# Patient Record
Sex: FEMALE | Race: WHITE | NOT HISPANIC OR LATINO | Employment: OTHER | URBAN - METROPOLITAN AREA
[De-identification: names, ages, dates, MRNs, and addresses within clinical notes are randomized per-mention and may not be internally consistent; named-entity substitution may affect disease eponyms.]

---

## 2018-01-17 ENCOUNTER — TRANSCRIBE ORDERS (OUTPATIENT)
Dept: ADMINISTRATIVE | Facility: HOSPITAL | Age: 78
End: 2018-01-17

## 2018-01-22 ENCOUNTER — APPOINTMENT (OUTPATIENT)
Dept: PREADMISSION TESTING | Facility: HOSPITAL | Age: 78
End: 2018-01-22

## 2018-01-22 LAB
ANION GAP SERPL CALCULATED.3IONS-SCNC: 2 MMOL/L (ref 3–11)
BUN BLD-MCNC: 19 MG/DL (ref 9–23)
BUN/CREAT SERPL: 19 (ref 7–25)
CALCIUM SPEC-SCNC: 9.6 MG/DL (ref 8.7–10.4)
CHLORIDE SERPL-SCNC: 109 MMOL/L (ref 99–109)
CO2 SERPL-SCNC: 29 MMOL/L (ref 20–31)
CREAT BLD-MCNC: 1 MG/DL (ref 0.6–1.3)
DEPRECATED RDW RBC AUTO: 47.1 FL (ref 37–54)
ERYTHROCYTE [DISTWIDTH] IN BLOOD BY AUTOMATED COUNT: 13.5 % (ref 11.3–14.5)
GFR SERPL CREATININE-BSD FRML MDRD: 54 ML/MIN/1.73
GLUCOSE BLD-MCNC: 84 MG/DL (ref 70–100)
HCT VFR BLD AUTO: 36.9 % (ref 34.5–44)
HGB BLD-MCNC: 12.1 G/DL (ref 11.5–15.5)
MCH RBC QN AUTO: 31.1 PG (ref 27–31)
MCHC RBC AUTO-ENTMCNC: 32.8 G/DL (ref 32–36)
MCV RBC AUTO: 94.9 FL (ref 80–99)
PLATELET # BLD AUTO: 277 10*3/MM3 (ref 150–450)
PMV BLD AUTO: 10.3 FL (ref 6–12)
POTASSIUM BLD-SCNC: 4.5 MMOL/L (ref 3.5–5.5)
RBC # BLD AUTO: 3.89 10*6/MM3 (ref 3.89–5.14)
SODIUM BLD-SCNC: 140 MMOL/L (ref 132–146)
WBC NRBC COR # BLD: 5.58 10*3/MM3 (ref 3.5–10.8)

## 2018-01-22 PROCEDURE — 93010 ELECTROCARDIOGRAM REPORT: CPT | Performed by: INTERNAL MEDICINE

## 2018-01-22 PROCEDURE — 80048 BASIC METABOLIC PNL TOTAL CA: CPT | Performed by: SURGERY

## 2018-01-22 PROCEDURE — 85027 COMPLETE CBC AUTOMATED: CPT | Performed by: SURGERY

## 2018-01-22 PROCEDURE — 93005 ELECTROCARDIOGRAM TRACING: CPT

## 2018-01-22 PROCEDURE — 36415 COLL VENOUS BLD VENIPUNCTURE: CPT

## 2018-01-26 ENCOUNTER — LAB REQUISITION (OUTPATIENT)
Dept: LAB | Facility: HOSPITAL | Age: 78
End: 2018-01-26

## 2018-01-26 DIAGNOSIS — C44.722 SQUAMOUS CELL CARCINOMA OF SKIN OF RIGHT LOWER LIMB, INCLUDING HIP: ICD-10-CM

## 2018-01-26 PROCEDURE — 88305 TISSUE EXAM BY PATHOLOGIST: CPT | Performed by: SURGERY

## 2018-01-29 LAB
CYTO UR: NORMAL
LAB AP CASE REPORT: NORMAL
LAB AP CLINICAL INFORMATION: NORMAL
LAB AP DIAGNOSIS COMMENT: NORMAL
Lab: NORMAL
PATH REPORT.FINAL DX SPEC: NORMAL
PATH REPORT.GROSS SPEC: NORMAL

## 2020-12-29 ENCOUNTER — LAB (OUTPATIENT)
Dept: LAB | Facility: HOSPITAL | Age: 80
End: 2020-12-29

## 2020-12-29 ENCOUNTER — OFFICE VISIT (OUTPATIENT)
Dept: ENDOCRINOLOGY | Facility: CLINIC | Age: 80
End: 2020-12-29

## 2020-12-29 VITALS
HEART RATE: 67 BPM | BODY MASS INDEX: 31.87 KG/M2 | WEIGHT: 168.8 LBS | DIASTOLIC BLOOD PRESSURE: 82 MMHG | OXYGEN SATURATION: 99 % | HEIGHT: 61 IN | TEMPERATURE: 97.3 F | SYSTOLIC BLOOD PRESSURE: 134 MMHG

## 2020-12-29 DIAGNOSIS — E04.2 MULTINODULAR GOITER: ICD-10-CM

## 2020-12-29 DIAGNOSIS — E04.2 MULTINODULAR GOITER: Primary | ICD-10-CM

## 2020-12-29 DIAGNOSIS — R94.6 ABNORMAL THYROID FUNCTION TEST: ICD-10-CM

## 2020-12-29 PROBLEM — E03.9 HYPOTHYROIDISM (ACQUIRED): Status: RESOLVED | Noted: 2020-12-29 | Resolved: 2020-12-29

## 2020-12-29 PROBLEM — E03.9 HYPOTHYROIDISM (ACQUIRED): Status: ACTIVE | Noted: 2020-12-29

## 2020-12-29 LAB
DEPRECATED RDW RBC AUTO: 44 FL (ref 37–54)
ERYTHROCYTE [DISTWIDTH] IN BLOOD BY AUTOMATED COUNT: 12.7 % (ref 12.3–15.4)
HCT VFR BLD AUTO: 39.3 % (ref 34–46.6)
HGB BLD-MCNC: 12.9 G/DL (ref 12–15.9)
MCH RBC QN AUTO: 31 PG (ref 26.6–33)
MCHC RBC AUTO-ENTMCNC: 32.8 G/DL (ref 31.5–35.7)
MCV RBC AUTO: 94.5 FL (ref 79–97)
PLATELET # BLD AUTO: 293 10*3/MM3 (ref 140–450)
PMV BLD AUTO: 11.1 FL (ref 6–12)
RBC # BLD AUTO: 4.16 10*6/MM3 (ref 3.77–5.28)
T3 SERPL-MCNC: 98.4 NG/DL (ref 80–200)
T4 FREE SERPL-MCNC: 0.99 NG/DL (ref 0.93–1.7)
TSH SERPL DL<=0.05 MIU/L-ACNC: 0.51 UIU/ML (ref 0.27–4.2)
WBC # BLD AUTO: 7.32 10*3/MM3 (ref 3.4–10.8)

## 2020-12-29 PROCEDURE — 84480 ASSAY TRIIODOTHYRONINE (T3): CPT

## 2020-12-29 PROCEDURE — 84439 ASSAY OF FREE THYROXINE: CPT

## 2020-12-29 PROCEDURE — 80053 COMPREHEN METABOLIC PANEL: CPT

## 2020-12-29 PROCEDURE — 99213 OFFICE O/P EST LOW 20 MIN: CPT | Performed by: INTERNAL MEDICINE

## 2020-12-29 PROCEDURE — 84443 ASSAY THYROID STIM HORMONE: CPT

## 2020-12-29 PROCEDURE — 85027 COMPLETE CBC AUTOMATED: CPT

## 2020-12-29 PROCEDURE — 84445 ASSAY OF TSI GLOBULIN: CPT

## 2020-12-29 RX ORDER — ASPIRIN 325 MG
325 TABLET ORAL DAILY
COMMUNITY

## 2020-12-29 RX ORDER — FOLIC ACID 1 MG/1
1 TABLET ORAL DAILY
COMMUNITY

## 2020-12-29 RX ORDER — MULTIPLE VITAMINS W/ MINERALS TAB 9MG-400MCG
1 TAB ORAL DAILY
COMMUNITY

## 2020-12-29 RX ORDER — EXEMESTANE 25 MG/1
25 TABLET ORAL DAILY
COMMUNITY
Start: 2020-12-03 | End: 2022-06-29

## 2020-12-29 RX ORDER — PROPAFENONE HYDROCHLORIDE 300 MG/1
1 TABLET, COATED ORAL 2 TIMES DAILY
COMMUNITY
Start: 2020-11-13

## 2020-12-29 RX ORDER — KETOCONAZOLE 20 MG/ML
SHAMPOO TOPICAL
COMMUNITY
Start: 2020-12-06 | End: 2022-06-29

## 2020-12-29 NOTE — PROGRESS NOTES
"     Office Note      Date: 2020  Patient Name: Laury Eugene  MRN: [id number]  : 1940    Chief Complaint   Patient presents with   • Follow-up   • Thyroid Problem       History of Present Illness:   Laury Eugene is a 80 y.o. female who presents for Follow-up and Thyroid Problem    She isn't taking any thyroid meds. She denies any excess iodine intake. She denies any recent steroids. She hasn't noted any change in the size of her neck. She denies any compressive sxs. She denies any sxs of hypo- or hyperthyroidism at this time, aside from fatigue and hair loss.  She is on MVI with low dose biotin.    Subjective      Review of Systems:   Review of Systems   Constitutional: Positive for fatigue.   Cardiovascular: Negative.    Gastrointestinal: Negative.    Endocrine: Negative.        The following portions of the patient's history were reviewed and updated as appropriate: allergies, current medications, past family history, past medical history, past social history, past surgical history and problem list.    Objective     Visit Vitals  /82   Pulse 67   Temp 97.3 °F (36.3 °C) (Infrared)   Ht 154.9 cm (61\")   Wt 76.6 kg (168 lb 12.8 oz)   SpO2 99%   BMI 31.89 kg/m²       Physical Exam:  Physical Exam  Constitutional:       Appearance: Normal appearance.   Neck:      Thyroid: No thyroid mass, thyromegaly or thyroid tenderness.   Lymphadenopathy:      Cervical: No cervical adenopathy.   Neurological:      Mental Status: She is alert.         Labs:    TSH  No results found for: TSHBASE     Free T4  No results found for: FREET4    T3  No results found for: [id number]      TPO  No results found for: THYROIDAB    TG AB  No results found for: THGAB    TG  No results found for: THYROGLB    CBC w/DIFF  Lab Results   Component Value Date    WBC 5.58 2018    RBC 3.89 2018    HGB 12.1 2018    HCT 36.9 2018    MCV 94.9 2018    MCH 31.1 (H) 2018    MCHC 32.8 2018    RDW " 13.5 01/22/2018    RDWSD 47.1 01/22/2018    MPV 10.3 01/22/2018     01/22/2018           Assessment / Plan      Assessment & Plan:  Problem List Items Addressed This Visit        Other    Multinodular goiter - Primary    Current Assessment & Plan     Plan for neck u/s in about 6 months.         Relevant Orders    Comprehensive Metabolic Panel    CBC (No Diff)    Abnormal thyroid function test    Current Assessment & Plan     TSH has been low.  I would like to check further labs.  Could she have Graves' disease or toxic nodule?  We discussed treatment with methimazole if needed.         Relevant Orders    TSH    T4, Free    T3    Thyroid Stimulating Immunoglobulin           Return in about 3 months (around 3/29/2021) for Recheck with TSH, free T4, CMP, CBC.    Roger Hodgson MD   12/29/2020

## 2020-12-29 NOTE — ASSESSMENT & PLAN NOTE
TSH has been low.  I would like to check further labs.  Could she have Graves' disease or toxic nodule?  We discussed treatment with methimazole if needed.

## 2020-12-30 LAB
ALBUMIN SERPL-MCNC: 3.9 G/DL (ref 3.5–5.2)
ALBUMIN/GLOB SERPL: 1.2 G/DL
ALP SERPL-CCNC: 104 U/L (ref 39–117)
ALT SERPL W P-5'-P-CCNC: 11 U/L (ref 1–33)
ANION GAP SERPL CALCULATED.3IONS-SCNC: 10.6 MMOL/L (ref 5–15)
AST SERPL-CCNC: 16 U/L (ref 1–32)
BILIRUB SERPL-MCNC: 0.2 MG/DL (ref 0–1.2)
BUN SERPL-MCNC: 20 MG/DL (ref 8–23)
BUN/CREAT SERPL: 20 (ref 7–25)
CALCIUM SPEC-SCNC: 9.3 MG/DL (ref 8.6–10.5)
CHLORIDE SERPL-SCNC: 103 MMOL/L (ref 98–107)
CO2 SERPL-SCNC: 25.4 MMOL/L (ref 22–29)
CREAT SERPL-MCNC: 1 MG/DL (ref 0.57–1)
GFR SERPL CREATININE-BSD FRML MDRD: 54 ML/MIN/1.73
GLOBULIN UR ELPH-MCNC: 3.2 GM/DL
GLUCOSE SERPL-MCNC: 75 MG/DL (ref 65–99)
POTASSIUM SERPL-SCNC: 4.8 MMOL/L (ref 3.5–5.2)
PROT SERPL-MCNC: 7.1 G/DL (ref 6–8.5)
SODIUM SERPL-SCNC: 139 MMOL/L (ref 136–145)

## 2021-01-02 LAB — TSI SER-ACNC: <0.1 IU/L (ref 0–0.55)

## 2021-04-05 ENCOUNTER — LAB (OUTPATIENT)
Dept: LAB | Facility: HOSPITAL | Age: 81
End: 2021-04-05

## 2021-04-05 ENCOUNTER — OFFICE VISIT (OUTPATIENT)
Dept: ENDOCRINOLOGY | Facility: CLINIC | Age: 81
End: 2021-04-05

## 2021-04-05 VITALS
TEMPERATURE: 98.6 F | DIASTOLIC BLOOD PRESSURE: 52 MMHG | BODY MASS INDEX: 31.34 KG/M2 | HEIGHT: 61 IN | HEART RATE: 74 BPM | OXYGEN SATURATION: 98 % | SYSTOLIC BLOOD PRESSURE: 122 MMHG | WEIGHT: 166 LBS

## 2021-04-05 DIAGNOSIS — R94.6 ABNORMAL THYROID FUNCTION TEST: ICD-10-CM

## 2021-04-05 DIAGNOSIS — E04.2 MULTINODULAR GOITER: ICD-10-CM

## 2021-04-05 DIAGNOSIS — E04.2 MULTINODULAR GOITER: Primary | ICD-10-CM

## 2021-04-05 LAB — TSH SERPL DL<=0.05 MIU/L-ACNC: 0.18 UIU/ML (ref 0.27–4.2)

## 2021-04-05 PROCEDURE — 99213 OFFICE O/P EST LOW 20 MIN: CPT | Performed by: INTERNAL MEDICINE

## 2021-04-05 PROCEDURE — 84443 ASSAY THYROID STIM HORMONE: CPT

## 2021-04-05 NOTE — ASSESSMENT & PLAN NOTE
She stopped the MVI with biotin about 5 days ago.  Check TSH and free T4 today.  Will send note about results.

## 2021-04-05 NOTE — PROGRESS NOTES
"     Office Note      Date: 2021  Patient Name: Laury Eugene  MRN: [id number]  : 1940    Chief Complaint   Patient presents with   • Thyroid Problem       History of Present Illness:   Laury Eugene is a 81 y.o. female who presents for Thyroid Problem    She isn't taking any thyroid meds. She denies any excess iodine intake. She denies any recent steroids. She hasn't noted any change in the size of her neck. She denies any compressive sxs. She denies any sxs of hypo- or hyperthyroidism at this time, aside from fatigue and hair loss.  She is on MVI with low dose biotin.    Subjective      Review of Systems:   Review of Systems   Constitutional: Positive for fatigue.   Cardiovascular: Negative.    Gastrointestinal: Negative.    Endocrine: Negative.        The following portions of the patient's history were reviewed and updated as appropriate: allergies, current medications, past family history, past medical history, past social history, past surgical history and problem list.    Objective     Visit Vitals  /52 (BP Location: Right arm, Patient Position: Sitting, Cuff Size: Adult)   Pulse 74   Temp 98.6 °F (37 °C) (Infrared)   Ht 154.9 cm (61\")   Wt 75.3 kg (166 lb)   SpO2 98%   BMI 31.37 kg/m²       Physical Exam:  Physical Exam  Constitutional:       Appearance: Normal appearance.   Neck:      Thyroid: No thyroid mass, thyromegaly or thyroid tenderness.   Lymphadenopathy:      Cervical: No cervical adenopathy.   Neurological:      Mental Status: She is alert.         Labs:    TSH  No results found for: TSHBASE     Free T4  Free T4   Date Value Ref Range Status   2020 0.99 0.93 - 1.70 ng/dL Final       T3  T3, Total   Date Value Ref Range Status   2020 98.4 80.0 - 200.0 ng/dl Final         TPO  No results found for: THYROIDAB    TG AB  No results found for: THGAB    TG  No results found for: THYROGLB    CBC w/DIFF  Lab Results   Component Value Date    WBC 7.32 2020    RBC " 4.16 12/29/2020    HGB 12.9 12/29/2020    HCT 39.3 12/29/2020    MCV 94.5 12/29/2020    MCH 31.0 12/29/2020    MCHC 32.8 12/29/2020    RDW 12.7 12/29/2020    RDWSD 44.0 12/29/2020    MPV 11.1 12/29/2020     12/29/2020           Assessment / Plan      Assessment & Plan:  Diagnoses and all orders for this visit:    1. Multinodular goiter (Primary)  Assessment & Plan:  Stable to palpation.  Plan for neck u/s next visit.    Orders:  -     TSH; Future    2. Abnormal thyroid function test  Assessment & Plan:  She stopped the MVI with biotin about 5 days ago.  Check TSH and free T4 today.  Will send note about results.          Return in about 3 months (around 7/5/2021) for Recheck with TSH, free T4, neck u/s.    Roger Hodgson MD   04/05/2021

## 2021-08-03 ENCOUNTER — LAB (OUTPATIENT)
Dept: LAB | Facility: HOSPITAL | Age: 81
End: 2021-08-03

## 2021-08-03 ENCOUNTER — OFFICE VISIT (OUTPATIENT)
Dept: ENDOCRINOLOGY | Facility: CLINIC | Age: 81
End: 2021-08-03

## 2021-08-03 VITALS
OXYGEN SATURATION: 98 % | SYSTOLIC BLOOD PRESSURE: 124 MMHG | BODY MASS INDEX: 31.15 KG/M2 | HEART RATE: 60 BPM | HEIGHT: 61 IN | WEIGHT: 165 LBS | DIASTOLIC BLOOD PRESSURE: 70 MMHG

## 2021-08-03 DIAGNOSIS — E04.2 MULTINODULAR GOITER: Primary | ICD-10-CM

## 2021-08-03 DIAGNOSIS — R94.6 ABNORMAL THYROID FUNCTION TEST: ICD-10-CM

## 2021-08-03 PROCEDURE — 99213 OFFICE O/P EST LOW 20 MIN: CPT | Performed by: INTERNAL MEDICINE

## 2021-08-03 PROCEDURE — 84439 ASSAY OF FREE THYROXINE: CPT

## 2021-08-03 PROCEDURE — 76536 US EXAM OF HEAD AND NECK: CPT | Performed by: INTERNAL MEDICINE

## 2021-08-03 PROCEDURE — 84443 ASSAY THYROID STIM HORMONE: CPT

## 2021-08-03 NOTE — ASSESSMENT & PLAN NOTE
A neck u/s was performed today.  This revealed multiple bilateral thyroid nodules.  The largest was on the right and was 1.9cm in size.  The largest on the left was 8mm in size.  These appear stable compared to u/s from 11/26/2019.  No abnormal lymph nodes were seen.    Plan for another u/s in 2 years.

## 2021-08-03 NOTE — PROGRESS NOTES
"     Office Note      Date: 2021  Patient Name: Laury Eugene  MRN: [id number]  : 1940    Chief Complaint   Patient presents with   • Thyroid Problem       History of Present Illness:   Laury Eugene is a 81 y.o. female who presents for Thyroid Problem    She isn't taking any thyroid meds. She denies any excess iodine intake. She denies any recent steroids. She hasn't noted any change in the size of her neck. She denies any compressive sxs. She denies any sxs of hypo- or hyperthyroidism at this time, aside from fatigue.  She is on MVI with low dose biotin.    Subjective      Review of Systems:   Review of Systems   Constitutional: Positive for fatigue.   Cardiovascular: Negative.    Gastrointestinal: Negative.    Endocrine: Negative.        The following portions of the patient's history were reviewed and updated as appropriate: allergies, current medications, past family history, past medical history, past social history, past surgical history and problem list.    Objective     Visit Vitals  /70 (BP Location: Right arm, Patient Position: Sitting, Cuff Size: Adult)   Pulse 60   Ht 154.9 cm (61\")   Wt 74.8 kg (165 lb)   SpO2 98%   BMI 31.18 kg/m²       Physical Exam:  Physical Exam  Constitutional:       Appearance: Normal appearance.   Neurological:      Mental Status: She is alert.         Labs:    TSH  No results found for: TSHBASE     Free T4  Free T4   Date Value Ref Range Status   2020 0.99 0.93 - 1.70 ng/dL Final       T3  T3, Total   Date Value Ref Range Status   2020 98.4 80.0 - 200.0 ng/dl Final         TPO  No results found for: THYROIDAB    TG AB  No results found for: THGAB    TG  No results found for: THYROGLB    CBC w/DIFF  Lab Results   Component Value Date    WBC 7.32 2020    RBC 4.16 2020    HGB 12.9 2020    HCT 39.3 2020    MCV 94.5 2020    MCH 31.0 2020    MCHC 32.8 2020    RDW 12.7 2020    RDWSD 44.0 2020 "    MPV 11.1 12/29/2020     12/29/2020           Assessment / Plan      Assessment & Plan:  Diagnoses and all orders for this visit:    1. Multinodular goiter (Primary)  Assessment & Plan:  A neck u/s was performed today.  This revealed multiple bilateral thyroid nodules.  The largest was on the right and was 1.9cm in size.  The largest on the left was 8mm in size.  These appear stable compared to u/s from 11/26/2019.  No abnormal lymph nodes were seen.    Plan for another u/s in 2 years.    Orders:  -     US Thyroid    2. Abnormal thyroid function test  Assessment & Plan:  TSH has been mildly low.  Check TFTs today.  Will send note about results.    Orders:  -     TSH; Future  -     T4, Free; Future      Return in about 6 months (around 2/3/2022) for Recheck with TSH, free T4.    Roger Hodgson MD   08/03/2021

## 2021-08-04 LAB
T4 FREE SERPL-MCNC: 1.12 NG/DL (ref 0.93–1.7)
TSH SERPL DL<=0.05 MIU/L-ACNC: 0.24 UIU/ML (ref 0.27–4.2)

## 2022-06-29 ENCOUNTER — OFFICE VISIT (OUTPATIENT)
Dept: ENDOCRINOLOGY | Facility: CLINIC | Age: 82
End: 2022-06-29

## 2022-06-29 ENCOUNTER — LAB (OUTPATIENT)
Dept: LAB | Facility: HOSPITAL | Age: 82
End: 2022-06-29

## 2022-06-29 VITALS
HEIGHT: 61 IN | BODY MASS INDEX: 30.96 KG/M2 | SYSTOLIC BLOOD PRESSURE: 126 MMHG | WEIGHT: 164 LBS | DIASTOLIC BLOOD PRESSURE: 70 MMHG | OXYGEN SATURATION: 98 % | HEART RATE: 72 BPM

## 2022-06-29 DIAGNOSIS — R94.6 ABNORMAL THYROID FUNCTION TEST: ICD-10-CM

## 2022-06-29 DIAGNOSIS — E04.2 MULTINODULAR GOITER: Primary | ICD-10-CM

## 2022-06-29 LAB
T4 FREE SERPL-MCNC: 1.12 NG/DL (ref 0.93–1.7)
TSH SERPL DL<=0.05 MIU/L-ACNC: 0.26 UIU/ML (ref 0.27–4.2)

## 2022-06-29 PROCEDURE — 99213 OFFICE O/P EST LOW 20 MIN: CPT | Performed by: INTERNAL MEDICINE

## 2022-06-29 PROCEDURE — 84443 ASSAY THYROID STIM HORMONE: CPT | Performed by: INTERNAL MEDICINE

## 2022-06-29 PROCEDURE — 84439 ASSAY OF FREE THYROXINE: CPT | Performed by: INTERNAL MEDICINE

## 2022-06-29 NOTE — PROGRESS NOTES
"     Office Note      Date: 2022  Patient Name: Laury Eugene  MRN: [id number]  : 1940    Chief Complaint   Patient presents with   • Goiter       History of Present Illness:   Laury Eugene is a 82 y.o. female who presents for Goiter    She isn't taking any thyroid meds. She denies any excess iodine intake. She denies any recent steroids. She hasn't noted any change in the size of her neck. She denies any compressive sxs. She denies any sxs of hypo- or hyperthyroidism at this time, aside from fatigue.  She is on MVI with low dose biotin.    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  She had cataract surgery since her last visit.      Subjective      Review of Systems:   Constitutional: Positive for fatigue.   Cardiovascular: Negative.    Gastrointestinal: Negative.    Endocrine: Negative.      The following portions of the patient's history were reviewed and updated as appropriate: allergies, current medications, past family history, past medical history, past social history, past surgical history and problem list.    Objective     Visit Vitals  /70 (BP Location: Left arm, Patient Position: Sitting, Cuff Size: Adult)   Pulse 72   Ht 154.9 cm (61\")   Wt 74.4 kg (164 lb)   SpO2 98%   BMI 30.99 kg/m²       Physical Exam:  Physical Exam  Constitutional:       Appearance: Normal appearance.   Neck:      Thyroid: No thyroid mass, thyromegaly or thyroid tenderness.   Lymphadenopathy:      Cervical: No cervical adenopathy.   Neurological:      Mental Status: She is alert.         Labs:    TSH  No results found for: TSHBASE     Free T4  Free T4   Date Value Ref Range Status   2021 1.12 0.93 - 1.70 ng/dL Final       T3  T3, Total   Date Value Ref Range Status   2020 98.4 80.0 - 200.0 ng/dl Final         TPO  No results found for: THYROIDAB    TG AB  No results found for: THGAB    TG  No results found for: THYROGLB    CBC w/DIFF  Lab Results   Component Value Date    WBC 7.32 " 12/29/2020    RBC 4.16 12/29/2020    HGB 12.9 12/29/2020    HCT 39.3 12/29/2020    MCV 94.5 12/29/2020    MCH 31.0 12/29/2020    MCHC 32.8 12/29/2020    RDW 12.7 12/29/2020    RDWSD 44.0 12/29/2020    MPV 11.1 12/29/2020     12/29/2020           Assessment / Plan      Assessment & Plan:  Diagnoses and all orders for this visit:    1. Multinodular goiter (Primary)  Assessment & Plan:  Plan for neck u/s in a year.      2. Abnormal thyroid function test  Assessment & Plan:  TSH has been low in the past.  Check TFTs today.    Orders:  -     TSH; Future  -     T4, Free; Future      Return in about 6 months (around 12/29/2022) for Recheck with TSH, free T4.    Roger Hodgson MD   06/29/2022

## 2022-12-30 ENCOUNTER — LAB (OUTPATIENT)
Dept: LAB | Facility: HOSPITAL | Age: 82
End: 2022-12-30
Payer: MEDICARE

## 2022-12-30 ENCOUNTER — OFFICE VISIT (OUTPATIENT)
Dept: ENDOCRINOLOGY | Facility: CLINIC | Age: 82
End: 2022-12-30

## 2022-12-30 VITALS
DIASTOLIC BLOOD PRESSURE: 68 MMHG | WEIGHT: 166 LBS | HEART RATE: 84 BPM | HEIGHT: 61 IN | SYSTOLIC BLOOD PRESSURE: 122 MMHG | BODY MASS INDEX: 31.34 KG/M2 | OXYGEN SATURATION: 98 %

## 2022-12-30 DIAGNOSIS — R94.6 ABNORMAL THYROID FUNCTION TEST: ICD-10-CM

## 2022-12-30 DIAGNOSIS — E04.2 MULTINODULAR GOITER: Primary | ICD-10-CM

## 2022-12-30 LAB
T4 FREE SERPL-MCNC: 1.13 NG/DL (ref 0.93–1.7)
TSH SERPL DL<=0.05 MIU/L-ACNC: 0.1 UIU/ML (ref 0.27–4.2)

## 2022-12-30 PROCEDURE — 84443 ASSAY THYROID STIM HORMONE: CPT

## 2022-12-30 PROCEDURE — 99213 OFFICE O/P EST LOW 20 MIN: CPT | Performed by: INTERNAL MEDICINE

## 2022-12-30 PROCEDURE — 84439 ASSAY OF FREE THYROXINE: CPT

## 2022-12-30 NOTE — PROGRESS NOTES
"     Office Note      Date: 2022  Patient Name: Laury Eugene  MRN: [id number]  : 1940    Chief Complaint   Patient presents with   • Goiter       History of Present Illness:   Laury Eugene is a 82 y.o. female who presents for Goiter    She isn't taking any thyroid meds. She denies any excess iodine intake. She denies any recent steroids. She hasn't noted any change in the size of her neck. She denies any compressive sxs. She denies any sxs of hypo- or hyperthyroidism at this time, aside from fatigue.  She is on MVI with low dose biotin.    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    Subjective      Review of Systems:   Review of Systems   Constitutional: Positive for fatigue.   Cardiovascular: Negative.    Gastrointestinal: Negative.    Endocrine: Negative.        The following portions of the patient's history were reviewed and updated as appropriate: allergies, current medications, past family history, past medical history, past social history, past surgical history and problem list.    Objective     Visit Vitals  /68   Pulse 84   Ht 154.9 cm (61\")   Wt 75.3 kg (166 lb)   SpO2 98%   BMI 31.37 kg/m²       Physical Exam:  Physical Exam  Constitutional:       Appearance: Normal appearance.   Neck:      Thyroid: No thyroid mass, thyromegaly or thyroid tenderness.   Lymphadenopathy:      Cervical: No cervical adenopathy.   Neurological:      Mental Status: She is alert.         Labs:    TSH  No results found for: TSHBASE     Free T4  Free T4   Date Value Ref Range Status   2022 1.12 0.93 - 1.70 ng/dL Final       T3  T3, Total   Date Value Ref Range Status   2020 98.4 80.0 - 200.0 ng/dl Final         TPO  No results found for: THYROIDAB    TG AB  No results found for: THGAB    TG  No results found for: THYROGLB    CBC w/DIFF  Lab Results   Component Value Date    WBC 7.32 2020    RBC 4.16 2020    HGB 12.9 2020    HCT 39.3 " 12/29/2020    MCV 94.5 12/29/2020    MCH 31.0 12/29/2020    MCHC 32.8 12/29/2020    RDW 12.7 12/29/2020    RDWSD 44.0 12/29/2020    MPV 11.1 12/29/2020     12/29/2020           Assessment / Plan      Assessment & Plan:  Diagnoses and all orders for this visit:    1. Multinodular goiter (Primary)  Assessment & Plan:  Plan for neck u/s in about 6 months.      2. Abnormal thyroid function test  Assessment & Plan:  TSH has been mildly low.  Check TFTs today.  Will send note about results.    Orders:  -     TSH; Future  -     T4, Free; Future    Current Outpatient Medications   Medication Instructions   • aspirin 325 mg, Oral, Daily   • folic acid (FOLVITE) 1 mg, Oral, Daily   • multivitamin with minerals tablet tablet 1 tablet, Oral, Daily   • propafenone (RYTHMOL) 300 MG tablet 1 tablet, 2 Times Daily   • vitamin B-12 (CYANOCOBALAMIN) 250 mcg, Oral, Daily      Return in about 6 months (around 6/30/2023) for Recheck with TSH, free T4, neck u/s.    Roger Hodgson MD   12/30/2022

## 2023-08-26 ENCOUNTER — APPOINTMENT (OUTPATIENT)
Dept: GENERAL RADIOLOGY | Facility: HOSPITAL | Age: 83
DRG: 061 | End: 2023-08-26
Payer: MEDICARE

## 2023-08-26 ENCOUNTER — APPOINTMENT (OUTPATIENT)
Dept: CT IMAGING | Facility: HOSPITAL | Age: 83
DRG: 061 | End: 2023-08-26
Payer: MEDICARE

## 2023-08-26 ENCOUNTER — HOSPITAL ENCOUNTER (INPATIENT)
Facility: HOSPITAL | Age: 83
LOS: 9 days | Discharge: NURSING, REHAB OR LONG-TERM CARE | DRG: 061 | End: 2023-09-04
Attending: EMERGENCY MEDICINE | Admitting: INTERNAL MEDICINE
Payer: MEDICARE

## 2023-08-26 ENCOUNTER — APPOINTMENT (OUTPATIENT)
Dept: CARDIOLOGY | Facility: HOSPITAL | Age: 83
DRG: 061 | End: 2023-08-26
Payer: MEDICARE

## 2023-08-26 ENCOUNTER — APPOINTMENT (OUTPATIENT)
Dept: MRI IMAGING | Facility: HOSPITAL | Age: 83
DRG: 061 | End: 2023-08-26
Payer: MEDICARE

## 2023-08-26 DIAGNOSIS — R13.10 DYSPHAGIA, UNSPECIFIED TYPE: ICD-10-CM

## 2023-08-26 DIAGNOSIS — R47.01 APHASIA: ICD-10-CM

## 2023-08-26 DIAGNOSIS — D62 ABLA (ACUTE BLOOD LOSS ANEMIA): ICD-10-CM

## 2023-08-26 DIAGNOSIS — I63.9 ACUTE CVA (CEREBROVASCULAR ACCIDENT): Primary | ICD-10-CM

## 2023-08-26 DIAGNOSIS — D64.9 ANEMIA, UNSPECIFIED TYPE: ICD-10-CM

## 2023-08-26 DIAGNOSIS — K92.2 ACUTE LOWER GASTROINTESTINAL BLEEDING: ICD-10-CM

## 2023-08-26 PROBLEM — I48.0 PAROXYSMAL ATRIAL FIBRILLATION: Status: ACTIVE | Noted: 2023-08-26

## 2023-08-26 PROBLEM — I10 ESSENTIAL HYPERTENSION: Status: ACTIVE | Noted: 2023-08-26

## 2023-08-26 PROBLEM — I63.512 ACUTE ISCHEMIC LEFT MCA STROKE: Status: ACTIVE | Noted: 2023-08-26

## 2023-08-26 PROBLEM — E78.5 HYPERLIPIDEMIA: Status: ACTIVE | Noted: 2023-08-26

## 2023-08-26 PROBLEM — E03.9 HYPOTHYROIDISM (ACQUIRED): Status: ACTIVE | Noted: 2023-08-26

## 2023-08-26 LAB
ABO GROUP BLD: NORMAL
ALBUMIN SERPL-MCNC: 2.8 G/DL (ref 3.5–5.2)
ALBUMIN/GLOB SERPL: 1.3 G/DL
ALP SERPL-CCNC: 85 U/L (ref 39–117)
ALT SERPL W P-5'-P-CCNC: 8 U/L (ref 1–33)
ALT SERPL W P-5'-P-CCNC: 9 U/L (ref 1–33)
ANION GAP SERPL CALCULATED.3IONS-SCNC: 6 MMOL/L (ref 5–15)
APTT PPP: 23 SECONDS (ref 22–39)
AST SERPL-CCNC: 14 U/L (ref 1–32)
AST SERPL-CCNC: 16 U/L (ref 1–32)
BASOPHILS # BLD AUTO: 0.03 10*3/MM3 (ref 0–0.2)
BASOPHILS # BLD AUTO: 0.04 10*3/MM3 (ref 0–0.2)
BASOPHILS NFR BLD AUTO: 0.4 % (ref 0–1.5)
BASOPHILS NFR BLD AUTO: 0.4 % (ref 0–1.5)
BH CV ECHO MEAS - AO ROOT DIAM: 2.7 CM
BH CV ECHO MEAS - EDV(CUBED): 48.4 ML
BH CV ECHO MEAS - EDV(MOD-SP4): 78.6 ML
BH CV ECHO MEAS - EF(MOD-SP4): 52.8 %
BH CV ECHO MEAS - ESV(CUBED): 20.6 ML
BH CV ECHO MEAS - ESV(MOD-SP4): 37.1 ML
BH CV ECHO MEAS - FS: 24.7 %
BH CV ECHO MEAS - IVS/LVPW: 1.06 CM
BH CV ECHO MEAS - IVSD: 0.98 CM
BH CV ECHO MEAS - LA DIMENSION: 3.6 CM
BH CV ECHO MEAS - LAT PEAK E' VEL: 14.9 CM/SEC
BH CV ECHO MEAS - LV DIASTOLIC VOL/BSA (35-75): 44.9 CM2
BH CV ECHO MEAS - LV MASS(C)D: 103.1 GRAMS
BH CV ECHO MEAS - LV MAX PG: 2.37 MMHG
BH CV ECHO MEAS - LV MEAN PG: 1 MMHG
BH CV ECHO MEAS - LV SYSTOLIC VOL/BSA (12-30): 21.2 CM2
BH CV ECHO MEAS - LV V1 MAX: 76.9 CM/SEC
BH CV ECHO MEAS - LV V1 VTI: 14.8 CM
BH CV ECHO MEAS - LVIDD: 3.6 CM
BH CV ECHO MEAS - LVIDS: 2.7 CM
BH CV ECHO MEAS - LVOT AREA: 2.13 CM2
BH CV ECHO MEAS - LVOT DIAM: 1.65 CM
BH CV ECHO MEAS - LVPWD: 0.93 CM
BH CV ECHO MEAS - MED PEAK E' VEL: 12 CM/SEC
BH CV ECHO MEAS - MV A MAX VEL: 44.3 CM/SEC
BH CV ECHO MEAS - MV E MAX VEL: 91.3 CM/SEC
BH CV ECHO MEAS - MV E/A: 2.06
BH CV ECHO MEAS - MV MAX PG: 8.1 MMHG
BH CV ECHO MEAS - MV MEAN PG: 2.7 MMHG
BH CV ECHO MEAS - MV V2 VTI: 18.1 CM
BH CV ECHO MEAS - MVA(VTI): 1.74 CM2
BH CV ECHO MEAS - PA ACC TIME: 0.12 SEC
BH CV ECHO MEAS - SI(MOD-SP4): 23.7 ML/M2
BH CV ECHO MEAS - SV(LVOT): 31.5 ML
BH CV ECHO MEAS - SV(MOD-SP4): 41.5 ML
BH CV ECHO MEAS - TAPSE (>1.6): 1.89 CM
BH CV ECHO MEASUREMENTS AVERAGE E/E' RATIO: 6.79
BH CV XLRA - RV BASE: 3.6 CM
BH CV XLRA - RV LENGTH: 5 CM
BH CV XLRA - RV MID: 2.19 CM
BH CV XLRA - TDI S': 21.3 CM/SEC
BILIRUB SERPL-MCNC: 0.2 MG/DL (ref 0–1.2)
BLD GP AB SCN SERPL QL: NEGATIVE
BUN BLDA-MCNC: 16 MG/DL (ref 8–26)
BUN SERPL-MCNC: 17 MG/DL (ref 8–23)
BUN/CREAT SERPL: 20.2 (ref 7–25)
CA-I BLDA-SCNC: 1.16 MMOL/L (ref 1.2–1.32)
CALCIUM SPEC-SCNC: 7.5 MG/DL (ref 8.6–10.5)
CHLORIDE BLDA-SCNC: 103 MMOL/L (ref 98–109)
CHLORIDE SERPL-SCNC: 107 MMOL/L (ref 98–107)
CHOLEST SERPL-MCNC: 129 MG/DL (ref 0–200)
CHOLEST SERPL-MCNC: 143 MG/DL (ref 0–200)
CO2 BLDA-SCNC: 25 MMOL/L (ref 24–29)
CO2 SERPL-SCNC: 25 MMOL/L (ref 22–29)
CREAT BLDA-MCNC: 1 MG/DL (ref 0.6–1.3)
CREAT SERPL-MCNC: 0.84 MG/DL (ref 0.57–1)
DEPRECATED RDW RBC AUTO: 49.1 FL (ref 37–54)
DEPRECATED RDW RBC AUTO: 50.2 FL (ref 37–54)
EGFRCR SERPLBLD CKD-EPI 2021: 56.7 ML/MIN/1.73
EGFRCR SERPLBLD CKD-EPI 2021: 69.9 ML/MIN/1.73
EOSINOPHIL # BLD AUTO: 0.15 10*3/MM3 (ref 0–0.4)
EOSINOPHIL # BLD AUTO: 0.38 10*3/MM3 (ref 0–0.4)
EOSINOPHIL NFR BLD AUTO: 1.8 % (ref 0.3–6.2)
EOSINOPHIL NFR BLD AUTO: 4.1 % (ref 0.3–6.2)
ERYTHROCYTE [DISTWIDTH] IN BLOOD BY AUTOMATED COUNT: 14.5 % (ref 12.3–15.4)
ERYTHROCYTE [DISTWIDTH] IN BLOOD BY AUTOMATED COUNT: 14.8 % (ref 12.3–15.4)
GLOBULIN UR ELPH-MCNC: 2.2 GM/DL
GLUCOSE BLDC GLUCOMTR-MCNC: 113 MG/DL (ref 70–130)
GLUCOSE BLDC GLUCOMTR-MCNC: 131 MG/DL (ref 70–130)
GLUCOSE BLDC GLUCOMTR-MCNC: 158 MG/DL (ref 70–130)
GLUCOSE BLDC GLUCOMTR-MCNC: 176 MG/DL (ref 70–130)
GLUCOSE BLDC GLUCOMTR-MCNC: 195 MG/DL (ref 70–130)
GLUCOSE SERPL-MCNC: 127 MG/DL (ref 65–99)
HCT VFR BLD AUTO: 26 % (ref 34–46.6)
HCT VFR BLD AUTO: 27 % (ref 34–46.6)
HCT VFR BLD AUTO: 27 % (ref 34–46.6)
HCT VFR BLD AUTO: 27.9 % (ref 34–46.6)
HCT VFR BLD AUTO: 28.3 % (ref 34–46.6)
HCT VFR BLDA CALC: 26 % (ref 38–51)
HDLC SERPL-MCNC: 41 MG/DL (ref 40–60)
HDLC SERPL-MCNC: 45 MG/DL (ref 40–60)
HGB BLD-MCNC: 8.6 G/DL (ref 12–15.9)
HGB BLD-MCNC: 9 G/DL (ref 12–15.9)
HGB BLD-MCNC: 9.1 G/DL (ref 12–15.9)
HGB BLD-MCNC: 9.4 G/DL (ref 12–15.9)
HGB BLD-MCNC: 9.5 G/DL (ref 12–15.9)
HGB BLDA-MCNC: 8.8 G/DL (ref 12–17)
HOLD SPECIMEN: NORMAL
IMM GRANULOCYTES # BLD AUTO: 0.06 10*3/MM3 (ref 0–0.05)
IMM GRANULOCYTES # BLD AUTO: 0.06 10*3/MM3 (ref 0–0.05)
IMM GRANULOCYTES NFR BLD AUTO: 0.7 % (ref 0–0.5)
IMM GRANULOCYTES NFR BLD AUTO: 0.7 % (ref 0–0.5)
INR PPP: 1 (ref 0.8–1.2)
LDLC SERPL CALC-MCNC: 73 MG/DL (ref 0–100)
LDLC SERPL CALC-MCNC: 80 MG/DL (ref 0–100)
LDLC/HDLC SERPL: 1.74 {RATIO}
LDLC/HDLC SERPL: 1.79 {RATIO}
LYMPHOCYTES # BLD AUTO: 1.31 10*3/MM3 (ref 0.7–3.1)
LYMPHOCYTES # BLD AUTO: 2.35 10*3/MM3 (ref 0.7–3.1)
LYMPHOCYTES NFR BLD AUTO: 16 % (ref 19.6–45.3)
LYMPHOCYTES NFR BLD AUTO: 25.6 % (ref 19.6–45.3)
MAGNESIUM SERPL-MCNC: 1.9 MG/DL (ref 1.6–2.4)
MCH RBC QN AUTO: 30.7 PG (ref 26.6–33)
MCH RBC QN AUTO: 30.8 PG (ref 26.6–33)
MCHC RBC AUTO-ENTMCNC: 33.1 G/DL (ref 31.5–35.7)
MCHC RBC AUTO-ENTMCNC: 33.6 G/DL (ref 31.5–35.7)
MCV RBC AUTO: 91.6 FL (ref 79–97)
MCV RBC AUTO: 93.2 FL (ref 79–97)
MONOCYTES # BLD AUTO: 0.67 10*3/MM3 (ref 0.1–0.9)
MONOCYTES # BLD AUTO: 1.08 10*3/MM3 (ref 0.1–0.9)
MONOCYTES NFR BLD AUTO: 11.8 % (ref 5–12)
MONOCYTES NFR BLD AUTO: 8.2 % (ref 5–12)
NEUTROPHILS NFR BLD AUTO: 5.27 10*3/MM3 (ref 1.7–7)
NEUTROPHILS NFR BLD AUTO: 5.99 10*3/MM3 (ref 1.7–7)
NEUTROPHILS NFR BLD AUTO: 57.4 % (ref 42.7–76)
NEUTROPHILS NFR BLD AUTO: 72.9 % (ref 42.7–76)
NRBC BLD AUTO-RTO: 0 /100 WBC (ref 0–0.2)
NRBC BLD AUTO-RTO: 0 /100 WBC (ref 0–0.2)
PHOSPHATE SERPL-MCNC: 2.6 MG/DL (ref 2.5–4.5)
PLATELET # BLD AUTO: 213 10*3/MM3 (ref 140–450)
PLATELET # BLD AUTO: 226 10*3/MM3 (ref 140–450)
PMV BLD AUTO: 10.4 FL (ref 6–12)
PMV BLD AUTO: 10.7 FL (ref 6–12)
POTASSIUM BLDA-SCNC: 3.7 MMOL/L (ref 3.5–4.9)
POTASSIUM SERPL-SCNC: 3.9 MMOL/L (ref 3.5–5.2)
PROT SERPL-MCNC: 5 G/DL (ref 6–8.5)
PROTHROMBIN TIME: 12.2 SECONDS (ref 12.8–15.2)
RBC # BLD AUTO: 2.79 10*6/MM3 (ref 3.77–5.28)
RBC # BLD AUTO: 3.09 10*6/MM3 (ref 3.77–5.28)
RH BLD: POSITIVE
SODIUM BLD-SCNC: 139 MMOL/L (ref 138–146)
SODIUM SERPL-SCNC: 138 MMOL/L (ref 136–145)
T&S EXPIRATION DATE: NORMAL
T4 FREE SERPL-MCNC: 1.3 NG/DL (ref 0.93–1.7)
TRIGL SERPL-MCNC: 74 MG/DL (ref 0–150)
TRIGL SERPL-MCNC: 98 MG/DL (ref 0–150)
TROPONIN T SERPL HS-MCNC: 18 NG/L
TSH SERPL DL<=0.05 MIU/L-ACNC: 0.2 UIU/ML (ref 0.27–4.2)
VLDLC SERPL-MCNC: 15 MG/DL (ref 5–40)
VLDLC SERPL-MCNC: 18 MG/DL (ref 5–40)
WBC NRBC COR # BLD: 8.21 10*3/MM3 (ref 3.4–10.8)
WBC NRBC COR # BLD: 9.18 10*3/MM3 (ref 3.4–10.8)
WHOLE BLOOD HOLD COAG: NORMAL
WHOLE BLOOD HOLD SPECIMEN: NORMAL

## 2023-08-26 PROCEDURE — 80047 BASIC METABLC PNL IONIZED CA: CPT

## 2023-08-26 PROCEDURE — 80061 LIPID PANEL: CPT | Performed by: NURSE PRACTITIONER

## 2023-08-26 PROCEDURE — 70551 MRI BRAIN STEM W/O DYE: CPT

## 2023-08-26 PROCEDURE — 74018 RADEX ABDOMEN 1 VIEW: CPT

## 2023-08-26 PROCEDURE — 0 MAGNESIUM SULFATE 4 GM/100ML SOLUTION: Performed by: INTERNAL MEDICINE

## 2023-08-26 PROCEDURE — 93306 TTE W/DOPPLER COMPLETE: CPT

## 2023-08-26 PROCEDURE — 71045 X-RAY EXAM CHEST 1 VIEW: CPT

## 2023-08-26 PROCEDURE — 80053 COMPREHEN METABOLIC PANEL: CPT | Performed by: EMERGENCY MEDICINE

## 2023-08-26 PROCEDURE — 93005 ELECTROCARDIOGRAM TRACING: CPT | Performed by: INTERNAL MEDICINE

## 2023-08-26 PROCEDURE — 94799 UNLISTED PULMONARY SVC/PX: CPT

## 2023-08-26 PROCEDURE — 99285 EMERGENCY DEPT VISIT HI MDM: CPT

## 2023-08-26 PROCEDURE — 86850 RBC ANTIBODY SCREEN: CPT | Performed by: INTERNAL MEDICINE

## 2023-08-26 PROCEDURE — 99222 1ST HOSP IP/OBS MODERATE 55: CPT | Performed by: INTERNAL MEDICINE

## 2023-08-26 PROCEDURE — 70498 CT ANGIOGRAPHY NECK: CPT

## 2023-08-26 PROCEDURE — 82948 REAGENT STRIP/BLOOD GLUCOSE: CPT

## 2023-08-26 PROCEDURE — 85025 COMPLETE CBC W/AUTO DIFF WBC: CPT | Performed by: INTERNAL MEDICINE

## 2023-08-26 PROCEDURE — 99291 CRITICAL CARE FIRST HOUR: CPT | Performed by: INTERNAL MEDICINE

## 2023-08-26 PROCEDURE — 84439 ASSAY OF FREE THYROXINE: CPT | Performed by: INTERNAL MEDICINE

## 2023-08-26 PROCEDURE — 74178 CT ABD&PLV WO CNTR FLWD CNTR: CPT

## 2023-08-26 PROCEDURE — 86901 BLOOD TYPING SEROLOGIC RH(D): CPT | Performed by: INTERNAL MEDICINE

## 2023-08-26 PROCEDURE — 93005 ELECTROCARDIOGRAM TRACING: CPT | Performed by: EMERGENCY MEDICINE

## 2023-08-26 PROCEDURE — 84100 ASSAY OF PHOSPHORUS: CPT | Performed by: INTERNAL MEDICINE

## 2023-08-26 PROCEDURE — 25510000001 IOPAMIDOL PER 1 ML: Performed by: EMERGENCY MEDICINE

## 2023-08-26 PROCEDURE — 93306 TTE W/DOPPLER COMPLETE: CPT | Performed by: INTERNAL MEDICINE

## 2023-08-26 PROCEDURE — 85014 HEMATOCRIT: CPT

## 2023-08-26 PROCEDURE — 85610 PROTHROMBIN TIME: CPT

## 2023-08-26 PROCEDURE — 83735 ASSAY OF MAGNESIUM: CPT | Performed by: INTERNAL MEDICINE

## 2023-08-26 PROCEDURE — 70496 CT ANGIOGRAPHY HEAD: CPT

## 2023-08-26 PROCEDURE — 25010000002 METHYLPREDNISOLONE PER 125 MG: Performed by: NURSE PRACTITIONER

## 2023-08-26 PROCEDURE — 86900 BLOOD TYPING SEROLOGIC ABO: CPT | Performed by: INTERNAL MEDICINE

## 2023-08-26 PROCEDURE — 84443 ASSAY THYROID STIM HORMONE: CPT | Performed by: INTERNAL MEDICINE

## 2023-08-26 PROCEDURE — 86923 COMPATIBILITY TEST ELECTRIC: CPT

## 2023-08-26 PROCEDURE — 70450 CT HEAD/BRAIN W/O DYE: CPT

## 2023-08-26 PROCEDURE — 94660 CPAP INITIATION&MGMT: CPT

## 2023-08-26 PROCEDURE — 99222 1ST HOSP IP/OBS MODERATE 55: CPT | Performed by: NURSE PRACTITIONER

## 2023-08-26 PROCEDURE — 0042T HC CT CEREBRAL PERFUSION W/WO CONTRAST: CPT

## 2023-08-26 PROCEDURE — 3E03317 INTRODUCTION OF OTHER THROMBOLYTIC INTO PERIPHERAL VEIN, PERCUTANEOUS APPROACH: ICD-10-PCS | Performed by: EMERGENCY MEDICINE

## 2023-08-26 PROCEDURE — 94640 AIRWAY INHALATION TREATMENT: CPT

## 2023-08-26 PROCEDURE — 85730 THROMBOPLASTIN TIME PARTIAL: CPT | Performed by: EMERGENCY MEDICINE

## 2023-08-26 PROCEDURE — 85025 COMPLETE CBC W/AUTO DIFF WBC: CPT | Performed by: EMERGENCY MEDICINE

## 2023-08-26 PROCEDURE — 93010 ELECTROCARDIOGRAM REPORT: CPT | Performed by: INTERNAL MEDICINE

## 2023-08-26 PROCEDURE — 93005 ELECTROCARDIOGRAM TRACING: CPT | Performed by: NURSE PRACTITIONER

## 2023-08-26 PROCEDURE — 85014 HEMATOCRIT: CPT | Performed by: INTERNAL MEDICINE

## 2023-08-26 PROCEDURE — 99223 1ST HOSP IP/OBS HIGH 75: CPT | Performed by: NURSE PRACTITIONER

## 2023-08-26 PROCEDURE — 25010000002 TENECTEPLASE PER 50 MG: Performed by: NURSE PRACTITIONER

## 2023-08-26 PROCEDURE — 25510000001 IOPAMIDOL PER 1 ML: Performed by: INTERNAL MEDICINE

## 2023-08-26 PROCEDURE — 25010000002 SULFUR HEXAFLUORIDE MICROSPH 60.7-25 MG RECONSTITUTED SUSPENSION: Performed by: NURSE PRACTITIONER

## 2023-08-26 PROCEDURE — 85018 HEMOGLOBIN: CPT | Performed by: INTERNAL MEDICINE

## 2023-08-26 PROCEDURE — 25010000002 DIPHENHYDRAMINE PER 50 MG: Performed by: NURSE PRACTITIONER

## 2023-08-26 PROCEDURE — 84484 ASSAY OF TROPONIN QUANT: CPT | Performed by: EMERGENCY MEDICINE

## 2023-08-26 PROCEDURE — 92610 EVALUATE SWALLOWING FUNCTION: CPT

## 2023-08-26 RX ORDER — NEBIVOLOL 2.5 MG/1
2.5 TABLET ORAL
Status: DISCONTINUED | OUTPATIENT
Start: 2023-08-26 | End: 2023-09-04 | Stop reason: HOSPADM

## 2023-08-26 RX ORDER — ATORVASTATIN CALCIUM 40 MG/1
80 TABLET, FILM COATED ORAL NIGHTLY
Status: DISCONTINUED | OUTPATIENT
Start: 2023-08-26 | End: 2023-09-04 | Stop reason: HOSPADM

## 2023-08-26 RX ORDER — METHYLPREDNISOLONE SODIUM SUCCINATE 125 MG/2ML
125 INJECTION, POWDER, LYOPHILIZED, FOR SOLUTION INTRAMUSCULAR; INTRAVENOUS ONCE
Status: COMPLETED | OUTPATIENT
Start: 2023-08-26 | End: 2023-08-26

## 2023-08-26 RX ORDER — ASPIRIN 300 MG/1
300 SUPPOSITORY RECTAL DAILY
Status: DISCONTINUED | OUTPATIENT
Start: 2023-08-27 | End: 2023-08-26

## 2023-08-26 RX ORDER — SODIUM CHLORIDE 0.9 % (FLUSH) 0.9 %
10 SYRINGE (ML) INJECTION EVERY 12 HOURS SCHEDULED
Status: DISCONTINUED | OUTPATIENT
Start: 2023-08-26 | End: 2023-08-26

## 2023-08-26 RX ORDER — ASPIRIN 81 MG/1
81 TABLET, CHEWABLE ORAL DAILY
Status: DISCONTINUED | OUTPATIENT
Start: 2023-08-26 | End: 2023-08-26

## 2023-08-26 RX ORDER — PEG-3350, SODIUM SULFATE, SODIUM CHLORIDE, POTASSIUM CHLORIDE, SODIUM ASCORBATE AND ASCORBIC ACID 7.5-2.691G
1000 KIT ORAL
Status: COMPLETED | OUTPATIENT
Start: 2023-08-27 | End: 2023-08-27

## 2023-08-26 RX ORDER — TRAMADOL HYDROCHLORIDE 50 MG/1
50 TABLET ORAL EVERY 6 HOURS PRN
COMMUNITY

## 2023-08-26 RX ORDER — DIPHENHYDRAMINE HYDROCHLORIDE 50 MG/ML
25 INJECTION INTRAMUSCULAR; INTRAVENOUS EVERY 6 HOURS
Status: DISCONTINUED | OUTPATIENT
Start: 2023-08-27 | End: 2023-08-27

## 2023-08-26 RX ORDER — ASPIRIN 300 MG/1
300 SUPPOSITORY RECTAL DAILY
Status: DISCONTINUED | OUTPATIENT
Start: 2023-08-26 | End: 2023-08-26 | Stop reason: ALTCHOICE

## 2023-08-26 RX ORDER — ASPIRIN 325 MG
325 TABLET, DELAYED RELEASE (ENTERIC COATED) ORAL DAILY
Status: DISCONTINUED | OUTPATIENT
Start: 2023-08-26 | End: 2023-08-26 | Stop reason: ALTCHOICE

## 2023-08-26 RX ORDER — SODIUM CHLORIDE 9 MG/ML
40 INJECTION, SOLUTION INTRAVENOUS AS NEEDED
Status: DISCONTINUED | OUTPATIENT
Start: 2023-08-26 | End: 2023-08-26

## 2023-08-26 RX ORDER — SODIUM CHLORIDE 0.9 % (FLUSH) 0.9 %
10 SYRINGE (ML) INJECTION AS NEEDED
Status: DISCONTINUED | OUTPATIENT
Start: 2023-08-26 | End: 2023-08-26

## 2023-08-26 RX ORDER — DIPHENHYDRAMINE HYDROCHLORIDE 50 MG/ML
50 INJECTION INTRAMUSCULAR; INTRAVENOUS ONCE
Status: COMPLETED | OUTPATIENT
Start: 2023-08-26 | End: 2023-08-26

## 2023-08-26 RX ORDER — SODIUM CHLORIDE 0.9 % (FLUSH) 0.9 %
10 SYRINGE (ML) INJECTION ONCE
Status: COMPLETED | OUTPATIENT
Start: 2023-08-26 | End: 2023-08-26

## 2023-08-26 RX ORDER — ASPIRIN 300 MG/1
300 SUPPOSITORY RECTAL DAILY
Status: DISCONTINUED | OUTPATIENT
Start: 2023-08-26 | End: 2023-08-26

## 2023-08-26 RX ORDER — EPINEPHRINE 1 MG/ML
0.3 INJECTION, SOLUTION, CONCENTRATE INTRAVENOUS ONCE AS NEEDED
Status: DISCONTINUED | OUTPATIENT
Start: 2023-08-26 | End: 2023-08-27

## 2023-08-26 RX ORDER — FAMOTIDINE 10 MG/ML
20 INJECTION, SOLUTION INTRAVENOUS EVERY 12 HOURS
Status: COMPLETED | OUTPATIENT
Start: 2023-08-26 | End: 2023-08-27

## 2023-08-26 RX ORDER — ASPIRIN 325 MG
325 TABLET ORAL DAILY
Status: DISCONTINUED | OUTPATIENT
Start: 2023-08-27 | End: 2023-08-26

## 2023-08-26 RX ORDER — PANTOPRAZOLE SODIUM 40 MG/1
40 TABLET, DELAYED RELEASE ORAL DAILY
COMMUNITY

## 2023-08-26 RX ORDER — PROPAFENONE HYDROCHLORIDE 325 MG/1
325 CAPSULE, EXTENDED RELEASE ORAL EVERY 12 HOURS SCHEDULED
Status: DISCONTINUED | OUTPATIENT
Start: 2023-08-26 | End: 2023-09-04 | Stop reason: HOSPADM

## 2023-08-26 RX ORDER — PEG-3350, SODIUM SULFATE, SODIUM CHLORIDE, POTASSIUM CHLORIDE, SODIUM ASCORBATE AND ASCORBIC ACID 7.5-2.691G
1000 KIT ORAL
Status: COMPLETED | OUTPATIENT
Start: 2023-08-26 | End: 2023-08-26

## 2023-08-26 RX ORDER — DIPHENHYDRAMINE HYDROCHLORIDE 50 MG/ML
25 INJECTION INTRAMUSCULAR; INTRAVENOUS EVERY 6 HOURS
Status: DISCONTINUED | OUTPATIENT
Start: 2023-08-26 | End: 2023-08-26

## 2023-08-26 RX ORDER — MAGNESIUM SULFATE HEPTAHYDRATE 40 MG/ML
4 INJECTION, SOLUTION INTRAVENOUS ONCE
Status: COMPLETED | OUTPATIENT
Start: 2023-08-26 | End: 2023-08-26

## 2023-08-26 RX ORDER — SODIUM CHLORIDE 0.9 % (FLUSH) 0.9 %
10 SYRINGE (ML) INJECTION
Status: COMPLETED | OUTPATIENT
Start: 2023-08-26 | End: 2023-08-26

## 2023-08-26 RX ORDER — FAMOTIDINE 10 MG/ML
20 INJECTION, SOLUTION INTRAVENOUS ONCE
Status: COMPLETED | OUTPATIENT
Start: 2023-08-26 | End: 2023-08-26

## 2023-08-26 RX ORDER — PANTOPRAZOLE SODIUM 40 MG/10ML
40 INJECTION, POWDER, LYOPHILIZED, FOR SOLUTION INTRAVENOUS EVERY 12 HOURS SCHEDULED
Status: DISCONTINUED | OUTPATIENT
Start: 2023-08-26 | End: 2023-08-26

## 2023-08-26 RX ORDER — IPRATROPIUM BROMIDE AND ALBUTEROL SULFATE 2.5; .5 MG/3ML; MG/3ML
3 SOLUTION RESPIRATORY (INHALATION) EVERY 6 HOURS PRN
Status: DISCONTINUED | OUTPATIENT
Start: 2023-08-26 | End: 2023-09-04 | Stop reason: HOSPADM

## 2023-08-26 RX ORDER — PANTOPRAZOLE SODIUM 40 MG/10ML
40 INJECTION, POWDER, LYOPHILIZED, FOR SOLUTION INTRAVENOUS EVERY 12 HOURS SCHEDULED
Status: DISCONTINUED | OUTPATIENT
Start: 2023-08-26 | End: 2023-08-28

## 2023-08-26 RX ADMIN — IPRATROPIUM BROMIDE AND ALBUTEROL SULFATE 3 ML: 2.5; .5 SOLUTION RESPIRATORY (INHALATION) at 23:06

## 2023-08-26 RX ADMIN — ATORVASTATIN CALCIUM 80 MG: 40 TABLET, FILM COATED ORAL at 20:17

## 2023-08-26 RX ADMIN — DIPHENHYDRAMINE HYDROCHLORIDE 50 MG: 50 INJECTION INTRAMUSCULAR; INTRAVENOUS at 04:41

## 2023-08-26 RX ADMIN — METHYLPREDNISOLONE SODIUM SUCCINATE 125 MG: 125 INJECTION, POWDER, FOR SOLUTION INTRAMUSCULAR; INTRAVENOUS at 04:41

## 2023-08-26 RX ADMIN — MAGNESIUM SULFATE HEPTAHYDRATE 4 G: 40 INJECTION, SOLUTION INTRAVENOUS at 17:25

## 2023-08-26 RX ADMIN — IOPAMIDOL 115 ML: 755 INJECTION, SOLUTION INTRAVENOUS at 00:42

## 2023-08-26 RX ADMIN — Medication 10 ML: at 02:03

## 2023-08-26 RX ADMIN — IOPAMIDOL 95 ML: 755 INJECTION, SOLUTION INTRAVENOUS at 09:31

## 2023-08-26 RX ADMIN — Medication 19 MG: at 02:04

## 2023-08-26 RX ADMIN — FAMOTIDINE 20 MG: 10 INJECTION INTRAVENOUS at 17:26

## 2023-08-26 RX ADMIN — SODIUM CHLORIDE 1000 ML: 9 INJECTION, SOLUTION INTRAVENOUS at 01:44

## 2023-08-26 RX ADMIN — Medication 10 ML: at 01:42

## 2023-08-26 RX ADMIN — DIPHENHYDRAMINE HYDROCHLORIDE 25 MG: 50 INJECTION INTRAMUSCULAR; INTRAVENOUS at 10:58

## 2023-08-26 RX ADMIN — NEBIVOLOL 2.5 MG: 2.5 TABLET ORAL at 15:37

## 2023-08-26 RX ADMIN — POLYETHYLENE GLYCOL 3350, SODIUM SULFATE, SODIUM CHLORIDE, POTASSIUM CHLORIDE, SODIUM ASCORBATE, AND ASCORBIC ACID 1000 ML: KIT at 20:18

## 2023-08-26 RX ADMIN — PROPAFENONE 325 MG: 325 CAPSULE, EXTENDED RELEASE ORAL at 15:38

## 2023-08-26 RX ADMIN — SULFUR HEXAFLUORIDE 3 ML: KIT at 10:46

## 2023-08-26 RX ADMIN — FAMOTIDINE 20 MG: 10 INJECTION INTRAVENOUS at 04:41

## 2023-08-26 RX ADMIN — PANTOPRAZOLE SODIUM 40 MG: 40 INJECTION, POWDER, LYOPHILIZED, FOR SOLUTION INTRAVENOUS at 20:18

## 2023-08-26 RX ADMIN — PROPAFENONE 325 MG: 325 CAPSULE, EXTENDED RELEASE ORAL at 20:17

## 2023-08-26 RX ADMIN — Medication 10 ML: at 02:04

## 2023-08-26 NOTE — CONSULTS
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    Chief Complaint:  APHASIA    Problem List:  Remote breast cancer in remission-data deficit  Paroxysmal atrial fibrillation on propafenone and aspirin therapy (UHD8CH1-MBOf score 8; 6.7% annual stroke risk)  Chronic hypertension-probably essential  Remote hypothyroidism/multinodular goiter  Remote tobacco use, resolved  Remote operations:  A.  Blepharoplasty  B.  Simple mastectomy with subsequent left breast reconstruction/implant-data deficit  C.  Cataract extraction, O.U.  D.  Hysterectomy  7.  Moderate normocytic normochromic anemia on admission with bright red blood per rectum/hematochezia post TNK  8.  Intermittent altered mental status examination with transient expressive aphasia/dysarthria with abnormal cerebral neuroimaging (acute ischemic stroke in the distal left MCA/M2 branch in the region of the anterior insula)/TNK administration, August 2023  9.  Recent GI bleeding with [facility] admission x 48 hours with EGD demonstrating erosive gastritis and colonoscopy demonstrating pandiverticulosis with no angiodysplasia or active GI bleeding but blood present in the distal transverse colon and distally with abdominal pelvic CT scan demonstrating \"small blush of high attenuation within the cecum.\"  (23-20 5 August 2023)  10.  Mild obesity (BMI 31.6)  11.  Periorbital angioedema post TNK treated with methylprednisolone, famotidine, and Benadryl, August 2023  12.  Obstructive sleep apnea treated with CPAP therapy    Allergies   Allergen Reactions    Lidocaine Other (See Comments)     seizure    Novocain [Procaine] Other (See Comments)     seizures     Medications Prior " to Admission   Medication Sig Dispense Refill Last Dose    aspirin 325 MG tablet Take 1 tablet by mouth Daily. (Patient not taking: Reported on 8/26/2023)   Not Taking    folic acid (FOLVITE) 1 MG tablet Take 800 mcg by mouth Daily.   Unknown    multivitamin with minerals tablet tablet Take 1 tablet by mouth Daily.   Unknown    pantoprazole (PROTONIX) 40 MG EC tablet Take 1 tablet by mouth Daily. Started at [facility]   Unknown    propafenone (RYTHMOL) 300 MG tablet 1 tablet 2 (Two) Times a Day.   Unknown    traMADol (ULTRAM) 50 MG tablet Take 1 tablet by mouth Every 6 (Six) Hours As Needed for Moderate Pain. Started at [facility]       vitamin B-12 (CYANOCOBALAMIN) 250 MCG tablet Take 4 tablets by mouth Daily.   Unknown     Scheduled Meds:[START ON 8/27/2023] aspirin, 325 mg, Oral, Daily   Or  [START ON 8/27/2023] aspirin, 300 mg, Rectal, Daily  atorvastatin, 80 mg, Oral, Nightly  diphenhydrAMINE, 25 mg, Intravenous, Q6H  famotidine, 20 mg, Intravenous, Q12H  sodium chloride, 500 mL, Intravenous, Once      Continuous Infusions:   PRN Meds:.  EPINEPHrine       History of Present Illness:      This elderly female was admitted after developing abrupt aphasia that waxed and waned with associated dysarthria after eating her evening meal yesterday.  She underwent evaluation and subsequent thrombolytic administration for waxing and waning symptoms that were quite severe.  She remotely has had the diagnosis of paroxysmal atrial fibrillation but current family members including a niece who is an RN as well as additional niece cannot provide additional history above and beyond the chart.  The patient did apparently have rapid atrial fibrillation while admitted to [facility] earlier this month in the emergency department; data deficit.  The initial diagnosis of atrial fibrillation is uncertain.  The patient is scheduled for cardiology consultation with  next month.  Patient currently is sleeping soundly and supine  "in bed and I did not awaken.  RN at bedside states that the aphasia is largely improving.  She has largely passed her dysphagia evaluation.  No apparent prior documented knowledge of myocardial infarction, CHF, TIA, claudication, or remote stroke.  No apparent angina pectoris or remote history of rheumatic heart disease.  Apparently the patient was not maintained on anticoagulation therapy because of cost concerns-data deficit.  The patient's  has significant Alzheimer's disease with marked cognitive dysfunction and her nieces help provide care in the absence of the patient having children.    Cardiac risk factors: advanced age (older than 55 for men, 65 for women), family history of premature cardiovascular disease, hypertension, obesity (BMI >= 30 kg/m2), sedentary lifestyle, and smoking/ tobacco exposure.    Social History     Socioeconomic History    Marital status:    Tobacco Use    Smoking status: Former     Types: Cigarettes     Quit date:      Years since quittin.6    Smokeless tobacco: Never   Vaping Use    Vaping Use: Never used   Substance and Sexual Activity    Alcohol use: Yes     Alcohol/week: 1.0 - 2.0 standard drink     Types: 1 - 2 Glasses of wine per week     Comment: social    Drug use: Never    Sexual activity: Never     Family History   Problem Relation Age of Onset    Hypothyroidism Sister     Stomach cancer Mother     Heart attack Father        Review of Systems  10 point review of systems was completed, positives outlined in the HPI, and otherwise all other systems are negative.      Objective:       Physical Exam  /76   Pulse 112   Temp 98.8 °F (37.1 °C) (Oral)   Resp 16   Ht 154.9 cm (61\")   Wt 75.9 kg (167 lb 5.3 oz)   SpO2 95%   BMI 31.62 kg/m²       23  0110 23  0400   Weight: 77.6 kg (171 lb 1.2 oz) 75.9 kg (167 lb 5.3 oz)     Body mass index is 31.62 kg/m².    Intake/Output Summary (Last 24 hours) at 2023 1152  Last data filed at " 8/26/2023 1133  Gross per 24 hour   Intake 1000 ml   Output 900 ml   Net 100 ml       General Appearance:  Sleeping soundly, no distress, appears stated age   Head:  Normocephalic, without obvious abnormality, atraumatic   Eyes:  PERRL, conjunctivae/corneas clear, EOM's intact, fundi benign, both eyes   Throat: Lips, mucosa, and tongue normal; teeth and gums normal   Neck: Supple, symmetrical, trachea midline, no adenopathy, thyroid: not enlarged, symmetric, no tenderness/mass/nodules, no carotid bruit or JVD   Lungs:   Clear to auscultation bilaterally, respirations unlabored   Heart:  Regular rate and rhythm, S1, S2 normal, grade 1/6 systolic murmur, no rub or gallop   Abdomen:   Soft, nontender, no masses, no organomegaly, bowel sounds audible x4   Extremities: No edema, normal range of motion   Pulses: 2+ and symmetric   Skin: Skin color, texture, turgor normal, no rashes or lesions   Neurologic: Deferred     Cardiographics:    EKG:    Normal sinus rhythm  Low voltage QRS  Nonspecific ST and T wave abnormality  Abnormal ECG  When compared with ECG of 26-AUG-2023 01:02, (Unconfirmed)  No significant change was found    ECHO: PENDING    Imaging:    Chest x-ray:    Findings:    There is no pneumothorax, pleural effusion or focal airspace consolidation. Heart size and pulmonary vasculature appear within normal limits. Regional bones appear intact.     IMPRESSION: No acute cardiopulmonary abnormality.    ABDOMINOPELVIC CT SCAN:    Findings:    Lower thorax: Bilateral dependent atelectasis. Trace right pleural effusion. Coronary artery calcifications seen. Trace pericardial fluid. No evidence of pulmonary embolus in the visualized pulmonary arteries.      Liver: No focal hepatic lesions seen.  Normal hepatic size. Normal density of the liver.      Gallbladder and bile ducts: Cholecystectomy clips. No intra- or extra- hepatic biliary ductal dilatation.     Spleen: Normal appearance of the spleen.     Pancreas: Normal  appearance of the pancreas. Main pancreatic duct is nondilated.     Adrenals: Normal appearance of the adrenal glands.     Kidneys: Scattered punctate hypodensities are too small to characterize. Symmetric enhancement and excretion of contrast. No nephrolithiasis.  Normal caliber of the ureters.      Bowel: Small hiatal hernia. Duodenal diverticulum seen with internal high density material is present on noncontrast images. Normal caliber of the bowel. No evidence of active arterial extravasation. Normal appearance of the appendix. Diverticulosis   without diverticulitis.     Pelvis: Normal appearance of the bladder. Hysterectomy. Ovaries are not visualized.     Peritoneum: No free air. No free fluid. No peritoneal nodularity.      Vessels: Normal aortic caliber.  Atherosclerotic calcification of the aorta.  Celiac artery, splenic artery, superior mesenteric artery, inferior mesenteric artery, renal arteries and iliac arteries appear patent. Iliac veins, inferior vena cava,   superior mesenteric vein, renal veins, portal vein and splenic vein are patent.      Lymph nodes: No enlarged or suspicious adenopathy.     Bones: No acute osseous abnormality. Degenerative changes of the spine.     Soft tissues: Left breast implant.     IMPRESSIONS:    No evidence of active arterial extravasation.     Trace right pleural effusion.     HEAD CT SCAN:    Findings:    There is patchy hypoattenuation and loss of gray-white differentiation within the posterior right insular region and right temporal lobe compatible with age-indeterminate, but likely chronic infarct. There is no acute intracranial hemorrhage. No   additional hypoattenuating lesions in the brain. There are intracranial vascular calcifications. There is no mass effect or midline shift. No abnormal extra-axial collections. The paranasal sinuses and mastoid air cells appear well aerated. The calvarium   is intact. There is a small focus of mucosal thickening within the  left sphenoid sinus and in the left nasal cavity. Superficial soft tissues are unremarkable. There is thinning of the orbital lenses bilaterally.     IMPRESSIONS:     1. Focal region of hypoattenuation in the right insula and right temporal lobe. This appears to represent chronic infarct, but is ultimately age indeterminate. If there is concern for acute infarct, consider MRI.  2. No acute intracranial hemorrhage    BRAIN MRI:    Findings:    There is a small focus of diffusion restriction in the anterior left insular cortex consistent with acute/subacute infarction. There is no additional diffusion restriction. There is a chronic infarct in the posterior right insular cortex and right   temporal lobe. There is mild patchy FLAIR hyperintense signal within the cerebral white matter. There is mild generalized parenchymal volume loss. There is no acute or chronic intracranial hemorrhage. There is no mass effect or midline shift. No abnormal   extra-axial collections. The major arterial flow voids appear intact. The calvarium appears normal signal. Superficial soft tissues are unremarkable. The cerebellum is normal. There are prominent perivascular spaces. There is thinning of the orbital   lenses bilaterally. There is mild left sphenoid and posterior left ethmoid sinus mucosal disease. The mastoid air cells appear well aerated.     IMPRESSIONS:     1. Small acute/subacute cortical infarct in the anterior left insular cortex.  2. Mild chronic small vessel ischemic change and chronic right temporal infarct.    NECK & HEAD CTA:    Findings:    Aorta: The aortic arch is unremarkable. There is conventional three-vessel arch anatomy. The right brachiocephalic and bilateral subclavian arteries appear patent.     Right carotid: The right CCA follows a normal course and appears normal caliber. Carotid bifurcation is normal. ECA and distal branches appear normal. Cervical ICA is normal. The intracranial ICA segments appear  widely patent. There is no definite   P-comm. There is a patent A-Comm. The A1 and M1 segments and distal NESSA and MCA branches appear patent.     Left carotid: The left CCA contains minimal amount stenosing plaque. The carotid bifurcation, ECA and distal branches and cervical ICA appear within normal limits. The intracranial ICA segments appear widely patent. There is a small patent P-comm. The A1   and M1 segments and distal NESSA branches appear normal. There is abrupt tapering of a distal left M2 branch within the anterior insular region. The remainder of the left MCA branches appear patent.     Posterior circulation: Vertebral arteries are codominant. Vertebral arteries follow a normal course and appear normal caliber throughout the neck. There is mild left V4 and right V4 calcific disease without stenosis. The basilar artery, superior   cerebellar and posterior cerebral arteries appear patent.     Nonvascular findings: Lung apices appear clear. Superior mediastinal structures are unremarkable. There is an 11 mm right thyroid lobe nodule. No evidence of a neck mass or adenopathy. There is thinning of the orbital lenses bilaterally. There is no   focal soft tissue inflammation or fluid collection. There are no acute osseous abnormalities or destructive bone lesions. There is moderate cervical spondylosis.     IMPRESSIONS:     1. There is abrupt tapering/occlusion of a distal left MCA/M2 branch in the region of the anterior insula. This is age-indeterminate, but corresponds to the region of perfusion abnormality on CT perfusion imaging.  2. No evidence of aneurysm or dissection.    CT CEREBRAL PERFUSION:                            Findings:    There is a region of perfusion abnormality in the left frontal lobe; left MCA distribution consisting of Tmax greater than 6 seconds with volume measuring 25 mL.     CBF<30% volume: 0 mL  Tmax>6sec volume: 25 mL mL  Mismatch volume: 25 mL mL  Mismatch ratio: Infinite      IMPRESSION:    Tmax perfusion abnormality in the left MCA distribution measuring approximately 25 mL suspicious for brain tissue at risk of ischemia.     Lab Review   Results from last 7 days   Lab Units 08/26/23  0446 08/26/23  0044   SODIUM mmol/L 138  --    POTASSIUM mmol/L 3.9  --    CHLORIDE mmol/L 107  --    CO2 mmol/L 25.0  --    BUN mg/dL 17  --    CREATININE mg/dL 0.84 1.00   GLUCOSE mg/dL 127*  --    CALCIUM mg/dL 7.5*  --      Results from last 7 days   Lab Units 08/26/23  1053 08/26/23  0446 08/26/23  0047   WBC 10*3/mm3  --  8.21 9.18   HEMOGLOBIN g/dL 9.4* 8.6* 9.5*   HEMATOCRIT % 27.9* 26.0* 28.3*   PLATELETS 10*3/mm3  --  213 226     Results from last 7 days   Lab Units 08/26/23 0446   CHOLESTEROL mg/dL 129   TRIGLYCERIDES mg/dL 74   HDL CHOL mg/dL 41   LDL CHOL mg/dL 73     Results from last 7 days   Lab Units 08/26/23 0047   HSTROP T ng/L 18*     NO URINALYSIS / proBNP / ESR.  TSH: 0.201  FT4: 1.30  ALBUMIN: 2.8  LFTs: WNL  INR: 1.00  MAGNESIUM: 1.9  NO DRIPS.     Assessment:      Unfortunate older female with history of paroxysmal atrial fibrillation and apparent recent recurrent episode 48-72 hours prior to current presentation with subsequent development of waxing and waning stroke which is unlikely for embolic stroke in the absence of additional findings of cerebral emboli on brain MRI scan.  I suspect she had ischemic stroke related to hypertension.  Lipid panel is currently acceptable albeit obtained following recent suboptimal nutrition while undergoing GI studies earlier in the week.  Await results of echocardiogram interms of occult mitral valve disease and to assess left atrial size and function.  She has a systolic flow murmur which I suspect may well represent mild aortic valvular sclerosis versus flow murmur in view of her significant anemia.  She needs treatment for obstructive sleep apnea; will restart propafenone and low-dose nebivolol.      Plan:     1.  Treat obstructive sleep  apnea  2.  Add nebivolol 2.5 mg daily and hold for systolic blood pressure less than 110 torr  3.  Restart propafenone  4.  No current full anticoagulation administration till cleared by neurology and without evidence of significant recurrent GI bleeding  5.  Defer MPS/LHC  6.  IV magnesium sulfate 4 g if recurrent atrial fibrillation develops as well as initiation of IV diltiazem to control ventricular response  7.  Review echocardiogram results when available    Discussed with 1 niece in room as well as patient's RN and additional RN niece via telephone.

## 2023-08-26 NOTE — CONSULTS
Stroke Consult Note    Patient Name: Laury Eugene   MRN: [id number]  Age: 83 y.o.  Sex: female  : 1940    Primary Care Physician: Leatha Melvin DO  Referring Physician:  Kathy ASKEW    TIME STROKE TEAM CALLED: 26 EST     TIME PATIENT SEEN: 26 EST    Handedness: Right  Race:     Chief Complaint/Reason for Consultation: Aphasia, altered mental status/aphasia, left facial droop    HPI:   This is Ms. Laury Eugene a 83-year-old white female, right-handed with significant health diagnosis for breast cancer in remission, remote A-fib on propafenone and aspirin, hypertension, hypothyroidism who presents to [facility] ED via EMS for strokelike symptoms left facial droop, confusion that lasted for 6 minutes at dinner.  Patient was reported last known well 2335 per niece Mindy Hall (POA).  Upon arrival to the unit patient was taken immediately to CT scanner CT head with no hemorrhage, age indeterminant right insula right temporal lobe represent old infarct, CTP with increased Tmax left MCA distribution was a mismatch volume 25 ml, CTA distal left MCA M2 branch occlusion in the anterior insula matching with a CTP study.     Initially NIH 0 patient upon arrival had no focal weakness, no aphasia no slurred speech alert and oriented and was able to tell me that her niece thought that she was having a stroke after a transient periods of unable to talk.  Patient reported that she was taking aspirin full dose 325 mg that was stopped prior to the colonoscopy.     After completing CT images patient again experienced a transient global aphasia and notable left facial droop, NIH score jumped to 8, discussed any TNK contraindication with patient as she reported that she was recently seen by [facility] for a GI bleed status post colonoscopy with possible small ruptured diverticulitis.  At that time I spoke to her niece Mindy Chávez over the phone discussing possibility giving TNK and risk and benefits.  Mindy  who is her POA reported that she did not feel comfortable giving the TNK at this time considering Laury recent GI bleed and old stroke. And she felt that dysarthria is non disabling as well as the facial droop and she wanted to proceed with medical management, and requested cardiology consult to rule out A-fib.  At that time reassessing patient NIH 3 with a slurred speech, left facial droop.  Patient was talking over the phone with her niece and her daughter who presents in the patient ED room discussing again the consideration of receiving TNK.  The ultimate decision by the family the niece JULES Garrison is to proceed with  TNK administration considering risk and benefits for the therapy,as Patient is within the window for administration.  Discussed plan with  neurology attending who recommended to give TNK.   Patient received TNK at 0202, blood pressure 156//78, heart rate , normal sinus rhythm.     Patient post TNK administration with some improvement in her speech.  She will be admitted to the ICU for close monitoring.    Last Known Normal Date/Time: 2335 EST     Review of Systems   Reason unable to perform ROS: Intermittent confusion, transient global aphasia.   Constitutional:  Positive for activity change.   HENT:  Positive for voice change.    Eyes:  Negative for visual disturbance.   Respiratory:  Negative for shortness of breath.    Cardiovascular:  Negative for chest pain and palpitations.   Musculoskeletal:  Negative for gait problem.   Neurological:  Positive for speech difficulty. Negative for weakness.   Psychiatric/Behavioral:  Positive for confusion.     Past Medical History:   Diagnosis Date    History of breast cancer     History of heart disease     Hypothyroidism     Multinodular goiter     Thyroid function test abnormal      Past Surgical History:   Procedure Laterality Date    BLEPHAROPLASTY      BREAST RECONSTRUCTION      CATARACT EXTRACTION Bilateral      HYSTERECTOMY      SIMPLE MASTECTOMY       Family History   Problem Relation Age of Onset    Hypothyroidism Sister     Stomach cancer Mother     Heart attack Father      Social History     Socioeconomic History    Marital status:    Tobacco Use    Smoking status: Former     Types: Cigarettes     Quit date: 1970     Years since quittin.6    Smokeless tobacco: Never   Vaping Use    Vaping Use: Never used   Substance and Sexual Activity    Alcohol use: Yes     Alcohol/week: 1.0 - 2.0 standard drink     Types: 1 - 2 Glasses of wine per week     Comment: social    Drug use: Never    Sexual activity: Never     Allergies   Allergen Reactions    Lidocaine Other (See Comments)     seizure    Novocain [Procaine] Other (See Comments)     seizures     Prior to Admission medications    Medication Sig Start Date End Date Taking? Authorizing Provider   aspirin 325 MG tablet Take 1 tablet by mouth Daily.    Angélica Stinson MD   folic acid (FOLVITE) 1 MG tablet Take 1 tablet by mouth Daily.    Angélica Stinson MD   multivitamin with minerals tablet tablet Take 1 tablet by mouth Daily.    Angélica Stinson MD   propafenone (RYTHMOL) 300 MG tablet 1 tablet 2 (Two) Times a Day. 20   Angélica Stinson MD   vitamin B-12 (CYANOCOBALAMIN) 250 MCG tablet Take 1 tablet by mouth Daily.    Angélica Stinson MD            Neurological Exam  Mental Status  Awake and alert. Oriented only to person and place. Moderate dysarthria present. Language: Transient global aphasia  Patient on and off with slurred speech, expressive aphasia. Attention and concentration are normal.    Physical Exam  Constitutional:       General: She is awake.   Neurological:      Mental Status: She is alert.      Cranial Nerves: Dysarthria present.       Acute Stroke Data    Thrombolytic Inclusion / Exclusion Criteria    Time: 00:43 EDT  Person Administering Scale: DIANNE George    Inclusion Criteria  [x]   18 years of age  or greater   []   Onset of symptoms < 4.5 hours before beginning treatment (stroke onset = time patient was last seen well or without symptoms).   []   Diagnosis of acute ischemic stroke causing measurable disabling deficit (Complete Hemianopia, Any Aphasia, Visual or Sensory Extinction, Any weakness limiting sustained effort against gravity)   []   Any remaining deficit considered potentially disabling in view of patient and practitioner   Exclusion criteria (Do not proceed with Alteplase if any are checked under exclusion criteria)  []   Onset unknown or GREATER than 4.5 hours   []   ICH on CT/MRI   []   CT demonstrates hypodensity representing acute or subacute infarct   []   Significant head trauma or prior stroke in the previous 3 months   []   Symptoms suggestive of subarachnoid hemorrhage   []   History of un-ruptured intracranial aneurysm GREATER than 10 mm   []   Recent intracranial or intraspinal surgery within the last 3 months   []   Arterial puncture at a non-compressible site in the previous 7 days   []   Active internal bleeding   []   Acute bleeding tendency   []   Platelet count LESS than 100,000 for known hematological diseases such as leukemia, thrombocytopenia or chronic cirrhosis   []   Current use of anticoagulant with INR GREATER than 1.7 or PT GREATER than 15 seconds, aPTT GREATER than 40 seconds   []   Heparin received within 48 hours, resulting in abnormally elevated aPTT GREATER than upper limit of normal   []   Current use of direct thrombin inhibitors or direct factor Xa inhibitors in the past 48 hours   []   Elevated blood pressure refractory to treatment (systolic GREATER than 185 mm/Hg or diastolic  GREATER than 110 mm/Hg   []   Suspected infective endocarditis and aortic arch dissection   []   Current use of therapeutic treatment dose of low-molecular-weight heparin (LMWH) within the previous 24 hours   []   Structural GI malignancy or bleed   Relative exclusion for all patients  []    Only minor non-disabling symptoms   []   Pregnancy   []   Seizure at onset with postictal residual neurological impairments   []   Major surgery or previous trauma within past 14 days   []   History of previous spontaneous ICH, intracranial neoplasm, or AV malformation   []   Postpartum (within previous 14 days)   []   Recent GI or urinary tract hemorrhage (within previous 21 days)   []   Recent acute MI (within previous 3 months)   []   History of un-ruptured intracranial aneurysm LESS than 10 mm   []   History of ruptured intracranial aneurysm   []   Blood glucose LESS than 50 mg/dL (2.7 mmol/L)   []   Dural puncture within the last 7 days   []   Known GREATER than 10 cerebral microbleeds   Additional exclusions for patients with symptoms onset between 3 and 4.5 hours.  [x]   Age > 80.   []   On any anticoagulants regardless of INR  >>> Warfarin (Coumadin), Heparin, Enoxaparin (Lovenox), fondaparinux (Arixtra), bivalirudin (Angiomax), Argatroban, dabigatran (Pradaxa), rivaroxaban (Xarelto), or apixaban (Eliquis)   []   Severe stroke (NIHSS > 25).   []   History of BOTH diabetes and previous ischemic stroke.   [x]   The risks and benefits have been discussed with the patient or family related to the administration of IV thrombolytic therapy for stroke symptoms.   [x]   I have discussed and reviewed the patient's case and imaging with the attending prior to IV thrombolytic therapy.   0202 Time IV thrombolytic administered       Hospital Meds:  Scheduled- aspirin, 81 mg, Oral, Daily   Or  aspirin, 300 mg, Rectal, Daily  atorvastatin, 80 mg, Oral, Nightly  sodium chloride, 10 mL, Intravenous, Q12H      Infusions-     PRNs-   sodium chloride    sodium chloride    Functional Status Prior to Current Stroke/[place] Score: 0    NIH Stroke Scale  Time: 00:43 EDT  Person Administering Scale: DIANNE George    Interval: baseline  1a. Level of Consciousness: 0-->Alert, keenly responsive  1b. LOC Questions:  0-->Answers both questions correctly  1c. LOC Commands: 0-->Performs both tasks correctly  2. Best Gaze: 0-->Normal  3. Visual: 0-->No visual loss  4. Facial Palsy: 0-->Normal symmetrical movements  5a. Motor Arm, Left: 0-->No drift, limb holds 90 (or 45) degrees for full 10 secs  5b. Motor Arm, Right: 0-->No drift, limb holds 90 (or 45) degrees for full 10 secs  6a. Motor Leg, Left: 0-->No drift, leg holds 30 degree position for full 5 secs  6b. Motor Leg, Right: 0-->No drift, leg holds 30 degree position for full 5 secs  7. Limb Ataxia: 0-->Absent  8. Sensory: 0-->Normal, no sensory loss  9. Best Language: 0-->No aphasia, normal  10. Dysarthria: 0-->Normal  11. Extinction and Inattention (formerly Neglect): 0-->No abnormality    Total (NIH Stroke Scale): 0    Interval: baseline  1a. Level of Consciousness: 0-->Alert, keenly responsive  1b. LOC Questions: 2-->Answers neither question correctly  1c. LOC Commands: 0-->Performs both tasks correctly  2. Best Gaze: 0-->Normal  3. Visual: 0-->No visual loss  4. Facial Palsy: 1-->Minor paralysis (flattened nasolabial fold, asymmetry on smiling)  5a. Motor Arm, Left: 0-->No drift, limb holds 90 (or 45) degrees for full 10 secs  5b. Motor Arm, Right: 0-->No drift, limb holds 90 (or 45) degrees for full 10 secs  6a. Motor Leg, Left: 0-->No drift, leg holds 30 degree position for full 5 secs  6b. Motor Leg, Right: 0-->No drift, leg holds 30 degree position for full 5 secs  7. Limb Ataxia: 0-->Absent  8. Sensory: 0-->Normal, no sensory loss  9. Best Language: 3-->Mute, global aphasia, no usable speech or auditory comprehension  10. Dysarthria: 2-->Severe dysarthria, patients speech is so slurred as to be unintelligible in the absence of or out of proportion to any dysphasia, or is mute/anarthric  11. Extinction and Inattention (formerly Neglect): 0-->No abnormality    Total (NIH Stroke Scale): 8    Interval: 24 hrs post onset of symptoms +/- 20 mins  1a. Level of  Consciousness: 0-->Alert, keenly responsive  1b. LOC Questions: 0-->Answers both questions correctly  1c. LOC Commands: 0-->Performs both tasks correctly  2. Best Gaze: 0-->Normal  3. Visual: 0-->No visual loss  4. Facial Palsy: 2-->Partial paralysis (total or near-total paralysis of lower face)  5a. Motor Arm, Left: 0-->No drift, limb holds 90 (or 45) degrees for full 10 secs  5b. Motor Arm, Right: 0-->No drift, limb holds 90 (or 45) degrees for full 10 secs  6a. Motor Leg, Left: 0-->No drift, leg holds 30 degree position for full 5 secs  6b. Motor Leg, Right: 0-->No drift, leg holds 30 degree position for full 5 secs  7. Limb Ataxia: 0-->Absent  8. Sensory: 0-->Normal, no sensory loss  9. Best Language: 0-->No aphasia, normal  10. Dysarthria: 1-->Mild-to-moderate dysarthria, patient slurs at least some words and, at worst, can be understood with some difficulty  11. Extinction and Inattention (formerly Neglect): 0-->No abnormality    Total (NIH Stroke Scale): 3    Results Reviewed:  I have personally reviewed current lab, radiology, and data and agree with results.    CT Angiogram Neck    Result Date: 8/26/2023  Impression: 1. There is abrupt tapering/occlusion of a distal left MCA/M2 branch in the region of the anterior insula. This is age-indeterminate, but corresponds to the region of perfusion abnormality on CT perfusion imaging. 2. No evidence of aneurysm or dissection. Electronically Signed: Jim Guillen MD  8/26/2023 1:03 AM EDT  Workstation ID: [id number]    XR Chest 1 View    Result Date: 8/26/2023  Impression: No acute cardiopulmonary abnormality. Electronically Signed: Jim Guillen MD  8/26/2023 1:15 AM EDT  Workstation ID: [id number]    CT Head Without Contrast Stroke Protocol    Result Date: 8/26/2023  Impression: 1. Focal region of hypoattenuation in the right insula and right temporal lobe. This appears to represent chronic infarct, but is ultimately age indeterminate. If there is concern for acute  infarct, consider MRI. 2. No acute intracranial hemorrhage Electronically Signed: Jim Guillen MD  8/26/2023 12:36 AM EDT  Workstation ID: [id number]    CT Angiogram Head w AI Analysis of LVO    Result Date: 8/26/2023  Impression: 1. There is abrupt tapering/occlusion of a distal left MCA/M2 branch in the region of the anterior insula. This is age-indeterminate, but corresponds to the region of perfusion abnormality on CT perfusion imaging. 2. No evidence of aneurysm or dissection. Electronically Signed: Jim Guillen MD  8/26/2023 1:03 AM EDT  Workstation ID: [id number]    CT CEREBRAL PERFUSION WITH & WITHOUT CONTRAST    Result Date: 8/26/2023  Impression: Tmax perfusion abnormality in the left MCA distribution measuring approximately 25 mL suspicious for brain tissue at risk of ischemia. Electronically Signed: Jim Guillen MD  8/26/2023 12:57 AM EDT  Workstation ID: [id number]         Assessment/Plan:        Antiplatelet PTA: asa 325   Anticoagulant PTA: NONE        Distal left MCA abrupt tapering\occlusion/M2 branch status post TNK administration  Chronic /age-indeterminate right MCA with possible stroke recrudescence.  Etiology: Cardioembolic possibly related to A-fib, as patient stopped aspirin.  ----TIA\ischemic stroke with IV thrombolytic order set in place  ----MRI ordered and pending  ----Echo ordered and pending w/o bubble study  ----CT head 24 hours post TNK ordered and pending  ----Lipid profile ordered and pending with LDL less than 70  ---Initiate high intensity statin Lipitor 80 mg at night and daily  ---Initiate aspirin 325 mg post 24 hours TNK administration per protocol  --- NIH\neuro check per protocol  ----Patient is admitted to the ICU for close monitoring  ----Systolic blood pressure less than 180 per protocol  ----Systolic blood pressure goal managed by intensivist  ----PT\OT\SLP evaluation  ----Case management for final discharge plan  ----Neurology stroke we will continue to  follow-up    Transient expressive aphasia/dysarthria  SLP evaluation    Remote A-fib on propafenone/aspirin  --- Patient currently normal sinus rhythm per EKG intermittently in sinus tach.  --- Echo ordered and  --- Consult cardiology, appreciate their recommendation    Essential hypertension  ----Systolic blood pressure less than 180 status post TNK administration  ----Managed by intensivist      Hyperlipidemia  --- Lipid profile ordered and pending  --- Initiate high intensity statin Lipitor 80 mg at night and daily      Anemia  --- H&H is stable  --- Received IV normal saline 1 L bolus  --- Trending lab      Obesity  --- BMI 32.32  --- Complicates all aspects of      I discussed plan of care with patient, family, ED physician,  neurology attending.  Neurology stroke we will continue to follow-up.  Thank you for letting us participate in patient care.            Ann Marie Witt, DIANNE  August 26, 2023  00:43 EDT

## 2023-08-26 NOTE — PROGRESS NOTES
"Critical Care Note     LOS: 0 days   Patient Care Team:  Leatha Melvin DO as PCP - General (Internal Medicine)  Roger Hodgson MD as Consulting Physician (Endocrinology)    Chief Complaint/Reason for visit:    Chief Complaint   Patient presents with    Stroke   Left middle cerebral artery stroke, status post TNK  Hypertension  Dyslipidemia  Recent GI bleed, recurrent  Paroxysmal atrial fibrillation    Subjective     Interval History:     Patient developed some angioedema after receiving TNK.  She was treated with Solu-Medrol, Benadryl and Pepcid.  MRI showed acute/subacute infarct in the left insula.  NIH stroke scale is an 8.  She remains in sinus rhythm.  She passed a bloody stool this morning with blood clots.  Hemoglobin is 8.6, decreased compared to 9.5 at midnight    Review of Systems:    All systems were reviewed and negative except as noted in subjective.    Medical history, surgical history, social history, family history reviewed    Objective     Intake/Output:    Intake/Output Summary (Last 24 hours) at 8/26/2023 09  Last data filed at 8/26/2023 0400  Gross per 24 hour   Intake 1000 ml   Output --   Net 1000 ml       Nutrition:  NPO Diet NPO Type: Strict NPO    Infusions:       Mechanical Ventilator Settings:                                                Telemetry: Sinus rhythm             Vital Signs  Blood pressure 157/85, pulse 93, temperature 98.8 °F (37.1 °C), temperature source Oral, resp. rate 18, height 154.9 cm (61\"), weight 75.9 kg (167 lb 5.3 oz), SpO2 96 %.    Physical Exam:  General Appearance:  Elderly woman in no respiratory distress   Head:  Atraumatic   Eyes:          No jaundice, conjunctiva pale   Ears:     Throat: Oral mucosa moist   Neck: Trachea midline, no palpable thyroid   Back:      Lungs:   Symmetric chest expansion without wheeze or rhonchi    Heart:  Regular rhythm, S1, S2 auscultated   Abdomen:   Bowel sounds present, soft, bloody stool   Rectal:   " Deferred   Extremities: No peripheral edema, some ecchymosis on her forearms   Pulses:    Skin: Warm and dry   Lymph nodes: No cervical adenopathy   Neurologic:     Interval:  (admission)  1a. Level of Consciousness: 2-->Not alert, requires repeated stimulation to attend, or is obtunded and requires strong or painful stimulation to make movements (not stereotyped)  1b. LOC Questions: 0-->Answers both questions correctly  1c. LOC Commands: 0-->Performs both tasks correctly  2. Best Gaze: 0-->Normal  3. Visual: 0-->No visual loss  4. Facial Palsy: 2-->Partial paralysis (total or near-total paralysis of lower face)  5a. Motor Arm, Left: 0-->No drift, limb holds 90 (or 45) degrees for full 10 secs  5b. Motor Arm, Right: 1-->Drift, limb holds 90 (or 45) degrees, but drifts down before full 10 secs, does not hit bed or other support  6a. Motor Leg, Left: 0-->No drift, leg holds 30 degree position for full 5 secs  6b. Motor Leg, Right: 0-->No drift, leg holds 30 degree position for full 5 secs  7. Limb Ataxia: 0-->Absent  8. Sensory: 1-->Mild-to-moderate sensory loss, patient feels pinprick is less sharp or is dull on the affected side, or there is a loss of superficial pain with pinprick, but patient is aware of being touched  9. Best Language: 1-->Mild-to-moderate aphasia, some obvious loss of fluency or facility of comprehension, without significant limitation on ideas expressed or form of expression. Reduction of speech and/or comprehension, however, makes conversation. . . (see row details)  10. Dysarthria: 1-->Mild-to-moderate dysarthria, patient slurs at least some words and, at worst, can be understood with some difficulty  11. Extinction and Inattention (formerly Neglect): 0-->No abnormality    Total (NIH Stroke Scale): 8   Results Review:     I reviewed the patient's new clinical results.   Results from last 7 days   Lab Units 08/26/23  0446 08/26/23  0047 08/26/23  0044   SODIUM mmol/L 138  --   --    POTASSIUM  mmol/L 3.9  --   --    CHLORIDE mmol/L 107  --   --    CO2 mmol/L 25.0  --   --    BUN mg/dL 17  --   --    CREATININE mg/dL 0.84  --  1.00   CALCIUM mg/dL 7.5*  --   --    BILIRUBIN mg/dL 0.2  --   --    ALK PHOS U/L 85  --   --    ALT (SGPT) U/L 8 9  --    AST (SGOT) U/L 14 16  --    GLUCOSE mg/dL 127*  --   --      Results from last 7 days   Lab Units 08/26/23  0446 08/26/23  0047 08/26/23  0044   WBC 10*3/mm3 8.21 9.18  --    HEMOGLOBIN g/dL 8.6* 9.5*  --    HEMOGLOBIN, POC g/dL  --   --  8.8*   HEMATOCRIT % 26.0* 28.3*  --    HEMATOCRIT POC %  --   --  26*   PLATELETS 10*3/mm3 213 226  --          No results found for: BLOODCX  No results found for: URINECX    I reviewed the patient's new imaging including images and reports.    Findings:  There is a small focus of diffusion restriction in the anterior left insular cortex consistent with acute/subacute infarction. There is no additional diffusion restriction. There is a chronic infarct in the posterior right insular cortex and right  temporal lobe. There is mild patchy FLAIR hyperintense signal within the cerebral white matter. There is mild generalized parenchymal volume loss. There is no acute or chronic intracranial hemorrhage. There is no mass effect or midline shift. No abnormal   extra-axial collections. The major arterial flow voids appear intact. The calvarium appears normal signal. Superficial soft tissues are unremarkable. The cerebellum is normal. There are prominent perivascular spaces. There is thinning of the orbital  lenses bilaterally. There is mild left sphenoid and posterior left ethmoid sinus mucosal disease. The mastoid air cells appear well aerated.   Impression:     Impression:    1. Small acute/subacute cortical infarct in the anterior left insular cortex.  2. Mild chronic small vessel ischemic change and chronic right temporal infarct.        Electronically Signed: Jim Guillen MD   8/26/2023 4:10 AM EDT     Findings:  There is a  region of perfusion abnormality in the left frontal lobe; left MCA distribution consisting of Tmax greater than 6 seconds with volume measuring 25 mL.    CBF<30% volume: 0 mL  Tmax>6sec volume: 25 mL mL  Mismatch volume: 25 mL mL  Mismatch ratio: Infinite     Impression:     Impression:  Tmax perfusion abnormality in the left MCA distribution measuring approximately 25 mL suspicious for brain tissue at risk of ischemia.        Electronically Signed: Jim Guillen MD   8/26/2023 12:57 AM EDT     Impression:     1. There is abrupt tapering/occlusion of a distal left MCA/M2 branch in the region of the anterior insula. This is age-indeterminate, but corresponds to the region of perfusion abnormality on CT perfusion imaging.   2. No evidence of aneurysm or dissection.       Electronically Signed: Jim Guillen MD    8/26/2023 1:03 AM EDT     FINDINGS:  LOWER CHEST:  The heart is normal in size. Lung bases are clear.    ABDOMEN/PELVIS:  Liver, gallbladder and bile ducts:  The liver enhances homogenously without suspicious focal hepatic lesion.  Cholelithiasis without evidence of cholecystitis. No significant biliary  ductal dilatation.    Adrenal glands:  The adrenal glands are morphologically unremarkable without suspicious  lesion.    Kidneys, ureters and urinary bladder:  No suspicious renal lesions. No hydronephrosis. Urinary bladder wall  thickening.    Spleen:  The spleen is normal in size.    Pancreas:  The pancreas is unremarkable.    Gastrointestinal system and mesentery:  Duodenal diverticulum. There is no evidence of bowel obstruction. The  appendix is visualized and unremarkable. No significant mesenteric  inflammation. Colonic diverticulosis without evidence of diverticulitis.  There is a small blush of high attenuation present within the cecum,  possibly representing an active colonic hemorrhage. (Axial images 49  through 52 of series 2 and coronal images 25 through 27 of series 601.)    Lymph nodes:  No  pathologically enlarged abdominal or pelvic lymph nodes are present.    Vessels:  The abdominal aorta is normal in caliber. The celiac trunk, superior  mesenteric artery, inferior mesenteric artery and their branch vessels  appear grossly patent. The superior mesenteric vein, splenic vein and  main portal veins are patent. The inferior vena cava and hepatic veins  are unremarkable.    Peritoneum:  No free intraperitoneal fluid or pneumoperitoneum.    Pelvic viscera:  Prior hysterectomy.    Body wall:  No acute findings. No significant body wall hernias.    Bones:  No acute fracture.   Impression:     Possible active GI hemorrhage involving the cecum. Colonoscopy is  recommended.    Urinary bladder wall thickening. Correlate with urinalysis.            Images personally reviewed, interpreted and dictated by RIMA Hargrove M.D.         All medications reviewed.   [START ON 8/27/2023] aspirin, 325 mg, Oral, Daily   Or  [START ON 8/27/2023] aspirin, 300 mg, Rectal, Daily  atorvastatin, 80 mg, Oral, Nightly  diphenhydrAMINE, 25 mg, Intravenous, Q6H  famotidine, 20 mg, Intravenous, Q12H  sodium chloride, 500 mL, Intravenous, Once          Assessment & Plan       CVA (cerebral vascular accident)    Hypothyroidism (acquired)    Essential hypertension    Hyperlipemia    Paroxysmal atrial fibrillation    Acute ischemic left MCA stroke    83-year-old woman with a history of paroxysmal atrial fibrillation, hypertension, hypothyroidism, breast cancer, recent GI bleed presenting with left facial droop, dysarthria, confusion.  Imaging revealed left middle cerebral artery M2 branch occlusion.  NIH was not 8.  She had a recent colonoscopy with possible ruptured diverticuli.  However with her new onset stroke and M2 occlusion decision was made to proceed with TNK.  She subsequently developed some angioedema and received Benadryl, Pepcid, Solu-Medrol with resolution.  Fortunately she did pass a bloody stool this morning.  Hemoglobin  is 8.6 compared to 9.5.  She is hemodynamically stable.      PLAN:    Monitor H&H every 6 hours and transfuse as needed  Continue Benadryl  Aspirin, statin  Monitor NIH stroke scale  CT scan in 24-hour  Systolic blood pressure less than 185      VTE Prophylaxis:SCDS    Stress Ulcer Prophylaxis: Charo Morales MD  08/26/23  09:13 EDT      Time: Critical care 25 min  I personally provided care to this critically ill patient as documented above.  Critical care time does not include time spent on separately billed procedures.  None of my critical care time was concurrent with other critical care providers.

## 2023-08-26 NOTE — CONSULTS
[facility]    Referring Provider: Provider, No Known    PCP: Leatha Melvin DO    Reason for Consultation: Lower GI bleed, s/p thrombolytics for stroke, recent GI bleed    Chief complaint: Bloody stool    History of present illness:    Laury Eugene is a 83 y.o. female who is admitted with strokelike symptoms was found to have MCA/M2 occlusion requiring tPA.  GI consult received as patient had large hematochezia stool while en route to scanner overnight. Of note, patient recently admitted at [facility] for GIB; underwent a bidirectional endoscopy on 08/24/2023- EGD with erosive gastritis and nonspecific erosions and colonoscopy concerning for pan-diverticulosis with findings concerning for diverticular GI bleed.  Patient presented overnight with aphasia and weakness; imaging concerning for MCA/M2 occlusion and received thrombolytic.  On exam, patient with marked lower abdominal pain and tenderness and recurrent bloody stool; no N/V, SOB, CP, or F/C.  Denies use of NSAIDs.  Received thrombolytics as noted.  Hemoglobin at time of admission 8.8; currently 9.4. Past medical, surgical, social, and family histories are reviewed for accuracy.  No documented alleviating or exacerbating factors.  Does not endorse pain at time of exam.    Allergies:  Lidocaine and Novocain [procaine]    Scheduled Meds:  [START ON 8/27/2023] aspirin, 325 mg, Oral, Daily   Or  [START ON 8/27/2023] aspirin, 300 mg, Rectal, Daily  atorvastatin, 80 mg, Oral, Nightly  diphenhydrAMINE, 25 mg, Intravenous, Q6H  famotidine, 20 mg, Intravenous, Q12H  sodium chloride, 500 mL, Intravenous, Once       Infusions:     PRN Meds:    EPINEPHrine    Home Meds:  Medications Prior to Admission   Medication Sig Dispense Refill Last Dose    aspirin 325 MG tablet Take 1 tablet by mouth Daily. (Patient not taking: Reported on 8/26/2023)   Not Taking    folic acid (FOLVITE) 1 MG tablet Take 800 mcg by mouth Daily.   Unknown     multivitamin with minerals tablet tablet Take 1 tablet by mouth Daily.   Unknown    pantoprazole (PROTONIX) 40 MG EC tablet Take 1 tablet by mouth Daily. Started at [facility]   Unknown    propafenone (RYTHMOL) 300 MG tablet 1 tablet 2 (Two) Times a Day.   Unknown    traMADol (ULTRAM) 50 MG tablet Take 1 tablet by mouth Every 6 (Six) Hours As Needed for Moderate Pain. Started at [place]       vitamin B-12 (CYANOCOBALAMIN) 250 MCG tablet Take 4 tablets by mouth Daily.   Unknown       ROS: Review of Systems   Constitutional:  Positive for activity change, appetite change and fatigue. Negative for chills, diaphoresis, fever and unexpected weight change.   HENT:  Negative for sore throat, trouble swallowing and voice change.    Respiratory:  Negative for apnea, cough, choking, chest tightness, shortness of breath, wheezing and stridor.    Cardiovascular:  Negative for chest pain, palpitations and leg swelling.   Gastrointestinal:  Positive for abdominal distention, abdominal pain, blood in stool and diarrhea. Negative for anal bleeding, constipation, nausea, rectal pain and vomiting.   Endocrine: Negative.    Genitourinary: Negative.    Musculoskeletal: Negative.    Skin:  Positive for pallor.   Allergic/Immunologic: Negative.    Neurological:  Positive for speech difficulty and weakness.   Hematological: Negative.    Psychiatric/Behavioral: Negative.       PAST MED HX:  Past Medical History:   Diagnosis Date    History of breast cancer     History of heart disease     Hypothyroidism     Multinodular goiter     Thyroid function test abnormal        PAST SURG HX:  Past Surgical History:   Procedure Laterality Date    BLEPHAROPLASTY      BREAST RECONSTRUCTION      CATARACT EXTRACTION Bilateral     HYSTERECTOMY      SIMPLE MASTECTOMY         FAM HX:  Family History   Problem Relation Age of Onset    Hypothyroidism Sister     Stomach cancer Mother     Heart attack Father        SOC HX:  Social History     Socioeconomic  "History    Marital status:    Tobacco Use    Smoking status: Former     Types: Cigarettes     Quit date: 1970     Years since quittin.6    Smokeless tobacco: Never   Vaping Use    Vaping Use: Never used   Substance and Sexual Activity    Alcohol use: Yes     Alcohol/week: 1.0 - 2.0 standard drink     Types: 1 - 2 Glasses of wine per week     Comment: social    Drug use: Never    Sexual activity: Never       PHYSICAL EXAM  /85 (BP Location: Right arm, Patient Position: Lying)   Pulse 93   Temp 98.8 °F (37.1 °C) (Oral)   Resp 18   Ht 154.9 cm (61\")   Wt 75.9 kg (167 lb 5.3 oz)   SpO2 96%   BMI 31.62 kg/m²   Wt Readings from Last 3 Encounters:   23 75.9 kg (167 lb 5.3 oz)   23 75.3 kg (166 lb)   22 75.3 kg (166 lb)   ,body mass index is 31.62 kg/m².  Physical Exam  Eyes:      General: No scleral icterus.     Extraocular Movements: Extraocular movements intact.      Conjunctiva/sclera: Conjunctivae normal.      Pupils: Pupils are equal, round, and reactive to light.   Cardiovascular:      Rate and Rhythm: Tachycardia present.      Pulses: Normal pulses.      Heart sounds: Normal heart sounds.   Pulmonary:      Effort: Pulmonary effort is normal. No respiratory distress.      Breath sounds: Normal breath sounds.   Abdominal:      General: Abdomen is flat. Bowel sounds are normal. There is distension.      Palpations: Abdomen is soft. There is no mass.      Tenderness: There is abdominal tenderness. There is guarding. There is no rebound.      Hernia: No hernia is present.   Skin:     General: Skin is warm and dry.      Capillary Refill: Capillary refill takes less than 2 seconds.      Coloration: Skin is pale. Skin is not jaundiced.   Neurological:      General: No focal deficit present.      Mental Status: She is oriented to person, place, and time. Mental status is at baseline.   Psychiatric:         Mood and Affect: Mood normal.         Behavior: Behavior normal.         " Thought Content: Thought content normal.         Judgment: Judgment normal.       Results Review:   I reviewed the patient's new clinical results.  I reviewed the patient's new imaging results and agree with the interpretation.  I reviewed the patient's other test results and agree with the interpretation  I personally viewed and interpreted the patient's EKG/Telemetry data    Lab Results   Component Value Date    WBC 8.21 08/26/2023    HGB 8.6 (L) 08/26/2023    HGB 9.5 (L) 08/26/2023    HGB 8.8 (L) 08/26/2023    HCT 26.0 (L) 08/26/2023    MCV 93.2 08/26/2023     08/26/2023       No results found for: INR    Lab Results   Component Value Date    GLUCOSE 127 (H) 08/26/2023    BUN 17 08/26/2023    CREATININE 0.84 08/26/2023    EGFRIFNONA 54 (L) 12/29/2020    BCR 20.2 08/26/2023     08/26/2023    K 3.9 08/26/2023    CO2 25.0 08/26/2023    CALCIUM 7.5 (L) 08/26/2023    ALBUMIN 2.8 (L) 08/26/2023    ALKPHOS 85 08/26/2023    BILITOT 0.2 08/26/2023    ALT 8 08/26/2023    AST 14 08/26/2023       CT Head Without Contrast    Result Date: 8/13/2023  HEAD CT     8/13/2023 4:15 PM HISTORY: Acute headache. COMPARISON: September 2020 TECHNIQUE: Multiple axial CT images were performed from the foramen magnum to the vertex. This study was performed with techniques to keep radiation doses as low as reasonably achievable, (ALARA). Individualized dose reduction techniques using automated exposure control or adjustment of mA and/or kV according to the patient size were employed. FINDINGS: The ventricles are enlarged. There is diffuse atrophy. There is no evidence of hemorrhage. No masses are identified. No extra-axial fluid is seen. The sinuses are normal. Right parietal encephalomalacia is consistent with a remote infarct.    No acute intracranial process. Images reviewed, interpreted, and dictated by GIANCARLO Collier MD    CT Angiogram Neck    Result Date: 8/26/2023  CT ANGIOGRAM NECK CT ANGIOGRAM HEAD W AI ANALYSIS  OF LVO Date of Exam: 8/26/2023 12:34 AM EDT Indication: stroke. Comparison: CT head 8/26/2023. Technique: CTA of the head and neck was performed before and after the uneventful intravenous administration of 115 mL Isovue-370. Reconstructed coronal and sagittal images were also obtained. In addition, a 3-D volume rendered image was created for interpretation. Automated exposure control and iterative reconstruction methods were used. Findings: Aorta: The aortic arch is unremarkable. There is conventional three-vessel arch anatomy. The right brachiocephalic and bilateral subclavian arteries appear patent. Right carotid: The right CCA follows a normal course and appears normal caliber. Carotid bifurcation is normal. ECA and distal branches appear normal. Cervical ICA is normal. The intracranial ICA segments appear widely patent. There is no definite P-comm. There is a patent A-Comm. The A1 and M1 segments and distal NESSA and MCA branches appear patent. Left carotid: The left CCA contains minimal amount stenosing plaque. The carotid bifurcation, ECA and distal branches and cervical ICA appear within normal limits. The intracranial ICA segments appear widely patent. There is a small patent P-comm. The A1  and M1 segments and distal NESSA branches appear normal. There is abrupt tapering of a distal left M2 branch within the anterior insular region. The remainder of the left MCA branches appear patent. Posterior circulation: Vertebral arteries are codominant. Vertebral arteries follow a normal course and appear normal caliber throughout the neck. There is mild left V4 and right V4 calcific disease without stenosis. The basilar artery, superior cerebellar and posterior cerebral arteries appear patent. Nonvascular findings: Lung apices appear clear. Superior mediastinal structures are unremarkable. There is an 11 mm right thyroid lobe nodule. No evidence of a neck mass or adenopathy. There is thinning of the orbital lenses  bilaterally. There is no focal soft tissue inflammation or fluid collection. There are no acute osseous abnormalities or destructive bone lesions. There is moderate cervical spondylosis.     Impression: 1. There is abrupt tapering/occlusion of a distal left MCA/M2 branch in the region of the anterior insula. This is age-indeterminate, but corresponds to the region of perfusion abnormality on CT perfusion imaging. 2. No evidence of aneurysm or dissection. Electronically Signed: Jim Guillen MD  8/26/2023 1:03 AM EDT  Workstation ID: [id number]    MRI Brain Without Contrast    Result Date: 8/26/2023  MRI BRAIN WO CONTRAST Date of Exam: 8/26/2023 3:03 AM EDT Indication: Stroke, follow up.  Comparison: CT head, perfusion and angiography 8/26/2023. Technique:  Routine multiplanar/multisequence sequence images of the brain were obtained without contrast administration. Findings: There is a small focus of diffusion restriction in the anterior left insular cortex consistent with acute/subacute infarction. There is no additional diffusion restriction. There is a chronic infarct in the posterior right insular cortex and right temporal lobe. There is mild patchy FLAIR hyperintense signal within the cerebral white matter. There is mild generalized parenchymal volume loss. There is no acute or chronic intracranial hemorrhage. There is no mass effect or midline shift. No abnormal  extra-axial collections. The major arterial flow voids appear intact. The calvarium appears normal signal. Superficial soft tissues are unremarkable. The cerebellum is normal. There are prominent perivascular spaces. There is thinning of the orbital lenses bilaterally. There is mild left sphenoid and posterior left ethmoid sinus mucosal disease. The mastoid air cells appear well aerated.     Impression: 1. Small acute/subacute cortical infarct in the anterior left insular cortex. 2. Mild chronic small vessel ischemic change and chronic right temporal  infarct. Electronically Signed: Jim Guillen MD  8/26/2023 4:10 AM EDT  Workstation ID: [id number]    CT Abdomen Pelvis With Contrast    Result Date: 8/23/2023  CT SCAN OF THE ABDOMEN AND PELVIS WITH CONTRAST    8/23/2023 8:24 PM HISTORY: GI bleed PROCEDURE: Axial CT images were obtained from the lung bases to the pubic symphysis following IV contrast administration. Coronal and sagittal reformatted images were generated from the axial data set and provided for interpretation. This study was performed with techniques to keep radiation doses as low as reasonably achievable, (ALARA). Individualized dose reduction techniques using automated exposure control or adjustment of mA and/or kV according to the patient size were employed. COMPARISON: 11/2/2018. FINDINGS: LOWER CHEST: The heart is normal in size. Lung bases are clear. ABDOMEN/PELVIS: Liver, gallbladder and bile ducts: The liver enhances homogenously without suspicious focal hepatic lesion. Cholelithiasis without evidence of cholecystitis. No significant biliary ductal dilatation. Adrenal glands: The adrenal glands are morphologically unremarkable without suspicious lesion. Kidneys, ureters and urinary bladder: No suspicious renal lesions. No hydronephrosis. Urinary bladder wall thickening. Spleen: The spleen is normal in size. Pancreas: The pancreas is unremarkable. Gastrointestinal system and mesentery: Duodenal diverticulum. There is no evidence of bowel obstruction. The appendix is visualized and unremarkable. No significant mesenteric inflammation. Colonic diverticulosis without evidence of diverticulitis. There is a small blush of high attenuation present within the cecum, possibly representing an active colonic hemorrhage. (Axial images 49 through 52 of series 2 and coronal images 25 through 27 of series 601.) Lymph nodes: No pathologically enlarged abdominal or pelvic lymph nodes are present. Vessels: The abdominal aorta is normal in caliber. The celiac  trunk, superior mesenteric artery, inferior mesenteric artery and their branch vessels appear grossly patent. The superior mesenteric vein, splenic vein and main portal veins are patent. The inferior vena cava and hepatic veins are unremarkable. Peritoneum: No free intraperitoneal fluid or pneumoperitoneum. Pelvic viscera: Prior hysterectomy. Body wall: No acute findings. No significant body wall hernias. Bones: No acute fracture.    Possible active GI hemorrhage involving the cecum. Colonoscopy is recommended. Urinary bladder wall thickening. Correlate with urinalysis. Images personally reviewed, interpreted and dictated by RIMA Hargrove M.D.    XR Chest 1 View    Result Date: 8/26/2023  XR CHEST 1 VW Date of Exam: 8/26/2023 12:53 AM EDT Indication: Acute Stroke Protocol (onset < 12 hrs) Comparison: None available. Findings: There is no pneumothorax, pleural effusion or focal airspace consolidation. Heart size and pulmonary vasculature appear within normal limits. Regional bones appear intact.     Impression: No acute cardiopulmonary abnormality. Electronically Signed: Jim Guillen MD  8/26/2023 1:15 AM EDT  Workstation ID: [id number]    CT Head Without Contrast Stroke Protocol    Result Date: 8/26/2023  CT HEAD WO CONTRAST STROKE PROTOCOL Date of Exam: 8/26/2023 12:27 AM EDT Indication: Stroke. Comparison: 4/23/2009. Technique: Axial CT images were obtained of the head without contrast administration.  Reconstructed coronal images were also obtained. Automated exposure control and iterative construction methods were used. Scan Time: 0026 hours Results discussed with stroke team at 0036 hours. Findings: There is patchy hypoattenuation and loss of gray-white differentiation within the posterior right insular region and right temporal lobe compatible with age-indeterminate, but likely chronic infarct. There is no acute intracranial hemorrhage. No additional hypoattenuating lesions in the brain. There are  intracranial vascular calcifications. There is no mass effect or midline shift. No abnormal extra-axial collections. The paranasal sinuses and mastoid air cells appear well aerated. The calvarium  is intact. There is a small focus of mucosal thickening within the left sphenoid sinus and in the left nasal cavity. Superficial soft tissues are unremarkable. There is thinning of the orbital lenses bilaterally.     Impression: 1. Focal region of hypoattenuation in the right insula and right temporal lobe. This appears to represent chronic infarct, but is ultimately age indeterminate. If there is concern for acute infarct, consider MRI. 2. No acute intracranial hemorrhage Electronically Signed: Jim Guillen MD  8/26/2023 12:36 AM EDT  Workstation ID: [id number]    XR Abdomen KUB    Result Date: 8/26/2023  XR ABDOMEN KUB Date of Exam: 8/26/2023 1:11 AM EDT Indication: mri clearance Comparison: None available. Findings: Surgical clips overlie the left axilla and left breast. There are multiple small surgical clips in the abdomen. There is no evidence of a metallic foreign body. There is contrast media in the urinary bladder. The visualized lung parenchyma is clear. No bowel distention, pneumatosis intestinalis or evidence of pneumoperitoneum.     Impression: No contraindication to MRI. Electronically Signed: Jim Guillen MD  8/26/2023 1:39 AM EDT  Workstation ID: [id number]    CT Angiogram Head w AI Analysis of LVO    Result Date: 8/26/2023  CT ANGIOGRAM NECK CT ANGIOGRAM HEAD W AI ANALYSIS OF LVO Date of Exam: 8/26/2023 12:34 AM EDT Indication: stroke. Comparison: CT head 8/26/2023. Technique: CTA of the head and neck was performed before and after the uneventful intravenous administration of 115 mL Isovue-370. Reconstructed coronal and sagittal images were also obtained. In addition, a 3-D volume rendered image was created for interpretation. Automated exposure control and iterative reconstruction methods were used.  Findings: Aorta: The aortic arch is unremarkable. There is conventional three-vessel arch anatomy. The right brachiocephalic and bilateral subclavian arteries appear patent. Right carotid: The right CCA follows a normal course and appears normal caliber. Carotid bifurcation is normal. ECA and distal branches appear normal. Cervical ICA is normal. The intracranial ICA segments appear widely patent. There is no definite P-comm. There is a patent A-Comm. The A1 and M1 segments and distal NESSA and MCA branches appear patent. Left carotid: The left CCA contains minimal amount stenosing plaque. The carotid bifurcation, ECA and distal branches and cervical ICA appear within normal limits. The intracranial ICA segments appear widely patent. There is a small patent P-comm. The A1  and M1 segments and distal NESSA branches appear normal. There is abrupt tapering of a distal left M2 branch within the anterior insular region. The remainder of the left MCA branches appear patent. Posterior circulation: Vertebral arteries are codominant. Vertebral arteries follow a normal course and appear normal caliber throughout the neck. There is mild left V4 and right V4 calcific disease without stenosis. The basilar artery, superior cerebellar and posterior cerebral arteries appear patent. Nonvascular findings: Lung apices appear clear. Superior mediastinal structures are unremarkable. There is an 11 mm right thyroid lobe nodule. No evidence of a neck mass or adenopathy. There is thinning of the orbital lenses bilaterally. There is no focal soft tissue inflammation or fluid collection. There are no acute osseous abnormalities or destructive bone lesions. There is moderate cervical spondylosis.     Impression: 1. There is abrupt tapering/occlusion of a distal left MCA/M2 branch in the region of the anterior insula. This is age-indeterminate, but corresponds to the region of perfusion abnormality on CT perfusion imaging. 2. No evidence of  aneurysm or dissection. Electronically Signed: Jim Guillen MD  8/26/2023 1:03 AM EDT  Workstation ID: [id number]    XR spine cervical 2 or 3 views    Result Date: 8/13/2023  CERVICAL SPINE SERIES HISTORY: Fall, acute neck pain. FINDINGS:  A two view exam demonstrates no acute fracture or dislocation. The joint spaces appear normal. No soft tissue abnormality is seen.    No acute fracture. THORACIC SPINE SERIES HISTORY: Fall, acute back pain. FINDINGS:  A two view exam was obtained. There is mild compression of 2 contiguous lower thoracic vertebral bodies and a single upper thoracic vertebral body which are age indeterminate. No other fracture is identified. The joint spaces appear normal. No soft tissue abnormality is seen. IMPRESSION: Age-indeterminate mild compression fractures. Images reviewed, interpreted, and dictated by GIANCARLO Collier MD    XR spine thoracic 2 views    Result Date: 8/13/2023  CERVICAL SPINE SERIES HISTORY: Fall, acute neck pain. FINDINGS:  A two view exam demonstrates no acute fracture or dislocation. The joint spaces appear normal. No soft tissue abnormality is seen.    No acute fracture. THORACIC SPINE SERIES HISTORY: Fall, acute back pain. FINDINGS:  A two view exam was obtained. There is mild compression of 2 contiguous lower thoracic vertebral bodies and a single upper thoracic vertebral body which are age indeterminate. No other fracture is identified. The joint spaces appear normal. No soft tissue abnormality is seen. IMPRESSION: Age-indeterminate mild compression fractures. Images reviewed, interpreted, and dictated by GIANCARLO Collier MD    CT CEREBRAL PERFUSION WITH & WITHOUT CONTRAST    Result Date: 8/26/2023  CT CEREBRAL PERFUSION W WO CONTRAST Date of Exam: 8/26/2023 12:34 AM EDT Indication: stroke.  Comparison: CT head 8/26/2023. Technique: Axial CT images of the brain were obtained prior to and after the administration of 115 mL Isovue-370. Core blood volume, core  blood flow, mean transit time, and Tmax images were obtained utilizing the Rapid software protocol. A limited CT angiogram of the head was also performed to measure the blood vessel density. The radiation dose reduction device was turned on for each scan per the ALARA (As Low as Reasonably Achievable) protocol. Findings: There is a region of perfusion abnormality in the left frontal lobe; left MCA distribution consisting of Tmax greater than 6 seconds with volume measuring 25 mL. CBF<30% volume: 0 mL Tmax>6sec volume: 25 mL mL Mismatch volume: 25 mL mL Mismatch ratio: Infinite     Impression: Tmax perfusion abnormality in the left MCA distribution measuring approximately 25 mL suspicious for brain tissue at risk of ischemia. Electronically Signed: Jim Guillen MD  8/26/2023 12:57 AM EDT  Workstation ID: [id number]      No results found for: COVID19    ASSESSMENTS/PLANS  1.  Acute abdominal pain with guarding  2.  Acute gastrointestinal bleeding, suspected lower source  3.  Acute blood loss anemia  4.  CVA, s/p TNK  5.  Angioedema secondary to above.  6.  Recent GI bleed, suspected diverticular, s/p bidirectional endoscopy on 8/24/2023    Laury Eugene is a 83 y.o. female presented to hospital with acute strokelike symptoms was found to have an MCA/M2 occlusion and received TNK protocol.  GI consult received for acute onset of lower gastrointestinal bleeding.  Patient was recently admitted outside facility, [facility], with similar presentation and underwent a bidirectional endoscopy earlier this week per Dr. Laura Pandya-EGD with erosive gastritis and nondescript erosions-colonoscopy concerning for pandiverticulosis.  Concerning this morning is acute lower abdominal pain with associated guarding and gaseous distention of the left and right upper quadrants.  Plan to obtain stat CT abdomen and pelvis with and without contrast given patient's recent colonoscopy.  If imaging unremarkable, will plan repeat  bidirectional endoscopy tomorrow for definitive evaluation and control of bleeding.    >>> N.p.o.  >>> Stat CT abdomen and pelvis with IV contrast x1  >>> IV PPI twice daily  >>> Continue to trend H&H; transfuse for hemoglobins less than 7 or symptomatic anemia per protocol.  >>> If imaging is unremarkable, will plan for bidirectional endoscopy in AM.    Addendum:  CT reviewed without acute findings. No contrast extravisation,.   Okay for SLP evaluation  If cleared from their perspective, okay for liquids today  NPO at MN  Obtain consent for esophagogastroduodenoscopy with and colonoscopy  MoviPrep: Give 8 ounces every 15 minutes ×2 L starting at 4 PM.  Repeat dose at 5 AM tomorrow.  Must finish both doses in 2 hours.  Stool should look like lemonade when finished.  Please call ENDO at [phone] at 7:30am if not clear.    I discussed the patient's findings and my recommendations with patient, family, and nursing staff    DIANNE Kern  08/26/23  09:29 EDT

## 2023-08-26 NOTE — H&P
INTENSIVIST   PROGRESS NOTE        SUBJECTIVE     Laury 83 y.o. female is followed for: Stroke       CVA (cerebral vascular accident)    Hypothyroidism (acquired)    Essential hypertension    Hyperlipemia    Paroxysmal atrial fibrillation    As an Intensivist, we provide an integrated approach to the ICU patient and family, medical management of comorbid conditions, including but not limited to electrolytes, glycemic control, organ dysfunction, lead interdisciplinary rounds and coordinate the care with all other services, including those from other specialists.     Interval History:  00:26 She was brought to the ED with new onset of aphasia. By the time she arrived to the ED she was able to talk.    At 00:45 after her CT Scan, she was aphasic again. This resolved again. At 01:35 she had some dysarthria but able to carry a conversation. After several conversation with the family and the Stroke APRN and the ED Attending, eventually family wanted to proceed with thrombolytics. (Administered at 02:04.    03:10 She went for her MRI. She had BRBPR. And another episode while getting the MRI. I saw her in the MRI area, she was aphasic again.    [place] [facility] [place] 2 [place] for GIB. She had an EGD. She was discharged on 08/25/23    Temp  Min: 98.8 °F (37.1 °C)  Max: 98.8 °F (37.1 °C)       History     Last Reviewed by Giancarlo Carreon MD on 8/26/2023 at  2:11 AM    Sections Reviewed    Medical, Family, Surgical, Tobacco, Alcohol, Drug Use, Sexual Activity,   Social Documentation    Problem list reviewed by Giancarlo Carreon MD on 8/26/2023 at  2:11 AM  Medicines reviewed by Giancarlo Carreon MD on 8/26/2023 at  2:11 AM  Allergies reviewed by Giancarlo Carreon MD on 8/26/2023 at  2:11 AM       The patient's relevant past medical, surgical and social history were reviewed and updated in Epic as appropriate.     PMH: She  has a past medical history of History of breast cancer, History of heart disease,  Hypothyroidism, Multinodular goiter, and Thyroid function test abnormal.   PSH: She  has a past surgical history that includes Blepharoplasty; Hysterectomy; Breast reconstruction; Simple mastectomy; and Cataract extraction (Bilateral).      Medications:  No current facility-administered medications on file prior to encounter.     Current Outpatient Medications on File Prior to Encounter   Medication Sig    aspirin 325 MG tablet Take 1 tablet by mouth Daily.    folic acid (FOLVITE) 1 MG tablet Take 1 tablet by mouth Daily.    multivitamin with minerals tablet tablet Take 1 tablet by mouth Daily.    propafenone (RYTHMOL) 300 MG tablet 1 tablet 2 (Two) Times a Day.    vitamin B-12 (CYANOCOBALAMIN) 250 MCG tablet Take 1 tablet by mouth Daily.       Allergies: She is allergic to lidocaine and novocain [procaine].   FH: Her family history includes Heart attack in her father; Hypothyroidism in her sister; Stomach cancer in her mother.   SH: She  reports that she quit smoking about 53 years ago. Her smoking use included cigarettes. She has never used smokeless tobacco. She reports current alcohol use of about 1.0 - 2.0 standard drink per week. She reports that she does not use drugs.     ROS:  Unable to obtain due to her neuro changes.       OBJECTIVE     Vitals:  Temp: 98.8 °F (37.1 °C) (23 0026) Temp  Min: 98.8 °F (37.1 °C)  Max: 98.8 °F (37.1 °C)   Temp core:      BP: 156/69 (23 0130) BP  Min: 148/90  Max: 160/78   MAP (non-invasive) Noninvasive MAP (mmHg): 78 (23 0130) Noninvasive MAP (mmHg)  Av.5  Min: 78  Max: 127   Pulse: 98 (23 0130) Pulse  Min: 96  Max: 98   Resp: 14 (23 0026) Resp  Min: 14  Max: 14   SpO2: 96 % (23 0130) SpO2  Min: 96 %  Max: 98 %   Device: room air (23 0026)    Flow Rate:   No data recorded     Physical Examination  Telemetry:  Rhythm: sinus tachycardia (23 0159)         Constitutional:  No acute distress.   Cardiovascular: RRR.     Respiratory: Normal breath sounds  (+) Rhonchi   Abdominal:  Soft with no tenderness.   Extremities: No Edema   Neurological:   Awake.  Best Eye Response: 4-->(E4) spontaneous (08/26/23 0143)  Best Motor Response: 6-->(M6) obeys commands (08/26/23 0143)  Best Verbal Response: 5-->(V5) oriented (08/26/23 0143)  Kd Coma Scale Score: 15 (08/26/23 0143)     NIH Stroke Scale  Interval: 24 hrs post onset of symptoms +/- 20 mins (08/26/23 0129)  1a. Level of Consciousness: 0-->Alert, keenly responsive (08/26/23 0129)  1b. LOC Questions: 0-->Answers both questions correctly (08/26/23 0129)  1c. LOC Commands: 0-->Performs both tasks correctly (08/26/23 0129)  2. Best Gaze: 0-->Normal (08/26/23 0129)  3. Visual: 0-->No visual loss (08/26/23 0129)  4. Facial Palsy: 2-->Partial paralysis (total or near-total paralysis of lower face) (08/26/23 0129)  5a. Motor Arm, Left: 0-->No drift, limb holds 90 (or 45) degrees for full 10 secs (08/26/23 0129)  5b. Motor Arm, Right: 0-->No drift, limb holds 90 (or 45) degrees for full 10 secs (08/26/23 0129)  6a. Motor Leg, Left: 0-->No drift, leg holds 30 degree position for full 5 secs (08/26/23 0129)  6b. Motor Leg, Right: 0-->No drift, leg holds 30 degree position for full 5 secs (08/26/23 0129)  7. Limb Ataxia: 0-->Absent (08/26/23 0129)  8. Sensory: 0-->Normal, no sensory loss (08/26/23 0129)  9. Best Language: 0-->No aphasia, normal (08/26/23 0129)  10. Dysarthria: 1-->Mild-to-moderate dysarthria, patient slurs at least some words and, at worst, can be understood with some difficulty (08/26/23 0129)  11. Extinction and Inattention (formerly Neglect): 0-->No abnormality (08/26/23 0129)  Total (NIH Stroke Scale): 3 (08/26/23 0129)    Results Reviewed:  Laboratory  Microbiology  Radiology  Pathology    Hematology:  Results from last 7 days   Lab Units 08/26/23 0047 08/26/23  0044   WBC 10*3/mm3 9.18  --    HEMOGLOBIN g/dL 9.5*  --    HEMOGLOBIN, POC g/dL  --  8.8*   MCV fL 91.6   --    PLATELETS 10*3/mm3 226  --      Results from last 7 days   Lab Units 08/26/23  0047   IMM GRAN % % 0.7*   NEUTROS ABS 10*3/mm3 5.27   LYMPHS ABS 10*3/mm3 2.35   MONOS ABS 10*3/mm3 1.08*   EOS ABS 10*3/mm3 0.38   BASOS ABS 10*3/mm3 0.04     Chemistry:  Estimated Creatinine Clearance: 41.6 mL/min (by C-G formula based on SCr of 1 mg/dL).    Results from last 7 days   Lab Units 08/26/23  0044   CREATININE mg/dL 1.00     Hepatic Panel:  Results from last 7 days   Lab Units 08/26/23  0047   AST (SGOT) U/L 16   ALT (SGPT) U/L 9     Coagulation Labs:  Results from last 7 days   Lab Units 08/26/23  0047   APTT seconds 23.0     Cardiac Labs:  Results from last 7 days   Lab Units 08/26/23  0047   HSTROP T ng/L 18*     Images:  CT Angiogram Neck    Result Date: 8/26/2023  Impression: 1. There is abrupt tapering/occlusion of a distal left MCA/M2 branch in the region of the anterior insula. This is age-indeterminate, but corresponds to the region of perfusion abnormality on CT perfusion imaging. 2. No evidence of aneurysm or dissection. Electronically Signed: Jim Guillen MD  8/26/2023 1:03 AM EDT  Workstation ID: [id number]    XR Chest 1 View    Result Date: 8/26/2023  Impression: No acute cardiopulmonary abnormality. Electronically Signed: Jim Guillen MD  8/26/2023 1:15 AM EDT  Workstation ID: [id number]    CT Head Without Contrast Stroke Protocol    Result Date: 8/26/2023  Impression: 1. Focal region of hypoattenuation in the right insula and right temporal lobe. This appears to represent chronic infarct, but is ultimately age indeterminate. If there is concern for acute infarct, consider MRI. 2. No acute intracranial hemorrhage Electronically Signed: Jim Guillen MD  8/26/2023 12:36 AM EDT  Workstation ID: [id number]    XR Abdomen KUB    Result Date: 8/26/2023  Impression: No contraindication to MRI. Electronically Signed: Jim Guillen MD  8/26/2023 1:39 AM EDT  Workstation ID: [id number]    CT Angiogram Head w AI  Analysis of LVO    Result Date: 8/26/2023  Impression: 1. There is abrupt tapering/occlusion of a distal left MCA/M2 branch in the region of the anterior insula. This is age-indeterminate, but corresponds to the region of perfusion abnormality on CT perfusion imaging. 2. No evidence of aneurysm or dissection. Electronically Signed: Jim Guillen MD  8/26/2023 1:03 AM EDT  Workstation ID: [id number]    CT CEREBRAL PERFUSION WITH & WITHOUT CONTRAST    Result Date: 8/26/2023  Impression: Tmax perfusion abnormality in the left MCA distribution measuring approximately 25 mL suspicious for brain tissue at risk of ischemia. Electronically Signed: Jim Guillen MD  8/26/2023 12:57 AM EDT  Workstation ID: [id number]     Echo:      Results: Reviewed.    I reviewed the patient's new laboratory and imaging results.  I independently reviewed the patient's new images.    Medications: Reviewed.    Assessment   A/P     Hospital:  LOS: 0 days   ICU: Patient does not have an ICU stay during this admission.     Active Hospital Problems    Diagnosis  POA    **CVA (cerebral vascular accident) [I63.9]  Yes    Hypothyroidism (acquired) [E03.9]  Yes    Essential hypertension [I10]  Yes    Hyperlipemia [E78.5]  Yes    Paroxysmal atrial fibrillation [I48.0]  Yes     Laury is a 83 y.o. female admitted on 8/26/2023 with CVA (cerebral vascular accident) [I63.9]    Assessment/Management/Treatment Plan:    AIS distal L MCA/M2 branch in the region of the anterior insula  S/p TNK  Cardiovascular  HTN  Dyslipidemia   P A Fib ? Treatment with Propafenone  GI/Hepatology  GIB (Admitted to [facility] 08/23/23-08/25/23)  EGD 08/24/23: Erosive gastritis. Duodenal diverticulum near ampulla.  Colonoscopy 08/24/23: Pandiverticulosis. No angiodysplasia. No active bleeding. Blood was present in the distal transverse colon and distally.  CT A/P 08/23/23: Small blush of high attenuation within the cecum.  Anemia ABL    Lab Results   Component Value Date    HGB 9.5 (L)  08/26/2023    HGB 8.8 (L) 08/26/2023    HGB 12.9 12/29/2020    HGB 12.1 01/22/2018     Hb: 08/24/23: 6.9 (SJE)  Endocrine  Body mass index is 32.32 kg/m². Obese Class I: 30-34.9kg/m2  Hypothyroidism per history but no replacement as per home medication list.    No results found for: HGBA1C    Results from last 7 days   Lab Units 08/26/23  0044   GLUCOSE mg/dL 113       Diet: NPO Diet NPO Type: Strict NPO  No active supplement orders      Advance Directives: There are no questions and answers to display.        DVT prophylaxis:  Mechanical DVT prophylaxis orders are present.       In brief:    Acute Ischemic Stroke  Monitor NIHSS  Monitor for arrhythmias  Parenteral antihypertensives prn  Target Post Thrombolytics BP < 180/105 mmHg  No ASA during first 24h post thrombolytics  MRI  24-h Head CT post-thrombolytics  High-intensity statin  FLP  HbA1c  ECHO with agitated saline study  VTE prophylaxis with SCDs  Keep SpO2 > 94%  Serial H/H  GI consult  PPI  Discussed with Dr. Mahesh Chavez (Emergency Medicine)    Disposition: Admit to ICU    Plan of care and goals reviewed during interdisciplinary rounds.  I discussed the patient's findings and my recommendations with nursing staff and ED Attending    Time: was greater than 40 minutes. This is non-concurrent time.   (This excludes time spent performing separately reportable procedures and services).    She has a high risk of imminent or life-threatening  deterioration, which requires the highest level of physician preparedness to intervene urgently. I devoted my full attention to the direct care of this patient for the amount of time indicated above.     Time spent with family or surrogate(s) is included only if the patient was incapable of providing the necessary information or participating in medical decision making.    She has the following organ/system impairments Stroke, Thrombolytic therapy and GIB.          [x] Primary Attending Intensive Care Medicine -  Nutrition Support   [] Consultant

## 2023-08-26 NOTE — PLAN OF CARE
Goal Outcome Evaluation:        Pt remains with facial droop, dysarthria/aphasia, RUE weakness. Pt's symptoms worsened this evening and stroke APRN notified. STAT CT obtained. Pt's symptoms improving. To note, pt's SBP was 100s around time of worsening symptoms, which was lower than it had been during shift. Pt on room air. Flipping between NS and afib. Cardiology saw & ordered meds given. Multiple bloody stools this shift. CT done this morning. EGD and colonoscopy planned for tomorrow. On arrival back from 2nd CT, pt's abd noted to be firm and distended to left. GI APRN notified and plan to come assess pt. Pt's niece/POA updated via telephone multiple times. Family at bedside also updated.              Problem: Adult Inpatient Plan of Care  Goal: Plan of Care Review  Outcome: Ongoing, Progressing  Goal: Patient-Specific Goal (Individualized)  Outcome: Ongoing, Progressing  Goal: Absence of Hospital-Acquired Illness or Injury  Outcome: Ongoing, Progressing  Intervention: Identify and Manage Fall Risk  Recent Flowsheet Documentation  Taken 8/26/2023 1000 by Iris Lott RN  Safety Promotion/Fall Prevention: safety round/check completed  Taken 8/26/2023 0800 by Iris Lott RN  Safety Promotion/Fall Prevention: safety round/check completed  Intervention: Prevent Skin Injury  Recent Flowsheet Documentation  Taken 8/26/2023 1000 by Iris Lott RN  Body Position:   turned   log-rolled   weight shifting  Skin Protection:   adhesive use limited   incontinence pads utilized   tubing/devices free from skin contact   skin-to-device areas padded  Taken 8/26/2023 0800 by Iris Lott RN  Body Position:   turned   log-rolled   weight shifting  Skin Protection:   adhesive use limited   incontinence pads utilized   tubing/devices free from skin contact   skin-to-device areas padded  Intervention: Prevent and Manage VTE (Venous Thromboembolism) Risk  Recent Flowsheet Documentation  Taken 8/26/2023 0800 by  Iris Lott RN  Activity Management: bedrest  VTE Prevention/Management:   bilateral   sequential compression devices on  Goal: Optimal Comfort and Wellbeing  Outcome: Ongoing, Progressing  Intervention: Provide Person-Centered Care  Recent Flowsheet Documentation  Taken 8/26/2023 1000 by Iris Lott RN  Trust Relationship/Rapport:   care explained   reassurance provided  Taken 8/26/2023 0800 by Iris Lott RN  Trust Relationship/Rapport:   care explained   emotional support provided   questions answered   reassurance provided  Goal: Readiness for Transition of Care  Outcome: Ongoing, Progressing     Problem: Fall Injury Risk  Goal: Absence of Fall and Fall-Related Injury  Outcome: Ongoing, Progressing  Intervention: Identify and Manage Contributors  Recent Flowsheet Documentation  Taken 8/26/2023 0800 by Iris Lott RN  Medication Review/Management: medications reviewed  Intervention: Promote Injury-Free Environment  Recent Flowsheet Documentation  Taken 8/26/2023 1000 by Iris Lott RN  Safety Promotion/Fall Prevention: safety round/check completed  Taken 8/26/2023 0800 by Iris Lott RN  Safety Promotion/Fall Prevention: safety round/check completed     Problem: Skin Injury Risk Increased  Goal: Skin Health and Integrity  Outcome: Ongoing, Progressing  Intervention: Optimize Skin Protection  Recent Flowsheet Documentation  Taken 8/26/2023 1000 by Iris Lott RN  Pressure Reduction Techniques:   weight shift assistance provided   pressure points protected   heels elevated off bed  Head of Bed (HOB) Positioning: HOB at 15 degrees  Pressure Reduction Devices:   specialty bed utilized   positioning supports utilized  Skin Protection:   adhesive use limited   incontinence pads utilized   tubing/devices free from skin contact   skin-to-device areas padded  Taken 8/26/2023 0800 by Iris Lott RN  Pressure Reduction Techniques:   weight shift assistance provided   pressure  points protected   heels elevated off bed  Head of Bed (HOB) Positioning: HOB at 30 degrees  Pressure Reduction Devices: specialty bed utilized  Skin Protection:   adhesive use limited   incontinence pads utilized   tubing/devices free from skin contact   skin-to-device areas padded     Problem: Adjustment to Illness (Stroke, Ischemic/Transient Ischemic Attack)  Goal: Optimal Coping  Outcome: Ongoing, Progressing     Problem: Bowel Elimination Impaired (Stroke, Ischemic/Transient Ischemic Attack)  Goal: Effective Bowel Elimination  Outcome: Ongoing, Progressing     Problem: Cerebral Tissue Perfusion (Stroke, Ischemic/Transient Ischemic Attack)  Goal: Optimal Cerebral Tissue Perfusion  Outcome: Ongoing, Progressing     Problem: Cognitive Impairment (Stroke, Ischemic/Transient Ischemic Attack)  Goal: Optimal Cognitive Function  Outcome: Ongoing, Progressing     Problem: Communication Impairment (Stroke, Ischemic/Transient Ischemic Attack)  Goal: Improved Communication Skills  Outcome: Ongoing, Progressing     Problem: Functional Ability Impaired (Stroke, Ischemic/Transient Ischemic Attack)  Goal: Optimal Functional Ability  Outcome: Ongoing, Progressing     Problem: Respiratory Compromise (Stroke, Ischemic/Transient Ischemic Attack)  Goal: Effective Oxygenation and Ventilation  Outcome: Ongoing, Progressing     Problem: Sensorimotor Impairment (Stroke, Ischemic/Transient Ischemic Attack)  Goal: Improved Sensorimotor Function  Outcome: Ongoing, Progressing     Problem: Swallowing Impairment (Stroke, Ischemic/Transient Ischemic Attack)  Goal: Optimal Eating and Swallowing without Aspiration  Outcome: Ongoing, Progressing     Problem: Urinary Elimination Impaired (Stroke, Ischemic/Transient Ischemic Attack)  Goal: Effective Urinary Elimination  Outcome: Ongoing, Progressing     Problem: Bleeding (Gastrointestinal Bleeding)  Goal: Hemostasis  Outcome: Ongoing, Progressing

## 2023-08-26 NOTE — PLAN OF CARE
Problem: Adult Inpatient Plan of Care  Goal: Plan of Care Review  Outcome: Ongoing, Progressing  Flowsheets (Taken 8/26/2023 1051)  Plan of Care Reviewed With:   patient   daughter   Goal Outcome Evaluation:  Plan of Care Reviewed With: patient, daughter         SLP evaluation completed. Will address dysphagia concerns with clinical reassessment as able pending GI concerns. Please see note for further details and recommendations.

## 2023-08-26 NOTE — THERAPY EVALUATION
Acute Care - Speech Language Pathology   Swallow Initial Evaluation [facility]  Clinical Swallow Evaluation       Patient Name: Laury Eugene  : 1940  MRN: [id number]  Today's Date: 2023               Admit Date: 2023    Visit Dx:     ICD-10-CM ICD-9-CM   1. Acute CVA (cerebrovascular accident)  I63.9 434.91   2. Anemia, unspecified type  D64.9 285.9   3. Dysphagia, unspecified type  R13.10 787.20     Patient Active Problem List   Diagnosis    Multinodular goiter    Abnormal thyroid function test    CVA (cerebral vascular accident)    Hypothyroidism (acquired)    Essential hypertension    Hyperlipemia    Paroxysmal atrial fibrillation    Acute ischemic left MCA stroke     Past Medical History:   Diagnosis Date    History of breast cancer     History of heart disease     Hypothyroidism     Multinodular goiter     Thyroid function test abnormal      Past Surgical History:   Procedure Laterality Date    BLEPHAROPLASTY      BREAST RECONSTRUCTION      CATARACT EXTRACTION Bilateral     HYSTERECTOMY      SIMPLE MASTECTOMY         SLP Recommendation and Plan  SLP Swallowing Diagnosis: R/O pharyngeal dysphagia, other (see comments) (unable to assess all consistencies) (23)  SLP Diet Recommendation: clear liquid diet (23)  Recommended Precautions and Strategies: upright posture during/after eating, general aspiration precautions (23)  SLP Rec. for Method of Medication Administration: meds whole, with thin liquids, as tolerated (23)     Monitor for Signs of Aspiration: yes, notify SLP if any concerns (23)  Recommended Diagnostics: reassess via clinical swallow evaluation (23)  Swallow Criteria for Skilled Therapeutic Interventions Met: demonstrates skilled criteria (23)           Predicted Duration Therapy Intervention (Days): until discharge (23)  Oral Care Recommendations: Oral Care BID/PRN (23)                                       Oral Care Recommendations: Oral Care BID/PRN (08/26/23 0915)    Plan of Care Reviewed With: patient, daughter      SWALLOW EVALUATION (last 72 hours)       SLP Adult Swallow Evaluation       Row Name 08/26/23 0915                   Rehab Evaluation    Document Type evaluation  -DV        Subjective Information no complaints  -DV        Patient Observations alert;cooperative  -DV        Patient/Family/Caregiver Comments/Observations dtrs present  -DV        Patient Effort excellent  -DV        Symptoms Noted During/After Treatment none  -DV           General Information    Patient Profile Reviewed yes  -DV        Pertinent History Of Current Problem 83yoF w/ L insular CVA. Also w/ GI concerns, undergoing EGD tomorrow per RN.  -DV        Current Method of Nutrition NPO  -DV        Precautions/Limitations, Vision WFL;for purposes of eval  -DV        Precautions/Limitations, Hearing WFL;for purposes of eval  -DV        Prior Level of Function-Communication unknown  -DV        Prior Level of Function-Swallowing no diet consistency restrictions  -DV        Plans/Goals Discussed with patient;family;agreed upon  -DV        Barriers to Rehab none identified  -DV        Patient's Goals for Discharge patient did not state  -DV        Family Goals for Discharge family did not state  -DV           Pain    Additional Documentation Pain Scale: Word Pre/Post-Treatment (Group)  -DV           Pain Scale: Word Pre/Post-Treatment    Pain: Word Scale, Pretreatment 2 - mild pain  -DV        Posttreatment Pain Rating 2 - mild pain  -DV           Oral Motor Structure and Function    Dentition Assessment natural, present and adequate  -DV        Secretion Management WNL/WFL  -DV        Mucosal Quality moist, healthy  -DV           Oral Musculature and Cranial Nerve Assessment    Oral Motor General Assessment unable to assess  -DV           General Eating/Swallowing Observations    Respiratory Support Currently  in Use room air  -DV        Eating/Swallowing Skills self-fed;needed assist  -DV        Positioning During Eating upright 90 degree;upright in bed  -DV        Utensils Used cup;straw  -DV        Consistencies Trialed thin liquids  -DV           Respiratory    Respiratory Status WFL  -DV           Clinical Swallow Eval    Oral Prep Phase WFL  -DV        Pharyngeal Phase no overt signs/symptoms of pharyngeal impairment  -DV        Clinical Swallow Evaluation Summary No s/sx aspiration with limited trials of thin liquids. Unable to complete further PO trials 2' pt had to be taken for stat CT. OK'd clear thin liquid diet with RN. Pt scheduled for EGD tomorrow - will check back to see if pt can do solid trials tomorrow.  -DV           SLP Evaluation Clinical Impression    SLP Swallowing Diagnosis R/O pharyngeal dysphagia;other (see comments)  unable to assess all consistencies  -DV        Functional Impact risk of aspiration/pneumonia  -DV        Swallow Criteria for Skilled Therapeutic Interventions Met demonstrates skilled criteria  -DV           Recommendations    Predicted Duration Therapy Intervention (Days) until discharge  -DV        SLP Diet Recommendation clear liquid diet  -DV        Recommended Diagnostics reassess via clinical swallow evaluation  -DV        Recommended Precautions and Strategies upright posture during/after eating;general aspiration precautions  -DV        Oral Care Recommendations Oral Care BID/PRN  -DV        SLP Rec. for Method of Medication Administration meds whole;with thin liquids;as tolerated  -DV        Monitor for Signs of Aspiration yes;notify SLP if any concerns  -DV                  User Key  (r) = Recorded By, (t) = Taken By, (c) = Cosigned By      Initials Name Effective Dates    Inga Obregon MS CCC-SLP 06/16/21 -                     EDUCATION  The patient has been educated in the following areas:   Dysphagia (Swallowing Impairment) Modified Diet Instruction.               Time Calculation:    Time Calculation- SLP       Row Name 08/26/23 1054             Time Calculation- SLP    SLP Start Time 0915  -DV      SLP Received On 08/26/23  -DV         Untimed Charges    SLP Eval/Re-eval  ST Eval Oral Pharyng Swallow - 98072  -DV      34017-IX Eval Oral Pharyng Swallow Minutes 41  -DV         Total Minutes    Untimed Charges Total Minutes 41  -DV       Total Minutes 41  -DV                User Key  (r) = Recorded By, (t) = Taken By, (c) = Cosigned By      Initials Name Provider Type    Inga Obregon MS CCC-SLP Speech and Language Pathologist                    Therapy Charges for Today       Code Description Service Date Service Provider Modifiers Qty    [id number]  ST EVAL ORAL PHARYNG SWALLOW 3 8/26/2023 Inga Gifford MS CCC-SLP GN 1                 Inga Gifford MS CCC-DOLORES  8/26/2023

## 2023-08-26 NOTE — SIGNIFICANT NOTE
RN report that pt have preorbital swelling bilaterally s/p TNK /MRI suspicious for angioedema, and double vision, suspect due to the swelling. Pt will receive steroids, benadryl and Pepcid , will be managed by intensivist.  Mri completed with acute subacute infract left insula area in DWI with correspondence in adc , no hemorrhage SWI noted.     Checked on pt , with improvement of symptoms.

## 2023-08-26 NOTE — ED PROVIDER NOTES
Subjective   History of Present Illness  This is a 83-year-old female with past medical history of hypothyroidism presented to the emergency department with some aphasia.  Patient was apparently at the dinner table when she stopped responding.  She was unable to talk.  Family members called 911 at this time for evaluation.  On presentation to the emergency department, the patient's symptoms have resolved.  She is talking to us fully without any difficulties.  The patient states that she just feels generally weak all over.  She denies any headache or change in vision.  No focal weakness.  No fevers or chills.  No chest pain or shortness of breath.  No abdominal pain or vomiting.    History provided by:  Patient and EMS personnel   used: No      Review of Systems   Constitutional:  Negative for chills and fever.   HENT:  Negative for congestion, ear pain and sore throat.    Eyes:  Negative for visual disturbance.   Respiratory:  Negative for shortness of breath.    Cardiovascular:  Negative for chest pain.   Gastrointestinal:  Negative for abdominal pain.   Genitourinary:  Negative for difficulty urinating.   Musculoskeletal:  Negative for arthralgias.   Skin:  Negative for rash.   Neurological:  Positive for speech difficulty and weakness. Negative for dizziness and numbness.   Psychiatric/Behavioral:  Negative for agitation.      Past Medical History:   Diagnosis Date    History of breast cancer     History of heart disease     Hypothyroidism     Multinodular goiter     Thyroid function test abnormal        Allergies   Allergen Reactions    Lidocaine Other (See Comments)     seizure    Novocain [Procaine] Other (See Comments)     seizures       Past Surgical History:   Procedure Laterality Date    BLEPHAROPLASTY      BREAST RECONSTRUCTION      CATARACT EXTRACTION Bilateral     HYSTERECTOMY      SIMPLE MASTECTOMY         Family History   Problem Relation Age of Onset    Hypothyroidism Sister      Stomach cancer Mother     Heart attack Father        Social History     Socioeconomic History    Marital status:    Tobacco Use    Smoking status: Former     Types: Cigarettes     Quit date: 1970     Years since quittin.6    Smokeless tobacco: Never   Vaping Use    Vaping Use: Never used   Substance and Sexual Activity    Alcohol use: Yes     Alcohol/week: 1.0 - 2.0 standard drink     Types: 1 - 2 Glasses of wine per week     Comment: social    Drug use: Never    Sexual activity: Never           Objective   Physical Exam  Vitals and nursing note reviewed.   Constitutional:       General: She is not in acute distress.     Appearance: She is not ill-appearing or toxic-appearing.   HENT:      Mouth/Throat:      Pharynx: No posterior oropharyngeal erythema.   Eyes:      Conjunctiva/sclera: Conjunctivae normal.      Pupils: Pupils are equal, round, and reactive to light.   Cardiovascular:      Rate and Rhythm: Normal rate and regular rhythm.   Pulmonary:      Effort: Pulmonary effort is normal. No respiratory distress.   Abdominal:      General: Abdomen is flat. There is no distension.      Palpations: There is no mass.      Tenderness: There is no abdominal tenderness. There is no guarding or rebound.   Musculoskeletal:         General: No deformity. Normal range of motion.   Skin:     General: Skin is warm.      Findings: No rash.   Neurological:      General: No focal deficit present.      Mental Status: She is alert and oriented to person, place, and time.      Motor: No weakness.       Procedures           ED Course  ED Course as of 23 0206   Sat Aug 26, 2023   0045 On reevaluation after CT scanning, the patient had another episode of aphasia.  Patient was unable to talk.  She was only able to communicate with pointing.  Seems like her symptoms are transient and returning.  Patient presents a complex case as she recently was admitted for anemia.  Her initial rapid hemoglobin in the emergency  department was 8.  Given her rapid resolution and return of symptoms, thrombolytics may be appropriate and the patient.  However we do need to have discussion with the family given her complex condition and recent bleeding.  We did call family numerous times to attend for discussion. [JK]   0109 After another reevaluation with the patient, her symptoms have completely resolved.  She is alert and appropriate and is answering all questions without difficulty.  Seems like her episodes of aphasia lasted for about 10 minutes or so.  Her NIH stroke scale is 0 at this time. [JK]   0135 Patient has some minimal slurred speech, however she is conversing appropriately.  She does not have any other focal weakness.  Family has arrived and we also had a discussion with the daughter.  We did discuss in length all of the benefits and risks of administration of thrombolytics at this time.  Stroke navigator was at bedside as well for this discussion.  We did answer all questions that the niece and daughter had regarding medication administration.  Family ultimately decided to not proceed with thrombolytic administration given the risks to the patient.  Patient agrees with this and would like to proceed with medical management. [JK]   0200 I was called to bedside as family had some more questions regarding medical management.  They are concerned that her slurred speech is not completely resolving.  They wish to proceed with thrombolytic administration at this time.  We did have a very thorough discussion with everyone at bedside including nursing staff, stroke team as well as the niece and daughter on the phone.  I explained to them in great depth the significant risks of administering thrombolytic medications.  They were made aware of the serious risks including life-threatening bleeding.  They would like to proceed with thrombolytic administration.  I also made sure the patient was aware and fully understand what was going on.  She  would like to proceed with thrombolytics as well.  Decision was made at this time to administer thrombolytics. [JK]   0203 Patient's repeat vitals, telemetry tracing, and pulse oximetry tracing were directly viewed and interpreted by myself.  Patient is hemodynamically stable, slightly elevated blood pressure [JK]   0203 BP: 150/80 [JK]   0203 Temp: 98.8 °F (37.1 °C) [JK]   0203 Temp src: Oral [JK]   0203 Heart Rate: 96 [JK]   0203 Resp: 14 [JK]   0203 SpO2: 97 % [JK]   0203 ALT [JK]   0203 AST [JK]   0203 aPTT [JK]   0203 Lipid Panel [JK]   0203 Single High Sensitivity Troponin T(!) [JK]   0203 CBC & Differential(!) [JK]   0203 POC CHEM 8(!)  Laboratory studies were reviewed and interpreted directly by myself.  Lipid panel was normal, coagulation studies normal, initial troponin was 18, CBC showed some anemia with a hemoglobin of 9.5, Chem-8 unremarkable [JK]   0204 XR Chest 1 View [JK]   0204 XR Abdomen KUB [JK]   0204 CT Angiogram Head w AI Analysis of LVO [JK]   0204 CT Angiogram Neck [JK]   0204 CT CEREBRAL PERFUSION WITH & WITHOUT CONTRAST [JK]   0204 CT Head Without Contrast Stroke Protocol  Imaging was directly visualized by myself, per my interpretations, chest x-ray unremarkable.  CT of the head was unremarkable.  CT angiogram of the head neck as well as CT perfusion showed some occlusion of the left MCA. [JK]   0204 We did proceed with thrombolytic administration per family and patient's wishes.  Patient will be admitted to the ICU in critical condition. [JK]   0204 Based on the patient's presentation, history and diffuse work-up in the emergency department, the patient is deemed appropriate for admission to the hospital for further evaluation and treatment.  This was discussed with the patient and family at bedside.  They are in agreement with the current medical management.    Admitting physician: Dr. Meng    Discussion was had with admitting physician regarding the laboratory and imaging findings.   We did discuss current therapeutics in the emergency department and progression of the patient.  Working diagnosis was conveyed to the admitting physician, as well as current status and prognosis for the patient.  They are in agreement with these findings and have accepted admission.    Shared decision making:   After full review of the patient's clinical presentation, review of any work-up including but not limited to laboratory studies and radiology obtained, I had a discussion with the patient.  Treatment options were discussed as well as the risks, benefits and consequences.  I discussed all findings with the patient and family members if available.  During the discussion, treatment goals were understood by all as well as any misconceptions which were addressed with the patient.  Ample time was given for any questions they may have had.  They are in agreement with the treatment plan as well as final disposition. [JK]      ED Course User Index  [JK] Mahesh Chavez MD                                           Medical Decision Making  This is a 83-year-old female with history of hypothyroidism presenting to the emergency department with some aphasia.  Patient appeared to have some rapid onset difficulties talking and unresponsiveness.  However upon arrival to the emergency department it appears that the patient's symptoms have resolved.  At this point her NIH stroke scale is 0.  However given the history of the symptoms patient was immediately transferred to CT scan code stroke was initiated.  Code stroke team was at bedside.  IV access established.  Work-up initiated.      Differential diagnosis: CVA, TIA, seizure, vasospasm, brain tumor, electrolyte abnormality, acute kidney injury, CAD      Amount and/or Complexity of Data Reviewed  External Data Reviewed: labs, radiology and notes.     Details: External laboratories, imaging as well as notes were reviewed personally by myself.  All relevant studies were  used to guide decision making.     Date of previous record: 8/23/2023    Source of note: Admission record    Summary: Patient was seen and admitted to [facility] secondary to symptomatic anemia and lower GI bleed.  The patient had a colonoscopy earlier today which did show some erosive gastritis as well as diverticulosis.  Patient had some anemia based on her laboratory studies which I did review previously.  Radiology and records were also reviewed.  Labs: ordered. Decision-making details documented in ED Course.  Radiology: ordered and independent interpretation performed. Decision-making details documented in ED Course.  ECG/medicine tests: ordered and independent interpretation performed. Decision-making details documented in ED Course.    Risk  Prescription drug management.  Decision regarding hospitalization.    Critical Care  Total time providing critical care: 43 minutes (Authorized and performed by: Mahesh Chavez MD  I personally spent a total of 43 minutes of critical care time with the patient.  Due to the high probability of clinically significant, life-threatening deterioration, the patient required my highest level of care to intervene emergently.  These interventions, including, but not limited to, establishing IV access, continuous pulse oximeter and telemetry monitoring, frequent monitoring and reevaluations, management the patient's airway and cardiovascular system, discussion with other consultants as needed, which bear directly on the management the patient.  This also includes obtaining history, examining the patient, frequent reevaluations and coordinating high level of care.  Failure to emergently initiate these interventions would carry a high probability of resulting in sudden, clinically significant or life threatening deterioration in the patient's condition.  This does not include time spent on separately reported billable procedures.)      Final diagnoses:   Acute CVA  (cerebrovascular accident)   Anemia, unspecified type       ED Disposition  ED Disposition       ED Disposition   Decision to Admit    Condition   --    Comment   Level of Care: Critical Care [6]   Admitting Physician: JOSAFAT KOHLI [[id number]]                 No follow-up provider specified.       Medication List      No changes were made to your prescriptions during this visit.            Mahesh Chavez MD  08/26/23 0206

## 2023-08-27 ENCOUNTER — ANESTHESIA (OUTPATIENT)
Dept: GASTROENTEROLOGY | Facility: HOSPITAL | Age: 83
DRG: 061 | End: 2023-08-27
Payer: MEDICARE

## 2023-08-27 ENCOUNTER — APPOINTMENT (OUTPATIENT)
Dept: CT IMAGING | Facility: HOSPITAL | Age: 83
DRG: 061 | End: 2023-08-27
Payer: MEDICARE

## 2023-08-27 ENCOUNTER — ANESTHESIA EVENT (OUTPATIENT)
Dept: GASTROENTEROLOGY | Facility: HOSPITAL | Age: 83
DRG: 061 | End: 2023-08-27
Payer: MEDICARE

## 2023-08-27 LAB
ANION GAP SERPL CALCULATED.3IONS-SCNC: 7 MMOL/L (ref 5–15)
BUN SERPL-MCNC: 12 MG/DL (ref 8–23)
BUN/CREAT SERPL: 14.5 (ref 7–25)
CALCIUM SPEC-SCNC: 7.8 MG/DL (ref 8.6–10.5)
CHLORIDE SERPL-SCNC: 106 MMOL/L (ref 98–107)
CO2 SERPL-SCNC: 28 MMOL/L (ref 22–29)
CREAT SERPL-MCNC: 0.83 MG/DL (ref 0.57–1)
DEPRECATED RDW RBC AUTO: 48.8 FL (ref 37–54)
EGFRCR SERPLBLD CKD-EPI 2021: 70.9 ML/MIN/1.73
ERYTHROCYTE [DISTWIDTH] IN BLOOD BY AUTOMATED COUNT: 14.4 % (ref 12.3–15.4)
GLUCOSE BLDC GLUCOMTR-MCNC: 117 MG/DL (ref 70–130)
GLUCOSE BLDC GLUCOMTR-MCNC: 147 MG/DL (ref 70–130)
GLUCOSE BLDC GLUCOMTR-MCNC: 157 MG/DL (ref 70–130)
GLUCOSE SERPL-MCNC: 104 MG/DL (ref 65–99)
HBA1C MFR BLD: 5.8 % (ref 4.8–5.6)
HCT VFR BLD AUTO: 23 % (ref 34–46.6)
HCT VFR BLD AUTO: 25.9 % (ref 34–46.6)
HGB BLD-MCNC: 7.6 G/DL (ref 12–15.9)
HGB BLD-MCNC: 8.5 G/DL (ref 12–15.9)
MCH RBC QN AUTO: 31 PG (ref 26.6–33)
MCHC RBC AUTO-ENTMCNC: 32.8 G/DL (ref 31.5–35.7)
MCV RBC AUTO: 94.5 FL (ref 79–97)
PLATELET # BLD AUTO: 242 10*3/MM3 (ref 140–450)
PMV BLD AUTO: 10.4 FL (ref 6–12)
POTASSIUM SERPL-SCNC: 3.3 MMOL/L (ref 3.5–5.2)
POTASSIUM SERPL-SCNC: 3.9 MMOL/L (ref 3.5–5.2)
QT INTERVAL: 352 MS
QTC INTERVAL: 430 MS
RBC # BLD AUTO: 2.74 10*6/MM3 (ref 3.77–5.28)
SODIUM SERPL-SCNC: 141 MMOL/L (ref 136–145)
WBC NRBC COR # BLD: 16.53 10*3/MM3 (ref 3.4–10.8)

## 2023-08-27 PROCEDURE — 85018 HEMOGLOBIN: CPT | Performed by: INTERNAL MEDICINE

## 2023-08-27 PROCEDURE — 85027 COMPLETE CBC AUTOMATED: CPT | Performed by: INTERNAL MEDICINE

## 2023-08-27 PROCEDURE — 83036 HEMOGLOBIN GLYCOSYLATED A1C: CPT | Performed by: INTERNAL MEDICINE

## 2023-08-27 PROCEDURE — 84132 ASSAY OF SERUM POTASSIUM: CPT | Performed by: INTERNAL MEDICINE

## 2023-08-27 PROCEDURE — 25010000002 PROPOFOL 10 MG/ML EMULSION: Performed by: NURSE ANESTHETIST, CERTIFIED REGISTERED

## 2023-08-27 PROCEDURE — 93005 ELECTROCARDIOGRAM TRACING: CPT | Performed by: INTERNAL MEDICINE

## 2023-08-27 PROCEDURE — 80048 BASIC METABOLIC PNL TOTAL CA: CPT | Performed by: INTERNAL MEDICINE

## 2023-08-27 PROCEDURE — 88305 TISSUE EXAM BY PATHOLOGIST: CPT | Performed by: INTERNAL MEDICINE

## 2023-08-27 PROCEDURE — 99232 SBSQ HOSP IP/OBS MODERATE 35: CPT | Performed by: INTERNAL MEDICINE

## 2023-08-27 PROCEDURE — 0 POTASSIUM CHLORIDE 10 MEQ/100ML SOLUTION: Performed by: NURSE PRACTITIONER

## 2023-08-27 PROCEDURE — 82948 REAGENT STRIP/BLOOD GLUCOSE: CPT

## 2023-08-27 PROCEDURE — 94799 UNLISTED PULMONARY SVC/PX: CPT

## 2023-08-27 PROCEDURE — 0W3P8ZZ CONTROL BLEEDING IN GASTROINTESTINAL TRACT, VIA NATURAL OR ARTIFICIAL OPENING ENDOSCOPIC: ICD-10-PCS | Performed by: INTERNAL MEDICINE

## 2023-08-27 PROCEDURE — 43255 EGD CONTROL BLEEDING ANY: CPT | Performed by: INTERNAL MEDICINE

## 2023-08-27 PROCEDURE — 0DB78ZX EXCISION OF STOMACH, PYLORUS, VIA NATURAL OR ARTIFICIAL OPENING ENDOSCOPIC, DIAGNOSTIC: ICD-10-PCS | Performed by: INTERNAL MEDICINE

## 2023-08-27 PROCEDURE — 94760 N-INVAS EAR/PLS OXIMETRY 1: CPT

## 2023-08-27 PROCEDURE — 93010 ELECTROCARDIOGRAM REPORT: CPT | Performed by: INTERNAL MEDICINE

## 2023-08-27 PROCEDURE — 45378 DIAGNOSTIC COLONOSCOPY: CPT | Performed by: INTERNAL MEDICINE

## 2023-08-27 PROCEDURE — 99291 CRITICAL CARE FIRST HOUR: CPT | Performed by: STUDENT IN AN ORGANIZED HEALTH CARE EDUCATION/TRAINING PROGRAM

## 2023-08-27 PROCEDURE — 70450 CT HEAD/BRAIN W/O DYE: CPT

## 2023-08-27 PROCEDURE — 0DJD8ZZ INSPECTION OF LOWER INTESTINAL TRACT, VIA NATURAL OR ARTIFICIAL OPENING ENDOSCOPIC: ICD-10-PCS | Performed by: INTERNAL MEDICINE

## 2023-08-27 PROCEDURE — 85014 HEMATOCRIT: CPT | Performed by: INTERNAL MEDICINE

## 2023-08-27 DEVICE — SYS CLIP GI OTSC 11/6T 165CM: Type: IMPLANTABLE DEVICE | Site: STOMACH | Status: FUNCTIONAL

## 2023-08-27 RX ORDER — ASPIRIN 81 MG/1
81 TABLET, CHEWABLE ORAL DAILY
Status: DISCONTINUED | OUTPATIENT
Start: 2023-08-27 | End: 2023-08-29

## 2023-08-27 RX ORDER — SODIUM CHLORIDE, SODIUM LACTATE, POTASSIUM CHLORIDE, CALCIUM CHLORIDE 600; 310; 30; 20 MG/100ML; MG/100ML; MG/100ML; MG/100ML
INJECTION, SOLUTION INTRAVENOUS CONTINUOUS PRN
Status: DISCONTINUED | OUTPATIENT
Start: 2023-08-27 | End: 2023-08-27 | Stop reason: SURG

## 2023-08-27 RX ORDER — ACETAMINOPHEN 325 MG/1
650 TABLET ORAL EVERY 6 HOURS PRN
Status: DISCONTINUED | OUTPATIENT
Start: 2023-08-27 | End: 2023-09-04 | Stop reason: HOSPADM

## 2023-08-27 RX ORDER — ONDANSETRON 2 MG/ML
4 INJECTION INTRAMUSCULAR; INTRAVENOUS EVERY 6 HOURS PRN
Status: DISCONTINUED | OUTPATIENT
Start: 2023-08-27 | End: 2023-09-04 | Stop reason: HOSPADM

## 2023-08-27 RX ORDER — POTASSIUM CHLORIDE 7.45 MG/ML
10 INJECTION INTRAVENOUS
Status: COMPLETED | OUTPATIENT
Start: 2023-08-27 | End: 2023-08-27

## 2023-08-27 RX ORDER — SODIUM CHLORIDE 9 MG/ML
75 INJECTION, SOLUTION INTRAVENOUS ONCE
Status: COMPLETED | OUTPATIENT
Start: 2023-08-27 | End: 2023-08-27

## 2023-08-27 RX ORDER — PHENYLEPHRINE HCL IN 0.9% NACL 1 MG/10 ML
SYRINGE (ML) INTRAVENOUS AS NEEDED
Status: DISCONTINUED | OUTPATIENT
Start: 2023-08-27 | End: 2023-08-27 | Stop reason: SURG

## 2023-08-27 RX ORDER — PROPOFOL 10 MG/ML
VIAL (ML) INTRAVENOUS AS NEEDED
Status: DISCONTINUED | OUTPATIENT
Start: 2023-08-27 | End: 2023-08-27 | Stop reason: SURG

## 2023-08-27 RX ORDER — SODIUM CHLORIDE, SODIUM LACTATE, POTASSIUM CHLORIDE, CALCIUM CHLORIDE 600; 310; 30; 20 MG/100ML; MG/100ML; MG/100ML; MG/100ML
30 INJECTION, SOLUTION INTRAVENOUS CONTINUOUS PRN
Status: DISCONTINUED | OUTPATIENT
Start: 2023-08-27 | End: 2023-08-28

## 2023-08-27 RX ADMIN — POTASSIUM CHLORIDE 10 MEQ: 7.46 INJECTION, SOLUTION INTRAVENOUS at 08:20

## 2023-08-27 RX ADMIN — SODIUM CHLORIDE, POTASSIUM CHLORIDE, SODIUM LACTATE AND CALCIUM CHLORIDE: 600; 310; 30; 20 INJECTION, SOLUTION INTRAVENOUS at 11:03

## 2023-08-27 RX ADMIN — POLYETHYLENE GLYCOL 3350, SODIUM SULFATE, SODIUM CHLORIDE, POTASSIUM CHLORIDE, SODIUM ASCORBATE, AND ASCORBIC ACID 1000 ML: KIT at 05:00

## 2023-08-27 RX ADMIN — SODIUM CHLORIDE 500 ML: 9 INJECTION, SOLUTION INTRAVENOUS at 17:53

## 2023-08-27 RX ADMIN — SODIUM CHLORIDE 500 ML: 9 INJECTION, SOLUTION INTRAVENOUS at 12:30

## 2023-08-27 RX ADMIN — FAMOTIDINE 20 MG: 10 INJECTION INTRAVENOUS at 05:54

## 2023-08-27 RX ADMIN — IPRATROPIUM BROMIDE AND ALBUTEROL SULFATE 3 ML: 2.5; .5 SOLUTION RESPIRATORY (INHALATION) at 17:07

## 2023-08-27 RX ADMIN — PROPOFOL 50 MG: 10 INJECTION, EMULSION INTRAVENOUS at 11:17

## 2023-08-27 RX ADMIN — PANTOPRAZOLE SODIUM 40 MG: 40 INJECTION, POWDER, LYOPHILIZED, FOR SOLUTION INTRAVENOUS at 10:17

## 2023-08-27 RX ADMIN — Medication 100 MCG: at 11:14

## 2023-08-27 RX ADMIN — PROPOFOL 50 MG: 10 INJECTION, EMULSION INTRAVENOUS at 11:26

## 2023-08-27 RX ADMIN — Medication 100 MCG: at 11:21

## 2023-08-27 RX ADMIN — PROPOFOL 50 MG: 10 INJECTION, EMULSION INTRAVENOUS at 11:20

## 2023-08-27 RX ADMIN — POTASSIUM CHLORIDE 10 MEQ: 7.46 INJECTION, SOLUTION INTRAVENOUS at 11:55

## 2023-08-27 RX ADMIN — POTASSIUM CHLORIDE 10 MEQ: 7.46 INJECTION, SOLUTION INTRAVENOUS at 10:17

## 2023-08-27 RX ADMIN — POTASSIUM CHLORIDE 10 MEQ: 7.46 INJECTION, SOLUTION INTRAVENOUS at 06:50

## 2023-08-27 RX ADMIN — ASPIRIN 81 MG: 81 TABLET, CHEWABLE ORAL at 16:36

## 2023-08-27 RX ADMIN — PANTOPRAZOLE SODIUM 40 MG: 40 INJECTION, POWDER, LYOPHILIZED, FOR SOLUTION INTRAVENOUS at 21:20

## 2023-08-27 RX ADMIN — PROPOFOL 50 MG: 10 INJECTION, EMULSION INTRAVENOUS at 11:10

## 2023-08-27 RX ADMIN — Medication 100 MCG: at 11:13

## 2023-08-27 RX ADMIN — PROPAFENONE 325 MG: 325 CAPSULE, EXTENDED RELEASE ORAL at 21:20

## 2023-08-27 RX ADMIN — SODIUM CHLORIDE 75 ML/HR: 9 INJECTION, SOLUTION INTRAVENOUS at 08:45

## 2023-08-27 RX ADMIN — PROPOFOL 50 MG: 10 INJECTION, EMULSION INTRAVENOUS at 11:07

## 2023-08-27 RX ADMIN — Medication 100 MCG: at 11:30

## 2023-08-27 NOTE — SIGNIFICANT NOTE
08/27/23 1040   SLP Deferred Reason   SLP Deferred Reason Patient unavailable for evaluation  (Pt out of room for procedure. SLP will f/u)

## 2023-08-27 NOTE — PROGRESS NOTES
Stroke Progress Note       Chief Complaint:  speech difficulty     Subjective    Subjective     Subjective:   Patient seemed exhausted after the overnight bowel prep for colonoscopy    Review of Systems   UTO    Objective      Temp:  [97.5 °F (36.4 °C)-100.4 °F (38 °C)] 97.8 °F (36.6 °C)  Heart Rate:  [] 89  Resp:  [16-18] 18  BP: ()/() 111/58          Patient is more drowsy today   She was going for endoscopy, exam is limited     Results Review:    I reviewed the patient's new clinical results.    Lab Results (last 24 hours)       Procedure Component Value Units Date/Time    POC Glucose Once [[id number]]  (Abnormal) Collected: 08/27/23 1154    Specimen: Blood Updated: 08/27/23 1158     Glucose 157 mg/dL     POC Glucose Once [[id number]]  (Normal) Collected: 08/27/23 0529    Specimen: Blood Updated: 08/27/23 0551     Glucose 117 mg/dL     Hemoglobin A1c [[id number]]  (Abnormal) Collected: 08/27/23 0331    Specimen: Blood Updated: 08/27/23 0539     Hemoglobin A1C 5.80 %     Narrative:      Hemoglobin A1C Ranges:    Increased Risk for Diabetes  5.7% to 6.4%  Diabetes                     >= 6.5%  Diabetic Goal                < 7.0%    Basic Metabolic Panel [[id number]]  (Abnormal) Collected: 08/27/23 0331    Specimen: Blood Updated: 08/27/23 0455     Glucose 104 mg/dL      BUN 12 mg/dL      Creatinine 0.83 mg/dL      Sodium 141 mmol/L      Potassium 3.3 mmol/L      Chloride 106 mmol/L      CO2 28.0 mmol/L      Calcium 7.8 mg/dL      BUN/Creatinine Ratio 14.5     Anion Gap 7.0 mmol/L      eGFR 70.9 mL/min/1.73     Narrative:      GFR Normal >60  Chronic Kidney Disease <60  Kidney Failure <15    The GFR formula is only valid for adults with stable renal function between ages 18 and 70.    CBC (No Diff) [[id number]]  (Abnormal) Collected: 08/27/23 0331    Specimen: Blood Updated: 08/27/23 0430     WBC 16.53 10*3/mm3      RBC 2.74 10*6/mm3      Hemoglobin 8.5 g/dL      Hematocrit 25.9 %      MCV 94.5 fL       MCH 31.0 pg      MCHC 32.8 g/dL      RDW 14.4 %      RDW-SD 48.8 fl      MPV 10.4 fL      Platelets 242 10*3/mm3     POC Glucose Once [[id number]]  (Abnormal) Collected: 08/26/23 2316    Specimen: Blood Updated: 08/26/23 2326     Glucose 158 mg/dL     Hemoglobin & Hematocrit, Blood [[id number]]  (Abnormal) Collected: 08/26/23 2158    Specimen: Blood Updated: 08/26/23 2207     Hemoglobin 9.1 g/dL      Hematocrit 27.0 %     POC Glucose Once [[id number]]  (Abnormal) Collected: 08/26/23 1726    Specimen: Blood Updated: 08/26/23 1742     Glucose 195 mg/dL     Hemoglobin & Hematocrit, Blood [[id number]]  (Abnormal) Collected: 08/26/23 1535    Specimen: Blood Updated: 08/26/23 1548     Hemoglobin 9.0 g/dL      Hematocrit 27.0 %           CT Abdomen Pelvis With & Without Contrast    Result Date: 8/26/2023  Impression: No evidence of active arterial extravasation. Trace right pleural effusion. Electronically Signed: Jose Gomez MD  8/26/2023 9:47 AM EDT  Workstation ID: [id number]    CT Head Without Contrast    Result Date: 8/27/2023  Impression: 1. Developing small area of hypoattenuation in the left insular cortex corresponding to known acute/subacute infarct. No acute intracranial hemorrhage. 2. Stable chronic right temporal lobe infarct and mild chronic small vessel ischemic change. Electronically Signed: Jim Guillen MD  8/27/2023 2:09 AM EDT  Workstation ID: [id number]    CT Head Without Contrast    Result Date: 8/26/2023  1.No acute intracranial hemorrhage is identified. 2.There is minimal hypodensity about the left insula corresponding to known acute infarct seen on MRI from today. 3.Findings compatible with chronic microvascular ischemic change. 4.Mild encephalomalacia within the right temporal lobe again noted. Electronically Signed: Geoff Marte MD  8/26/2023 6:10 PM EDT  Workstation ID: [id number]    CT Angiogram Neck    Result Date: 8/26/2023  Impression: 1. There is abrupt tapering/occlusion of a distal left  MCA/M2 branch in the region of the anterior insula. This is age-indeterminate, but corresponds to the region of perfusion abnormality on CT perfusion imaging. 2. No evidence of aneurysm or dissection. Electronically Signed: Jim Guillen MD  8/26/2023 1:03 AM EDT  Workstation ID: [id number]    MRI Brain Without Contrast    Result Date: 8/26/2023  Impression: 1. Small acute/subacute cortical infarct in the anterior left insular cortex. 2. Mild chronic small vessel ischemic change and chronic right temporal infarct. Electronically Signed: Jim Guillen MD  8/26/2023 4:10 AM EDT  Workstation ID: [id number]    XR Chest 1 View    Result Date: 8/26/2023  Impression: No acute cardiopulmonary abnormality. Electronically Signed: Jim Guillen MD  8/26/2023 1:15 AM EDT  Workstation ID: [id number]    CT Head Without Contrast Stroke Protocol    Result Date: 8/26/2023  Impression: 1. Focal region of hypoattenuation in the right insula and right temporal lobe. This appears to represent chronic infarct, but is ultimately age indeterminate. If there is concern for acute infarct, consider MRI. 2. No acute intracranial hemorrhage Electronically Signed: Jim Guillen MD  8/26/2023 12:36 AM EDT  Workstation ID: [id number]    XR Abdomen KUB    Result Date: 8/26/2023  Impression: No contraindication to MRI. Electronically Signed: Jim Guillen MD  8/26/2023 1:39 AM EDT  Workstation ID: [id number]    CT Angiogram Head w AI Analysis of LVO    Result Date: 8/26/2023  Impression: 1. There is abrupt tapering/occlusion of a distal left MCA/M2 branch in the region of the anterior insula. This is age-indeterminate, but corresponds to the region of perfusion abnormality on CT perfusion imaging. 2. No evidence of aneurysm or dissection. Electronically Signed: Jim Guillen MD  8/26/2023 1:03 AM EDT  Workstation ID: [id number]    CT CEREBRAL PERFUSION WITH & WITHOUT CONTRAST    Result Date: 8/26/2023  Impression: Tmax perfusion abnormality in the left  MCA distribution measuring approximately 25 mL suspicious for brain tissue at risk of ischemia. Electronically Signed: Jim Guillen MD  8/26/2023 12:57 AM EDT  Workstation ID: [id number]   Results for orders placed during the hospital encounter of 08/26/23    Adult Transthoracic Echo Complete W/ Cont if Necessary Per Protocol    Interpretation Summary    Left ventricular systolic function is normal. Left ventricular ejection fraction appears to be 51 - 55%.            Assessment/Plan       Other active problems          Essential hypertension    Hyperlipemia    Paroxysmal atrial fibrillation    Dysphagia    Acute lower gastrointestinal bleeding    ABLA (acute blood loss anemia)      Assessment/Plan:  Acute ischemic stroke, status post left distal M2 occlusion, status post TNK.  -Patient had a recent history of melena status post colonoscopy recently.  Risk of bleeding was explained to the family regarding administration of TNK.  Family was understanding, and willing to take the risk of the bleed, and the TNK was administered.  The delay in administration was due to patient having relative contraindications + delayed decision making from the family.     -Likely etiology of the stroke is cardioembolism in the setting of atrial fibrillation.  Patient was not taking anticoagulation secondary to the cost of Eliquis in the past.    -EGD was done 8/27-and showed the source of the bleed which was clipped. GI indicated low risk for bleeding if anticoagulants are indicated.   -will start aspirin today and consider anticoagulation tomorrow after discussing with the family   -okay for DVT prophylaxis with Lovenox.  - normal blood pressure goals.( 110-140)       critically ill, spent > 35 min reviewing the scans and discussing the plan with multidisciplinary team.       Stef Villarreal MD  08/27/23  14:47 EDT

## 2023-08-27 NOTE — NURSING NOTE
DIANNE Reveles Stroke navigator notified about systolic blood pressure in the 90's. Give 500 ml bolus of IV fluid. They are okay for systolic pressures above 100.

## 2023-08-27 NOTE — ANESTHESIA PREPROCEDURE EVALUATION
Anesthesia Evaluation                  Airway   Mallampati: I  TM distance: >3 FB  Neck ROM: full  No difficulty expected  Dental      Pulmonary    Cardiovascular     ECG reviewed    (+) hypertension, dysrhythmias Atrial Fib, hyperlipidemia      Neuro/Psych  (+) CVA  GI/Hepatic/Renal/Endo    (+) GI bleeding , thyroid problem     Musculoskeletal     Abdominal    Substance History      OB/GYN          Other                        Anesthesia Plan    ASA 4     general     intravenous induction     Anesthetic plan, risks, benefits, and alternatives have been provided, discussed and informed consent has been obtained with: patient.    Plan discussed with CRNA.    CODE STATUS:    Code Status (Patient has no pulse and is not breathing): CPR (Attempt to Resuscitate)  Medical Interventions (Patient has pulse or is breathing): Full Support

## 2023-08-27 NOTE — PLAN OF CARE
Goal Outcome Evaluation:    EGD completed with intervention.     NIHSS 12. Unable to speak. Waxes and wanes frequently Stroke team aware.     OKAY for systolic pressures greater than 100. Bolus's used today to maintain. See MAR.

## 2023-08-27 NOTE — NURSING NOTE
Stroke navigator DIANNE Cunningham notified about patient's lethargy. New order to give 500 ml bolus and continue to monitor.

## 2023-08-27 NOTE — PROGRESS NOTES
Laury Eugene  [id number]  1940   LOS: 1 day   Patient Care Team:  Leatha Melvin DO as PCP - General (Internal Medicine)  Roger Hodgson MD as Consulting Physician (Endocrinology)    Chief Complaint:  HTN / PAF / CVA / GIB    Subjective     Fluctuating aphasia and dysarthria of the night with labile blood pressure.  No atrial fibrillation or rapid tachycardia discernible.  She is undergoing prep and has had some intermittent darker blood but no current hematochezia or definitive melena and stools seem to be clearing up.  She did have nausea and emesis x1 while undergoing prep.  No apparent anginal type chest discomfort or increased tachypnea/dyspnea.  No current verbalization.    Objective     Vital Sign Min/Max for last 24 hours  Temp  Min: 97.5 °F (36.4 °C)  Max: 98.6 °F (37 °C)   BP  Min: 90/60  Max: 147/93   Pulse  Min: 70  Max: 125   Resp  Min: 16  Max: 18   SpO2  Min: 91 %  Max: 100 %               08/26/23  0110 08/26/23  0400   Weight: 77.6 kg (171 lb 1.2 oz) 75.9 kg (167 lb 5.3 oz)         Intake/Output Summary (Last 24 hours) at 8/27/2023 0745  Last data filed at 8/27/2023 0600  Gross per 24 hour   Intake 1448.1 ml   Output 2850 ml   Net -1401.9 ml       Physical Exam:     General Appearance:  Lethargic, no verbalization,, in no acute distress   Lungs:     Clear to auscultation,respirations regular, even and                unlabored    Heart:    Regular and normal rate, normal S1 and S2, grade 1/6 systolic murmur, no gallop, no rub, no click   Abdomen:  Extremities:   Soft, nontender, bowel sounds audible x4    No edema, normal range of motion   Pulses:   Pulses palpable and equal bilaterally      Results Review:   Results from last 7 days   Lab Units 08/27/23  0331 08/26/23  0446 08/26/23  0446 08/26/23  0044   SODIUM mmol/L 141  --  138  --    POTASSIUM mmol/L 3.3*  --  3.9  --    CHLORIDE mmol/L 106  --  107  --    CO2 mmol/L 28.0  --  25.0  --    BUN mg/dL 12  --  17  --     CREATININE mg/dL 0.83  --  0.84 1.00   GLUCOSE mg/dL 104*   < > 127*  --    CALCIUM mg/dL 7.8*  --  7.5*  --     < > = values in this interval not displayed.     Results from last 7 days   Lab Units 08/27/23  0331 08/26/23  2158 08/26/23  1535 08/26/23  1053 08/26/23  0446 08/26/23  0047   WBC 10*3/mm3 16.53*  --   --   --  8.21 9.18   HEMOGLOBIN g/dL 8.5* 9.1* 9.0*   < > 8.6* 9.5*   HEMATOCRIT % 25.9* 27.0* 27.0*   < > 26.0* 28.3*   PLATELETS 10*3/mm3 242  --   --   --  213 226    < > = values in this interval not displayed.     Results from last 7 days   Lab Units 08/26/23  0446   CHOLESTEROL mg/dL 129   TRIGLYCERIDES mg/dL 74   HDL CHOL mg/dL 41   LDL CHOL mg/dL 73     Results from last 7 days   Lab Units 08/27/23  0331   HEMOGLOBIN A1C % 5.80*     Results from last 7 days   Lab Units 08/26/23  0047   HSTROP T ng/L 18*     HEAD CT SCAN:    Findings:    There is no acute intracranial hemorrhage. No mass effect or midline shift. No abnormal extra-axial collections. There is a developing small cortical infarct in the left insular cortex. There is a stable small chronic infarct in the right temporal lobe. Stable patchy white matter hypoattenuation. There are intracranial vascular calcifications. There is thinning of the orbital lenses bilaterally. The calvarium appears intact. Paranasal sinuses and mastoid air cells appear well aerated.     IMPRESSIONS:     1. Developing small area of hypoattenuation in the left insular cortex corresponding to known acute/subacute infarct. No acute intracranial hemorrhage.  2. Stable chronic right temporal lobe infarct and mild chronic small vessel ischemic change.    EKG:    Normal sinus rhythm  Nonspecific ST and T wave abnormality  Abnormal ECG  When compared with ECG of 26-AUG-2023 13:38, (Unconfirmed)  No significant change was found    ECHO:     Left ventricular systolic function is normal. Left ventricular ejection fraction appears to be 51 - 55%.   No significant structural  valvular heart disease demonstrated.    Medication Review: REVIEWED; NO DRIPS.    Assessment & Plan     Labile blood pressure without recurrent atrial fibrillation with stable GFR and slight decrease in hemoglobin without definitive findings of marked GI bleeding currently.  N.p.o. and with prep will initiate IV fluids with normal saline 75 cc/hour x 1 L if all concur.  We will continue current cardiac medications and close observation and monitoring in ICU.  Prognosis guarded.    Discussed with patient and family member in room.      CVA (cerebral vascular accident)    Hypothyroidism (acquired)    Essential hypertension    Hyperlipemia    Paroxysmal atrial fibrillation    Acute ischemic left MCA stroke    Dysphagia    Acute lower gastrointestinal bleeding    ABLA (acute blood loss anemia)    08/27/23  07:45 EDT

## 2023-08-27 NOTE — ANESTHESIA POSTPROCEDURE EVALUATION
Patient: Laury Eugene    Procedure Summary       Date: 08/27/23 Room / Location:  [facility] /  [facility]    Anesthesia Start: 1103 Anesthesia Stop:     Procedures:       COLONOSCOPY      ESOPHAGOGASTRODUODENOSCOPY Diagnosis:       Dysphagia, unspecified type      Acute lower gastrointestinal bleeding      ABLA (acute blood loss anemia)      (Dysphagia, unspecified type [R13.10])      (Acute lower gastrointestinal bleeding [K92.2])      (ABLA (acute blood loss anemia) [D62])    Surgeons: Brunner, Mark I, MD Provider: Alex Neal MD    Anesthesia Type: general ASA Status: 4            Anesthesia Type: general    Vitals  No vitals data found for the desired time range.          Post Anesthesia Care and Evaluation    Patient location during evaluation: ICU  Patient participation: waiting for patient participation  Pain management: adequate    Airway patency: patent  Anesthetic complications: No anesthetic complications  PONV Status: none  Cardiovascular status: hemodynamically stable and acceptable  Respiratory status: nonlabored ventilation, acceptable and nasal cannula  Hydration status: acceptable

## 2023-08-27 NOTE — NURSING NOTE
Patient drank movi prep x 1 last night. This morning during drinking prep, patient vomited clear liquid. Endo nurse called and she spoke with NOAH Mora and then returned call to us. Sshe said it was okay to stop movi prep and instead put in order for tap water enema and said to call once stool is clear. First enema given at 0747.

## 2023-08-27 NOTE — BRIEF OP NOTE
COLONOSCOPY, ESOPHAGOGASTRODUODENOSCOPY  Progress Note    Laury Eugene  8/27/2023    Colonoscopy shows pandiverticulosis.  No blood seen.    EGD reveals several ulcerations in the gastric antrum.  The largest ulcer measures 8 mm and contains a visible vessel.  This is a presumed source of recent significant GI hemorrhage.  An Ovesco 11/6T clip was applied for hemostasis.  Biopsy taken for H. pylori.    >> Twice daily PPI for 1 month, then daily.  >> Await pathology.  >> Low risk for bleeding if anticoagulants are indicated.    Please call with questions or concerns.    Mark I. Brunner, MD     Date: 8/27/2023  Time: 11:40 EDT

## 2023-08-27 NOTE — PROGRESS NOTES
"Critical Care Note     LOS: 1 day   Patient Care Team:  Leatha Melvin DO as PCP - General (Internal Medicine)  Roger Hodgson MD as Consulting Physician (Endocrinology)    Chief Complaint/Reason for visit:    Chief Complaint   Patient presents with    Stroke   Left middle cerebral artery stroke, status post TNK  Angioedema secondary to TNKase  Hypertension  Dyslipidemia  Recent GI bleed, recurrent  Paroxysmal atrial fibrillation    Subjective     Interval History:     Patient underwent colonoscopy prep last night and is exhausted this morning.  She does remain in sinus rhythm.  Persistent right facial droop, dysarthria, right upper extremity weakness.    Review of Systems:    All systems were reviewed and negative except as noted in subjective.    Medical history, surgical history, social history, family history reviewed    Objective     Intake/Output:    Intake/Output Summary (Last 24 hours) at 8/27/2023 1114  Last data filed at 8/27/2023 1000  Gross per 24 hour   Intake 1923.1 ml   Output 2850 ml   Net -926.9 ml       Nutrition:  NPO Diet NPO Type: Strict NPO    Infusions:  lactated ringers, 30 mL/hr        Mechanical Ventilator Settings:                                                Telemetry: Sinus rhythm             Vital Signs  Blood pressure 104/56, pulse 89, temperature 98.2 °F (36.8 °C), temperature source Oral, resp. rate 18, height 154.9 cm (61\"), weight 77.4 kg (170 lb 10.2 oz), SpO2 99 %.    Physical Exam:  General Appearance:  Elderly woman in no respiratory distress, supine in bed with her eyes closed   Head:  Atraumatic   Eyes:          No jaundice, conjunctiva pale   Ears:     Throat: Oral mucosa moist   Neck: Trachea midline, no palpable thyroid   Back:      Lungs:   Symmetric chest expansion without wheeze or rhonchi    Heart:  Regular rhythm, S1, S2 auscultated   Abdomen:   Bowel sounds present, soft, nontender   Rectal:   Deferred   Extremities: No peripheral edema, some " ecchymosis on her forearms   Pulses:    Skin: Warm and dry   Lymph nodes: No cervical adenopathy   Neurologic:     Interval:  (Handoff)  1a. Level of Consciousness: 2-->Not alert, requires repeated stimulation to attend, or is obtunded and requires strong or painful stimulation to make movements (not stereotyped)  1b. LOC Questions: 2-->Answers neither question correctly  1c. LOC Commands: 0-->Performs both tasks correctly  2. Best Gaze: 0-->Normal  3. Visual: 0-->No visual loss  4. Facial Palsy: 2-->Partial paralysis (total or near-total paralysis of lower face)  5a. Motor Arm, Left: 0-->No drift, limb holds 90 (or 45) degrees for full 10 secs  5b. Motor Arm, Right: 1-->Drift, limb holds 90 (or 45) degrees, but drifts down before full 10 secs, does not hit bed or other support  6a. Motor Leg, Left: 0-->No drift, leg holds 30 degree position for full 5 secs  6b. Motor Leg, Right: 0-->No drift, leg holds 30 degree position for full 5 secs  7. Limb Ataxia: 0-->Absent  8. Sensory: 1-->Mild-to-moderate sensory loss, patient feels pinprick is less sharp or is dull on the affected side, or there is a loss of superficial pain with pinprick, but patient is aware of being touched  9. Best Language: 2-->Severe aphasia, all communication is through fragmentary expression, great need for inference, questioning, and guessing by the listener. Range of information that can be exchanged is limited, listener carries burden of. . . (see row details)  10. Dysarthria: 2-->Severe dysarthria, patients speech is so slurred as to be unintelligible in the absence of or out of proportion to any dysphasia, or is mute/anarthric  11. Extinction and Inattention (formerly Neglect): 0-->No abnormality    Total (NIH Stroke Scale): 12   Results Review:     I reviewed the patient's new clinical results.   Results from last 7 days   Lab Units 08/27/23  0331 08/26/23  0446 08/26/23  0047 08/26/23  0044   SODIUM mmol/L 141 138  --   --    POTASSIUM  mmol/L 3.3* 3.9  --   --    CHLORIDE mmol/L 106 107  --   --    CO2 mmol/L 28.0 25.0  --   --    BUN mg/dL 12 17  --   --    CREATININE mg/dL 0.83 0.84  --  1.00   CALCIUM mg/dL 7.8* 7.5*  --   --    BILIRUBIN mg/dL  --  0.2  --   --    ALK PHOS U/L  --  85  --   --    ALT (SGPT) U/L  --  8 9  --    AST (SGOT) U/L  --  14 16  --    GLUCOSE mg/dL 104* 127*  --   --      Results from last 7 days   Lab Units 08/27/23  0331 08/26/23  2158 08/26/23  1535 08/26/23  1053 08/26/23  0446 08/26/23  0047   WBC 10*3/mm3 16.53*  --   --   --  8.21 9.18   HEMOGLOBIN g/dL 8.5* 9.1* 9.0*   < > 8.6* 9.5*   HEMATOCRIT % 25.9* 27.0* 27.0*   < > 26.0* 28.3*   PLATELETS 10*3/mm3 242  --   --   --  213 226    < > = values in this interval not displayed.         No results found for: BLOODCX  No results found for: URINECX    I reviewed the patient's new imaging including images and reports.    Findings:  There is no acute intracranial hemorrhage. No mass effect or midline shift. No abnormal extra-axial collections. There is a developing small cortical infarct in the left insular cortex. There is a stable small chronic infarct in the right temporal lobe.   Stable patchy white matter hypoattenuation. There are intracranial vascular calcifications. There is thinning of the orbital lenses bilaterally. The calvarium appears intact. Paranasal sinuses and mastoid air cells appear well aerated.     IMPRESSION:  Impression:     1. Developing small area of hypoattenuation in the left insular cortex corresponding to known acute/subacute infarct. No acute intracranial hemorrhage.  2. Stable chronic right temporal lobe infarct and mild chronic small vessel ischemic change.           Electronically Signed: Jim Guillen MD    8/27/2023 2:09 AM EDT     Echocardiogram August 26, EF 51 to 55%, no valvular abnormalities or effusion          FINDINGS:  LOWER CHEST:  The heart is normal in size. Lung bases are clear.    ABDOMEN/PELVIS:  Liver, gallbladder  and bile ducts:  The liver enhances homogenously without suspicious focal hepatic lesion.  Cholelithiasis without evidence of cholecystitis. No significant biliary  ductal dilatation.    Adrenal glands:  The adrenal glands are morphologically unremarkable without suspicious  lesion.    Kidneys, ureters and urinary bladder:  No suspicious renal lesions. No hydronephrosis. Urinary bladder wall  thickening.    Spleen:  The spleen is normal in size.    Pancreas:  The pancreas is unremarkable.    Gastrointestinal system and mesentery:  Duodenal diverticulum. There is no evidence of bowel obstruction. The  appendix is visualized and unremarkable. No significant mesenteric  inflammation. Colonic diverticulosis without evidence of diverticulitis.  There is a small blush of high attenuation present within the cecum,  possibly representing an active colonic hemorrhage. (Axial images 49  through 52 of series 2 and coronal images 25 through 27 of series 601.)    Lymph nodes:  No pathologically enlarged abdominal or pelvic lymph nodes are present.    Vessels:  The abdominal aorta is normal in caliber. The celiac trunk, superior  mesenteric artery, inferior mesenteric artery and their branch vessels  appear grossly patent. The superior mesenteric vein, splenic vein and  main portal veins are patent. The inferior vena cava and hepatic veins  are unremarkable.    Peritoneum:  No free intraperitoneal fluid or pneumoperitoneum.    Pelvic viscera:  Prior hysterectomy.    Body wall:  No acute findings. No significant body wall hernias.    Bones:  No acute fracture.   Impression:     Possible active GI hemorrhage involving the cecum. Colonoscopy is  recommended.    Urinary bladder wall thickening. Correlate with urinalysis.            Images personally reviewed, interpreted and dictated by RIMA Hargrove M.D.         All medications reviewed.   atorvastatin, 80 mg, Oral, Nightly  diphenhydrAMINE, 25 mg, Intravenous, Q6H  nebivolol,  2.5 mg, Oral, Q24H  pantoprazole, 40 mg, Intravenous, Q12H  potassium chloride, 10 mEq, Intravenous, Q1H  propafenone SR, 325 mg, Oral, Q12H  sodium chloride, 500 mL, Intravenous, Once          Assessment & Plan       CVA (cerebral vascular accident)    Hypothyroidism (acquired)    Essential hypertension    Hyperlipemia    Paroxysmal atrial fibrillation    Acute ischemic left MCA stroke    Dysphagia    Acute lower gastrointestinal bleeding    ABLA (acute blood loss anemia)    83-year-old woman with a history of paroxysmal atrial fibrillation, hypertension, hypothyroidism, breast cancer, recent GI bleed presenting with left facial droop, dysarthria, confusion.  Imaging revealed left middle cerebral artery M2 branch occlusion.  NIH was  8.  She had a recent colonoscopy with possible ruptured diverticuli.  However with her new onset stroke and M2 occlusion decision was made to proceed with TNK.  She subsequently developed some angioedema and received Benadryl, Pepcid, Solu-Medrol with resolution.    Today her NIH stroke scale is a 12.  CT scan of the head shows stable evolving stroke.  Speech therapy cleared her for a liquid diet.  She has had no atrial fibrillation.  Echo revealed a normal EF.  Most recent hemoglobin is 8.5 and has ranged from 8.5-9.4    PLAN:    Monitor H&H every 6 hours and transfuse as needed  Colonoscopy today  Stop Benadryl  Aspirin, statin  Monitor NIH stroke scale  Monitor for atrial arrhythmias  Systolic blood pressure less than 185  PT, OT, speech therapy    VTE Prophylaxis:SCDS    Stress Ulcer Prophylaxis: Charo Morales MD  08/27/23  11:14 EDT      Time: Critical care 25 min  I personally provided care to this critically ill patient as documented above.  Critical care time does not include time spent on separately billed procedures.  None of my critical care time was concurrent with other critical care providers.

## 2023-08-28 ENCOUNTER — ANCILLARY PROCEDURE (OUTPATIENT)
Dept: SPEECH THERAPY | Facility: HOSPITAL | Age: 83
DRG: 061 | End: 2023-08-28
Payer: MEDICARE

## 2023-08-28 ENCOUNTER — APPOINTMENT (OUTPATIENT)
Dept: NEUROLOGY | Facility: HOSPITAL | Age: 83
DRG: 061 | End: 2023-08-28
Payer: MEDICARE

## 2023-08-28 LAB
ABO GROUP BLD: NORMAL
ANION GAP SERPL CALCULATED.3IONS-SCNC: 4 MMOL/L (ref 5–15)
BUN SERPL-MCNC: 14 MG/DL (ref 8–23)
BUN/CREAT SERPL: 18.9 (ref 7–25)
CALCIUM SPEC-SCNC: 6.9 MG/DL (ref 8.6–10.5)
CHLORIDE SERPL-SCNC: 108 MMOL/L (ref 98–107)
CO2 SERPL-SCNC: 27 MMOL/L (ref 22–29)
CREAT SERPL-MCNC: 0.74 MG/DL (ref 0.57–1)
DEPRECATED RDW RBC AUTO: 52 FL (ref 37–54)
EGFRCR SERPLBLD CKD-EPI 2021: 81.4 ML/MIN/1.73
ERYTHROCYTE [DISTWIDTH] IN BLOOD BY AUTOMATED COUNT: 14.8 % (ref 12.3–15.4)
GLUCOSE BLDC GLUCOMTR-MCNC: 104 MG/DL (ref 70–130)
GLUCOSE BLDC GLUCOMTR-MCNC: 109 MG/DL (ref 70–130)
GLUCOSE BLDC GLUCOMTR-MCNC: 110 MG/DL (ref 70–130)
GLUCOSE BLDC GLUCOMTR-MCNC: 129 MG/DL (ref 70–130)
GLUCOSE BLDC GLUCOMTR-MCNC: 97 MG/DL (ref 70–130)
GLUCOSE SERPL-MCNC: 89 MG/DL (ref 65–99)
HCT VFR BLD AUTO: 22.3 % (ref 34–46.6)
HCT VFR BLD AUTO: 22.5 % (ref 34–46.6)
HCT VFR BLD AUTO: 22.5 % (ref 34–46.6)
HCT VFR BLD AUTO: 22.8 % (ref 34–46.6)
HCT VFR BLD AUTO: 24.4 % (ref 34–46.6)
HGB BLD-MCNC: 7 G/DL (ref 12–15.9)
HGB BLD-MCNC: 7 G/DL (ref 12–15.9)
HGB BLD-MCNC: 7.2 G/DL (ref 12–15.9)
HGB BLD-MCNC: 7.6 G/DL (ref 12–15.9)
HGB BLD-MCNC: 8.1 G/DL (ref 12–15.9)
MAGNESIUM SERPL-MCNC: 2.1 MG/DL (ref 1.6–2.4)
MCH RBC QN AUTO: 30.8 PG (ref 26.6–33)
MCHC RBC AUTO-ENTMCNC: 31.1 G/DL (ref 31.5–35.7)
MCV RBC AUTO: 99.1 FL (ref 79–97)
PHOSPHATE SERPL-MCNC: 2.4 MG/DL (ref 2.5–4.5)
PLATELET # BLD AUTO: 222 10*3/MM3 (ref 140–450)
PMV BLD AUTO: 10.6 FL (ref 6–12)
POTASSIUM SERPL-SCNC: 3.4 MMOL/L (ref 3.5–5.2)
POTASSIUM SERPL-SCNC: 3.9 MMOL/L (ref 3.5–5.2)
QT INTERVAL: 326 MS
QT INTERVAL: 340 MS
QT INTERVAL: 388 MS
QTC INTERVAL: 444 MS
QTC INTERVAL: 451 MS
QTC INTERVAL: 485 MS
RBC # BLD AUTO: 2.27 10*6/MM3 (ref 3.77–5.28)
RH BLD: POSITIVE
SODIUM SERPL-SCNC: 139 MMOL/L (ref 136–145)
WBC NRBC COR # BLD: 13.86 10*3/MM3 (ref 3.4–10.8)

## 2023-08-28 PROCEDURE — 36430 TRANSFUSION BLD/BLD COMPNT: CPT

## 2023-08-28 PROCEDURE — C1894 INTRO/SHEATH, NON-LASER: HCPCS

## 2023-08-28 PROCEDURE — 86900 BLOOD TYPING SEROLOGIC ABO: CPT

## 2023-08-28 PROCEDURE — 85027 COMPLETE CBC AUTOMATED: CPT | Performed by: INTERNAL MEDICINE

## 2023-08-28 PROCEDURE — 92612 ENDOSCOPY SWALLOW (FEES) VID: CPT

## 2023-08-28 PROCEDURE — 0DJ08ZZ INSPECTION OF UPPER INTESTINAL TRACT, VIA NATURAL OR ARTIFICIAL OPENING ENDOSCOPIC: ICD-10-PCS | Performed by: INTERNAL MEDICINE

## 2023-08-28 PROCEDURE — 83735 ASSAY OF MAGNESIUM: CPT | Performed by: INTERNAL MEDICINE

## 2023-08-28 PROCEDURE — 94761 N-INVAS EAR/PLS OXIMETRY MLT: CPT

## 2023-08-28 PROCEDURE — 97166 OT EVAL MOD COMPLEX 45 MIN: CPT

## 2023-08-28 PROCEDURE — 95816 EEG AWAKE AND DROWSY: CPT | Performed by: PSYCHIATRY & NEUROLOGY

## 2023-08-28 PROCEDURE — 84132 ASSAY OF SERUM POTASSIUM: CPT | Performed by: INTERNAL MEDICINE

## 2023-08-28 PROCEDURE — 85018 HEMOGLOBIN: CPT | Performed by: INTERNAL MEDICINE

## 2023-08-28 PROCEDURE — 95816 EEG AWAKE AND DROWSY: CPT

## 2023-08-28 PROCEDURE — 85014 HEMATOCRIT: CPT | Performed by: INTERNAL MEDICINE

## 2023-08-28 PROCEDURE — 99232 SBSQ HOSP IP/OBS MODERATE 35: CPT | Performed by: INTERNAL MEDICINE

## 2023-08-28 PROCEDURE — 86901 BLOOD TYPING SEROLOGIC RH(D): CPT

## 2023-08-28 PROCEDURE — 92610 EVALUATE SWALLOWING FUNCTION: CPT

## 2023-08-28 PROCEDURE — 94799 UNLISTED PULMONARY SVC/PX: CPT

## 2023-08-28 PROCEDURE — 0 POTASSIUM CHLORIDE 10 MEQ/100ML SOLUTION: Performed by: INTERNAL MEDICINE

## 2023-08-28 PROCEDURE — 25010000002 ENOXAPARIN PER 10 MG

## 2023-08-28 PROCEDURE — 92523 SPEECH SOUND LANG COMPREHEN: CPT

## 2023-08-28 PROCEDURE — 99232 SBSQ HOSP IP/OBS MODERATE 35: CPT

## 2023-08-28 PROCEDURE — 97162 PT EVAL MOD COMPLEX 30 MIN: CPT

## 2023-08-28 PROCEDURE — 82948 REAGENT STRIP/BLOOD GLUCOSE: CPT

## 2023-08-28 PROCEDURE — 02HV33Z INSERTION OF INFUSION DEVICE INTO SUPERIOR VENA CAVA, PERCUTANEOUS APPROACH: ICD-10-PCS | Performed by: INTERNAL MEDICINE

## 2023-08-28 PROCEDURE — C1751 CATH, INF, PER/CENT/MIDLINE: HCPCS

## 2023-08-28 PROCEDURE — 84100 ASSAY OF PHOSPHORUS: CPT | Performed by: INTERNAL MEDICINE

## 2023-08-28 PROCEDURE — 80048 BASIC METABOLIC PNL TOTAL CA: CPT | Performed by: INTERNAL MEDICINE

## 2023-08-28 PROCEDURE — 94664 DEMO&/EVAL PT USE INHALER: CPT

## 2023-08-28 PROCEDURE — P9016 RBC LEUKOCYTES REDUCED: HCPCS

## 2023-08-28 RX ORDER — SODIUM CHLORIDE 9 MG/ML
75 INJECTION, SOLUTION INTRAVENOUS CONTINUOUS
Status: ACTIVE | OUTPATIENT
Start: 2023-08-28 | End: 2023-08-29

## 2023-08-28 RX ORDER — PANTOPRAZOLE SODIUM 40 MG/1
40 TABLET, DELAYED RELEASE ORAL
Status: DISCONTINUED | OUTPATIENT
Start: 2023-08-28 | End: 2023-09-04 | Stop reason: HOSPADM

## 2023-08-28 RX ORDER — SODIUM CHLORIDE 0.9 % (FLUSH) 0.9 %
20 SYRINGE (ML) INJECTION AS NEEDED
Status: DISCONTINUED | OUTPATIENT
Start: 2023-08-28 | End: 2023-09-04 | Stop reason: HOSPADM

## 2023-08-28 RX ORDER — POTASSIUM CHLORIDE 7.45 MG/ML
10 INJECTION INTRAVENOUS
Status: COMPLETED | OUTPATIENT
Start: 2023-08-28 | End: 2023-08-28

## 2023-08-28 RX ORDER — SODIUM CHLORIDE 0.9 % (FLUSH) 0.9 %
10 SYRINGE (ML) INJECTION EVERY 12 HOURS SCHEDULED
Status: DISCONTINUED | OUTPATIENT
Start: 2023-08-28 | End: 2023-09-04 | Stop reason: HOSPADM

## 2023-08-28 RX ORDER — ENOXAPARIN SODIUM 100 MG/ML
40 INJECTION SUBCUTANEOUS DAILY
Status: DISCONTINUED | OUTPATIENT
Start: 2023-08-28 | End: 2023-08-29

## 2023-08-28 RX ORDER — SODIUM CHLORIDE 0.9 % (FLUSH) 0.9 %
10 SYRINGE (ML) INJECTION AS NEEDED
Status: DISCONTINUED | OUTPATIENT
Start: 2023-08-28 | End: 2023-09-04 | Stop reason: HOSPADM

## 2023-08-28 RX ADMIN — Medication 10 ML: at 20:26

## 2023-08-28 RX ADMIN — ENOXAPARIN SODIUM 40 MG: 100 INJECTION SUBCUTANEOUS at 13:36

## 2023-08-28 RX ADMIN — PROPAFENONE 325 MG: 325 CAPSULE, EXTENDED RELEASE ORAL at 08:16

## 2023-08-28 RX ADMIN — ATORVASTATIN CALCIUM 80 MG: 40 TABLET, FILM COATED ORAL at 20:26

## 2023-08-28 RX ADMIN — NEBIVOLOL 2.5 MG: 2.5 TABLET ORAL at 08:16

## 2023-08-28 RX ADMIN — POTASSIUM CHLORIDE 10 MEQ: 7.46 INJECTION, SOLUTION INTRAVENOUS at 12:01

## 2023-08-28 RX ADMIN — POTASSIUM CHLORIDE 10 MEQ: 7.46 INJECTION, SOLUTION INTRAVENOUS at 08:16

## 2023-08-28 RX ADMIN — PANTOPRAZOLE SODIUM 40 MG: 40 INJECTION, POWDER, LYOPHILIZED, FOR SOLUTION INTRAVENOUS at 08:16

## 2023-08-28 RX ADMIN — PANTOPRAZOLE SODIUM 40 MG: 40 TABLET, DELAYED RELEASE ORAL at 16:49

## 2023-08-28 RX ADMIN — POTASSIUM CHLORIDE 10 MEQ: 7.46 INJECTION, SOLUTION INTRAVENOUS at 07:01

## 2023-08-28 RX ADMIN — IPRATROPIUM BROMIDE AND ALBUTEROL SULFATE 3 ML: 2.5; .5 SOLUTION RESPIRATORY (INHALATION) at 16:56

## 2023-08-28 RX ADMIN — SODIUM CHLORIDE 75 ML/HR: 9 INJECTION, SOLUTION INTRAVENOUS at 20:26

## 2023-08-28 RX ADMIN — PROPAFENONE 325 MG: 325 CAPSULE, EXTENDED RELEASE ORAL at 20:26

## 2023-08-28 RX ADMIN — ASPIRIN 81 MG: 81 TABLET, CHEWABLE ORAL at 08:16

## 2023-08-28 RX ADMIN — POTASSIUM CHLORIDE 10 MEQ: 7.46 INJECTION, SOLUTION INTRAVENOUS at 13:33

## 2023-08-28 NOTE — THERAPY EVALUATION
Acute Care - Speech Language Pathology Initial Evaluation  [facility]  Cognitive-Communication Evaluation   Clinical Swallow Re-Evaluation      Patient Name: Laury Eugene  : 1940  MRN: [id number]  Today's Date: 2023               Admit Date: 2023     Visit Dx:    ICD-10-CM ICD-9-CM   1. Acute CVA (cerebrovascular accident)  I63.9 434.91   2. Anemia, unspecified type  D64.9 285.9   3. Dysphagia, unspecified type  R13.10 787.20   4. Acute lower gastrointestinal bleeding  K92.2 578.9   5. ABLA (acute blood loss anemia)  D62 285.1     Patient Active Problem List   Diagnosis    Multinodular goiter    Abnormal thyroid function test    CVA (cerebral vascular accident)    Hypothyroidism (acquired)    Essential hypertension    Hyperlipemia    Paroxysmal atrial fibrillation    Acute ischemic left MCA stroke    Dysphagia    Acute lower gastrointestinal bleeding    ABLA (acute blood loss anemia)     Past Medical History:   Diagnosis Date    History of breast cancer     History of heart disease     Hypothyroidism     Multinodular goiter     Thyroid function test abnormal      Past Surgical History:   Procedure Laterality Date    BLEPHAROPLASTY      BREAST RECONSTRUCTION      CATARACT EXTRACTION Bilateral     COLONOSCOPY N/A 2023    Procedure: COLONOSCOPY;  Surgeon: Brunner, Mark I, MD;  Location: [facility];  Service: Gastroenterology;  Laterality: N/A;    ENDOSCOPY N/A 2023    Procedure: ESOPHAGOGASTRODUODENOSCOPY;  Surgeon: Brunner, Mark I, MD;  Location:  [facility];  Service: Gastroenterology;  Laterality: N/A;    HYSTERECTOMY      SIMPLE MASTECTOMY         SLP Recommendation and Plan  SLP Diagnosis: severe, aphasia (? possible apraxia) (23)           Swallow Criteria for Skilled Therapeutic Interventions Met: demonstrates skilled criteria (23)  SLC Criteria for Skilled Therapy Interventions Met: yes (23)  Anticipated Discharge Disposition (SLP):  inpatient rehabilitation facility, anticipate therapy at next level of care (08/28/23 0835)        Predicted Duration Therapy Intervention (Days): until discharge (08/28/23 0835)  Oral Care Recommendations: Oral Care BID/PRN (08/28/23 0835)                          Plan of Care Reviewed With: patient (08/28/23 0904)      SLP EVALUATION (last 72 hours)       SLP SLC Evaluation       Row Name 08/28/23 0835                   Communication Assessment/Intervention    Document Type evaluation  -MP        Subjective Information no complaints  -MP        Patient Observations alert;cooperative  -MP        Patient/Family/Caregiver Comments/Observations Family present  -MP        Patient Effort good  -MP           General Information    Patient Profile Reviewed yes  -MP        Pertinent History Of Current Problem Adm 8/26 w/ L insular CVA. s/p EGD 8/27 w/ ulcerations s/p clips. Pt was initially placed on CL diet, but RN reported has been coughing w/ PO.  -MP        Precautions/Limitations, Vision WFL;for purposes of eval  -MP        Precautions/Limitations, Hearing WFL;for purposes of eval  -MP        Prior Level of Function-Communication WFL  -MP        Plans/Goals Discussed with patient;family;agreed upon  -MP        Barriers to Rehab medically complex  -MP        Patient's Goals for Discharge patient could not state  -MP        Family Goals for Discharge family did not state  -MP           Pain    Additional Documentation Pain Scale: FACES Pre/Post-Treatment (Group)  -MP           Pain Scale: FACES Pre/Post-Treatment    Pain: FACES Scale, Pretreatment 0-->no hurt  -MP        Posttreatment Pain Rating 0-->no hurt  -MP           Comprehension Assessment/Intervention    Comprehension Assessment/Intervention Auditory Comprehension  -MP           Auditory Comprehension Assessment/Intervention    Auditory Comprehension (Communication) moderate impairment  -MP        Answers Questions (Communication) yes/no;personal;simple;severe  impairment  -MP        Able to Follow Commands (Communication) 1-step;moderate impairment  -MP           Expression Assessment/Intervention    Expression Assessment/Intervention verbal expression  -MP           Verbal Expression Assessment/Intervention    Verbal Expression severe impairment  -MP        Automatic Speech (Communication) response to greeting;severe impairment  -MP        Repetition sounds;severe impairment  -MP        Spontaneous/Functional Words severe impairment  -MP           Oral Motor Structure and Function    Oral Motor Structure and Function WFL  -MP        Dentition Assessment natural, present and adequate  -MP           Oral Musculature and Cranial Nerve Assessment    Oral Motor General Assessment oral labial or buccal impairment  -MP        Oral Labial or Buccal Impairment, Detail, Cranial Nerve VII (Facial): right labial droop;other (see comments)  dense  -MP           Motor Speech Assessment/Intervention    Motor Speech Function unable/difficult to assess;other (see comments)  no verbalizations  -MP           Cognitive Assessment Intervention- SLP    Cognitive Function (Cognition) unable/difficult to assess;other (see comments)  2' speech/language deficits  -MP           SLP Evaluation Clinical Impressions    SLP Diagnosis severe;aphasia  ? possible apraxia  -MP        Rehab Potential/Prognosis good  -MP        SLC Criteria for Skilled Therapy Interventions Met yes  -MP        Functional Impact difficulty communicating wants, needs;difficulty communicating in an emergency  -MP           Recommendations    Therapy Frequency (SLP SLC) 5 days per week  -MP        Predicted Duration Therapy Intervention (Days) until discharge  -MP        Anticipated Discharge Disposition (SLP) inpatient rehabilitation facility;anticipate therapy at next level of care  -MP                  User Key  (r) = Recorded By, (t) = Taken By, (c) = Cosigned By      Initials Name Effective Dates    SANTO Kong  MS Vince CCC-SLP 12/28/21 -                        EDUCATION  The patient has been educated in the following areas:     Cognitive Impairment Communication Impairment.           SLP GOALS       Row Name 08/28/23 0835             Patient will demonstrate functional communication skills for return to discharge environment     [place] with moderate cues  -MP      Time frame by discharge  -MP         Comprehend Questions Goal 1 (SLP)    Improve Ability to Comprehend Questions Goal 1 (SLP) simple yes/no questions;80%;with moderate cues (50-74%)  -MP      Time Frame (Comprehend Questions Goal 1, SLP) short term goal (STG)  -MP         Follow Directions Goal 2 (SLP)    Improve Ability to Follow Directions Goal 1 (SLP) 1 step direction with objects;1 step direction without objects;80%;with minimal cues (75-90%)  -MP      Time Frame (Follow Directions Goal 1, SLP) short term goal (STG)  -MP         Word Retrieval Skills Goal 1 (SLP)    Improve Word Retrieval Skills By Goal 1 (SLP) repeating sounds;repeating words;completing automatic speech task, counting;completing automatic speech task, alphabet;completing automatic speech task, days of the week;completing automatic speech task, months;completing automatic speech task, Pledge of Allegiance;completing automatic speech task, sing “Happy Birthday”;80%;with moderate cues (50-74%)  -MP      Time Frame (Word Retrieval Goal 1, SLP) short term goal (STG)  -MP         Motor Planning Goal 1 (SLP)    Improve Motor Planning to Reduce Apraxia by Goal 1 (SLP) imitating mouth postures;imitating vowels;following isolated oral commands;80%;with moderate cues (50-74%)  -MP      Time Frame (Motor Planning Goal 1, SLP) short term goal (STG)  -MP                User Key  (r) = Recorded By, (t) = Taken By, (c) = Cosigned By      Initials Name Provider Type    MP Vince Barnhart MS CCC-SLP Speech and Language Pathologist                            Time Calculation:      Time  Calculation- SLP       Row Name 23 0905             Time Calculation- SLP    SLP Start Time 0835  -MP      SLP Received On 23  -MP         Untimed Charges    63992-TJ Eval Speech and Production w/ Language Minutes 12  -MP      01134-TD Eval Oral Pharyng Swallow Minutes 25  -MP         Total Minutes    Untimed Charges Total Minutes 37  -MP       Total Minutes 37  -MP                User Key  (r) = Recorded By, (t) = Taken By, (c) = Cosigned By      Initials Name Provider Type    MP Vince Barnahrt MS CCC-SLP Speech and Language Pathologist                    Therapy Charges for Today       Code Description Service Date Service Provider Modifiers Qty    13840561435 HC ST EVAL ORAL PHARYNG SWALLOW 2 2023 iVnce Barnhart MS CCC-SLP GN 1    55230033334 HC ST EVAL SPEECH AND PROD W LANG  1 2023 Vince Barnhart MS CCC-SLP GN 1                       Vince Kong MS CCC-SLP  2023   and [facility] Care - Speech Language Pathology   Swallow Initial Evaluation [facility]     Patient Name: Laury Eugene  : 1940  MRN: [id number]  Today's Date: 2023               Admit Date: 2023    Visit Dx:     ICD-10-CM ICD-9-CM   1. Acute CVA (cerebrovascular accident)  I63.9 434.91   2. Anemia, unspecified type  D64.9 285.9   3. Dysphagia, unspecified type  R13.10 787.20   4. Acute lower gastrointestinal bleeding  K92.2 578.9   5. ABLA (acute blood loss anemia)  D62 285.1     Patient Active Problem List   Diagnosis    Multinodular goiter    Abnormal thyroid function test    CVA (cerebral vascular accident)    Hypothyroidism (acquired)    Essential hypertension    Hyperlipemia    Paroxysmal atrial fibrillation    Acute ischemic left MCA stroke    Dysphagia    Acute lower gastrointestinal bleeding    ABLA (acute blood loss anemia)     Past Medical History:   Diagnosis Date    History of breast cancer     History of heart disease     Hypothyroidism     Multinodular goiter      Thyroid function test abnormal      Past Surgical History:   Procedure Laterality Date    BLEPHAROPLASTY      BREAST RECONSTRUCTION      CATARACT EXTRACTION Bilateral     COLONOSCOPY N/A 8/27/2023    Procedure: COLONOSCOPY;  Surgeon: Brunner, Mark I, MD;  Location:  [facility];  Service: Gastroenterology;  Laterality: N/A;    ENDOSCOPY N/A 8/27/2023    Procedure: ESOPHAGOGASTRODUODENOSCOPY;  Surgeon: Brunner, Mark I, MD;  Location:  [facility];  Service: Gastroenterology;  Laterality: N/A;    HYSTERECTOMY      SIMPLE MASTECTOMY         SLP Recommendation and Plan  SLP Swallowing Diagnosis: suspected pharyngeal dysphagia (08/28/23 0835)  SLP Diet Recommendation: NPO (08/28/23 0835)  Recommended Precautions and Strategies: general aspiration precautions (08/28/23 0835)  SLP Rec. for Method of Medication Administration: meds whole, meds crushed, with puree, as tolerated (08/28/23 0835)     Monitor for Signs of Aspiration: notify SLP if any concerns (08/28/23 0835)  Recommended Diagnostics: FEES (08/28/23 0835)  Swallow Criteria for Skilled Therapeutic Interventions Met: demonstrates skilled criteria (08/28/23 0835)  Anticipated Discharge Disposition (SLP): inpatient rehabilitation facility, anticipate therapy at next level of care (08/28/23 0835)  Rehab Potential/Prognosis, Swallowing: good, to achieve stated therapy goals (08/28/23 0835)     Predicted Duration Therapy Intervention (Days): until discharge (08/28/23 0835)  Oral Care Recommendations: Oral Care BID/PRN (08/28/23 0835)                                      Oral Care Recommendations: Oral Care BID/PRN (08/28/23 0835)    Plan of Care Reviewed With: patient      SWALLOW EVALUATION (last 72 hours)       SLP Adult Swallow Evaluation       Row Name 08/28/23 0835 08/26/23 0915                Rehab Evaluation    Document Type -- evaluation  -DV       Subjective Information -- no complaints  -DV       Patient Observations -- alert;cooperative  -DV        Patient/Family/Caregiver Comments/Observations -- dtrs present  -DV       Patient Effort -- excellent  -DV       Symptoms Noted During/After Treatment -- none  -DV          General Information    Patient Profile Reviewed -- yes  -DV       Pertinent History Of Current Problem -- 83yoF w/ L insular CVA. Also w/ GI concerns, undergoing EGD tomorrow per RN.  -DV       Current Method of Nutrition NPO  -MP NPO  -DV       Precautions/Limitations, Vision -- WFL;for purposes of eval  -DV       Precautions/Limitations, Hearing -- WFL;for purposes of eval  -DV       Prior Level of Function-Communication WFL  -MP unknown  -DV       Prior Level of Function-Swallowing no diet consistency restrictions  -MP no diet consistency restrictions  -DV       Plans/Goals Discussed with -- patient;family;agreed upon  -DV       Barriers to Rehab -- none identified  -DV       Patient's Goals for Discharge patient did not state  -MP patient did not state  -DV       Family Goals for Discharge family did not state  -MP family did not state  -DV          Pain    Additional Documentation -- Pain Scale: Word Pre/Post-Treatment (Group)  -DV          Pain Scale: Word Pre/Post-Treatment    Pain: Word Scale, Pretreatment -- 2 - mild pain  -DV       Posttreatment Pain Rating -- 2 - mild pain  -DV          Oral Motor Structure and Function    Dentition Assessment -- natural, present and adequate  -DV       Secretion Management WNL/WFL  -MP WNL/WFL  -DV       Mucosal Quality moist, healthy  -MP moist, healthy  -DV          Oral Musculature and Cranial Nerve Assessment    Oral Motor General Assessment -- unable to assess  -DV          General Eating/Swallowing Observations    Respiratory Support Currently in Use room air  -MP room air  -DV       Eating/Swallowing Skills fed by SLP  -MP self-fed;needed assist  -DV       Positioning During Eating upright in bed  -MP upright 90 degree;upright in bed  -DV       Utensils Used spoon;cup  -MP cup;straw  -DV        Consistencies Trialed ice chips;thin liquids;pureed  -MP thin liquids  -DV          Respiratory    Respiratory Status -- WFL  -DV          Clinical Swallow Eval    Oral Prep Phase -- WFL  -DV       Pharyngeal Phase suspected pharyngeal impairment  -MP no overt signs/symptoms of pharyngeal impairment  -DV       Clinical Swallow Evaluation Summary Immediate wheeze w/ trials thin. No s/sxs w/ puree. Rec NPO x meds in pudding/puree and SLP will proceed w/ FEES to further assess.  -MP No s/sx aspiration with limited trials of thin liquids. Unable to complete further PO trials 2' pt had to be taken for stat CT. OK'd clear thin liquid diet with RN. Pt scheduled for EGD tomorrow - will check back to see if pt can do solid trials tomorrow.  -DV          Pharyngeal Phase Concerns    Pharyngeal Phase Concerns other (see comments)  wheeze  -MP --          SLP Evaluation Clinical Impression    SLP Swallowing Diagnosis suspected pharyngeal dysphagia  -MP R/O pharyngeal dysphagia;other (see comments)  unable to assess all consistencies  -DV       Functional Impact risk of aspiration/pneumonia  -MP risk of aspiration/pneumonia  -DV       Rehab Potential/Prognosis, Swallowing good, to achieve stated therapy goals  -MP --       Swallow Criteria for Skilled Therapeutic Interventions Met demonstrates skilled criteria  -MP demonstrates skilled criteria  -DV          Recommendations    Predicted Duration Therapy Intervention (Days) -- until discharge  -DV       SLP Diet Recommendation NPO  -MP clear liquid diet  -DV       Recommended Diagnostics FEES  -MP reassess via clinical swallow evaluation  -DV       Recommended Precautions and Strategies general aspiration precautions  -MP upright posture during/after eating;general aspiration precautions  -DV       Oral Care Recommendations Oral Care BID/PRN  -MP Oral Care BID/PRN  -DV       SLP Rec. for Method of Medication Administration meds whole;meds crushed;with puree;as tolerated  -MP  meds whole;with thin liquids;as tolerated  -DV       Monitor for Signs of Aspiration notify SLP if any concerns  -MP yes;notify SLP if any concerns  -DV                 User Key  (r) = Recorded By, (t) = Taken By, (c) = Cosigned By      Initials Name Effective Dates    MP Jens Vince Kong, MS CCC-SLP 12/28/21 -     DV Inga Gifford, MS CCC-SLP 06/16/21 -                     EDUCATION  The patient has been educated in the following areas:   Dysphagia (Swallowing Impairment).        SLP GOALS       Row Name 08/28/23 0835             Patient will demonstrate functional communication skills for return to discharge environment     [place] with moderate cues  -MP      Time frame by discharge  -MP         Comprehend Questions Goal 1 (SLP)    Improve Ability to Comprehend Questions Goal 1 (SLP) simple yes/no questions;80%;with moderate cues (50-74%)  -MP      Time Frame (Comprehend Questions Goal 1, SLP) short term goal (STG)  -MP         Follow Directions Goal 2 (SLP)    Improve Ability to Follow Directions Goal 1 (SLP) 1 step direction with objects;1 step direction without objects;80%;with minimal cues (75-90%)  -MP      Time Frame (Follow Directions Goal 1, SLP) short term goal (STG)  -MP         Word Retrieval Skills Goal 1 (SLP)    Improve Word Retrieval Skills By Goal 1 (SLP) repeating sounds;repeating words;completing automatic speech task, counting;completing automatic speech task, alphabet;completing automatic speech task, days of the week;completing automatic speech task, months;completing automatic speech task, Pledge of Allegiance;completing automatic speech task, sing “Happy Birthday”;80%;with moderate cues (50-74%)  -MP      Time Frame (Word Retrieval Goal 1, SLP) short term goal (STG)  -MP         Motor Planning Goal 1 (SLP)    Improve Motor Planning to Reduce Apraxia by Goal 1 (SLP) imitating mouth postures;imitating vowels;following isolated oral commands;80%;with moderate cues (50-74%)  -MP       Time Frame (Motor Planning Goal 1, SLP) short term goal (STG)  -MP                User Key  (r) = Recorded By, (t) = Taken By, (c) = Cosigned By      Initials Name Provider Type    Vince Adhikari MS CCC-SLP Speech and Language Pathologist                       Time Calculation:    Time Calculation- SLP       Row Name 08/28/23 0905             Time Calculation- SLP    SLP Start Time 0835  -MP      SLP Received On 08/28/23  -MP         Untimed Charges    64506-LJ Eval Speech and Production w/ Language Minutes 12  -MP      11398-OF Eval Oral Pharyng Swallow Minutes 25  -MP         Total Minutes    Untimed Charges Total Minutes 37  -MP       Total Minutes 37  -MP                User Key  (r) = Recorded By, (t) = Taken By, (c) = Cosigned By      Initials Name Provider Type    Vince Adhikari MS CCC-SLP Speech and Language Pathologist                    Therapy Charges for Today       Code Description Service Date Service Provider Modifiers Qty    23459121217 HC ST EVAL ORAL PHARYNG SWALLOW 2 8/28/2023 Vince Barnhart MS CCC-SLP GN 1    [id number] HC ST EVAL SPEECH AND PROD W LANG  1 8/28/2023 Vince Barnhart MS CCC-SLP GN 1                 MS JEREMI Dhillon  8/28/2023

## 2023-08-28 NOTE — PLAN OF CARE
Goal Outcome Evaluation:  Plan of Care Reviewed With: patient        Progress: no change  Outcome Evaluation: Patient presents w/ aphasia, impaired balance, R side weakness, inability to safely ambulate, and is below her functional baseline. Pt. required mod A x 2 for transfers and was unable to safely take steps. Skilled IPPT warranted to promote return to PLOF. Recommend IP rehab at D/C.      Anticipated Discharge Disposition (PT): inpatient rehabilitation facility

## 2023-08-28 NOTE — MBS/VFSS/FEES
Acute Care - Speech Language Pathology   Swallow Initial Evaluation [facility]  Fiberoptic Endoscopic Evaluation of Swallowing (FEES)      Patient Name: Laury Eugene  : 1940  MRN: [id number]  Today's Date: 2023               Admit Date: 2023    Visit Dx:     ICD-10-CM ICD-9-CM   1. Acute CVA (cerebrovascular accident)  I63.9 434.91   2. Anemia, unspecified type  D64.9 285.9   3. Dysphagia, unspecified type  R13.10 787.20   4. Acute lower gastrointestinal bleeding  K92.2 578.9   5. ABLA (acute blood loss anemia)  D62 285.1     Patient Active Problem List   Diagnosis    Multinodular goiter    Abnormal thyroid function test    CVA (cerebral vascular accident)    Hypothyroidism (acquired)    Essential hypertension    Hyperlipemia    Paroxysmal atrial fibrillation    Acute ischemic left MCA stroke    Dysphagia    Acute lower gastrointestinal bleeding    ABLA (acute blood loss anemia)     Past Medical History:   Diagnosis Date    History of breast cancer     History of heart disease     Hypothyroidism     Multinodular goiter     Thyroid function test abnormal      Past Surgical History:   Procedure Laterality Date    BLEPHAROPLASTY      BREAST RECONSTRUCTION      CATARACT EXTRACTION Bilateral     COLONOSCOPY N/A 2023    Procedure: COLONOSCOPY;  Surgeon: Brunner, Mark I, MD;  Location: [facility] ENDOSCOPY;  Service: Gastroenterology;  Laterality: N/A;    ENDOSCOPY N/A 2023    Procedure: ESOPHAGOGASTRODUODENOSCOPY;  Surgeon: Brunner, Mark I, MD;  Location:  [facility] ENDOSCOPY;  Service: Gastroenterology;  Laterality: N/A;    HYSTERECTOMY      SIMPLE MASTECTOMY         SLP Recommendation and Plan  SLP Swallowing Diagnosis: mild-moderate, oral dysphagia, pharyngeal dysphagia (23 1100)  SLP Diet Recommendation: soft to chew textures, ground, no mixed consistencies, nectar thick liquids (23 1100)  Recommended Precautions and Strategies: no straw, small bites of food and sips of liquid,  general aspiration precautions, upright posture during/after eating (08/28/23 1100)  SLP Rec. for Method of Medication Administration: meds whole, meds crushed, with puree, as tolerated (08/28/23 1100)     Monitor for Signs of Aspiration: notify SLP if any concerns (08/28/23 1100)  Recommended Diagnostics: FEES (08/28/23 0835)  Swallow Criteria for Skilled Therapeutic Interventions Met: demonstrates skilled criteria (08/28/23 1100)  Anticipated Discharge Disposition (SLP): inpatient rehabilitation facility, anticipate therapy at next level of care (08/28/23 1100)  Rehab Potential/Prognosis, Swallowing: good, to achieve stated therapy goals (08/28/23 1100)  Therapy Frequency (Swallow): 5 days per week (08/28/23 1100)  Predicted Duration Therapy Intervention (Days): until discharge (08/28/23 1100)  Oral Care Recommendations: Oral Care BID/PRN (08/28/23 1100)                                      Oral Care Recommendations: Oral Care BID/PRN (08/28/23 1100)    Plan of Care Reviewed With: patient      SWALLOW EVALUATION (last 72 hours)       SLP Adult Swallow Evaluation       Row Name 08/28/23 1100 08/28/23 0835 08/26/23 0915             Rehab Evaluation    Document Type evaluation  -MP -- evaluation  -DV      Subjective Information no complaints  -MP -- no complaints  -DV      Patient Observations alert;cooperative  -MP -- alert;cooperative  -DV      Patient/Family/Caregiver Comments/Observations Family present  -MP -- dtrs present  -DV      Patient Effort good  -MP -- excellent  -DV      Symptoms Noted During/After Treatment -- -- none  -DV         General Information    Patient Profile Reviewed yes  -MP -- yes  -DV      Pertinent History Of Current Problem See AM eval  -MP -- 83yoF w/ L insular CVA. Also w/ GI concerns, undergoing EGD tomorrow per RN.  -DV      Current Method of Nutrition NPO  -MP NPO  -MP NPO  -DV      Precautions/Limitations, Vision -- -- WFL;for purposes of eval  -DV      Precautions/Limitations,  Hearing -- -- WFL;for purposes of eval  -DV      Prior Level of Function-Communication -- WFL  -MP unknown  -DV      Prior Level of Function-Swallowing -- no diet consistency restrictions  - no diet consistency restrictions  -DV      Plans/Goals Discussed with -- -- patient;family;agreed upon  -DV      Barriers to Rehab -- -- none identified  -DV      Patient's Goals for Discharge -- patient did not state  -MP patient did not state  -DV      Family Goals for Discharge -- family did not state  -MP family did not state  -DV         Pain    Additional Documentation Pain Scale: FACES Pre/Post-Treatment (Group)  -MP -- Pain Scale: Word Pre/Post-Treatment (Group)  -DV         Pain Scale: Word Pre/Post-Treatment    Pain: Word Scale, Pretreatment -- -- 2 - mild pain  -DV      Posttreatment Pain Rating -- -- 2 - mild pain  -DV         Pain Scale: FACES Pre/Post-Treatment    Pain: FACES Scale, Pretreatment 0-->no hurt  -MP -- --      Posttreatment Pain Rating 0-->no hurt  -MP -- --         Oral Motor Structure and Function    Dentition Assessment -- -- natural, present and adequate  -DV      Secretion Management -- WNL/WFL  -MP WNL/WFL  -DV      Mucosal Quality -- moist, healthy  -MP moist, healthy  -DV         Oral Musculature and Cranial Nerve Assessment    Oral Motor General Assessment -- -- unable to assess  -DV         General Eating/Swallowing Observations    Respiratory Support Currently in Use -- room air  -MP room air  -DV      Eating/Swallowing Skills -- fed by SLP  -MP self-fed;needed assist  -DV      Positioning During Eating -- upright in bed  - upright 90 degree;upright in bed  -      Utensils Used -- spoon;cup  - cup;straw  -DV      Consistencies Trialed -- ice chips;thin liquids;pureed  - thin liquids  -DV         Respiratory    Respiratory Status -- -- WFL  -DV         Clinical Swallow Eval    Oral Prep Phase -- -- WFL  -DV      Pharyngeal Phase -- suspected pharyngeal impairment  - no overt  signs/symptoms of pharyngeal impairment  -DV      Clinical Swallow Evaluation Summary -- Immediate wheeze w/ trials thin. No s/sxs w/ puree. Rec NPO x meds in pudding/puree and SLP will proceed w/ FEES to further assess.  -MP No s/sx aspiration with limited trials of thin liquids. Unable to complete further PO trials 2' pt had to be taken for stat CT. OK'd clear thin liquid diet with RN. Pt scheduled for EGD tomorrow - will check back to see if pt can do solid trials tomorrow.  -DV         Pharyngeal Phase Concerns    Pharyngeal Phase Concerns -- other (see comments)  wheeze  -MP --         Fiberoptic Endoscopic Evaluation of Swallowing (FEES)    Risks/Benefits Reviewed risks/benefits explained;patient;family;agreed to eval  -MP -- --      Nasal Entry right:  -MP -- --      Scope serial number/identification [id number]  -MP -- --         Anatomy and Physiology    Anatomic Considerations no anatomic structural deviation  -MP -- --      Velopharyngeal WFL  -MP -- --      Base of Tongue symmetrical  -MP -- --      Epiglottis rests posteriorly  -MP -- --      Laryngeal Function Breathing symmetrical  -MP -- --      Laryngeal Function Phonation CNA  -MP -- --      Laryngeal Function to Breath Hold CNA  -MP -- --      Secretion Rating Scale (Geoff et al. 1996) 0- normal, no visible secretions  -MP -- --      Ice Chips DNA  -MP -- --      Spontaneous Swallow frequency adequate  -MP -- --      Sensory sensed scope  -MP -- --      Utensils Used Spoon;Cup;Straw  -MP -- --      Consistencies Trialed thin liquids;nectar-thick liquids;pudding/puree;regular textures  -MP -- --         FEES Interpretation    Oral Phase prolonged manipulation;with solids only;other (see comments)  u/a to pull liquid from straw  -MP -- --         Initiation of Pharyngeal Swallow    Initiation of Pharyngeal Swallow bolus in pyriform sinuses  -MP -- --      Pharyngeal Phase impaired pharyngeal phase of swallowing  -MP -- --      Penetration Before the  Swallow thin liquids;secondary to delayed swallow initiation or mistiming  -MP -- --      Aspiration During the Swallow thin liquids;secondary to delayed swallow initiation or mistiming;secondary to reduced vestibular closure  -MP -- --      Response to Aspiration No  -MP -- --      No spontaneous response to aspiration with could not produce cough response despite cue  -MP -- --      Residue other (see comments)  no significant pharyngeal residue  -MP -- --      FEES Summary Mild-moderate oropharyngeal dysphagia. Prolonged mastication w/ solids. U/a to pull liquids from straw. Penetration before/ aspiration during the swallow w/ thin liquid. Silent & u/a to elicit cough w/ cue. No penetration/aspiration w/ nectar-thick, pudding, or solid. No significant pharyngeal residue. Rec soft/ground diet, no mixed consistencies, nectar-thick liquids, no straws, meds in pudding/puree.  -MP -- --         SLP Evaluation Clinical Impression    SLP Swallowing Diagnosis mild-moderate;oral dysphagia;pharyngeal dysphagia  -MP suspected pharyngeal dysphagia  -MP R/O pharyngeal dysphagia;other (see comments)  unable to assess all consistencies  -DV      Functional Impact risk of aspiration/pneumonia  -MP risk of aspiration/pneumonia  -MP risk of aspiration/pneumonia  -DV      Rehab Potential/Prognosis, Swallowing good, to achieve stated therapy goals  -MP good, to achieve stated therapy goals  -MP --      Swallow Criteria for Skilled Therapeutic Interventions Met demonstrates skilled criteria  -MP demonstrates skilled criteria  -MP demonstrates skilled criteria  -DV         Recommendations    Therapy Frequency (Swallow) 5 days per week  -MP -- --      Predicted Duration Therapy Intervention (Days) until discharge  -MP -- until discharge  -DV      SLP Diet Recommendation soft to chew textures;ground;no mixed consistencies;nectar thick liquids  -MP NPO  -MP clear liquid diet  -DV      Recommended Diagnostics -- FEES  -MP reassess via  clinical swallow evaluation  -DV      Recommended Precautions and Strategies no straw;small bites of food and sips of liquid;general aspiration precautions;upright posture during/after eating  -MP general aspiration precautions  -MP upright posture during/after eating;general aspiration precautions  -DV      Oral Care Recommendations Oral Care BID/PRN  -MP Oral Care BID/PRN  -MP Oral Care BID/PRN  -DV      SLP Rec. for Method of Medication Administration meds whole;meds crushed;with puree;as tolerated  -MP meds whole;meds crushed;with puree;as tolerated  -MP meds whole;with thin liquids;as tolerated  -DV      Monitor for Signs of Aspiration notify SLP if any concerns  -MP notify SLP if any concerns  -MP yes;notify SLP if any concerns  -DV      Anticipated Discharge Disposition (SLP) inpatient rehabilitation facility;anticipate therapy at next level of care  -MP -- --                User Key  (r) = Recorded By, (t) = Taken By, (c) = Cosigned By      Initials Name Effective Dates    MP Vince Barnhart, MS CCC-SLP 12/28/21 -     Inga Obregon MS CCC-SLP 06/16/21 -                     EDUCATION  The patient has been educated in the following areas:   Dysphagia (Swallowing Impairment) Modified Diet Instruction.        SLP GOALS       Row Name 08/28/23 1100 08/28/23 0835          (LTG) Patient will demonstrate functional swallow for    Diet Texture (Demonstrate functional swallow) soft to chew (ground) textures  -MP --     Liquid viscosity (Demonstrate functional swallow) nectar/ mildly thick liquids  -MP --     [place] (Demonstrate functional swallow) with minimal cues (75-90% accuracy)  -MP --     Time Frame (Demonstrate functional swallow) by discharge  -MP --        (STG) Patient will tolerate trials of    Consistencies Trialed (Tolerate trials) soft to chew (ground) textures;nectar/ mildly thick liquids  -MP --     Desired Outcome (Tolerate trials) without signs/symptoms of aspiration;without signs of  distress;with adequate oral prep/transit/clearance  -MP --     [place] (Tolerate trials) with minimal cues (75-90% accuracy)  -MP --     Time Frame (Tolerate trials) by discharge  -MP --        (STG) Lingual Strengthening Goal 1 (SLP)    Activity (Lingual Strengthening Goal 1, SLP) increase tongue back strength  -MP --     Increase Tongue Back Strength swallow trials;lingual resistance exercises  -MP --     [place]/Accuracy (Lingual Strengthening Goal 1, SLP) with minimal cues (75-90% accuracy)  -MP --     Time Frame (Lingual Strengthening Goal 1, SLP) short term goal (STG)  -MP --        (STG) Pharyngeal Strengthening Exercise Goal 1 (SLP)    Activity (Pharyngeal Strengthening Goal 1, SLP) increase timing;increase closure at entrance to airway/closure of airway at glottis  -MP --     Increase Timing prepping - 3 second prep or suck swallow or 3-step swallow  -MP --     Increase Closure at Entrance to Airway/Closure of Airway at Glottis supraglottic swallow;hard effortful swallow  -MP --     [place]/Accuracy (Pharyngeal Strengthening Goal 1, SLP) with minimal cues (75-90% accuracy)  -MP --     Time Frame (Pharyngeal Strengthening Goal 1, SLP) short term goal (STG)  -MP --        Patient will demonstrate functional communication skills for return to discharge environment     [place] with moderate cues  -MP with moderate cues  -MP     Time frame by discharge  -MP by discharge  -MP     Progress/Outcomes goal ongoing  -MP --        Comprehend Questions Goal 1 (SLP)    Improve Ability to Comprehend Questions Goal 1 (SLP) simple yes/no questions;80%;with moderate cues (50-74%)  -MP simple yes/no questions;80%;with moderate cues (50-74%)  -MP     Time Frame (Comprehend Questions Goal 1, SLP) short term goal (STG)  -MP short term goal (STG)  -MP     Progress/Outcomes (Comprehend Questions Goal 1, SLP) goal ongoing  -MP --        Follow Directions Goal 2 (SLP)    Improve Ability to Follow Directions Goal  1 (SLP) 1 step direction with objects;1 step direction without objects;80%;with minimal cues (75-90%)  -MP 1 step direction with objects;1 step direction without objects;80%;with minimal cues (75-90%)  -MP     Time Frame (Follow Directions Goal 1, SLP) short term goal (STG)  -MP short term goal (STG)  -MP     Progress/Outcomes (Follow Directions Goal 1, SLP) goal ongoing  -MP --        Word Retrieval Skills Goal 1 (SLP)    Improve Word Retrieval Skills By Goal 1 (SLP) repeating sounds;repeating words;completing automatic speech task, counting;completing automatic speech task, alphabet;completing automatic speech task, days of the week;completing automatic speech task, months;completing automatic speech task, Pledge of Allegiance;completing automatic speech task, sing “Happy Birthday”;80%;with moderate cues (50-74%)  -MP repeating sounds;repeating words;completing automatic speech task, counting;completing automatic speech task, alphabet;completing automatic speech task, days of the week;completing automatic speech task, months;completing automatic speech task, Pledge of Allegiance;completing automatic speech task, sing “Happy Birthday”;80%;with moderate cues (50-74%)  -MP     Time Frame (Word Retrieval Goal 1, SLP) short term goal (STG)  -MP short term goal (STG)  -MP     Progress/Outcomes (Word Retrieval Goal 1, SLP) goal ongoing  -MP --        Motor Planning Goal 1 (SLP)    Improve Motor Planning to Reduce Apraxia by Goal 1 (SLP) imitating mouth postures;imitating vowels;following isolated oral commands;80%;with moderate cues (50-74%)  -MP imitating mouth postures;imitating vowels;following isolated oral commands;80%;with moderate cues (50-74%)  -MP     Time Frame (Motor Planning Goal 1, SLP) short term goal (STG)  -MP short term goal (STG)  -MP     Progress/Outcomes (Motor Planning Goal 1, SLP) goal ongoing  -MP --               User Key  (r) = Recorded By, (t) = Taken By, (c) = Cosigned By      Initials Name  Provider Type    Vince Adhikari MS CCC-SLP Speech and Language Pathologist                       Time Calculation:    Time Calculation- SLP       Row Name 08/28/23 1257 08/28/23 0905          Time Calculation- SLP    SLP Start Time 1100  -MP 0835  -MP     SLP Received On 08/28/23  -MP 08/28/23  -MP        Untimed Charges    14587-FV Eval Speech and Production w/ Language Minutes -- 12  -MP     22945-RD Eval Oral Pharyng Swallow Minutes -- 25  -MP     58109-XJ Fiberoptic Endo Eval Swallow Minutes 70  -MP --        Total Minutes    Untimed Charges Total Minutes 70  -MP 37  -MP      Total Minutes 70  -MP 37  -MP               User Key  (r) = Recorded By, (t) = Taken By, (c) = Cosigned By      Initials Name Provider Type    Vince Adhikari MS CCC-SLP Speech and Language Pathologist                    Therapy Charges for Today       Code Description Service Date Service Provider Modifiers Qty    32774012786 HC ST EVAL ORAL PHARYNG SWALLOW 2 8/28/2023 Vince Barnhart MS CCC-SLP GN 1    73831518885 HC ST EVAL SPEECH AND PROD W LANG  1 8/28/2023 Vince Barnhart MS CCC-SLP GN 1    09693377657 HC ST FIBEROPTIC ENDO EVAL SWALL 5 8/28/2023 Vince Barnhart MS CCC-SLP GN 1                 MS JEREMI Dhillon  8/28/2023

## 2023-08-28 NOTE — PROGRESS NOTES
Intensive Care Follow-up     Hospital:  LOS: 2 days   Ms. Laury Eugene, 83 y.o. female is followed for: Glycemic, Electrolyte, Respiratory, and Medical management         Subjective   Interval History:  Chart reviewed. VSS. Patient sleeping but arousable, lying in bed in no acute distress. Patient is able to follow commands, but is unable to speak.Patient has had two IVs infiltrate in right arm and is swollen and tender to touch. PICC consult placed for access. Patient neuro exam waxes and wanes frequently per nursing notes.     ROS unobtainable.     The patient's past medical, surgical and social history were reviewed and updated in Epic as appropriate.     Objective     Infusions:  lactated ringers, 30 mL/hr      Medications:  aspirin, 81 mg, Oral, Daily  atorvastatin, 80 mg, Oral, Nightly  nebivolol, 2.5 mg, Oral, Q24H  pantoprazole, 40 mg, Intravenous, Q12H  propafenone SR, 325 mg, Oral, Q12H  sodium chloride, 500 mL, Intravenous, Once      I reviewed the patient's medications.    Vital Sign Min/Max for last 24 hours  Temp  Min: 97.4 °F (36.3 °C)  Max: 99.6 °F (37.6 °C)   BP  Min: 95/46  Max: 124/42   Pulse  Min: 67  Max: 112   Resp  Min: 16  Max: 18   SpO2  Min: 93 %  Max: 100 %   Flow (L/min)  Min: 2  Max: 2       Input/Output for last 24 hour shift  08/27 0701 - 08/28 0700  In: 2555 [P.O.:100; I.V.:1555]  Out: 750 [Urine:750]      Physical Exam:  GENERAL: Patient lying in bed. Unable to speak No acute distress.   HEENT: Normocephalic and atraumatic. Trachea midline. PER. EOM WNL.   LUNGS: Chest rise of normal depth and symmetric. Lungs clear to auscultation bilaterally. No wheezes, rhonchi, or rales.   HEART: S1,S2 detected. Regular rate and rhythm. No rub, murmur, or gallop.   ABDOMEN: Soft, round, nondistended, and nontender. Bowel sounds present.   EXTREMITIES: No clubbing, edema, or cyanosis. Peripheral pulses present. Skin warm and dry.    NEURO/PSYCH: Alert and oriented. Follows commands. Moves all  extremities. Unable to speak.      Results from last 7 days   Lab Units 08/28/23  0546 08/28/23  0017 08/27/23  1615 08/27/23  0331 08/26/23  1053 08/26/23  0446   WBC 10*3/mm3 13.86*  --   --  16.53*  --  8.21   HEMOGLOBIN g/dL 7.0*  7.0* 7.2* 7.6* 8.5*   < > 8.6*   PLATELETS 10*3/mm3 222  --   --  242  --  213    < > = values in this interval not displayed.     Results from last 7 days   Lab Units 08/28/23  0546 08/27/23  2141 08/27/23  0331 08/26/23  0446 08/26/23  0044 08/25/23  0202   SODIUM mmol/L 139  --  141 138  --   --    POTASSIUM mmol/L 3.4* 3.9 3.3* 3.9   < >  --    CO2 mmol/L 27.0  --  28.0 25.0  --   --    BUN mg/dL 14  --  12 17  --   --    CREATININE mg/dL 0.74  --  0.83 0.84   < >  --    MAGNESIUM mg/dL 2.1  --   --  1.9  --  2.1   PHOSPHORUS mg/dL 2.4*  --   --  2.6  --   --    GLUCOSE mg/dL 89  --  104* 127*  --   --     < > = values in this interval not displayed.     Estimated Creatinine Clearance: 56.1 mL/min (by C-G formula based on SCr of 0.74 mg/dL).      I reviewed the patient's new clinical results.  I reviewed the patient's new imaging results/reports including actual images and agree with reports.     Imaging Results (Last 24 Hours)       Procedure Component Value Units Date/Time    SLP FEES - Fiberoptic Endo Eval Swallow [[id number]] Resulted: 08/28/23 1123     Updated: 08/28/23 1123    Narrative:      This procedure was auto-finalized with no dictation required.          Interval:  (Handoff)  1a. Level of Consciousness: 0-->Alert, keenly responsive  1b. LOC Questions: 2-->Answers neither question correctly  1c. LOC Commands: 0-->Performs both tasks correctly  2. Best Gaze: 0-->Normal  3. Visual: 0-->No visual loss  4. Facial Palsy: 2-->Partial paralysis (total or near-total paralysis of lower face)  5a. Motor Arm, Left: 0-->No drift, limb holds 90 (or 45) degrees for full 10 secs  5b. Motor Arm, Right: 0-->No drift, limb holds 90 (or 45) degrees for full 10 secs  6a. Motor Leg, Left:  0-->No drift, leg holds 30 degree position for full 5 secs  6b. Motor Leg, Right: 0-->No drift, leg holds 30 degree position for full 5 secs  7. Limb Ataxia: 0-->Absent  8. Sensory: 1-->Mild-to-moderate sensory loss, patient feels pinprick is less sharp or is dull on the affected side, or there is a loss of superficial pain with pinprick, but patient is aware of being touched  9. Best Language: 3-->Mute, global aphasia, no usable speech or auditory comprehension  10. Dysarthria: 2-->Severe dysarthria, patients speech is so slurred as to be unintelligible in the absence of or out of proportion to any dysphasia, or is mute/anarthric  11. Extinction and Inattention (formerly Neglect): 0-->No abnormality    Total (NIH Stroke Scale): 10      Assessment & Plan   Impression      CVA (cerebral vascular accident)    Hypothyroidism (acquired)    Essential hypertension    Hyperlipemia    Paroxysmal atrial fibrillation    Acute ischemic left MCA stroke    Dysphagia    Acute lower gastrointestinal bleeding    ABLA (acute blood loss anemia)       Plan      83-year-old woman with a history of paroxysmal atrial fibrillation, hypertension, hypothyroidism, breast cancer, recent GI bleed presenting with left facial droop, dysarthria, confusion.  Imaging revealed left middle cerebral artery M2 branch occlusion.  NIH was  8.  She had a recent colonoscopy with possible ruptured diverticuli.  However with her new onset stroke and M2 occlusion decision was made to proceed with TNK.  She subsequently developed some angioedema and received Benadryl, Pepcid, Solu-Medrol with resolution.     NIHSS today 10. CT scan of the head shows stable evolving stroke.  Speech therapy cleared her for a liquid diet 8/27; however, she was questionable for aspiration so she was made NPO with FEES scheduled today.  She underwent EGD and colonoscopy per Dr. Brunner 8/27 and was found to have several ulcerations in the gastric antrum, the largest of which was  8mm and was clipped. Biopsy was taken for H. Pylori. Pathology pending. GI noted patient to be low risk for bleeding if anticoagulation warranted, so she was initiated on ASA and started on Lovenox for DVT prophylaxis. She has had no atrial fibrillation.  Echo revealed a normal EF.  Most recent hemoglobin is 7.0. She will be transfused today.    Monitor H/H  Transfuse 1 u PRBCs today  SBP <110-140 per Neurology  PICC placement for IV access  Per Neurology, OK to start Lovenox for DVT prophylaxis  FEES today; if she does not pass will initiate maintenance IVF  Replace electrolytes per protocol  AM labs    DVT Prophylaxis: Lovenox  GI Prophylaxis: PPI  Dispo: Keep in ICU    Time spent: 36 minutes  Plan of care and goals reviewed with multidisciplinary/antibiotic stewardship team during rounds.   I discussed the patient's findings and my recommendations with patient, family, nursing staff, and primary care team     Mary Camarena, MSN, APRN, ACNPC-AG  Pulmonary and Critical Care Medicine  Electronically signed by DIANNE Swift, 08/28/23, 12:11 PM EDT.

## 2023-08-28 NOTE — CASE MANAGEMENT/SOCIAL WORK
Discharge Planning Assessment  [facility]     Patient Name: Laury Eugene  MRN: [id number]  Today's Date: 8/28/2023    Admit Date: 8/26/2023    Plan: IDP   Discharge Needs Assessment       Row Name 08/28/23 1413       Living Environment    People in Home spouse    Unique Family Situation Pt is caregiver for spouse, he has Alzheimer's disease    Current Living Arrangements home    Potentially Unsafe Housing Conditions none    Primary Care Provided by self    Provides Primary Care For no one    Family Caregiver if Needed other relative(s)    Family Caregiver Names Aditya/Caryn    Quality of Family Relationships helpful;involved;supportive       Transition Planning    Patient/Family Anticipates Transition to inpatient rehabilitation facility    Transportation Anticipated family or friend will provide                   Discharge Plan       Row Name 08/28/23 1414       Plan    Plan IDP    Patient/Family in Agreement with Plan yes    Plan Comments Pt has Expressive and Receptive Aphasia. I spoke with pt's aditya/Caryn, on the phone for IDP. Pt lives with spouse in [facility] with ADL's, still drives. She is husbands caregiver, he has Alzheimer's. They have a sitter come in to help T/Th and every other Friday. Aditya/Mindy/JULES comes in to help. She states she may need to move in with them. Pt wears a cpap at night-owns. PT/OT to see pt today. Pt receiving 1 unit PRBC. SLP doing FEES today. Pt's niece states she feels pt will need rehab before returning home. I explained the rehab referral process. She states they would want a referral to [facility] because  will be there for respite care. Mindy knows pt would not want [facility] or [facility]. I told Mindy CM would f/u after PT/OT recommendations are in and that we request 3 facilities to make referrals to, at a minimum. D/C plan is ongoing.  will continue to follow.    Final Discharge Disposition Code 30 - still a patient                   Continued Care and Services - Admitted Since 8/26/2023    Coordination has not been started for this encounter.       Expected Discharge Date and Time       Expected Discharge Date Expected Discharge Time    Sep 4, 2023            Demographic Summary       Row Name 08/28/23 1412       General Information    Admission Type inpatient    Reason for Consult discharge planning       Contact Information    Permission Granted to Share Info With                    Functional Status       Row Name 08/28/23 1413       Functional Status    Usual Activity Tolerance good       Functional Status, IADL    Medications independent    Meal Preparation independent    Housekeeping independent    Laundry independent    Shopping independent                   Psychosocial    No documentation.                  Abuse/Neglect    No documentation.                  Legal    No documentation.                  Substance Abuse    No documentation.                  Patient Forms    No documentation.                     Regina Hurtado RN

## 2023-08-28 NOTE — PLAN OF CARE
Goal Outcome Evaluation:  Plan of Care Reviewed With: patient            SLP evaluation completed. Will  plan for FEES today to further assess swallow function . Please see note for further details and recommendations.

## 2023-08-28 NOTE — PLAN OF CARE
Problem: Adult Inpatient Plan of Care  Goal: Plan of Care Review  Outcome: Ongoing, Progressing  Goal: Patient-Specific Goal (Individualized)  Outcome: Ongoing, Progressing  Goal: Absence of Hospital-Acquired Illness or Injury  Outcome: Ongoing, Progressing  Intervention: Identify and Manage Fall Risk  Recent Flowsheet Documentation  Taken 8/28/2023 0400 by Paola Solano, RN  Safety Promotion/Fall Prevention:   activity supervised   assistive device/personal items within reach   clutter free environment maintained   fall prevention program maintained   lighting adjusted   muscle strengthening facilitated   nonskid shoes/slippers when out of bed   room organization consistent   safety round/check completed  Taken 8/28/2023 0200 by Paola Solano, RN  Safety Promotion/Fall Prevention:   activity supervised   assistive device/personal items within reach   clutter free environment maintained   fall prevention program maintained   lighting adjusted   muscle strengthening facilitated   nonskid shoes/slippers when out of bed   room organization consistent   safety round/check completed  Taken 8/28/2023 0000 by Paola Solano, RN  Safety Promotion/Fall Prevention:   activity supervised   assistive device/personal items within reach   clutter free environment maintained   fall prevention program maintained   lighting adjusted   muscle strengthening facilitated   nonskid shoes/slippers when out of bed   room organization consistent   safety round/check completed  Taken 8/27/2023 2200 by Paola Solano, RN  Safety Promotion/Fall Prevention:   activity supervised   assistive device/personal items within reach   clutter free environment maintained   fall prevention program maintained   lighting adjusted   muscle strengthening facilitated   nonskid shoes/slippers when out of bed   safety round/check completed   room organization consistent  Taken 8/27/2023 2000 by Paola Solano, RN  Safety Promotion/Fall Prevention:    activity supervised   clutter free environment maintained   assistive device/personal items within reach   fall prevention program maintained   lighting adjusted   muscle strengthening facilitated   nonskid shoes/slippers when out of bed   room organization consistent   safety round/check completed  Intervention: Prevent Skin Injury  Recent Flowsheet Documentation  Taken 8/28/2023 0400 by Paola Solano RN  Body Position:   weight shifting   patient/family refused  Taken 8/28/2023 0200 by Paola Solano RN  Body Position:   weight shifting   neutral head position   neutral body alignment   patient/family refused  Taken 8/28/2023 0000 by Paola Solano RN  Body Position:   weight shifting   upper extremity elevated   neutral head position   neutral body alignment   legs elevated   patient/family refused  Taken 8/27/2023 2200 by Paola Solano RN  Body Position:   weight shifting   upper extremity elevated   neutral head position   neutral body alignment   legs elevated   turned  Taken 8/27/2023 2000 by Paola Solano RN  Body Position:   upper extremity elevated   weight shifting   neutral head position   neutral body alignment   legs elevated   patient/family refused  Intervention: Prevent and Manage VTE (Venous Thromboembolism) Risk  Recent Flowsheet Documentation  Taken 8/28/2023 0400 by Paola Solano RN  Activity Management: bedrest  VTE Prevention/Management: sequential compression devices off  Taken 8/28/2023 0200 by Paola Solano RN  Activity Management: bedrest  VTE Prevention/Management: sequential compression devices off  Range of Motion: active ROM (range of motion) encouraged  Taken 8/28/2023 0000 by Paola Solano RN  Activity Management: bedrest  VTE Prevention/Management: sequential compression devices off  Range of Motion: active ROM (range of motion) encouraged  Taken 8/27/2023 2200 by Paola Solano RN  Activity Management: bedrest  VTE Prevention/Management: sequential  compression devices off  Range of Motion:   active ROM (range of motion) encouraged   ROM (range of motion) performed  Taken 8/27/2023 2000 by Paola Solano, RN  Activity Management: bedrest  VTE Prevention/Management: sequential compression devices off  Range of Motion:   active ROM (range of motion) encouraged   ROM (range of motion) performed  Goal: Optimal Comfort and Wellbeing  Outcome: Ongoing, Progressing  Intervention: Provide Person-Centered Care  Recent Flowsheet Documentation  Taken 8/27/2023 2000 by Paola Solano, RN  Trust Relationship/Rapport:   care explained   choices provided   emotional support provided   empathic listening provided   questions encouraged   questions answered   reassurance provided   thoughts/feelings acknowledged  Goal: Readiness for Transition of Care  Outcome: Ongoing, Progressing   Goal Outcome Evaluation:

## 2023-08-28 NOTE — PLAN OF CARE
Goal Outcome Evaluation:  Plan of Care Reviewed With: patient           Outcome Evaluation: OT eval complete. Pt presents w/ R sided weakness (UE>LE), expressive/receptive aphasia, and R visual inattention limiting functional independence from baseline. Recommend cont skilled IPOT POC to promote return to PLOF. Recommend pt DC to IP rehab.      Anticipated Discharge Disposition (OT): inpatient rehabilitation facility

## 2023-08-28 NOTE — THERAPY TREATMENT NOTE
Patient Name: Laury Eugene  : 1940    MRN: [id number]                              Today's Date: 2023       Admit Date: 2023    Visit Dx:     ICD-10-CM ICD-9-CM   1. Acute CVA (cerebrovascular accident)  I63.9 434.91   2. Anemia, unspecified type  D64.9 285.9   3. Dysphagia, unspecified type  R13.10 787.20   4. Acute lower gastrointestinal bleeding  K92.2 578.9   5. ABLA (acute blood loss anemia)  D62 285.1     Patient Active Problem List   Diagnosis    Multinodular goiter    Abnormal thyroid function test    CVA (cerebral vascular accident)    Hypothyroidism (acquired)    Essential hypertension    Hyperlipemia    Paroxysmal atrial fibrillation    Acute ischemic left MCA stroke    Dysphagia    Acute lower gastrointestinal bleeding    ABLA (acute blood loss anemia)     Past Medical History:   Diagnosis Date    History of breast cancer     History of heart disease     Hypothyroidism     Multinodular goiter     Thyroid function test abnormal      Past Surgical History:   Procedure Laterality Date    BLEPHAROPLASTY      BREAST RECONSTRUCTION      CATARACT EXTRACTION Bilateral     COLONOSCOPY N/A 2023    Procedure: COLONOSCOPY;  Surgeon: Brunner, Mark I, MD;  Location:  [facility];  Service: Gastroenterology;  Laterality: N/A;    ENDOSCOPY N/A 2023    Procedure: ESOPHAGOGASTRODUODENOSCOPY;  Surgeon: Brunner, Mark I, MD;  Location:  [facility];  Service: Gastroenterology;  Laterality: N/A;    HYSTERECTOMY      SIMPLE MASTECTOMY        General Information       Row Name 23 1321          Physical Therapy Time and Intention    Document Type evaluation  -MB     Mode of Treatment physical therapy  -MB       Row Name 23 1321          General Information    Patient Profile Reviewed yes  -MB     Prior Level of Function --  Pt. unable to provide history d/t aphasia. Per chart, pt. was independent w/ ambulation w/out AD PTA, drives, and is caregiver for  w/ Alz.  -MB      Existing Precautions/Restrictions fall  R sided inattention/weakness, expressive/receptive aphasia  -MB     Barriers to Rehab medically complex;cognitive status;visual deficit  -MB       Row Name 08/28/23 1321          Living Environment    People in Home --  Pt limited historian, ELINOR further, no family present  -MB       Row Name 08/28/23 1321          Cognition    Orientation Status (Cognition) oriented to;person  -MB       Row Name 08/28/23 1321          Safety Issues, Functional Mobility    Safety Issues Affecting Function (Mobility) ability to follow commands;insight into deficits/self-awareness;problem-solving;safety precaution awareness;safety precautions follow-through/compliance;sequencing abilities  -MB     Impairments Affecting Function (Mobility) balance;cognition;coordination;endurance/activity tolerance;motor planning;strength;visual/perceptual;postural/trunk control  -MB               User Key  (r) = Recorded By, (t) = Taken By, (c) = Cosigned By      Initials Name Provider Type    MB Monica Dominguez, PT Physical Therapist                   Mobility       Row Name 08/28/23 1600          Bed Mobility    Bed Mobility supine-sit;sit-supine  -MB     Supine-Sit [place] (Bed Mobility) moderate assist (50% patient effort);2 person assist;verbal cues;nonverbal cues (demo/gesture)  -MB     Sit-Supine [place] (Bed Mobility) moderate assist (50% patient effort);2 person assist;verbal cues  -MB     Assistive Device (Bed Mobility) bed rails;draw sheet;head of bed elevated  -MB     Comment, (Bed Mobility) Pt. demo effort to move BLEs towards EOB; required assist to support trunk and scoot hips forward to EOB. Pt. demo R lateral lean in sitting, requiring assist to correct.  -MB       Row Name 08/28/23 1600          Transfers    Comment, (Transfers) STS from EOB w/ consistent cueing for sequencing. Pt. w/ R lateral lean and demo difficulty following commands for lateral weight shifting. No knee  sudheerling observed.  -MB       Row Name 08/28/23 1600          Sit-Stand Transfer    Sit-Stand [place] (Transfers) moderate assist (50% patient effort);2 person assist;verbal cues;nonverbal cues (demo/gesture)  -MB     Assistive Device (Sit-Stand Transfers) other (see comments)  BUE support  -MB       Row Name 08/28/23 1600          Gait/Stairs (Locomotion)    [place] Level (Gait) unable to assess  -MB     Comment, (Gait/Stairs) Pt. demo difficulty following commands for lateral weight shifting and readily fatigued. Unable to safely take steps.  -MB               User Key  (r) = Recorded By, (t) = Taken By, (c) = Cosigned By      Initials Name Provider Type    Monica Adams, PT Physical Therapist                   Obj/Interventions       Row Name 08/28/23 1604          Range of Motion Comprehensive    General Range of Motion bilateral lower extremity ROM WFL  -MB       Row Name 08/28/23 1604          Strength Comprehensive (MMT)    General Manual Muscle Testing (MMT) Assessment lower extremity strength deficits identified  -MB     Comment, General Manual Muscle Testing (MMT) Assessment LLE grossly 4/5, RLE hip flex 4-/5, R knee ext 3+/5, R ankle DF 4-/5  -MB       Row Name 08/28/23 1604          Balance    Balance Assessment standing static balance;standing dynamic balance  -MB     Static Standing Balance moderate assist;2-person assist  -MB     Dynamic Standing Balance moderate assist;2-person assist  -MB     Position/Device Used, Standing Balance supported  BUE support  -MB     Balance Interventions standing;sit to stand;weight shifting activity  -MB       Row Name 08/28/23 1604          Sensory Assessment (Somatosensory)    Sensory Assessment (Somatosensory) unable/difficult to assess  -MB               User Key  (r) = Recorded By, (t) = Taken By, (c) = Cosigned By      Initials Name Provider Type    Monica Adams, PT Physical Therapist                   Goals/Plan       Row Name  08/28/23 1608          Bed Mobility Goal 1 (PT)    Activity/Assistive Device (Bed Mobility Goal 1, PT) sit to supine/supine to sit  -MB     [place] Level/Cues Needed (Bed Mobility Goal 1, PT) minimum assist (75% or more patient effort)  -MB     Time Frame (Bed Mobility Goal 1, PT) 10 days  -MB       Row Name 08/28/23 1608          Transfer Goal 1 (PT)    Activity/Assistive Device (Transfer Goal 1, PT) sit-to-stand/stand-to-sit;bed-to-chair/chair-to-bed;walker, rolling  -MB     [place] Level/Cues Needed (Transfer Goal 1, PT) minimum assist (75% or more patient effort)  -MB     Time Frame (Transfer Goal 1, PT) 10 days  -MB       Row Name 08/28/23 1608          Gait Training Goal 1 (PT)    Activity/Assistive Device (Gait Training Goal 1, PT) gait (walking locomotion);decrease fall risk;increase endurance/gait distance;improve balance and speed;walker, rolling  -MB     [place] Level (Gait Training Goal 1, PT) minimum assist (75% or more patient effort)  -MB     Distance (Gait Training Goal 1, PT) 50  -MB     Time Frame (Gait Training Goal 1, PT) 10 days  -MB       Row Name 08/28/23 1601          Therapy Assessment/Plan (PT)    Planned Therapy Interventions (PT) balance training;bed mobility training;gait training;home exercise program;motor coordination training;neuromuscular re-education;postural re-education;strengthening;transfer training  -MB               User Key  (r) = Recorded By, (t) = Taken By, (c) = Cosigned By      Initials Name Provider Type    Monica Adams, PT Physical Therapist                   Clinical Impression       Row Name 08/28/23 1606          Pain Scale: FACES Pre/Post-Treatment    Pain: FACES Scale, Pretreatment 0-->no hurt  -MB     Posttreatment Pain Rating 0-->no hurt  -MB       Row Name 08/28/23 160          Plan of Care Review    Plan of Care Reviewed With patient  -MB     Progress no change  -MB     Outcome Evaluation Patient presents w/ aphasia, impaired balance,  R side weakness, inability to safely ambulate, and is below her functional baseline. Pt. required mod A x 2 for transfers and was unable to safely take steps. Skilled IPPT warranted to promote return to PLOF. Recommend IP rehab at D/C.  -MB       Row Name 08/28/23 1605          Therapy Assessment/Plan (PT)    Patient/Family Therapy Goals Statement (PT) Pt. u/a to state.  -MB     Rehab Potential (PT) good, to achieve stated therapy goals  -MB     Criteria for Skilled Interventions Met (PT) yes;meets criteria;skilled treatment is necessary  -MB     Therapy Frequency (PT) daily  -MB       Row Name 08/28/23 1605          Vital Signs    Pre Systolic BP Rehab 116  -MB     Pre Treatment Diastolic BP 48  -MB     Intra Systolic BP Rehab 165  -MB     Intra Treatment Diastolic BP 92  -MB     Post Systolic BP Rehab 138  -MB     Post Treatment Diastolic BP 69  -MB     Pre SpO2 (%) 93  -MB     O2 Delivery Pre Treatment room air  -MB     Post SpO2 (%) 94  -MB     O2 Delivery Post Treatment room air  -MB     Pre Patient Position Supine  -MB     Intra Patient Position Standing  -MB     Post Patient Position Supine  -MB       Row Name 08/28/23 1605          Positioning and Restraints    Pre-Treatment Position in bed  -MB     Post Treatment Position bed  -MB     In Bed notified nsg;supine;call light within reach;encouraged to call for assist;exit alarm on;heels elevated;RUE elevated  -MB               User Key  (r) = Recorded By, (t) = Taken By, (c) = Cosigned By      Initials Name Provider Type    Monica Adams, PT Physical Therapist                   Outcome Measures       Row Name 08/28/23 1609 08/28/23 0800       How much help from another person do you currently need...    Turning from your back to your side while in flat bed without using bedrails? 3  -MB 4  -SW    Moving from lying on back to sitting on the side of a flat bed without bedrails? 3  -MB 4  -SW    Moving to and from a bed to a chair (including a  wheelchair)? 2  -MB 3  -SW    Standing up from a chair using your arms (e.g., wheelchair, bedside chair)? 2  -MB 3  -SW    Climbing 3-5 steps with a railing? 1  -MB 2  -SW    To walk in hospital room? 2  -MB 2  -SW    AM-PAC 6 Clicks Score (PT) 13  -MB 18  -SW    Highest level of mobility 4 --> Transferred to chair/commode  -MB 6 --> Walked 10 steps or more  -      Row Name 08/28/23 1439          Modified [place] Scale    Pre-Stroke Modified [place] Scale 6 - Unable to determine (UTD) from the medical record documentation  -CS     Modified [place] Scale 4 - Moderately severe disability.  Unable to walk without assistance, and unable to attend to own bodily needs without assistance.  -       Row Name 08/28/23 1609 08/28/23 1439       Functional Assessment    Outcome Measure Options AM-PAC 6 Clicks Basic Mobility (PT)  -MB AM-PAC 6 Clicks Daily Activity (OT);Modified [place]  -CS              User Key  (r) = Recorded By, (t) = Taken By, (c) = Cosigned By      Initials Name Provider Type    Monica Adams, PT Physical Therapist    Loly Hubbard, RN Registered Nurse    Kaye Robles, OT Occupational Therapist                                 Physical Therapy Education       Title: PT OT SLP Therapies (In Progress)       Topic: Physical Therapy (In Progress)       Point: Mobility training (In Progress)       Learning Progress Summary             Patient Acceptance, E,D, NR by MB at 8/28/2023 1610                         Point: Home exercise program (In Progress)       Learning Progress Summary             Patient Acceptance, E,D, NR by MB at 8/28/2023 1610                         Point: Body mechanics (In Progress)       Learning Progress Summary             Patient Acceptance, E,D, NR by MB at 8/28/2023 1610                         Point: Precautions (In Progress)       Learning Progress Summary             Patient Acceptance, E,D, NR by MB at 8/28/2023 1610                                         User  Key       Initials Effective Dates Name Provider Type Discipline    MB 06/16/21 -  Monica Dominguez, PT Physical Therapist PT                  PT Recommendation and Plan  Planned Therapy Interventions (PT): balance training, bed mobility training, gait training, home exercise program, motor coordination training, neuromuscular re-education, postural re-education, strengthening, transfer training  Plan of Care Reviewed With: patient  Progress: no change  Outcome Evaluation: Patient presents w/ aphasia, impaired balance, R side weakness, inability to safely ambulate, and is below her functional baseline. Pt. required mod A x 2 for transfers and was unable to safely take steps. Skilled IPPT warranted to promote return to PLOF. Recommend IP rehab at D/C.     Time Calculation:   PT Evaluation Complexity  History, PT Evaluation Complexity: 1-2 personal factors and/or comorbidities  Examination of Body Systems (PT Eval Complexity): total of 3 or more elements  Clinical Presentation (PT Evaluation Complexity): evolving  Clinical Decision Making (PT Evaluation Complexity): moderate complexity  Overall Complexity (PT Evaluation Complexity): moderate complexity     PT Charges       Row Name 08/28/23 1610             Time Calculation    Start Time 1321  -MB      PT Received On 08/28/23  -MB      PT Goal Re-Cert Due Date 09/07/23  -MB         Untimed Charges    PT Eval/Re-eval Minutes 47  -MB         Total Minutes    Untimed Charges Total Minutes 47  -MB       Total Minutes 47  -MB                User Key  (r) = Recorded By, (t) = Taken By, (c) = Cosigned By      Initials Name Provider Type    Monica Adams, PT Physical Therapist                  Therapy Charges for Today       Code Description Service Date Service Provider Modifiers Qty    14307879884 HC PT EVAL MOD COMPLEXITY 4 8/28/2023 Monica Dominguez, PT GP 1            PT G-Codes  Outcome Measure Options: AM-PAC 6 Clicks Basic Mobility (PT)  AM-PAC 6 Clicks  Score (PT): 13  AM-PAC 6 Clicks Score (OT): 9  Modified [place] Scale: 4 - Moderately severe disability.  Unable to walk without assistance, and unable to attend to own bodily needs without assistance.  PT Discharge Summary  Anticipated Discharge Disposition (PT): inpatient rehabilitation facility    Monica Dominguez, PT  8/28/2023

## 2023-08-28 NOTE — PROGRESS NOTES
"Laury Eugene  [id number]  1940   LOS: 2 days   Patient Care Team:  Leatha Melvin DO as PCP - General (Internal Medicine)  Roger Hodgson MD as Consulting Physician (Endocrinology)    Chief Complaint:  HTN / PAF / CVA / GIB     Subjective     Comfortable semi-supine in bed off supplemental oxygen therapy (room oximetry 96%).  Family member noted \"wheezes\" with suggestion of upper airway mild stridor.  Marked expressive and receptive aphasia.  No posturing or overt seizure activity.  Flat somber affect.    Objective     Vital Sign Min/Max for last 24 hours  Temp  Min: 97.8 °F (36.6 °C)  Max: 100.4 °F (38 °C)   BP  Min: 95/46  Max: 124/42   Pulse  Min: 80  Max: 107   Resp  Min: 16  Max: 18   SpO2  Min: 94 %  Max: 100 %               08/26/23  0400 08/27/23  0200   Weight: 75.9 kg (167 lb 5.3 oz) 77.4 kg (170 lb 10.2 oz)         Intake/Output Summary (Last 24 hours) at 8/28/2023 0721  Last data filed at 8/28/2023 0600  Gross per 24 hour   Intake 2555 ml   Output 750 ml   Net 1805 ml       Physical Exam:     General Appearance:    Alert,  mild stridor, in no acute distress   Lungs:     Clear to auscultation,respirations regular, even and                unlabored    Heart:    Regular and normal rate, normal S1 and S2, grade 1/6 systolic murmur, no gallop, no rub, no click   Abdomen:  Extremities:   Soft, nontender, bowel sounds audible x4    No edema, normal range of motion   Pulses:   Pulses palpable and equal bilaterally      Results Review:   Results from last 7 days   Lab Units 08/28/23  0546 08/27/23  2141 08/27/23  0331 08/26/23  0446   SODIUM mmol/L 139  --  141 138   POTASSIUM mmol/L 3.4* 3.9 3.3* 3.9   CHLORIDE mmol/L 108*  --  106 107   CO2 mmol/L 27.0  --  28.0 25.0   BUN mg/dL 14  --  12 17   CREATININE mg/dL 0.74  --  0.83 0.84   GLUCOSE mg/dL 89  --  104* 127*   CALCIUM mg/dL 6.9*  --  7.8* 7.5*     Results from last 7 days   Lab Units 08/28/23  0017 08/27/23  1615 08/27/23  0331 " 08/26/23  1053 08/26/23  0446 08/26/23  0047   WBC 10*3/mm3  --   --  16.53*  --  8.21 9.18   HEMOGLOBIN g/dL 7.2* 7.6* 8.5*   < > 8.6* 9.5*   HEMATOCRIT % 22.3* 23.0* 25.9*   < > 26.0* 28.3*   PLATELETS 10*3/mm3  --   --  242  --  213 226    < > = values in this interval not displayed.     Results from last 7 days   Lab Units 08/26/23  0446   CHOLESTEROL mg/dL 129   TRIGLYCERIDES mg/dL 74   HDL CHOL mg/dL 41   LDL CHOL mg/dL 73     Results from last 7 days   Lab Units 08/27/23  0331   HEMOGLOBIN A1C % 5.80*     Results from last 7 days   Lab Units 08/26/23 0047   HSTROP T ng/L 18*     MAGNESIUM: 2.1  PHOSPHORUS: 2.4  NO NEW EKG / CXR / CT SCAN.  GI STUDIES:    Colonoscopy shows pandiverticulosis.  No blood seen.     EGD reveals several ulcerations in the gastric antrum.  The largest ulcer measures 8 mm and contains a visible vessel.  This is a presumed source of recent significant GI hemorrhage.  An [place] 11/6T clip was applied for hemostasis.  Biopsy taken for H. pylori.     >> Twice daily PPI for 1 month, then daily.  >> Await pathology.  >> Low risk for bleeding if anticoagulants are indicated.    Medication Review: REVIEWED; DRIP = normal saline 75 cc/hour continuous infusion.      Assessment & Plan     Labile blood pressure with nominal GFR and persistent severe anemia.  Patient will undergo SLP evaluation this morning.  If unable to take oral fluids would consider continuing maintenance IV fluids at 50 cc/hour but will defer to intensivist.  Suspect she will need to have a unit of packed red blood cells transfused today.  Prognosis guarded.  Would continue current cardiac medications.    Discussed with patient and family member in room.      CVA (cerebral vascular accident)    Hypothyroidism (acquired)    Essential hypertension    Hyperlipemia    Paroxysmal atrial fibrillation    Acute ischemic left MCA stroke    Dysphagia    Acute lower gastrointestinal bleeding    ABLA (acute blood loss  anemia)    08/28/23  07:21 EDT

## 2023-08-28 NOTE — PLAN OF CARE
Goal Outcome Evaluation:    PICC line placed for poor access. Okay to draw labs from line per DIANNE Selby.    Patient passed FEE's today and placed on Dys 3 nectar diet. No straws.    1 unit of PRBC's administered. Watch H&H.     Potassium 3.4. Replaced. Redraw 3.9.

## 2023-08-28 NOTE — THERAPY EVALUATION
Patient Name: Laury Eugene  : 1940    MRN: [id number]                              Today's Date: 2023       Admit Date: 2023    Visit Dx:     ICD-10-CM ICD-9-CM   1. Acute CVA (cerebrovascular accident)  I63.9 434.91   2. Anemia, unspecified type  D64.9 285.9   3. Dysphagia, unspecified type  R13.10 787.20   4. Acute lower gastrointestinal bleeding  K92.2 578.9   5. ABLA (acute blood loss anemia)  D62 285.1     Patient Active Problem List   Diagnosis    Multinodular goiter    Abnormal thyroid function test    CVA (cerebral vascular accident)    Hypothyroidism (acquired)    Essential hypertension    Hyperlipemia    Paroxysmal atrial fibrillation    Acute ischemic left MCA stroke    Dysphagia    Acute lower gastrointestinal bleeding    ABLA (acute blood loss anemia)     Past Medical History:   Diagnosis Date    History of breast cancer     History of heart disease     Hypothyroidism     Multinodular goiter     Thyroid function test abnormal      Past Surgical History:   Procedure Laterality Date    BLEPHAROPLASTY      BREAST RECONSTRUCTION      CATARACT EXTRACTION Bilateral     COLONOSCOPY N/A 2023    Procedure: COLONOSCOPY;  Surgeon: Brunner, Mark I, MD;  Location:  [facility];  Service: Gastroenterology;  Laterality: N/A;    ENDOSCOPY N/A 2023    Procedure: ESOPHAGOGASTRODUODENOSCOPY;  Surgeon: Brunner, Mark I, MD;  Location:  [facility];  Service: Gastroenterology;  Laterality: N/A;    HYSTERECTOMY      SIMPLE MASTECTOMY        General Information       Row Name 23 1434          OT Time and Intention    Document Type evaluation  -CS     Mode of Treatment occupational therapy  -CS       Row Name 23 1434          General Information    Patient Profile Reviewed yes  -CS     Prior Level of Function --  Pt limited historian, TBA further, no family present  -CS     Existing Precautions/Restrictions fall;other (see comments)  R sided inattention/weakness,  expressive/receptiv aphasia  -CS     Barriers to Rehab medically complex;cognitive status;visual deficit  -CS       Row Name 08/28/23 1434          Living Environment    People in Home --  Pt limited historian, TBA further, no family present  -CS       Row Name 08/28/23 1434          Cognition    Orientation Status (Cognition) oriented to;person  responds to name  -CS       Row Name 08/28/23 1434          Safety Issues, Functional Mobility    Safety Issues Affecting Function (Mobility) ability to follow commands  -CS     Impairments Affecting Function (Mobility) balance;cognition;coordination;endurance/activity tolerance;motor planning;strength;visual/perceptual;postural/trunk control  -CS     Cognitive Impairments, Mobility Safety/Performance attention;insight into deficits/self-awareness;sequencing abilities;awareness, need for assistance  -CS               User Key  (r) = Recorded By, (t) = Taken By, (c) = Cosigned By      Initials Name Provider Type    CS Kaye Weathers, JOSE RAUL Occupational Therapist                     Mobility/ADL's       Row Name 08/28/23 1435          Bed Mobility    Bed Mobility sit-supine  -CS     Supine-Sit [place] (Bed Mobility) --  -CS     Sit-Supine [place] (Bed Mobility) moderate assist (50% patient effort);2 person assist;verbal cues  -CS     Assistive Device (Bed Mobility) bed rails;draw sheet;head of bed elevated  -CS       Row Name 08/28/23 1435          Transfers    Transfers sit-stand transfer;stand-sit transfer  -CS     Comment, (Transfers) BUE support  -CS       Row Name 08/28/23 1435          Sit-Stand Transfer    Sit-Stand [place] (Transfers) moderate assist (50% patient effort);2 person assist;verbal cues  -CS       Row Name 08/28/23 1435          Stand-Sit Transfer    Stand-Sit [place] (Transfers) moderate assist (50% patient effort);2 person assist;verbal cues  -CS       Row Name 08/28/23 1435          Activities of Daily Living    BADL  Assessment/Intervention lower body dressing;grooming  -CS       Row Name 08/28/23 1435          Lower Body Dressing Assessment/Training    [place] Level (Lower Body Dressing) don;socks;dependent (less than 25% patient effort)  -CS     Position (Lower Body Dressing) supine  -CS       Row Name 08/28/23 1435          Grooming Assessment/Training    [place] Level (Grooming) wash face, hands;maximum assist (25% patient effort)  -     Assistive Devices (Grooming) hand over hand  -CS     Position (Grooming) sitting up in bed  -               User Key  (r) = Recorded By, (t) = Taken By, (c) = Cosigned By      Initials Name Provider Type    CS Kaye Weathers OT Occupational Therapist                   Obj/Interventions       Row Name 08/28/23 1436          Sensory Assessment (Somatosensory)    Sensory Assessment (Somatosensory) unable/difficult to assess  -       Row Name 08/28/23 1436          Vision Assessment/Intervention    Visual Impairment/Limitations unable/difficult to assess  -     Visual Processing Deficit visual attention, right  -       Row Name 08/28/23 1436          Range of Motion Comprehensive    General Range of Motion bilateral upper extremity ROM WFL  -       Row Name 08/28/23 1436          Strength Comprehensive (MMT)    Comment, General Manual Muscle Testing (MMT) Assessment LUE grossly 4-/5, RUE shldr F 3-/5, bicep/tricep 3+/5,  2-/5  -       Row Name 08/28/23 1436          Balance    Balance Assessment sitting static balance;sitting dynamic balance;standing static balance  -     Static Sitting Balance minimal assist  -CS     Dynamic Sitting Balance minimal assist  -CS     Position, Sitting Balance sitting edge of bed  -CS     Static Standing Balance moderate assist;2-person assist  -     Balance Interventions sitting;standing;static;dynamic;dynamic reaching;occupation based/functional task;weight shifting activity  -               User Key  (r) = Recorded By, (t) =  Taken By, (c) = Cosigned By      Initials Name Provider Type    CS Kaye Weathers OT Occupational Therapist                   Goals/Plan       Row Name 08/28/23 1438          Transfer Goal 1 (OT)    Activity/Assistive Device (Transfer Goal 1, OT) sit-to-stand/stand-to-sit;toilet  -CS     [place] Level/Cues Needed (Transfer Goal 1, OT) minimum assist (75% or more patient effort)  -CS     Time Frame (Transfer Goal 1, OT) long term goal (LTG);10 days  -CS     Progress/Outcome (Transfer Goal 1, OT) goal ongoing  -CS       Row Name 08/28/23 1438          Grooming Goal 1 (OT)    Activity/Device (Grooming Goal 1, OT) hair care;wash face, hands  -CS     [place] (Grooming Goal 1, OT) moderate assist (50-74% patient effort)  -CS     Time Frame (Grooming Goal 1, OT) long term goal (LTG);10 days  -CS     Progress/Outcome (Grooming Goal 1, OT) goal ongoing  -       Row Name 08/28/23 1438          Self-Feeding Goal 1 (OT)    Activity/Device (Self-Feeding Goal 1, OT) finger foods;liquids to mouth;scoop food and bring to mouth;adapted cup;built-up handle utensils  -CS     [place] Level/Cues Needed (Self-Feeding Goal 1, OT) minimum assist (75% or more patient effort)  -CS     Time Frame (Self-Feeding Goal 1, OT) long term goal (LTG);10 days  -CS     Progress/Outcomes (Self-Feeding Goal 1, OT) goal ongoing  -       Row Name 08/28/23 1438          Therapy Assessment/Plan (OT)    Planned Therapy Interventions (OT) activity tolerance training;adaptive equipment training;BADL retraining;functional balance retraining;occupation/activity based interventions;ROM/therapeutic exercise;strengthening exercise;transfer/mobility retraining;neuromuscular control/coordination retraining;cognitive/visual perception retraining;patient/caregiver education/training  -CS               User Key  (r) = Recorded By, (t) = Taken By, (c) = Cosigned By      Initials Name Provider Type    Kaye Robles, OT Occupational Therapist                    Clinical Impression       Row Name 08/28/23 [facility]          Pain Scale: FACES Pre/Post-Treatment    Pain: FACES Scale, Pretreatment 0-->no hurt  -CS     Posttreatment Pain Rating 0-->no hurt  -CS       Row Name 08/28/23 [facility]6          Plan of Care Review    Plan of Care Reviewed With patient  -CS     Outcome Evaluation OT eval complete. Pt presents w/ R sided weakness (UE>LE), expressive/receptive aphasia, and R visual inattention limiting functional independence from baseline. Recommend cont skilled IPOT POC to promote return to PLOF. Recommend pt DC to IP rehab.  -CS       Row Name 08/28/23 1436          Therapy Assessment/Plan (OT)    Patient/Family Therapy Goal Statement (OT) Return to PLOF  -CS     Rehab Potential (OT) good, to achieve stated therapy goals  -CS     Criteria for Skilled Therapeutic Interventions Met (OT) yes;skilled treatment is necessary  -CS     Therapy Frequency (OT) daily  -CS       Row Name 08/28/23 1436          Therapy Plan Review/Discharge Plan (OT)    Anticipated Discharge Disposition (OT) inpatient rehabilitation facility  -CS       Row Name 08/28/23 1436          Vital Signs    Pre Systolic BP Rehab 165  -CS     Pre Treatment Diastolic BP 92  -CS     Post Systolic BP Rehab 138  -CS     Post Treatment Diastolic BP 69  -CS     Pretreatment Heart Rate (beats/min) 72  -CS     Posttreatment Heart Rate (beats/min) 73  -CS     Pre SpO2 (%) 93  -CS     O2 Delivery Pre Treatment room air  -CS     Post SpO2 (%) 93  -CS     O2 Delivery Post Treatment room air  -CS     Pre Patient Position Sitting  -CS     Intra Patient Position Standing  -CS     Post Patient Position Supine  -CS       Row Name 08/28/23 1436          Positioning and Restraints    Pre-Treatment Position in bed  -CS     Post Treatment Position bed  -CS     In Bed notified nsg;fowlers;call light within reach;encouraged to call for assist;exit alarm on;side rails up x3;RUE elevated;with nsg  -CS               User Key  (r) =  Recorded By, (t) = Taken By, (c) = Cosigned By      Initials Name Provider Type    Kaye Robles, JOSE RAUL Occupational Therapist                   Outcome Measures       Row Name 08/28/23 1439          How much help from another is currently needed...    Putting on and taking off regular lower body clothing? 1  -CS     Bathing (including washing, rinsing, and drying) 1  -CS     Toileting (which includes using toilet bed pan or urinal) 1  -CS     Putting on and taking off regular upper body clothing 2  -CS     Taking care of personal grooming (such as brushing teeth) 2  -CS     Eating meals 2  -CS     AM-PAC 6 Clicks Score (OT) 9  -CS       Row Name 08/28/23 0800          How much help from another person do you currently need...    Turning from your back to your side while in flat bed without using bedrails? 4  -SW     Moving from lying on back to sitting on the side of a flat bed without bedrails? 4  -SW     Moving to and from a bed to a chair (including a wheelchair)? 3  -SW     Standing up from a chair using your arms (e.g., wheelchair, bedside chair)? 3  -SW     Climbing 3-5 steps with a railing? 2  -SW     To walk in hospital room? 2  -SW     AM-PAC 6 Clicks Score (PT) 18  -SW     Highest level of mobility 6 --> Walked 10 steps or more  -[facility] Name 08/28/23 1439          Modified [place] Scale    Pre-Stroke Modified [place] Scale 6 - Unable to determine (UTD) from the medical record documentation  -CS     Modified [place] Scale 4 - Moderately severe disability.  Unable to walk without assistance, and unable to attend to own bodily needs without assistance.  -       Row Name 08/28/23 1439          Functional Assessment    Outcome Measure Options AM-PAC 6 Clicks Daily Activity (OT);Modified [place]  -CS               User Key  (r) = Recorded By, (t) = Taken By, (c) = Cosigned By      Initials Name Provider Type    Loly Hubbard RN Registered Nurse    Kaye Robles, JOSE RAUL Occupational Therapist                     Occupational Therapy Education       Title: PT OT SLP Therapies (In Progress)       Topic: Occupational Therapy (In Progress)       Point: ADL training (In Progress)       Description:   Instruct learner(s) on proper safety adaptation and remediation techniques during self care or transfers.   Instruct in proper use of assistive devices.                  Learning Progress Summary             Patient Acceptance, E, NR by  at 8/28/2023 1439                         Point: Home exercise program (Not Started)       Description:   Instruct learner(s) on appropriate technique for monitoring, assisting and/or progressing therapeutic exercises/activities.                  Learner Progress:  Not documented in this visit.              Point: Precautions (In Progress)       Description:   Instruct learner(s) on prescribed precautions during self-care and functional transfers.                  Learning Progress Summary             Patient Acceptance, E, NR by  at 8/28/2023 1439                         Point: Body mechanics (In Progress)       Description:   Instruct learner(s) on proper positioning and spine alignment during self-care, functional mobility activities and/or exercises.                  Learning Progress Summary             Patient Acceptance, E, NR by  at 8/28/2023 1435                                         User Key       Initials Effective Dates Name Provider Type Discipline     09/02/21 -  Kaye Weathers OT Occupational Therapist OT                  OT Recommendation and Plan  Planned Therapy Interventions (OT): activity tolerance training, adaptive equipment training, BADL retraining, functional balance retraining, occupation/activity based interventions, ROM/therapeutic exercise, strengthening exercise, transfer/mobility retraining, neuromuscular control/coordination retraining, cognitive/visual perception retraining, patient/caregiver education/training  Therapy Frequency (OT):  daily  Plan of Care Review  Plan of Care Reviewed With: patient  Outcome Evaluation: OT eval complete. Pt presents w/ R sided weakness (UE>LE), expressive/receptive aphasia, and R visual inattention limiting functional independence from baseline. Recommend cont skilled IPOT POC to promote return to PLOF. Recommend pt DC to IP rehab.     Time Calculation:   Evaluation Complexity (OT)  Review Occupational Profile/Medical/Therapy History Complexity: expanded/moderate complexity  Assessment, Occupational Performance/Identification of Deficit Complexity: 5 or more performance deficits  Clinical Decision Making Complexity (OT): comprehensive assessment/high complexity  Overall Complexity of Evaluation (OT): moderate complexity     Time Calculation- OT       Row Name 08/28/23 1441             Time Calculation- OT    OT Start Time 1330  -CS      OT Received On 08/28/23  -CS      OT Goal Re-Cert Due Date 09/07/23  -CS         Untimed Charges    OT Eval/Re-eval Minutes 48  -CS         Total Minutes    Untimed Charges Total Minutes 48  -CS       Total Minutes 48  -CS                User Key  (r) = Recorded By, (t) = Taken By, (c) = Cosigned By      Initials Name Provider Type    CS Kaye Weathers OT Occupational Therapist                  Therapy Charges for Today       Code Description Service Date Service Provider Modifiers Qty    57102625145 HC OT EVAL MOD COMPLEXITY 4 8/28/2023 Kaye Weathers OT GO 1                 Kaye Weathers OT  8/28/2023

## 2023-08-28 NOTE — PROGRESS NOTES
Clinical Nutrition   Nutrition Support Assessment  Reason for Visit: MDR, Identified at risk by screening criteria, Difficulty chewing/swallowing      Patient Name: Laury Eugene  YOB: 1940  MRN: [id number]  Date of Encounter: 08/28/23 13:36 EDT  Admission date: 8/26/2023    Comments:    Nutrition Assessment   Admission Diagnosis:  CVA (cerebral vascular accident) [I63.9]  Acute ischemic left MCA stroke [I63.512]      Problem List:    CVA (cerebral vascular accident)    Hypothyroidism (acquired)    Essential hypertension    Hyperlipemia    Paroxysmal atrial fibrillation    Acute ischemic left MCA stroke    Dysphagia    Acute lower gastrointestinal bleeding    ABLA (acute blood loss anemia)        PMH:   She  has a past medical history of History of breast cancer, History of heart disease, Hypothyroidism, Multinodular goiter, and Thyroid function test abnormal.    PSH:  She  has a past surgical history that includes Blepharoplasty; Hysterectomy; Breast reconstruction; Simple mastectomy; Cataract extraction (Bilateral); Colonoscopy (N/A, 8/27/2023); and Esophagogastroduodenoscopy (N/A, 8/27/2023).    Applicable Nutrition Concerns:   Skin:  Oral:  GI: recent GIB adm @ [facility] [place] 8/23-8/25    Applicable Interval History:   SLP Swallowing Diagnosis: mild-moderate, oral dysphagia, pharyngeal dysphagia (08/28/23 1100)  SLP Diet Recommendation: soft to chew textures, ground, no mixed consistencies, nectar thick liquids .  Recommended Precautions and Strategies: no straw, small bites of food and sips of liquid, general aspiration precautions, upright posture during/after eating      Reported/Observed/Food/Nutrition Related History:   RN reports pt adm w/  L MCA CVA s/p TNKase w/ some angioedema. Pt w/ recent adm at OSH for GIB. She is s/p 1 u PRBCs, EGD w/ ulers clipped for blood clots from rectum , getting a PICC. Pt  remains mute she has a communication board. Passed FEES.    I spoke w/ friend,  "she is able to provide some diet hx. She was feeding pt, explained dysphagia diet. [place] to coem this evening.    Anthropometrics     Admission Height 154.9 cm (61\") Documented at 08/26/2023 0110   Admission Weight 77.6 kg (171 lb 1.2 oz) Documented at 08/26/2023 0110          Last Filed Weight: Weight: 77.4 kg (170 lb 10.2 oz) (08/27/23 0200)  Method: Weight Method: Bed scale  BMI: BMI (Calculated): 32.3  BMI classification: Obese Class I: 30-34.9kg/m2  IBW:      Weight Weight (kg) Weight (lbs) Weight Method Visit Report   8/27/2023 77.4 kg 170 lb 10.2 oz Bed scale -   8/26/2023 75.9 kg  77.6 kg 167 lb 5.3 oz  171 lb 1.2 oz Bed scale  Bed scale -   6/30/2023 75.297 kg 166 lb - Report   12/30/2022 75.297 kg 166 lb - Report   6/29/2022 74.39 kg 164 lb - Report         UBW:    Weight change:       Nutrition Focused Physical Exam     Date:         Unable to perform exam due to: Defer pending indication    Current Nutrition Prescription   PO: Diet: Regular/House Diet; No Mixed Consistencies, No Straw; Texture: Soft to Chew (NDD 3); Soft to Chew: Ground Meat; Fluid Consistency: [place] Thick  Oral Nutrition Supplement:   Intake: Insufficient data NPO since adm , first meal today      Nutrition Diagnosis   Date:   8/28           Updated:    Problem Predicted suboptimal energy intake   Etiology CVA dysphagia   Signs/Symptoms Requires feeding assistance; textures modified   Status: new    Date:     8/28  Updated:     Problem Swallowing difficulty/chewing difficulty   Etiology CVA   Signs/Symptoms Mild-moderate oropharyngeal dysphagia per FEES evaluation   Status: new    Goal:   General: Nutrition to support treatment  PO: Establish PO, Tolerate PO, Increase intake, Modify texture/consistency  EN/PN: N/A    Nutrition Intervention      Follow treatment progress, Care plan reviewed, Interview for preferences, Supplement provided, Education provided    Magic cup twice daily  Advised of no straws, nectar  thick liquids, no mixed " consistecy and grd meats.      Monitoring/Evaluation:   Per protocol, I&O, PO intake, Supplement intake, Symptoms, POC/GOC, Swallow function      Imelda Shi MS,RD,LD  Time Spent: 20 mins

## 2023-08-29 LAB
ALBUMIN SERPL-MCNC: 2.2 G/DL (ref 3.5–5.2)
ALBUMIN/GLOB SERPL: 1 G/DL
ALP SERPL-CCNC: 71 U/L (ref 39–117)
ALT SERPL W P-5'-P-CCNC: 6 U/L (ref 1–33)
ANION GAP SERPL CALCULATED.3IONS-SCNC: 7 MMOL/L (ref 5–15)
AST SERPL-CCNC: 14 U/L (ref 1–32)
BASOPHILS # BLD AUTO: 0.02 10*3/MM3 (ref 0–0.2)
BASOPHILS NFR BLD AUTO: 0.2 % (ref 0–1.5)
BH BB BLOOD EXPIRATION DATE: NORMAL
BH BB BLOOD TYPE BARCODE: 6200
BH BB DISPENSE STATUS: NORMAL
BH BB PRODUCT CODE: NORMAL
BH BB UNIT NUMBER: NORMAL
BILIRUB SERPL-MCNC: 0.5 MG/DL (ref 0–1.2)
BUN SERPL-MCNC: 12 MG/DL (ref 8–23)
BUN/CREAT SERPL: 18.2 (ref 7–25)
CALCIUM SPEC-SCNC: 6.9 MG/DL (ref 8.6–10.5)
CHLORIDE SERPL-SCNC: 110 MMOL/L (ref 98–107)
CO2 SERPL-SCNC: 25 MMOL/L (ref 22–29)
CREAT SERPL-MCNC: 0.66 MG/DL (ref 0.57–1)
CROSSMATCH INTERPRETATION: NORMAL
CYTO UR: NORMAL
DEPRECATED RDW RBC AUTO: 49.3 FL (ref 37–54)
EGFRCR SERPLBLD CKD-EPI 2021: 88.3 ML/MIN/1.73
EOSINOPHIL # BLD AUTO: 0.35 10*3/MM3 (ref 0–0.4)
EOSINOPHIL NFR BLD AUTO: 2.9 % (ref 0.3–6.2)
ERYTHROCYTE [DISTWIDTH] IN BLOOD BY AUTOMATED COUNT: 14.5 % (ref 12.3–15.4)
GLOBULIN UR ELPH-MCNC: 2.3 GM/DL
GLUCOSE BLDC GLUCOMTR-MCNC: 107 MG/DL (ref 70–130)
GLUCOSE BLDC GLUCOMTR-MCNC: 109 MG/DL (ref 70–130)
GLUCOSE BLDC GLUCOMTR-MCNC: 126 MG/DL (ref 70–130)
GLUCOSE BLDC GLUCOMTR-MCNC: 138 MG/DL (ref 70–130)
GLUCOSE BLDC GLUCOMTR-MCNC: 148 MG/DL (ref 70–130)
GLUCOSE SERPL-MCNC: 110 MG/DL (ref 65–99)
HCT VFR BLD AUTO: 21 % (ref 34–46.6)
HCT VFR BLD AUTO: 28.6 % (ref 34–46.6)
HGB BLD-MCNC: 7.1 G/DL (ref 12–15.9)
HGB BLD-MCNC: 9.6 G/DL (ref 12–15.9)
IMM GRANULOCYTES # BLD AUTO: 0.06 10*3/MM3 (ref 0–0.05)
IMM GRANULOCYTES NFR BLD AUTO: 0.5 % (ref 0–0.5)
LAB AP CASE REPORT: NORMAL
LAB AP CLINICAL INFORMATION: NORMAL
LYMPHOCYTES # BLD AUTO: 1.26 10*3/MM3 (ref 0.7–3.1)
LYMPHOCYTES NFR BLD AUTO: 10.5 % (ref 19.6–45.3)
MAGNESIUM SERPL-MCNC: 1.9 MG/DL (ref 1.6–2.4)
MCH RBC QN AUTO: 32.4 PG (ref 26.6–33)
MCHC RBC AUTO-ENTMCNC: 33.8 G/DL (ref 31.5–35.7)
MCV RBC AUTO: 95.9 FL (ref 79–97)
MONOCYTES # BLD AUTO: 1.27 10*3/MM3 (ref 0.1–0.9)
MONOCYTES NFR BLD AUTO: 10.6 % (ref 5–12)
NEUTROPHILS NFR BLD AUTO: 75.3 % (ref 42.7–76)
NEUTROPHILS NFR BLD AUTO: 9.06 10*3/MM3 (ref 1.7–7)
NRBC BLD AUTO-RTO: 0 /100 WBC (ref 0–0.2)
PATH REPORT.FINAL DX SPEC: NORMAL
PATH REPORT.GROSS SPEC: NORMAL
PHOSPHATE SERPL-MCNC: 2.9 MG/DL (ref 2.5–4.5)
PLATELET # BLD AUTO: 209 10*3/MM3 (ref 140–450)
PMV BLD AUTO: 10.2 FL (ref 6–12)
POTASSIUM SERPL-SCNC: 3.8 MMOL/L (ref 3.5–5.2)
PROT SERPL-MCNC: 4.5 G/DL (ref 6–8.5)
QT INTERVAL: 420 MS
QTC INTERVAL: 426 MS
RBC # BLD AUTO: 2.19 10*6/MM3 (ref 3.77–5.28)
SODIUM SERPL-SCNC: 142 MMOL/L (ref 136–145)
UNIT  ABO: NORMAL
UNIT  RH: NORMAL
WBC NRBC COR # BLD: 12.02 10*3/MM3 (ref 3.4–10.8)

## 2023-08-29 PROCEDURE — 25010000002 ENOXAPARIN PER 10 MG

## 2023-08-29 PROCEDURE — P9016 RBC LEUKOCYTES REDUCED: HCPCS

## 2023-08-29 PROCEDURE — 94761 N-INVAS EAR/PLS OXIMETRY MLT: CPT

## 2023-08-29 PROCEDURE — 86900 BLOOD TYPING SEROLOGIC ABO: CPT

## 2023-08-29 PROCEDURE — 82948 REAGENT STRIP/BLOOD GLUCOSE: CPT

## 2023-08-29 PROCEDURE — 99232 SBSQ HOSP IP/OBS MODERATE 35: CPT

## 2023-08-29 PROCEDURE — 25010000002 HYDRALAZINE PER 20 MG

## 2023-08-29 PROCEDURE — 84100 ASSAY OF PHOSPHORUS: CPT

## 2023-08-29 PROCEDURE — 94664 DEMO&/EVAL PT USE INHALER: CPT

## 2023-08-29 PROCEDURE — 85018 HEMOGLOBIN: CPT | Performed by: INTERNAL MEDICINE

## 2023-08-29 PROCEDURE — 94799 UNLISTED PULMONARY SVC/PX: CPT

## 2023-08-29 PROCEDURE — 36430 TRANSFUSION BLD/BLD COMPNT: CPT

## 2023-08-29 PROCEDURE — 97110 THERAPEUTIC EXERCISES: CPT

## 2023-08-29 PROCEDURE — 83735 ASSAY OF MAGNESIUM: CPT

## 2023-08-29 PROCEDURE — 99232 SBSQ HOSP IP/OBS MODERATE 35: CPT | Performed by: STUDENT IN AN ORGANIZED HEALTH CARE EDUCATION/TRAINING PROGRAM

## 2023-08-29 PROCEDURE — 80053 COMPREHEN METABOLIC PANEL: CPT

## 2023-08-29 PROCEDURE — 99232 SBSQ HOSP IP/OBS MODERATE 35: CPT | Performed by: INTERNAL MEDICINE

## 2023-08-29 PROCEDURE — 93010 ELECTROCARDIOGRAM REPORT: CPT | Performed by: INTERNAL MEDICINE

## 2023-08-29 PROCEDURE — 25010000002 ONDANSETRON PER 1 MG: Performed by: INTERNAL MEDICINE

## 2023-08-29 PROCEDURE — 85025 COMPLETE CBC W/AUTO DIFF WBC: CPT

## 2023-08-29 PROCEDURE — 93005 ELECTROCARDIOGRAM TRACING: CPT | Performed by: INTERNAL MEDICINE

## 2023-08-29 PROCEDURE — 85014 HEMATOCRIT: CPT | Performed by: INTERNAL MEDICINE

## 2023-08-29 PROCEDURE — 97530 THERAPEUTIC ACTIVITIES: CPT

## 2023-08-29 RX ORDER — ASPIRIN 81 MG/1
324 TABLET, CHEWABLE ORAL DAILY
Status: DISCONTINUED | OUTPATIENT
Start: 2023-08-30 | End: 2023-08-30

## 2023-08-29 RX ORDER — SODIUM CHLORIDE 9 MG/ML
25 INJECTION, SOLUTION INTRAVENOUS CONTINUOUS
Status: ACTIVE | OUTPATIENT
Start: 2023-08-29 | End: 2023-08-29

## 2023-08-29 RX ORDER — ESCITALOPRAM OXALATE 10 MG/1
10 TABLET ORAL DAILY
Status: DISCONTINUED | OUTPATIENT
Start: 2023-08-29 | End: 2023-09-04 | Stop reason: HOSPADM

## 2023-08-29 RX ORDER — LEVETIRACETAM 500 MG/1
500 TABLET, EXTENDED RELEASE ORAL DAILY
Status: DISCONTINUED | OUTPATIENT
Start: 2023-08-29 | End: 2023-09-04 | Stop reason: HOSPADM

## 2023-08-29 RX ORDER — HYDRALAZINE HYDROCHLORIDE 20 MG/ML
10 INJECTION INTRAMUSCULAR; INTRAVENOUS EVERY 6 HOURS PRN
Status: DISCONTINUED | OUTPATIENT
Start: 2023-08-29 | End: 2023-09-04 | Stop reason: HOSPADM

## 2023-08-29 RX ADMIN — ONDANSETRON 4 MG: 2 INJECTION INTRAMUSCULAR; INTRAVENOUS at 20:24

## 2023-08-29 RX ADMIN — HYDRALAZINE HYDROCHLORIDE 10 MG: 20 INJECTION INTRAMUSCULAR; INTRAVENOUS at 18:10

## 2023-08-29 RX ADMIN — SODIUM CHLORIDE 75 ML/HR: 9 INJECTION, SOLUTION INTRAVENOUS at 10:34

## 2023-08-29 RX ADMIN — LEVETIRACETAM 500 MG: 500 TABLET, EXTENDED RELEASE ORAL at 20:03

## 2023-08-29 RX ADMIN — SODIUM CHLORIDE 75 ML/HR: 9 INJECTION, SOLUTION INTRAVENOUS at 00:29

## 2023-08-29 RX ADMIN — ASPIRIN 81 MG: 81 TABLET, CHEWABLE ORAL at 08:16

## 2023-08-29 RX ADMIN — Medication 10 ML: at 08:17

## 2023-08-29 RX ADMIN — NEBIVOLOL 2.5 MG: 2.5 TABLET ORAL at 08:16

## 2023-08-29 RX ADMIN — PROPAFENONE 325 MG: 325 CAPSULE, EXTENDED RELEASE ORAL at 08:16

## 2023-08-29 RX ADMIN — Medication 10 ML: at 20:21

## 2023-08-29 RX ADMIN — PANTOPRAZOLE SODIUM 40 MG: 40 TABLET, DELAYED RELEASE ORAL at 08:17

## 2023-08-29 RX ADMIN — ENOXAPARIN SODIUM 40 MG: 100 INJECTION SUBCUTANEOUS at 08:16

## 2023-08-29 RX ADMIN — PROPAFENONE 325 MG: 325 CAPSULE, EXTENDED RELEASE ORAL at 20:03

## 2023-08-29 RX ADMIN — PANTOPRAZOLE SODIUM 40 MG: 40 TABLET, DELAYED RELEASE ORAL at 17:48

## 2023-08-29 RX ADMIN — SODIUM CHLORIDE, POTASSIUM CHLORIDE, SODIUM LACTATE AND CALCIUM CHLORIDE 500 ML: 600; 310; 30; 20 INJECTION, SOLUTION INTRAVENOUS at 01:04

## 2023-08-29 RX ADMIN — ATORVASTATIN CALCIUM 80 MG: 40 TABLET, FILM COATED ORAL at 20:03

## 2023-08-29 RX ADMIN — SODIUM CHLORIDE 25 ML/HR: 9 INJECTION, SOLUTION INTRAVENOUS at 14:11

## 2023-08-29 RX ADMIN — ESCITALOPRAM OXALATE 10 MG: 10 TABLET ORAL at 17:48

## 2023-08-29 RX ADMIN — IPRATROPIUM BROMIDE AND ALBUTEROL SULFATE 3 ML: 2.5; .5 SOLUTION RESPIRATORY (INHALATION) at 12:13

## 2023-08-29 NOTE — PROGRESS NOTES
Laury Eugene  [id number]  1940   LOS: 3 days   Patient Care Team:  Leatha Melvin DO as PCP - General (Internal Medicine)  Roger Hodgson MD as Consulting Physician (Endocrinology)    Chief Complaint:  HTN / PAF / CVA / GIB / ANEMIA / FEVER    Subjective     Awake and alert with marked expressive aphasia.  No posturing or overt seizure activity.  No apparent increased tachypnea/dyspnea, nausea, emesis, or apparent abdominal pain.  No headache or new focal motor-sensory changes.  PICC line placed with moderate right hand edema.  Minimal oral intake.    Objective     Vital Sign Min/Max for last 24 hours  Temp  Min: 97.4 °F (36.3 °C)  Max: 99.9 °F (37.7 °C)   BP  Min: 97/42  Max: 140/58   Pulse  Min: 61  Max: 112   Resp  Min: 16  Max: 16   SpO2  Min: 91 %  Max: 100 %               08/26/23  0400 08/27/23  0200   Weight: 75.9 kg (167 lb 5.3 oz) 77.4 kg (170 lb 10.2 oz)         Intake/Output Summary (Last 24 hours) at 8/29/2023 0716  Last data filed at 8/29/2023 0600  Gross per 24 hour   Intake 2304.74 ml   Output 625 ml   Net 1679.74 ml       Physical Exam:     General Appearance:    Alert, cooperative, in no acute distress   Lungs:   Upper airway whistle with generally clear lungs,respirations regular, even and unlabored without supplemental oxygen (room air oximetry 96%).    Heart:    Regular and normal rate, normal S1 and S2, grade 1/6 systolic murmur, no gallop, no rub, no click   Abdomen:  Extremities:   Soft, nontender, bowel sounds audible x4  2+ generalized edema edema, normal range of motion   Pulses:   Pulses palpable and equal bilaterally      Results Review:   Results from last 7 days   Lab Units 08/29/23  0219 08/28/23  1735 08/28/23  0546 08/27/23  2141 08/27/23  0331   SODIUM mmol/L 142  --  139  --  141   POTASSIUM mmol/L 3.8 3.9 3.4*   < > 3.3*   CHLORIDE mmol/L 110*  --  108*  --  106   CO2 mmol/L 25.0  --  27.0  --  28.0   BUN mg/dL 12  --  14  --  12   CREATININE mg/dL 0.66   --  0.74  --  0.83   GLUCOSE mg/dL 110*  --  89  --  104*   CALCIUM mg/dL 6.9*  --  6.9*  --  7.8*    < > = values in this interval not displayed.     Results from last 7 days   Lab Units 08/29/23  0219 08/28/23  1735 08/28/23  1523 08/28/23  0546 08/27/23  1615 08/27/23  0331   WBC 10*3/mm3 12.02*  --   --  13.86*  --  16.53*   HEMOGLOBIN g/dL 7.1* 7.6* 8.1* 7.0*  7.0*   < > 8.5*   HEMATOCRIT % 21.0* 22.8* 24.4* 22.5*  22.5*   < > 25.9*   PLATELETS 10*3/mm3 209  --   --  222  --  242    < > = values in this interval not displayed.     Results from last 7 days   Lab Units 08/26/23  0446   CHOLESTEROL mg/dL 129   TRIGLYCERIDES mg/dL 74   HDL CHOL mg/dL 41   LDL CHOL mg/dL 73     Results from last 7 days   Lab Units 08/27/23  0331   HEMOGLOBIN A1C % 5.80*     Results from last 7 days   Lab Units 08/26/23  0047   HSTROP T ng/L 18*     MAGNESIUM: 1.9  ALBUMIN: 2.2  LFTs: WNL  PHOSPHORUS: 2.9  NO NEW CXR / CT SCAN.  EKG:    Sinus rhythm with borderline first-degree AV block  Low voltage QRS  Abnormal ECG  When compared with ECG of 27-AUG-2023 05:19,  T wave inversion no longer evident in Anterior leads    Medication Review: REVIEWED; DRIP = normal saline 75 cc/hour continuous infusion.    Assessment & Plan     Continued severe anemia despite unit of packed red blood cells yesterday.  Generalized edema related to poor intake and low serum albumin with continued normal saline.  Would consider 1 unit of packed red blood cells transfusion as well as reducing normal saline to 25 cc/hour.  Persistent normal sinus rhythm and generally normotensive.  Able to stand yesterday with assistance and transfer to chair.  Prognosis guarded.    Discussed with patient and family member in room.      CVA (cerebral vascular accident)    Hypothyroidism (acquired)    Essential hypertension    Hyperlipemia    Paroxysmal atrial fibrillation    Acute ischemic left MCA stroke    Dysphagia    Acute lower gastrointestinal bleeding    ABLA (acute  blood loss anemia)    08/29/23  07:16 EDT

## 2023-08-29 NOTE — PLAN OF CARE
Goal Outcome Evaluation:    1 unit of PRBC's administered. H&H improved to 9.6, 28.6. Continue to monitor.    PT/OT up to chair today. Ambulated in room back to bed a few steps. Assist of 2.     PRN Hydralazine for SBP > 140.     Antidepressant added to regimen.

## 2023-08-29 NOTE — PLAN OF CARE
Goal Outcome Evaluation: Vital signs stable overnight. NIH 10. Pt had low urine output overnight. LR bolus ordered for low urine output. Trending H&H.

## 2023-08-29 NOTE — PROGRESS NOTES
Stroke Progress Note       Chief Complaint: Acute left insular infarct, chronic right temporal infarct    Subjective     Subjective:  Laury Eugene was seen and examined at bedside.  Her , and 2 nieces were physically present for the encounter.  Her niece Mindy was present via telephone call for the encounter.  Per report, the patient has had at least 3 falls in the past year most recently 8/23.  She has had multiple GI bleeds in the past year.  We discussed starting oral anticoagulation versus aspirin and decision was made to continue high-dose aspirin.  We will assume a slightly increased risk of ischemic stroke while not on full anticoagulation.  Shared decision with family not willing to assume bleed risk with restart of oral anticoagulation in setting of multiple falls in past year, limited mobility, and multiple recent GI bleeds.    Objective      Temp:  [97.6 °F (36.4 °C)-99.9 °F (37.7 °C)] 98.6 °F (37 °C)  Heart Rate:  [61-80] 68  Resp:  [16] 16  BP: (103-155)/() 155/72            Objective    Physical Exam:  General Appearance: Alert, being fed soft food by family  Eyes: Anicteric sclera  HEENT: no scleral injection  Lungs: respirations appear comfortable, no obvious increased work of breathing  Extremities: No cyanosis or fingernail clubbing   Skin: No rashes in exposed skin areas     Neurological Examination:   Mental status: Alert, global aphasia, mute, not following commands.  No obvious facial twitching or gaze deviation.  Cranial Nerves: Bilateral blink to threat, midline gaze, looks around room without nystagmus   Sensory/Motor: At least 2/5 in bilateral upper extremities with stimulation, at least 1/5 in bilateral lower extremities with stimulation  Tone: Normal    Results Review:    CT head 8/26/2023.  Impression: 1. Focal region of hypoattenuation in the right insula and right temporal lobe. This appears to represent chronic infarct, but is ultimately age indeterminate. If there is  concern for acute infarct, consider MRI. 2. No acute intracranial hemorrhage.    CT angiogram head and neck 8/26/2023.  Impression: 1. There is abrupt tapering/occlusion of a distal left MCA/M2 branch in the region of the anterior insula. This is age-indeterminate, but corresponds to the region of perfusion abnormality on CT perfusion imaging. 2. No evidence of aneurysm or dissection.    MRI brain without contrast 8/26/2023.  Impression: 1. Small acute/subacute cortical infarct in the anterior left insular cortex. 2. Mild chronic small vessel ischemic change and chronic right temporal infarct.    CT head 8/26/2023.  Impression: 1. No acute intracranial hemorrhage is identified. 2. There is minimal hypodensity about the left insula corresponding to known acute infarct seen on MRI from today. 3. Findings compatible with chronic microvascular ischemic change. 4. Mild encephalomalacia within the right temporal lobe again noted.    CT head 8/27/2023.  Impression: 1. Developing small area of hypoattenuation in the left insular cortex corresponding to known acute/subacute infarct. No acute intracranial hemorrhage. 2. Stable chronic right temporal lobe infarct and mild chronic small vessel ischemic change.    TTE 8/26/2023 left ventricular ejection fraction 51-55%, normal left atrium, no thrombus noted    EKG 8/26/2023 sinus rhythm with first-degree AV block  EKG 8/26/2023 narrow complex rhythm previously noted to be sinus rhythm with first-degree AV block  EKG 8/27/2023 with nonspecific ST and T wave abnormality  EKG 8/29/2023 normal sinus rhythm    EEG 8/28/2023 left hemisphere dysfunction which could be suggestive of stroke or partial seizure      Labs:  Lab Results   Component Value Date    HGBA1C 5.80 (H) 08/27/2023      Lab Results   Component Value Date    CHOL 129 08/26/2023    TRIG 74 08/26/2023    HDL 41 08/26/2023    LDL 73 08/26/2023     LIVER FUNCTION TESTS:      Lab 08/29/23  0219 08/26/23  0446 08/26/23  0047    TOTAL PROTEIN 4.5* 5.0*  --    ALBUMIN 2.2* 2.8*  --    GLOBULIN 2.3 2.2  --    ALT (SGPT) 6 8 9   AST (SGOT) 14 14 16   BILIRUBIN 0.5 0.2  --    ALK PHOS 71 85  --                  Assessment/Plan     Assessment:  Laury Eugene is an 83-year-old female with a past medical history of breast cancer in remission, atrial fibrillation, hypertension, hypothyroidism, recent GI bleed 8/23/2023, multiple falls in past year (most recent 8/23/2023), chronic right temporal lobe infarct who presented to [facility] emergency department on 8/26/2023 with acute left facial droop, aphasia, and confusion.  CT head 8/26/2023 showed chronic right temporal infarct and no hemorrhage.  Decision made to proceed with TNK.  CT angiogram head and neck 8/26/2023 showed distal left M2 occlusion.  The patient was not taken for thrombectomy due to NIHSS 3 on admission.  MRI brain 8/26/2023 shows chronic right temporal infarct and acute left insular infarct.  TTE 8/26/2023 LVEF 51-55%, normal left atrium, no thrombus.  Hospital course complicated by acute blood loss anemia requiring multiple transfusion.    #Acute left insular infarct  #Chronic right temporal infarct  The etiology of the patient's cortical infarcts in multiple vascular territories is suggestive of atrial fibrillation.  The patient is currently not taking anticoagulation due to multiple recent falls, at least 3 in the past year most recent 8/23/2023 and recent GI bleed.  GI was consulted and did clear the patient to start anticoagulation if indicated.  I had a risk-benefit discussion with the patient's nieces Mindy and Hossein and the decision was made to hold anticoagulation given multiple recent falls and recent GI bleed in an 83-year-old patient with global aphasia.  Decision was made to revisit anticoagulation in stroke clinic in 1 month.    -Speech therapy recommendation: Meds whole, meds crushed, with purée as tolerated  -Continue aspirin 325 mg  daily  -Continue atorvastatin 80 mg daily (LDL 80)  -Blood pressure goals: Normotensive  -Neurochecks: every 2 hours  -PT/OT recommendation: Inpatient rehabilitation facility  -Sequential compression device for DVT prophylaxis  -Follow-up in stroke neurology clinic in 1 month after discharge (order placed in ADT summary)      #Abnormal EEG  Consideration of left hemispheric stroke versus partial seizure.  -Keppra 500 mg BID ordered  -Repeat EEG in clinic      Discharge Planning: cleared to discharge to inpatient rehabilitation from facility from stroke neurology perspective, will need to be cleared from acute blood loss anemia perspective prior to discharge    Regina Granado MD  [facility] [place] [facility]  08/29/23  18:05 EDT

## 2023-08-29 NOTE — THERAPY TREATMENT NOTE
Patient Name: Laury Eugene  : 1940    MRN: [id number]                              Today's Date: 2023       Admit Date: 2023    Visit Dx:     ICD-10-CM ICD-9-CM   1. Acute CVA (cerebrovascular accident)  I63.9 434.91   2. Anemia, unspecified type  D64.9 285.9   3. Dysphagia, unspecified type  R13.10 787.20   4. Acute lower gastrointestinal bleeding  K92.2 578.9   5. ABLA (acute blood loss anemia)  D62 285.1     Patient Active Problem List   Diagnosis    Multinodular goiter    Abnormal thyroid function test    CVA (cerebral vascular accident)    Hypothyroidism (acquired)    Essential hypertension    Hyperlipemia    Paroxysmal atrial fibrillation    Acute ischemic left MCA stroke    Dysphagia    Acute lower gastrointestinal bleeding    ABLA (acute blood loss anemia)     Past Medical History:   Diagnosis Date    History of breast cancer     History of heart disease     Hypothyroidism     Multinodular goiter     Thyroid function test abnormal      Past Surgical History:   Procedure Laterality Date    BLEPHAROPLASTY      BREAST RECONSTRUCTION      CATARACT EXTRACTION Bilateral     COLONOSCOPY N/A 2023    Procedure: COLONOSCOPY;  Surgeon: Brunner, Mark I, MD;  Location:  [facility] ENDOSCOPY;  Service: Gastroenterology;  Laterality: N/A;    ENDOSCOPY N/A 2023    Procedure: ESOPHAGOGASTRODUODENOSCOPY;  Surgeon: Brunner, Mark I, MD;  Location:  [facility] ENDOSCOPY;  Service: Gastroenterology;  Laterality: N/A;    HYSTERECTOMY      SIMPLE MASTECTOMY        General Information       Row Name 23 1213          OT Time and Intention    Document Type therapy note (daily note)  -CS     Mode of Treatment occupational therapy  -CS       Row Name 23 1213          General Information    Existing Precautions/Restrictions fall;other (see comments)  R sided weakness (UE>LE), expressive/receptive aphasia, R inattention  -CS     Barriers to Rehab medically complex;cognitive status  -CS       Row Name  08/29/23 1213          Living Environment    People in Home spouse  -CS       Row Name 08/29/23 1213          Home Main Entrance    Number of Stairs, Main Entrance none  -CS       Row Name 08/29/23 1213          Stairs Within Home, Primary    Number of Stairs, Within Home, Primary none  -CS       Row Name 08/29/23 1213          Cognition    Orientation Status (Cognition) oriented to;person;disoriented to;place;situation;time  -       Row Name 08/29/23 1213          Safety Issues, Functional Mobility    Impairments Affecting Function (Mobility) balance;cognition;coordination;endurance/activity tolerance;motor planning;strength;visual/perceptual;postural/trunk control  -     Cognitive Impairments, Mobility Safety/Performance attention;awareness, need for assistance;insight into deficits/self-awareness;sequencing abilities;safety precaution awareness;judgment;problem-solving/reasoning;safety precaution follow-through  -               User Key  (r) = Recorded By, (t) = Taken By, (c) = Cosigned By      Initials Name Provider Type    CS Kaye Weathers, OT Occupational Therapist                     Mobility/ADL's       Row Name 08/29/23 1214          Bed Mobility    Bed Mobility supine-sit  -     Supine-Sit [place] (Bed Mobility) minimum assist (75% patient effort);verbal cues  -     Assistive Device (Bed Mobility) bed rails;draw sheet;head of bed elevated  -       Row Name 08/29/23 1214          Transfers    Transfers bed-chair transfer;sit-stand transfer;stand-sit transfer  -     Comment, (Transfers) BUE support  -       Row Name 08/29/23 1214          Bed-Chair Transfer    Bed-Chair [place] (Transfers) minimum assist (75% patient effort);2 person assist;verbal cues  -       Row Name 08/29/23 1214          Sit-Stand Transfer    Sit-Stand [place] (Transfers) minimum assist (75% patient effort);verbal cues  -       Row Name 08/29/23 1214          Stand-Sit Transfer    Stand-Sit  [place] (Transfers) minimum assist (75% patient effort);verbal cues  -       Row Name 08/29/23 1214          Activities of Daily Living    BADL Assessment/Intervention feeding;lower body dressing  -       Row Name 08/29/23 1214          Lower Body Dressing Assessment/Training    [place] (Lower Body Dressing) don;socks;dependent (less than 25% patient effort)  -     Position (Lower Body Dressing) supine  -       Row Name 08/29/23 1214          Self-Feeding Assessment/Training    [place] (Feeding) liquids to mouth;maximum assist (25% patient effort)  -     Assistive Devices (Feeding) hand over hand  -     Position (Self-Feeding) supported sitting  -               User Key  (r) = Recorded By, (t) = Taken By, (c) = Cosigned By      Initials Name Provider Type     Kaye Weathers, JOSE RAUL Occupational Therapist                   Obj/Interventions       Row Name 08/29/23 1215          Shoulder (Therapeutic Exercise)    Shoulder (Therapeutic Exercise) AAROM (active assistive range of motion)  -     Shoulder AAROM (Therapeutic Exercise) left assist right;flexion;10 repetitions  -       Row Name 08/29/23 1215          Elbow/Forearm (Therapeutic Exercise)    Elbow/Forearm (Therapeutic Exercise) AAROM (active assistive range of motion)  -     Elbow/Forearm AAROM (Therapeutic Exercise) left assist right;flexion;extension;10 repetitions  -       Row Name 08/29/23 1215          Motor Skills    Therapeutic Exercise shoulder;elbow/forearm  -       Row Name 08/29/23 1215          Balance    Balance Assessment sitting static balance;sitting dynamic balance  -     Static Sitting Balance standby assist  -     Dynamic Sitting Balance contact guard  -     Position, Sitting Balance sitting edge of bed  -     Static Standing Balance minimal assist  -     Dynamic Standing Balance minimal assist;2-person assist  -     Position/Device Used, Standing Balance supported  -     Balance  Interventions standing;sitting;static;dynamic;dynamic reaching;occupation based/functional task;weight shifting activity  -CS               User Key  (r) = Recorded By, (t) = Taken By, (c) = Cosigned By      Initials Name Provider Type    CS Kaye Weathers, JOSE RAUL Occupational Therapist                   Goals/Plan    No documentation.                  Clinical Impression       [facility] Name 08/29/23 1215          Pain Scale: FACES Pre/Post-Treatment    Pain: FACES Scale, Pretreatment 0-->no hurt  -CS     Posttreatment Pain Rating 0-->no hurt  -CS       [facility] Name 08/29/23 1215          Plan of Care Review    Plan of Care Reviewed With patient  -CS     Progress improving  -     Outcome Evaluation Pt demo improved independence by performing bed mobility and STS t/f w/ Min A. Pt conts to require significantly increased assist from baseline for all ADL performance d/t cognitive deficits and RUE weakness. Recommend cont skilled IPOT POC. Recommend pt DC to IP rehab.  -[facility] Name 08/29/23 1215          Therapy Plan Review/Discharge Plan (OT)    Anticipated Discharge Disposition (OT) inpatient rehabilitation facility  -[facility] Name 08/29/23 1215          Vital Signs    Pre Systolic BP Rehab 124  -CS     Pre Treatment Diastolic BP 69  -CS     Post Systolic BP Rehab 131  -CS     Post Treatment Diastolic BP 54  -CS     Pretreatment Heart Rate (beats/min) 64  -CS     Posttreatment Heart Rate (beats/min) 79  -CS     Pre SpO2 (%) 98  -CS     O2 Delivery Pre Treatment room air  -CS     Post SpO2 (%) 98  -CS     O2 Delivery Post Treatment room air  -CS     Pre Patient Position Supine  -CS     Intra Patient Position Standing  -CS     Post Patient Position Sitting  -CS       [facility] Name 08/29/23 1215          Positioning and Restraints    Pre-Treatment Position in bed  -CS     Post Treatment Position chair  -CS     In Chair notified nsg;reclined;call light within reach;encouraged to call for assist;exit alarm on;RUE elevated;LUE  elevated;waffle cushion;on mechanical lift sling;legs elevated;heels elevated;with family/caregiver  -               User Key  (r) = Recorded By, (t) = Taken By, (c) = Cosigned By      Initials Name Provider Type    Kaye Robles OT Occupational Therapist                   Outcome Measures       Row Name 08/29/23 1217          How much help from another is currently needed...    Putting on and taking off regular lower body clothing? 1  -CS     Bathing (including washing, rinsing, and drying) 2  -CS     Toileting (which includes using toilet bed pan or urinal) 1  -CS     Putting on and taking off regular upper body clothing 2  -CS     Taking care of personal grooming (such as brushing teeth) 2  -CS     Eating meals 2  -CS     AM-PAC 6 Clicks Score (OT) 10  -CS       Row Name 08/29/23 0800          How much help from another person do you currently need...    Turning from your back to your side while in flat bed without using bedrails? 3  -SW     Moving from lying on back to sitting on the side of a flat bed without bedrails? 3  -SW     Moving to and from a bed to a chair (including a wheelchair)? 2  -SW     Standing up from a chair using your arms (e.g., wheelchair, bedside chair)? 2  -SW     Climbing 3-5 steps with a railing? 1  -SW     To walk in hospital room? 1  -SW     AM-PAC 6 Clicks Score (PT) 12  -SW     Highest level of mobility 4 --> Transferred to chair/commode  -[facility] Name 08/29/23 1217          Modified Ashburn Scale    Modified Ashburn Scale 4 - Moderately severe disability.  Unable to walk without assistance, and unable to attend to own bodily needs without assistance.  -       Row Name 08/29/23 1217          Functional Assessment    Outcome Measure Options AM-PAC 6 Clicks Daily Activity (OT)  -               User Key  (r) = Recorded By, (t) = Taken By, (c) = Cosigned By      Initials Name Provider Type    Loly Hubbard RN Registered Nurse    Kaye Robles OT Occupational  Therapist                    Occupational Therapy Education       Title: PT OT SLP Therapies (In Progress)       Topic: Occupational Therapy (In Progress)       Point: ADL training (In Progress)       Description:   Instruct learner(s) on proper safety adaptation and remediation techniques during self care or transfers.   Instruct in proper use of assistive devices.                  Learning Progress Summary             Patient Acceptance, E, NR by  at 8/29/2023 1217    Acceptance, E, NR by CS at 8/28/2023 1439                         Point: Home exercise program (In Progress)       Description:   Instruct learner(s) on appropriate technique for monitoring, assisting and/or progressing therapeutic exercises/activities.                  Learning Progress Summary             Patient Acceptance, E, NR by CS at 8/29/2023 1217                         Point: Precautions (In Progress)       Description:   Instruct learner(s) on prescribed precautions during self-care and functional transfers.                  Learning Progress Summary             Patient Acceptance, E, NR by  at 8/29/2023 1217    Acceptance, E, NR by CS at 8/28/2023 1439                         Point: Body mechanics (In Progress)       Description:   Instruct learner(s) on proper positioning and spine alignment during self-care, functional mobility activities and/or exercises.                  Learning Progress Summary             Patient Acceptance, E, NR by  at 8/29/2023 1217    Acceptance, E, NR by CS at 8/28/2023 1439                                         User Key       Initials Effective Dates Name Provider Type Discipline     09/02/21 -  Kaye Weathers OT Occupational Therapist OT                  OT Recommendation and Plan  Planned Therapy Interventions (OT): activity tolerance training, adaptive equipment training, BADL retraining, functional balance retraining, occupation/activity based interventions, ROM/therapeutic exercise,  strengthening exercise, transfer/mobility retraining, neuromuscular control/coordination retraining, cognitive/visual perception retraining, patient/caregiver education/training  Therapy Frequency (OT): daily  Plan of Care Review  Plan of Care Reviewed With: patient  Progress: improving  Outcome Evaluation: Pt demo improved independence by performing bed mobility and STS t/f w/ Min A. Pt conts to require significantly increased assist from baseline for all ADL performance d/t cognitive deficits and RUE weakness. Recommend cont skilled IPOT POC. Recommend pt DC to IP rehab.     Time Calculation:   Evaluation Complexity (OT)  Review Occupational Profile/Medical/Therapy History Complexity: expanded/moderate complexity  Assessment, Occupational Performance/Identification of Deficit Complexity: 5 or more performance deficits  Clinical Decision Making Complexity (OT): comprehensive assessment/high complexity  Overall Complexity of Evaluation (OT): moderate complexity     Time Calculation- OT       Row Name 08/29/23 1218             Time Calculation- OT    OT Start Time 1120  -CS      OT Received On 08/29/23  -CS      OT Goal Re-Cert Due Date 09/07/23  -CS         Timed Charges    18745 - OT Therapeutic Exercise Minutes 8  -CS      41610 - OT Therapeutic Activity Minutes 12  -CS      49081 - OT Self Care/Mgmt Minutes 5  -CS         Total Minutes    Timed Charges Total Minutes 25  -CS       Total Minutes 25  -CS                User Key  (r) = Recorded By, (t) = Taken By, (c) = Cosigned By      Initials Name Provider Type    CS Kaye Weathers OT Occupational Therapist                  Therapy Charges for Today       Code Description Service Date Service Provider Modifiers Qty    08356880633 HC OT EVAL MOD COMPLEXITY 4 8/28/2023 Kaye Weathers OT GO 1    17545586398 HC OT THER PROC EA 15 MIN 8/29/2023 Kaye Weathers OT GO 1    17099762516 HC OT THERAPEUTIC ACT EA 15 MIN 8/29/2023 Kaye Weathers OT GO 1    02357027809 HC  OT THER SUPP EA 15 MIN 8/29/2023 Kaye Weathers, OT GO 2                 Kaye Weathers, OT  8/29/2023

## 2023-08-29 NOTE — PROGRESS NOTES
"GI Daily Progress Note  Subjective:    Chief Complaint: Follow-up bleeding gastric ulcer    The patient is not talkative this morning.  She offers no complaints.  Had 2 bowel movements since yesterday, with no signs of blood in the stool.    Objective:    /44 (BP Location: Left leg, Patient Position: Lying)   Pulse 64   Temp 99.4 °F (37.4 °C)   Resp 16   Ht 154.9 cm (61\")   Wt 77.4 kg (170 lb 10.2 oz)   SpO2 97%   BMI 32.24 kg/m²     Physical Exam  Vitals and nursing note reviewed.   Constitutional:       General: She is not in acute distress.     Appearance: She is ill-appearing. She is not toxic-appearing.   HENT:      Head: Normocephalic.      Nose: Nose normal.      Comments: Nasal CPAP in place.  Eyes:      General: No scleral icterus.     Conjunctiva/sclera: Conjunctivae normal.   Cardiovascular:      Rate and Rhythm: Normal rate.   Pulmonary:      Effort: Pulmonary effort is normal. No respiratory distress.   Abdominal:      General: Bowel sounds are normal. There is no distension.      Palpations: Abdomen is soft.      Tenderness: There is no abdominal tenderness. There is no guarding.   Genitourinary:     Comments: Deferred  Musculoskeletal:      Cervical back: Normal range of motion.      Right lower leg: No edema.      Left lower leg: No edema.   Skin:     General: Skin is warm and dry.      Capillary Refill: Capillary refill takes less than 2 seconds.      Coloration: Skin is pale. Skin is not jaundiced.      Findings: No erythema or rash.   Neurological:      Mental Status: She is alert.   Psychiatric:      Comments: Mood appears depressed.       Lab  Lab Results   Component Value Date    WBC 12.02 (H) 08/29/2023    HGB 7.1 (L) 08/29/2023    HGB 7.6 (L) 08/28/2023    HGB 8.1 (L) 08/28/2023    MCV 95.9 08/29/2023     08/29/2023    INR 1.0 08/26/2023       Lab Results   Component Value Date    GLUCOSE 110 (H) 08/29/2023    BUN 12 08/29/2023    CREATININE 0.66 08/29/2023    EGFRIFNONA " 54 (L) 12/29/2020    BCR 18.2 08/29/2023     08/29/2023    K 3.8 08/29/2023    CO2 25.0 08/29/2023    CALCIUM 6.9 (L) 08/29/2023    ALBUMIN 2.2 (L) 08/29/2023    ALKPHOS 71 08/29/2023    BILITOT 0.5 08/29/2023    ALT 6 08/29/2023    AST 14 08/29/2023       Assessment:    1. Acute gastric ulcer with hemorrhage, H. pylori negative. POD #2 endoscopic intervention.  2. Acute blood loss anemia.  3. Hematochezia, resolved. Bleeding precipitated by thrombolytic therapy.    Plan:    >> Twice daily PPI for 1 month then daily.  >> Okay to give anticoagulants or antiplatelet therapy if clinically indicated.    Will sign off. Please call with recurrent bleeding or other concerns.    Mark I. Brunner, MD  08/29/23  11:00 EDT

## 2023-08-29 NOTE — PROGRESS NOTES
Intensive Care Follow-up     Hospital:  LOS: 3 days   Ms. Laury Eugene, 83 y.o. female is followed for: Glycemic, Electrolyte, Respiratory, and Medical management         Subjective   Interval History:  Chart reviewed. VSS. Patient sleeping but arousable, lying in bed in no acute distress. Patient is able to follow commands, but is unable to speak. PICC placed 8/28. Passed FEES 8/28 and niece at bedside said she ate well this morning.    ROS unobtainable.     The patient's past medical, surgical and social history were reviewed and updated in Epic as appropriate.     Objective     Infusions:  sodium chloride, 25 mL/hr, Last Rate: 75 mL/hr (08/29/23 1034)      Medications:  aspirin, 81 mg, Oral, Daily  atorvastatin, 80 mg, Oral, Nightly  nebivolol, 2.5 mg, Oral, Q24H  pantoprazole, 40 mg, Oral, BID AC  propafenone SR, 325 mg, Oral, Q12H  sodium chloride, 10 mL, Intravenous, Q12H      I reviewed the patient's medications.    Vital Sign Min/Max for last 24 hours  Temp  Min: 97.6 °F (36.4 °C)  Max: 99.9 °F (37.7 °C)   BP  Min: 103/36  Max: 140/58   Pulse  Min: 61  Max: 97   Resp  Min: 16  Max: 16   SpO2  Min: 91 %  Max: 100 %   Flow (L/min)  Min: 2  Max: 2       Input/Output for last 24 hour shift  08/28 0701 - 08/29 0700  In: 2404.7 [I.V.:1244.7]  Out: 625 [Urine:625]      Physical Exam:  GENERAL: Patient lying in bed. Unable to speak No acute distress.   HEENT: Normocephalic and atraumatic. Trachea midline. PER. EOM WNL.   LUNGS: Chest rise of normal depth and symmetric. Lungs clear to auscultation bilaterally. No wheezes, rhonchi, or rales.   HEART: S1,S2 detected. Regular rate and rhythm. No rub, murmur, or gallop.   ABDOMEN: Soft, round, nondistended, and nontender. Bowel sounds present.   EXTREMITIES: No clubbing, edema, or cyanosis. Peripheral pulses present. Skin warm and dry. PICC in place.   NEURO/PSYCH: Alert and oriented. Follows commands. Moves all extremities. Unable to speak.      Results from last 7  days   Lab Units 08/29/23 0219 08/28/23  1735 08/28/23  1523 08/28/23  0546 08/27/23  1615 08/27/23  0331   WBC 10*3/mm3 12.02*  --   --  13.86*  --  16.53*   HEMOGLOBIN g/dL 7.1* 7.6* 8.1* 7.0*  7.0*   < > 8.5*   PLATELETS 10*3/mm3 209  --   --  222  --  242    < > = values in this interval not displayed.     Results from last 7 days   Lab Units 08/29/23 0219 08/28/23  1735 08/28/23  0546 08/27/23  2141 08/27/23  0331 08/26/23  0446   SODIUM mmol/L 142  --  139  --  141 138   POTASSIUM mmol/L 3.8 3.9 3.4*   < > 3.3* 3.9   CO2 mmol/L 25.0  --  27.0  --  28.0 25.0   BUN mg/dL 12  --  14  --  12 17   CREATININE mg/dL 0.66  --  0.74  --  0.83 0.84   MAGNESIUM mg/dL 1.9  --  2.1  --   --  1.9   PHOSPHORUS mg/dL 2.9  --  2.4*  --   --  2.6   GLUCOSE mg/dL 110*  --  89  --  104* 127*    < > = values in this interval not displayed.     Estimated Creatinine Clearance: 62.9 mL/min (by C-G formula based on SCr of 0.66 mg/dL).      I reviewed the patient's new clinical results.  I reviewed the patient's new imaging results/reports including actual images and agree with reports.     Imaging Results (Last 24 Hours)       Procedure Component Value Units Date/Time    SLP FEES - Fiberoptic Endo Eval Swallow [[id number]] Resulted: 08/28/23 1123     Updated: 08/28/23 1123    Narrative:      This procedure was auto-finalized with no dictation required.          Interval:  (Handoff)  1a. Level of Consciousness: 0-->Alert, keenly responsive  1b. LOC Questions: 2-->Answers neither question correctly  1c. LOC Commands: 0-->Performs both tasks correctly  2. Best Gaze: 0-->Normal  3. Visual: 0-->No visual loss  4. Facial Palsy: 2-->Partial paralysis (total or near-total paralysis of lower face)  5a. Motor Arm, Left: 0-->No drift, limb holds 90 (or 45) degrees for full 10 secs  5b. Motor Arm, Right: 0-->No drift, limb holds 90 (or 45) degrees for full 10 secs  6a. Motor Leg, Left: 0-->No drift, leg holds 30 degree position for full 5  secs  6b. Motor Leg, Right: 0-->No drift, leg holds 30 degree position for full 5 secs  7. Limb Ataxia: 0-->Absent  8. Sensory: 1-->Mild-to-moderate sensory loss, patient feels pinprick is less sharp or is dull on the affected side, or there is a loss of superficial pain with pinprick, but patient is aware of being touched  9. Best Language: 3-->Mute, global aphasia, no usable speech or auditory comprehension  10. Dysarthria: 2-->Severe dysarthria, patients speech is so slurred as to be unintelligible in the absence of or out of proportion to any dysphasia, or is mute/anarthric  11. Extinction and Inattention (formerly Neglect): 0-->No abnormality    Total (NIH Stroke Scale): 10    Assessment & Plan   Impression      CVA (cerebral vascular accident)    Hypothyroidism (acquired)    Essential hypertension    Hyperlipemia    Paroxysmal atrial fibrillation    Acute ischemic left MCA stroke    Dysphagia    Acute lower gastrointestinal bleeding    ABLA (acute blood loss anemia)       Plan      83-year-old woman with a history of paroxysmal atrial fibrillation, hypertension, hypothyroidism, breast cancer, recent GI bleed presenting with left facial droop, dysarthria, confusion.  Imaging revealed left middle cerebral artery M2 branch occlusion.  NIH was  8.  She had a recent colonoscopy with possible ruptured diverticuli.  However with her new onset stroke and M2 occlusion decision was made to proceed with TNK.  She subsequently developed some angioedema and received Benadryl, Pepcid, Solu-Medrol with resolution.     NIHSS today 10. CT scan of the head shows stable evolving stroke.  Speech therapy cleared her for a liquid diet 8/27; however, she was questionable for aspiration so she was made NPO with FEES scheduled today.  She underwent EGD and colonoscopy per Dr. Brunner 8/27 and was found to have several ulcerations in the gastric antrum, the largest of which was 8mm and was clipped. Biopsy was taken for H. Pylori.  Pathology pending. GI noted patient to be low risk for bleeding if anticoagulation warranted, so she was initiated on ASA and started on Lovenox for DVT prophylaxis. She has had no atrial fibrillation.  Echo revealed a normal EF.  Most recent hemoglobin is 7.1. She will be transfused again today.    Monitor H/H  Transfuse 1 u PRBCs again today  Albumin x 1  SBP <110-140 per Neurology  Per Neurology, OK to start Lovenox for DVT prophylaxis. Stopped today r/t decreasing H/H.  Passed FEES 8/28. Tolerating PO intake.  Replace electrolytes per protocol  AM labs    DVT Prophylaxis: Mechanical  GI Prophylaxis: PPI  Dispo: Keep in ICU    Time spent: 36 minutes  Plan of care and goals reviewed with multidisciplinary/antibiotic stewardship team during rounds.   I discussed the patient's findings and my recommendations with patient, family, nursing staff, and primary care team     Mary Camarena, MSN, APRN, ACNPC-AG  Pulmonary and Critical Care Medicine  Electronically signed by DIANNE Swift, 08/29/23, 10:44 AM EDT.

## 2023-08-29 NOTE — PLAN OF CARE
Goal Outcome Evaluation:  Plan of Care Reviewed With: patient        Progress: improving  Outcome Evaluation: Pt demo improved independence by performing bed mobility and STS t/f w/ Min A. Pt conts to require significantly increased assist from baseline for all ADL performance d/t cognitive deficits and RUE weakness. Recommend cont skilled IPOT POC. Recommend pt DC to IP rehab.      Anticipated Discharge Disposition (OT): inpatient rehabilitation facility

## 2023-08-30 ENCOUNTER — APPOINTMENT (OUTPATIENT)
Dept: GENERAL RADIOLOGY | Facility: HOSPITAL | Age: 83
DRG: 061 | End: 2023-08-30
Payer: MEDICARE

## 2023-08-30 LAB
ANION GAP SERPL CALCULATED.3IONS-SCNC: 7 MMOL/L (ref 5–15)
BH BB BLOOD EXPIRATION DATE: NORMAL
BH BB BLOOD TYPE BARCODE: 600
BH BB DISPENSE STATUS: NORMAL
BH BB PRODUCT CODE: NORMAL
BH BB UNIT NUMBER: NORMAL
BUN SERPL-MCNC: 12 MG/DL (ref 8–23)
BUN/CREAT SERPL: 23.1 (ref 7–25)
CALCIUM SPEC-SCNC: 7.5 MG/DL (ref 8.6–10.5)
CHLORIDE SERPL-SCNC: 109 MMOL/L (ref 98–107)
CO2 SERPL-SCNC: 25 MMOL/L (ref 22–29)
CREAT SERPL-MCNC: 0.52 MG/DL (ref 0.57–1)
CROSSMATCH INTERPRETATION: NORMAL
DEPRECATED RDW RBC AUTO: 49.4 FL (ref 37–54)
EGFRCR SERPLBLD CKD-EPI 2021: 93.5 ML/MIN/1.73
ERYTHROCYTE [DISTWIDTH] IN BLOOD BY AUTOMATED COUNT: 14.6 % (ref 12.3–15.4)
GLUCOSE BLDC GLUCOMTR-MCNC: 103 MG/DL (ref 70–130)
GLUCOSE BLDC GLUCOMTR-MCNC: 104 MG/DL (ref 70–130)
GLUCOSE BLDC GLUCOMTR-MCNC: 119 MG/DL (ref 70–130)
GLUCOSE BLDC GLUCOMTR-MCNC: 121 MG/DL (ref 70–130)
GLUCOSE SERPL-MCNC: 101 MG/DL (ref 65–99)
HCT VFR BLD AUTO: 27.6 % (ref 34–46.6)
HCT VFR BLD AUTO: 27.7 % (ref 34–46.6)
HCT VFR BLD AUTO: 29.4 % (ref 34–46.6)
HGB BLD-MCNC: 9.2 G/DL (ref 12–15.9)
HGB BLD-MCNC: 9.2 G/DL (ref 12–15.9)
HGB BLD-MCNC: 9.7 G/DL (ref 12–15.9)
MAGNESIUM SERPL-MCNC: 1.9 MG/DL (ref 1.6–2.4)
MCH RBC QN AUTO: 31.7 PG (ref 26.6–33)
MCHC RBC AUTO-ENTMCNC: 33.3 G/DL (ref 31.5–35.7)
MCV RBC AUTO: 95.2 FL (ref 79–97)
PHOSPHATE SERPL-MCNC: 2.9 MG/DL (ref 2.5–4.5)
PLATELET # BLD AUTO: 237 10*3/MM3 (ref 140–450)
PMV BLD AUTO: 10.2 FL (ref 6–12)
POTASSIUM SERPL-SCNC: 3.6 MMOL/L (ref 3.5–5.2)
POTASSIUM SERPL-SCNC: 4.4 MMOL/L (ref 3.5–5.2)
RBC # BLD AUTO: 2.9 10*6/MM3 (ref 3.77–5.28)
SODIUM SERPL-SCNC: 141 MMOL/L (ref 136–145)
UNIT  ABO: NORMAL
UNIT  RH: NORMAL
WBC NRBC COR # BLD: 11.55 10*3/MM3 (ref 3.4–10.8)

## 2023-08-30 PROCEDURE — 92507 TX SP LANG VOICE COMM INDIV: CPT

## 2023-08-30 PROCEDURE — 99232 SBSQ HOSP IP/OBS MODERATE 35: CPT | Performed by: NURSE PRACTITIONER

## 2023-08-30 PROCEDURE — 97116 GAIT TRAINING THERAPY: CPT

## 2023-08-30 PROCEDURE — 84100 ASSAY OF PHOSPHORUS: CPT | Performed by: INTERNAL MEDICINE

## 2023-08-30 PROCEDURE — 85014 HEMATOCRIT: CPT | Performed by: INTERNAL MEDICINE

## 2023-08-30 PROCEDURE — 25010000002 HYDRALAZINE PER 20 MG

## 2023-08-30 PROCEDURE — 71045 X-RAY EXAM CHEST 1 VIEW: CPT

## 2023-08-30 PROCEDURE — 0 POTASSIUM CHLORIDE PER 2 MEQ: Performed by: INTERNAL MEDICINE

## 2023-08-30 PROCEDURE — 83735 ASSAY OF MAGNESIUM: CPT | Performed by: INTERNAL MEDICINE

## 2023-08-30 PROCEDURE — 84132 ASSAY OF SERUM POTASSIUM: CPT | Performed by: INTERNAL MEDICINE

## 2023-08-30 PROCEDURE — 97530 THERAPEUTIC ACTIVITIES: CPT

## 2023-08-30 PROCEDURE — 94664 DEMO&/EVAL PT USE INHALER: CPT

## 2023-08-30 PROCEDURE — 99291 CRITICAL CARE FIRST HOUR: CPT | Performed by: INTERNAL MEDICINE

## 2023-08-30 PROCEDURE — 94799 UNLISTED PULMONARY SVC/PX: CPT

## 2023-08-30 PROCEDURE — 82948 REAGENT STRIP/BLOOD GLUCOSE: CPT

## 2023-08-30 PROCEDURE — 92526 ORAL FUNCTION THERAPY: CPT

## 2023-08-30 PROCEDURE — 85027 COMPLETE CBC AUTOMATED: CPT | Performed by: INTERNAL MEDICINE

## 2023-08-30 PROCEDURE — 85018 HEMOGLOBIN: CPT | Performed by: INTERNAL MEDICINE

## 2023-08-30 PROCEDURE — 80048 BASIC METABOLIC PNL TOTAL CA: CPT | Performed by: INTERNAL MEDICINE

## 2023-08-30 RX ORDER — ASPIRIN 81 MG/1
324 TABLET, CHEWABLE ORAL DAILY
Status: DISCONTINUED | OUTPATIENT
Start: 2023-08-30 | End: 2023-08-31

## 2023-08-30 RX ORDER — IPRATROPIUM BROMIDE AND ALBUTEROL SULFATE 2.5; .5 MG/3ML; MG/3ML
3 SOLUTION RESPIRATORY (INHALATION)
Status: DISCONTINUED | OUTPATIENT
Start: 2023-08-30 | End: 2023-09-04 | Stop reason: HOSPADM

## 2023-08-30 RX ORDER — POTASSIUM CHLORIDE 29.8 MG/ML
20 INJECTION INTRAVENOUS
Status: COMPLETED | OUTPATIENT
Start: 2023-08-30 | End: 2023-08-30

## 2023-08-30 RX ADMIN — PROPAFENONE 325 MG: 325 CAPSULE, EXTENDED RELEASE ORAL at 20:27

## 2023-08-30 RX ADMIN — IPRATROPIUM BROMIDE AND ALBUTEROL SULFATE 3 ML: 2.5; .5 SOLUTION RESPIRATORY (INHALATION) at 13:28

## 2023-08-30 RX ADMIN — ATORVASTATIN CALCIUM 80 MG: 40 TABLET, FILM COATED ORAL at 20:28

## 2023-08-30 RX ADMIN — POTASSIUM CHLORIDE: 29.8 INJECTION, SOLUTION INTRAVENOUS at 07:30

## 2023-08-30 RX ADMIN — IPRATROPIUM BROMIDE AND ALBUTEROL SULFATE 3 ML: 2.5; .5 SOLUTION RESPIRATORY (INHALATION) at 20:03

## 2023-08-30 RX ADMIN — NEBIVOLOL 2.5 MG: 2.5 TABLET ORAL at 10:05

## 2023-08-30 RX ADMIN — ASPIRIN 324 MG: 81 TABLET, CHEWABLE ORAL at 10:05

## 2023-08-30 RX ADMIN — PROPAFENONE 325 MG: 325 CAPSULE, EXTENDED RELEASE ORAL at 10:04

## 2023-08-30 RX ADMIN — POTASSIUM CHLORIDE 20 MEQ: 29.8 INJECTION, SOLUTION INTRAVENOUS at 05:48

## 2023-08-30 RX ADMIN — APIXABAN 5 MG: 5 TABLET, FILM COATED ORAL at 10:05

## 2023-08-30 RX ADMIN — IPRATROPIUM BROMIDE AND ALBUTEROL SULFATE 3 ML: 2.5; .5 SOLUTION RESPIRATORY (INHALATION) at 16:56

## 2023-08-30 RX ADMIN — PANTOPRAZOLE SODIUM 40 MG: 40 TABLET, DELAYED RELEASE ORAL at 17:20

## 2023-08-30 RX ADMIN — Medication 10 ML: at 20:28

## 2023-08-30 RX ADMIN — ASPIRIN 324 MG: 81 TABLET, CHEWABLE ORAL at 17:21

## 2023-08-30 RX ADMIN — ESCITALOPRAM OXALATE 10 MG: 10 TABLET ORAL at 10:05

## 2023-08-30 RX ADMIN — LEVETIRACETAM 500 MG: 500 TABLET, EXTENDED RELEASE ORAL at 10:05

## 2023-08-30 RX ADMIN — PANTOPRAZOLE SODIUM 40 MG: 40 TABLET, DELAYED RELEASE ORAL at 10:05

## 2023-08-30 RX ADMIN — Medication 10 ML: at 10:06

## 2023-08-30 RX ADMIN — HYDRALAZINE HYDROCHLORIDE 10 MG: 20 INJECTION INTRAMUSCULAR; INTRAVENOUS at 05:48

## 2023-08-30 NOTE — PLAN OF CARE
Goal Outcome Evaluation:              Outcome Evaluation: SLP following and treating pt. Poor po intake, however not appropriate for diet upgrade consideration at this time.

## 2023-08-30 NOTE — THERAPY TREATMENT NOTE
Acute Care - Speech Language Pathology Treatment Note  [facility]     Patient Name: Laury Eugene  : 1940  MRN: [id number]  Today's Date: 2023               Admit Date: 2023     Visit Dx:    ICD-10-CM ICD-9-CM   1. Acute CVA (cerebrovascular accident)  I63.9 434.91   2. Anemia, unspecified type  D64.9 285.9   3. Dysphagia, unspecified type  R13.10 787.20   4. Acute lower gastrointestinal bleeding  K92.2 578.9   5. ABLA (acute blood loss anemia)  D62 285.1   6. Aphasia  R47.01 784.3     Patient Active Problem List   Diagnosis    Multinodular goiter    Abnormal thyroid function test    CVA (cerebral vascular accident)    Hypothyroidism (acquired)    Essential hypertension    Hyperlipemia    Paroxysmal atrial fibrillation    Acute ischemic left MCA stroke    Dysphagia    Acute lower gastrointestinal bleeding    ABLA (acute blood loss anemia)     Past Medical History:   Diagnosis Date    History of breast cancer     History of heart disease     Hypothyroidism     Multinodular goiter     Thyroid function test abnormal      Past Surgical History:   Procedure Laterality Date    BLEPHAROPLASTY      BREAST RECONSTRUCTION      CATARACT EXTRACTION Bilateral     COLONOSCOPY N/A 2023    Procedure: COLONOSCOPY;  Surgeon: Brunner, Mark I, MD;  Location: [facility] ENDOSCOPY;  Service: Gastroenterology;  Laterality: N/A;    ENDOSCOPY N/A 2023    Procedure: ESOPHAGOGASTRODUODENOSCOPY;  Surgeon: Brunner, Mark I, MD;  Location:  [facility] ENDOSCOPY;  Service: Gastroenterology;  Laterality: N/A;    HYSTERECTOMY      SIMPLE MASTECTOMY         SLP Recommendation and Plan                 Anticipated Discharge Disposition (SLP): inpatient rehabilitation facility (23 1230)        Predicted Duration Therapy Intervention (Days): until discharge (23 1230)        Daily Summary of Progress (SLP): progress toward functional goals is gradual (23 1230)           Treatment Assessment (SLP): continued, oral  dysphagia, pharyngeal dysphagia, apraxia, aphasia, communication disorder (08/30/23 1230)  Treatment Assessment Comments (SLP): Pt is nonverbal. She presents with a signifcant right facial droop.  Pocketing on the right with assistance needed for clearance. No overt pharyngeal signs suggesting aspiration, however delayed swallow appreciated with PO trials.  Pt is unable to swallow on command.  In addition, she is unable to comply with directions necessary for successful completion of swallowing exercises.  Wheezing noted- not unchanged from yesterday when FEES repeated.  Pt exhibits impaired expressive language/speech.  She is unable to repeat/imitate sounds or imitate phoneme/vowel oral position.  She imitated gestures and laughed at SLP when demonstrating oral movements. She appears to understand more than she is able to demonstrate.  However, she is unable to answer y/n questions.  I appreciated one shoulder shrug. She waved appropriately to SLP but was unable to    imitate thumbs up/down or head nod/shake. (08/30/23 1230)  Plan for Continued Treatment (SLP): continue treatment per plan of care (08/30/23 1230)  Outcome Evaluation: SLP following and treating pt. Poor po intake, however not appropriate for diet upgrade consideration at this time. (08/30/23 1358)      SLP EVALUATION (last 72 hours)       SLP SLC Evaluation       Row Name 08/30/23 1230 08/28/23 0888                Communication Assessment/Intervention    Document Type therapy note (daily note)  -ML evaluation  -MP       Subjective Information no complaints  -ML no complaints  -MP       Patient Observations cooperative  -ML alert;cooperative  -MP       Patient/Family/Caregiver Comments/Observations Family present- initially refused SLP wanting pt to rest, however pt woke up in my presence and family agreeable to ST.  -ML Family present  -MP       Patient Effort adequate  -ML good  -MP       Symptoms Noted During/After Treatment none  -ML --           General Information    Patient Profile Reviewed yes  -ML yes  -MP       Pertinent History Of Current Problem See previously documented ST hx. Family reports essentially nonverbal since Monday.  SLP reevaluated swallowing yesterday with diet modification sft ground/no mixed/nectar/no straw. Family report bites of PO/poor PO intake since admission.  -ML Adm 8/26 w/ L insular CVA. s/p EGD 8/27 w/ ulcerations s/p clips. Pt was initially placed on CL diet, but RN reported has been coughing w/ PO.  -MP       Precautions/Limitations, Vision -- WFL;for purposes of eval  -MP       Precautions/Limitations, Hearing -- WFL;for purposes of eval  -MP       Prior Level of Function-Communication -- WFL  -MP       Plans/Goals Discussed with -- patient;family;agreed upon  -MP       Barriers to Rehab -- medically complex  -MP       Patient's Goals for Discharge -- patient could not state  -MP       Family Goals for Discharge -- family did not state  -MP          Pain    Additional Documentation -- Pain Scale: FACES Pre/Post-Treatment (Group)  -MP          Pain Scale: FACES Pre/Post-Treatment    Pain: FACES Scale, Pretreatment 2-->hurts little bit  -ML 0-->no hurt  -MP       Posttreatment Pain Rating 0-->no hurt  -ML 0-->no hurt  -MP          Comprehension Assessment/Intervention    Comprehension Assessment/Intervention -- Auditory Comprehension  -MP          Auditory Comprehension Assessment/Intervention    Auditory Comprehension (Communication) -- moderate impairment  -MP       Answers Questions (Communication) -- yes/no;personal;simple;severe impairment  -MP       Able to Follow Commands (Communication) -- 1-step;moderate impairment  -MP          Expression Assessment/Intervention    Expression Assessment/Intervention -- verbal expression  -MP          Verbal Expression Assessment/Intervention    Verbal Expression -- severe impairment  -MP       Automatic Speech (Communication) -- response to greeting;severe impairment  -MP        Repetition -- sounds;severe impairment  -MP       Spontaneous/Functional Words -- severe impairment  -MP          Oral Motor Structure and Function    Oral Motor Structure and Function -- WFL  -MP       Dentition Assessment -- natural, present and adequate  -MP          Oral Musculature and Cranial Nerve Assessment    Oral Motor General Assessment -- oral labial or buccal impairment  -MP       Oral Labial or Buccal Impairment, Detail, Cranial Nerve VII (Facial): -- right labial droop;other (see comments)  dense  -MP          Motor Speech Assessment/Intervention    Motor Speech Function -- unable/difficult to assess;other (see comments)  no verbalizations  -MP          Cognitive Assessment Intervention- SLP    Cognitive Function (Cognition) -- unable/difficult to assess;other (see comments)  2' speech/language deficits  -          SLP Evaluation Clinical Impressions    SLP Diagnosis -- severe;aphasia  ? possible apraxia  -       Rehab Potential/Prognosis -- good  -[facility] Criteria for Skilled Therapy Interventions Met -- yes  -MP       Functional Impact -- difficulty communicating wants, needs;difficulty communicating in an emergency  -          SLP Treatment Clinical Impressions    Treatment Assessment (SLP) continued;oral dysphagia;pharyngeal dysphagia;apraxia;aphasia;communication disorder  -ML --       Treatment Assessment Comments (SLP) Pt is nonverbal. She presents with a signifcant right facial droop.  Pocketing on the right with assistance needed for clearance. No overt pharyngeal signs suggesting aspiration, however delayed swallow appreciated with PO trials.  Pt is unable to swallow on command.  In addition, she is unable to comply with directions necessary for successful completion of swallowing exercises.  Wheezing noted- not unchanged from yesterday when FEES repeated.  Pt exhibits impaired expressive language/speech.  She is unable to repeat/imitate sounds or imitate phoneme/vowel oral  position.  She imitated gestures and laughed at SLP when demonstrating oral movements. She appears to understand more than she is able to demonstrate.  However, she is unable to answer y/n questions.  I appreciated one shoulder shrug. She waved appropriately to SLP but was unable to    imitate thumbs up/down or head nod/shake.  -ML --       Daily Summary of Progress (SLP) progress toward functional goals is gradual  -ML --       Barriers to Overall Progress (SLP) Other (see comments)  Level of deficits  -ML --       Plan for Continued Treatment (SLP) continue treatment per plan of care  - --       Care Plan Review care plan/treatment goals reviewed  -ML --       Care Plan Review, Other Participant(s) daughter  - --          Recommendations    [place] [facility] days per week  -ML 5 days per week  -       Predicted Duration Therapy Intervention (Days) until discharge  -ML until discharge  -MP       Anticipated Discharge Disposition (SLP) inpatient rehabilitation facility  - inpatient rehabilitation facility;anticipate therapy at next level of care  -                 User Key  (r) = Recorded By, (t) = Taken By, (c) = Cosigned By      Initials Name Effective Dates     Any Burgos, CCC-SLP 06/16/21 -     MP Vince Barnhart, MS CCC-SLP 12/28/21 -                        EDUCATION  The patient has been educated in the following areas:     Cognitive Impairment Communication Impairment.           SLP GOALS       Row Name 08/30/23 1230 08/28/23 1100 08/28/23 0835       (LTG) Patient will demonstrate functional swallow for    Diet Texture (Demonstrate functional swallow) soft to chew (ground) textures  -ML soft to chew (ground) textures  -MP --    Liquid viscosity (Demonstrate functional swallow) nectar/ mildly thick liquids  -ML nectar/ mildly thick liquids  -MP --    [place] (Demonstrate functional swallow) with minimal cues (75-90% accuracy)  -ML with minimal cues (75-90% accuracy)   -MP --    Time Frame (Demonstrate functional swallow) by discharge  -ML by discharge  -MP --    Progress/Outcomes (Demonstrate functional swallow) progress slower than expected  -ML -- --    Comment (Demonstrate functional swallow) Poor po intake.  Tolerates puree/thin but with pocketing and decreased clearance except with assistance. Not appropriate for diet upgrade, however poor intake with current diet.  -ML -- --       (STG) Patient will tolerate trials of    Consistencies Trialed (Tolerate trials) soft to chew (ground) textures;nectar/ mildly thick liquids  -ML soft to chew (ground) textures;nectar/ mildly thick liquids  -MP --    Desired Outcome (Tolerate trials) without signs/symptoms of aspiration;without signs of distress;with adequate oral prep/transit/clearance  -ML without signs/symptoms of aspiration;without signs of distress;with adequate oral prep/transit/clearance  -MP --    [place] (Tolerate trials) with minimal cues (75-90% accuracy)  -ML with minimal cues (75-90% accuracy)  -MP --    Time Frame (Tolerate trials) by discharge  -ML by discharge  -MP --    Progress/Outcomes (Tolerate trials) progress slower than expected  -ML -- --    Comment (Tolerate trials) Poor po.  Pocketing on right without independent clearance.  No overt pharyngeal signs with trials presented. Wheezing persisent. No change in O2 sats with PO.  -ML -- --       (STG) Lingual Strengthening Goal 1 (SLP)    Activity (Lingual Strengthening Goal 1, SLP) increase tongue back strength  -ML increase tongue back strength  -MP --    Increase Tongue Back Strength swallow trials;lingual resistance exercises  -ML swallow trials;lingual resistance exercises  -MP --    [place]/Accuracy (Lingual Strengthening Goal 1, SLP) with minimal cues (75-90% accuracy)  -ML with minimal cues (75-90% accuracy)  -MP --    Time Frame (Lingual Strengthening Goal 1, SLP) short term goal (STG)  -ML short term goal (STG)  -MP --    Progress/Outcomes  (Lingual Strengthening Goal 1, SLP) progress slower than expected  -ML -- --    Comment (Lingual Strengthening Goal 1, SLP) Poor lingual strength- pt unable to comply with exercises to improve oral strength. Difficulty complying with directions- improved with functional tasks- eating vs. oral directions.  -ML -- --       (STG) Pharyngeal Strengthening Exercise Goal 1 (SLP)    Activity (Pharyngeal Strengthening Goal 1, SLP) increase timing;increase closure at entrance to airway/closure of airway at glottis  -ML increase timing;increase closure at entrance to airway/closure of airway at glottis  -MP --    Increase Timing prepping - 3 second prep or suck swallow or 3-step swallow  -ML prepping - 3 second prep or suck swallow or 3-step swallow  -MP --    Increase Closure at Entrance to Airway/Closure of Airway at Glottis supraglottic swallow;hard effortful swallow  -ML supraglottic swallow;hard effortful swallow  -MP --    [place]/Accuracy (Pharyngeal Strengthening Goal 1, SLP) with minimal cues (75-90% accuracy)  -ML with minimal cues (75-90% accuracy)  -MP --    Time Frame (Pharyngeal Strengthening Goal 1, SLP) short term goal (STG)  -ML short term goal (STG)  -MP --    Barriers (Pharyngeal Strengthening Goal 1, SLP) Language impairment  -ML -- --    Progress/Outcomes (Pharyngeal Strengthening Goal 1, SLP) progress slower than expected  -ML -- --    Comment (Pharyngeal Strengthening Goal 1, SLP) Unable to comply with directions for 3 second prep or supraglottic.  Slow swallow trigger with nectar thick liquids despite cues to swallow quickly.  -ML -- --       Patient will demonstrate functional communication skills for return to discharge environment     [place] with moderate cues  -ML with moderate cues  -MP with moderate cues  -MP    Time frame by discharge  -ML by discharge  -MP by discharge  -MP    Progress/Outcomes goal ongoing  -ML goal ongoing  -MP --       Comprehend Questions Goal 1 (SLP)    Improve  Ability to Comprehend Questions Goal 1 (SLP) simple yes/no questions;80%;with moderate cues (50-74%)  -ML simple yes/no questions;80%;with moderate cues (50-74%)  -MP simple yes/no questions;80%;with moderate cues (50-74%)  -MP    Time Frame (Comprehend Questions Goal 1, SLP) short term goal (STG)  -ML short term goal (STG)  -MP short term goal (STG)  -MP    Progress (Ability to Comprehend Questions Goal 1, SLP) with maximum cues (25-49%);0%  -ML -- --    Progress/Outcomes (Comprehend Questions Goal 1, SLP) progress slower than expected  -ML goal ongoing  -MP --    Comment (Comprehend Questions Goal 1, SLP) Unable to answer y/n questions with gesture or imitate y/n response with head gesture or thumbs up/down.  Shoulder shrug one time.  -ML -- --       Follow Directions Goal 2 (SLP)    Improve Ability to Follow Directions Goal 1 (SLP) 1 step direction with objects;1 step direction without objects;80%;with minimal cues (75-90%)  -ML 1 step direction with objects;1 step direction without objects;80%;with minimal cues (75-90%)  -MP 1 step direction with objects;1 step direction without objects;80%;with minimal cues (75-90%)  -MP    Time Frame (Follow Directions Goal 1, SLP) short term goal (STG)  -ML short term goal (STG)  -MP short term goal (STG)  -MP    Progress (Ability to Follow Directions Goal 1, SLP) 20%;with maximum cues (25-49%)  -ML -- --    Progress/Outcomes (Follow Directions Goal 1, SLP) progress slower than expected  -ML goal ongoing  -MP --    Comment (Follow Directions Goal 1, SLP) Unable to follow directions except with hand over hand assist.  -ML -- --       Word Retrieval Skills Goal 1 (SLP)    Improve Word Retrieval Skills By Goal 1 (SLP) repeating sounds;repeating words;completing automatic speech task, counting;completing automatic speech task, alphabet;completing automatic speech task, days of the week;completing automatic speech task, months;completing automatic speech task, Pledge of  Allegiance;completing automatic speech task, sing “Happy Birthday”;80%;with moderate cues (50-74%)  -ML repeating sounds;repeating words;completing automatic speech task, counting;completing automatic speech task, alphabet;completing automatic speech task, days of the week;completing automatic speech task, months;completing automatic speech task, Pledge of Allegiance;completing automatic speech task, sing “Happy Birthday”;80%;with moderate cues (50-74%)  -MP repeating sounds;repeating words;completing automatic speech task, counting;completing automatic speech task, alphabet;completing automatic speech task, days of the week;completing automatic speech task, months;completing automatic speech task, Pledge of Allegiance;completing automatic speech task, sing “Happy Birthday”;80%;with moderate cues (50-74%)  -MP    Time Frame (Word Retrieval Goal 1, SLP) short term goal (STG)  -ML short term goal (STG)  -MP short term goal (STG)  -MP    Progress (Word Retrieval Skills Goal 1, SLP) 0%  -ML -- --    Progress/Outcomes (Word Retrieval Goal 1, SLP) progress slower than expected  -ML goal ongoing  -MP --    Comment (Word Retrieval Goal 1, SLP) Unable to imitate sounds or mouth position for phonemes/vowels.  -ML -- --       Motor Planning Goal 1 (SLP)    Improve Motor Planning to Reduce Apraxia by Goal 1 (SLP) imitating mouth postures;imitating vowels;following isolated oral commands;80%;with moderate cues (50-74%)  -ML imitating mouth postures;imitating vowels;following isolated oral commands;80%;with moderate cues (50-74%)  -MP imitating mouth postures;imitating vowels;following isolated oral commands;80%;with moderate cues (50-74%)  -MP    Time Frame (Motor Planning Goal 1, SLP) short term goal (STG)  -ML short term goal (STG)  -MP short term goal (STG)  -MP    Progress (Motor Planning Goal 1, SLP) 0%;with maximum cues (25-49%)  -ML -- --    Progress/Outcomes (Motor Planning Goal 1, SLP) progress slower than expected  -ML  goal ongoing  -MP --    Comment (Motor Planning Goal 1, SLP) Unable to comply with max cues.  -ML -- --              User Key  (r) = Recorded By, (t) = Taken By, (c) = Cosigned By      Initials Name Provider Type    Any Tenorio CCC-SLP Speech and Language Pathologist    Vince Adhikari, MS CCC-SLP Speech and Language Pathologist                            Time Calculation:      Time Calculation- SLP       Row Name 23 1358             Time Calculation- SLP    SLP Start Time 1230  -ML      SLP Received On 23  -ML                User Key  (r) = Recorded By, (t) = Taken By, (c) = Cosigned By      Initials Name Provider Type    Any Tenorio CCC-SLP Speech and Language Pathologist                    Therapy Charges for Today       Code Description Service Date Service Provider Modifiers Qty    70089056495 HC ST TREATMENT SPEECH 2 2023 Any Burgos CCC-SLP GN 1    [id number] HC ST TREATMENT SWALLOW 2 2023 Any Burgos CCC-DOLORES GN 1                       JEREMI Petersen  2023   and [facility] - Speech Language Pathology   Swallow Treatment Note [facility]     Patient Name: Laury Eugene  : 1940  MRN: [id number]  Today's Date: 2023               Admit Date: 2023    Visit Dx:     ICD-10-CM ICD-9-CM   1. Acute CVA (cerebrovascular accident)  I63.9 434.91   2. Anemia, unspecified type  D64.9 285.9   3. Dysphagia, unspecified type  R13.10 787.20   4. Acute lower gastrointestinal bleeding  K92.2 578.9   5. ABLA (acute blood loss anemia)  D62 285.1   6. Aphasia  R47.01 784.3     Patient Active Problem List   Diagnosis    Multinodular goiter    Abnormal thyroid function test    CVA (cerebral vascular accident)    Hypothyroidism (acquired)    Essential hypertension    Hyperlipemia    Paroxysmal atrial fibrillation    Acute ischemic left MCA stroke    Dysphagia    Acute lower gastrointestinal bleeding    ABLA (acute  blood loss anemia)     Past Medical History:   Diagnosis Date    History of breast cancer     History of heart disease     Hypothyroidism     Multinodular goiter     Thyroid function test abnormal      Past Surgical History:   Procedure Laterality Date    BLEPHAROPLASTY      BREAST RECONSTRUCTION      CATARACT EXTRACTION Bilateral     COLONOSCOPY N/A 8/27/2023    Procedure: COLONOSCOPY;  Surgeon: Brunner, Mark I, MD;  Location:  [facility] ENDOSCOPY;  Service: Gastroenterology;  Laterality: N/A;    ENDOSCOPY N/A 8/27/2023    Procedure: ESOPHAGOGASTRODUODENOSCOPY;  Surgeon: Brunner, Mark I, MD;  Location:  [facility] ENDOSCOPY;  Service: Gastroenterology;  Laterality: N/A;    HYSTERECTOMY      SIMPLE MASTECTOMY         SLP Recommendation and Plan                          Anticipated Discharge Disposition (SLP): inpatient rehabilitation facility (08/30/23 1230)        Predicted Duration Therapy Intervention (Days): until discharge (08/30/23 1230)           Daily Summary of Progress (SLP): progress toward functional goals is gradual (08/30/23 1230)               Treatment Assessment (SLP): continued, oral dysphagia, pharyngeal dysphagia, apraxia, aphasia, communication disorder (08/30/23 1230)  Treatment Assessment Comments (SLP): Pt is nonverbal. She presents with a signifcant right facial droop.  Pocketing on the right with assistance needed for clearance. No overt pharyngeal signs suggesting aspiration, however delayed swallow appreciated with PO trials.  Pt is unable to swallow on command.  In addition, she is unable to comply with directions necessary for successful completion of swallowing exercises.  Wheezing noted- not unchanged from yesterday when FEES repeated.  Pt exhibits impaired expressive language/speech.  She is unable to repeat/imitate sounds or imitate phoneme/vowel oral position.  She imitated gestures and laughed at SLP when demonstrating oral movements. She appears to understand more than she is able to  demonstrate.  However, she is unable to answer y/n questions.  I appreciated one shoulder shrug. She waved appropriately to SLP but was unable to    imitate thumbs up/down or head nod/shake. (08/30/23 1230)  Plan for Continued Treatment (SLP): continue treatment per plan of care (08/30/23 1230)            Outcome Evaluation: SLP following and treating pt. Poor po intake, however not appropriate for diet upgrade consideration at this time.      SWALLOW EVALUATION (last 72 hours)       SLP Adult Swallow Evaluation       Row Name 08/28/23 1100 08/28/23 0835                Rehab Evaluation    Document Type evaluation  -MP --       Subjective Information no complaints  -MP --       Patient Observations alert;cooperative  -MP --       Patient/Family/Caregiver Comments/Observations Family present  -MP --       Patient Effort good  -MP --          General Information    Patient Profile Reviewed yes  -MP --       Pertinent History Of Current Problem See AM eval  -MP --       Current Method of Nutrition NPO  -MP NPO  -MP       Prior Level of Function-Communication -- WFL  -MP       Prior Level of Function-Swallowing -- no diet consistency restrictions  -MP       Patient's Goals for Discharge -- patient did not state  -MP       Family Goals for Discharge -- family did not state  -MP          Pain    Additional Documentation Pain Scale: FACES Pre/Post-Treatment (Group)  -MP --          Pain Scale: FACES Pre/Post-Treatment    Pain: FACES Scale, Pretreatment 0-->no hurt  -MP --       Posttreatment Pain Rating 0-->no hurt  -MP --          Oral Motor Structure and Function    Secretion Management -- WNL/WFL  -MP       Mucosal Quality -- moist, healthy  -MP          General Eating/Swallowing Observations    Respiratory Support Currently in Use -- room air  -MP       Eating/Swallowing Skills -- fed by SLP  -MP       Positioning During Eating -- upright in bed  -MP       Utensils Used -- spoon;cup  -MP       Consistencies Trialed --  ice chips;thin liquids;pureed  -MP          Clinical Swallow Eval    Pharyngeal Phase -- suspected pharyngeal impairment  -MP       Clinical Swallow Evaluation Summary -- Immediate wheeze w/ trials thin. No s/sxs w/ puree. Rec NPO x meds in pudding/puree and SLP will proceed w/ FEES to further assess.  -MP          Pharyngeal Phase Concerns    Pharyngeal Phase Concerns -- other (see comments)  wheeze  -MP          Fiberoptic Endoscopic Evaluation of Swallowing (FEES)    Risks/Benefits Reviewed risks/benefits explained;patient;family;agreed to eval  -MP --       Nasal Entry right:  -MP --       Scope serial number/identification [id number]  -MP --          Anatomy and Physiology    Anatomic Considerations no anatomic structural deviation  -MP --       Velopharyngeal WFL  -MP --       Base of Tongue symmetrical  -MP --       Epiglottis rests posteriorly  -MP --       Laryngeal Function Breathing symmetrical  -MP --       Laryngeal Function Phonation CNA  -MP --       Laryngeal Function to Breath Hold CNA  -MP --       Secretion Rating Scale (Geoff et al. 1996) 0- normal, no visible secretions  -MP --       Ice Chips DNA  -MP --       Spontaneous Swallow frequency adequate  -MP --       Sensory sensed scope  -MP --       Utensils Used Spoon;Cup;Straw  -MP --       Consistencies Trialed thin liquids;nectar-thick liquids;pudding/puree;regular textures  -MP --          FEES Interpretation    Oral Phase prolonged manipulation;with solids only;other (see comments)  u/a to pull liquid from straw  -MP --          Initiation of Pharyngeal Swallow    Initiation of Pharyngeal Swallow bolus in pyriform sinuses  -MP --       Pharyngeal Phase impaired pharyngeal phase of swallowing  -MP --       Penetration Before the Swallow thin liquids;secondary to delayed swallow initiation or mistiming  -MP --       Aspiration During the Swallow thin liquids;secondary to delayed swallow initiation or mistiming;secondary to reduced vestibular closure   -MP --       Response to Aspiration No  -MP --       No spontaneous response to aspiration with could not produce cough response despite cue  -MP --       Residue other (see comments)  no significant pharyngeal residue  -MP --       FEES Summary Mild-moderate oropharyngeal dysphagia. Prolonged mastication w/ solids. U/a to pull liquids from straw. Penetration before/ aspiration during the swallow w/ thin liquid. Silent & u/a to elicit cough w/ cue. No penetration/aspiration w/ nectar-thick, pudding, or solid. No significant pharyngeal residue. Rec soft/ground diet, no mixed consistencies, nectar-thick liquids, no straws, meds in pudding/puree.  -MP --          SLP Evaluation Clinical Impression    SLP Swallowing Diagnosis mild-moderate;oral dysphagia;pharyngeal dysphagia  -MP suspected pharyngeal dysphagia  -MP       Functional Impact risk of aspiration/pneumonia  -MP risk of aspiration/pneumonia  -MP       Rehab Potential/Prognosis, Swallowing good, to achieve stated therapy goals  -MP good, to achieve stated therapy goals  -MP       Swallow Criteria for Skilled Therapeutic Interventions Met demonstrates skilled criteria  -MP demonstrates skilled criteria  -MP          Recommendations    Therapy Frequency (Swallow) 5 days per week  -MP --       Predicted Duration Therapy Intervention (Days) until discharge  -MP --       SLP Diet Recommendation soft to chew textures;ground;no mixed consistencies;nectar thick liquids  -MP NPO  -MP       Recommended Diagnostics -- FEES  -MP       Recommended Precautions and Strategies no straw;small bites of food and sips of liquid;general aspiration precautions;upright posture during/after eating  -MP general aspiration precautions  -MP       Oral Care Recommendations Oral Care BID/PRN  -MP Oral Care BID/PRN  -MP       SLP Rec. for Method of Medication Administration meds whole;meds crushed;with puree;as tolerated  -MP meds whole;meds crushed;with puree;as tolerated  -MP        Monitor for Signs of Aspiration notify SLP if any concerns  -MP notify SLP if any concerns  -MP       Anticipated Discharge Disposition (SLP) inpatient rehabilitation facility;anticipate therapy at next level of care  -MP --                 User Key  (r) = Recorded By, (t) = Taken By, (c) = Cosigned By      Initials Name Effective Dates    MP Vince Barnhart, MS CCC-SLP 12/28/21 -                     EDUCATION  The patient has been educated in the following areas:   Dysphagia (Swallowing Impairment).        SLP GOALS       Row Name 08/30/23 1230 08/28/23 1100 08/28/23 0835       (LTG) Patient will demonstrate functional swallow for    Diet Texture (Demonstrate functional swallow) soft to chew (ground) textures  -ML soft to chew (ground) textures  -MP --    Liquid viscosity (Demonstrate functional swallow) nectar/ mildly thick liquids  -ML nectar/ mildly thick liquids  -MP --    Printer (Demonstrate functional swallow) with minimal cues (75-90% accuracy)  -ML with minimal cues (75-90% accuracy)  -MP --    Time Frame (Demonstrate functional swallow) by discharge  -ML by discharge  -MP --    Progress/Outcomes (Demonstrate functional swallow) progress slower than expected  -ML -- --    Comment (Demonstrate functional swallow) Poor po intake.  Tolerates puree/thin but with pocketing and decreased clearance except with assistance. Not appropriate for diet upgrade, however poor intake with current diet.  -ML -- --       (STG) Patient will tolerate trials of    Consistencies Trialed (Tolerate trials) soft to chew (ground) textures;nectar/ mildly thick liquids  -ML soft to chew (ground) textures;nectar/ mildly thick liquids  -MP --    Desired Outcome (Tolerate trials) without signs/symptoms of aspiration;without signs of distress;with adequate oral prep/transit/clearance  -ML without signs/symptoms of aspiration;without signs of distress;with adequate oral prep/transit/clearance  -MP --    Printer (Tolerate  trials) with minimal cues (75-90% accuracy)  -ML with minimal cues (75-90% accuracy)  -MP --    Time Frame (Tolerate trials) by discharge  -ML by discharge  -MP --    Progress/Outcomes (Tolerate trials) progress slower than expected  -ML -- --    Comment (Tolerate trials) Poor po.  Pocketing on right without independent clearance.  No overt pharyngeal signs with trials presented. Wheezing persisent. No change in O2 sats with PO.  -ML -- --       (STG) Lingual Strengthening Goal 1 (SLP)    Activity (Lingual Strengthening Goal 1, SLP) increase tongue back strength  -ML increase tongue back strength  -MP --    Increase Tongue Back Strength swallow trials;lingual resistance exercises  -ML swallow trials;lingual resistance exercises  -MP --    Gaines/Accuracy (Lingual Strengthening Goal 1, SLP) with minimal cues (75-90% accuracy)  -ML with minimal cues (75-90% accuracy)  -MP --    Time Frame (Lingual Strengthening Goal 1, SLP) short term goal (STG)  -ML short term goal (STG)  -MP --    Progress/Outcomes (Lingual Strengthening Goal 1, SLP) progress slower than expected  -ML -- --    Comment (Lingual Strengthening Goal 1, SLP) Poor lingual strength- pt unable to comply with exercises to improve oral strength. Difficulty complying with directions- improved with functional tasks- eating vs. oral directions.  -ML -- --       (STG) Pharyngeal Strengthening Exercise Goal 1 (SLP)    Activity (Pharyngeal Strengthening Goal 1, SLP) increase timing;increase closure at entrance to airway/closure of airway at glottis  -ML increase timing;increase closure at entrance to airway/closure of airway at glottis  -MP --    Increase Timing prepping - 3 second prep or suck swallow or 3-step swallow  -ML prepping - 3 second prep or suck swallow or 3-step swallow  -MP --    Increase Closure at Entrance to Airway/Closure of Airway at Glottis supraglottic swallow;hard effortful swallow  -ML supraglottic swallow;hard effortful swallow  -MP --     Panther Burn/Accuracy (Pharyngeal Strengthening Goal 1, SLP) with minimal cues (75-90% accuracy)  -ML with minimal cues (75-90% accuracy)  -MP --    Time Frame (Pharyngeal Strengthening Goal 1, SLP) short term goal (STG)  -ML short term goal (STG)  -MP --    Barriers (Pharyngeal Strengthening Goal 1, SLP) Language impairment  -ML -- --    Progress/Outcomes (Pharyngeal Strengthening Goal 1, SLP) progress slower than expected  -ML -- --    Comment (Pharyngeal Strengthening Goal 1, SLP) Unable to comply with directions for 3 second prep or supraglottic.  Slow swallow trigger with nectar thick liquids despite cues to swallow quickly.  -ML -- --       Patient will demonstrate functional communication skills for return to discharge environment     Panther Burn with moderate cues  -ML with moderate cues  -MP with moderate cues  -MP    Time frame by discharge  -ML by discharge  -MP by discharge  -MP    Progress/Outcomes goal ongoing  -ML goal ongoing  -MP --       Comprehend Questions Goal 1 (SLP)    Improve Ability to Comprehend Questions Goal 1 (SLP) simple yes/no questions;80%;with moderate cues (50-74%)  -ML simple yes/no questions;80%;with moderate cues (50-74%)  -MP simple yes/no questions;80%;with moderate cues (50-74%)  -MP    Time Frame (Comprehend Questions Goal 1, SLP) short term goal (STG)  -ML short term goal (STG)  -MP short term goal (STG)  -MP    Progress (Ability to Comprehend Questions Goal 1, SLP) with maximum cues (25-49%);0%  -ML -- --    Progress/Outcomes (Comprehend Questions Goal 1, SLP) progress slower than expected  -ML goal ongoing  -MP --    Comment (Comprehend Questions Goal 1, SLP) Unable to answer y/n questions with gesture or imitate y/n response with head gesture or thumbs up/down.  Shoulder shrug one time.  -ML -- --       Follow Directions Goal 2 (SLP)    Improve Ability to Follow Directions Goal 1 (SLP) 1 step direction with objects;1 step direction without objects;80%;with minimal  cues (75-90%)  -ML 1 step direction with objects;1 step direction without objects;80%;with minimal cues (75-90%)  -MP 1 step direction with objects;1 step direction without objects;80%;with minimal cues (75-90%)  -MP    Time Frame (Follow Directions Goal 1, SLP) short term goal (STG)  -ML short term goal (STG)  -MP short term goal (STG)  -MP    Progress (Ability to Follow Directions Goal 1, SLP) 20%;with maximum cues (25-49%)  -ML -- --    Progress/Outcomes (Follow Directions Goal 1, SLP) progress slower than expected  -ML goal ongoing  -MP --    Comment (Follow Directions Goal 1, SLP) Unable to follow directions except with hand over hand assist.  -ML -- --       Word Retrieval Skills Goal 1 (SLP)    Improve Word Retrieval Skills By Goal 1 (SLP) repeating sounds;repeating words;completing automatic speech task, counting;completing automatic speech task, alphabet;completing automatic speech task, days of the week;completing automatic speech task, months;completing automatic speech task, Pledge of Allegiance;completing automatic speech task, sing “Happy Birthday”;80%;with moderate cues (50-74%)  -ML repeating sounds;repeating words;completing automatic speech task, counting;completing automatic speech task, alphabet;completing automatic speech task, days of the week;completing automatic speech task, months;completing automatic speech task, Pledge of Allegiance;completing automatic speech task, sing “Happy Birthday”;80%;with moderate cues (50-74%)  -MP repeating sounds;repeating words;completing automatic speech task, counting;completing automatic speech task, alphabet;completing automatic speech task, days of the week;completing automatic speech task, months;completing automatic speech task, Pledge of Allegiance;completing automatic speech task, sing “Happy Birthday”;80%;with moderate cues (50-74%)  -MP    Time Frame (Word Retrieval Goal 1, SLP) short term goal (STG)  -ML short term goal (STG)  -MP short term goal  (STG)  -MP    Progress (Word Retrieval Skills Goal 1, SLP) 0%  -ML -- --    Progress/Outcomes (Word Retrieval Goal 1, SLP) progress slower than expected  -ML goal ongoing  -MP --    Comment (Word Retrieval Goal 1, SLP) Unable to imitate sounds or mouth position for phonemes/vowels.  -ML -- --       Motor Planning Goal 1 (SLP)    Improve Motor Planning to Reduce Apraxia by Goal 1 (SLP) imitating mouth postures;imitating vowels;following isolated oral commands;80%;with moderate cues (50-74%)  -ML imitating mouth postures;imitating vowels;following isolated oral commands;80%;with moderate cues (50-74%)  -MP imitating mouth postures;imitating vowels;following isolated oral commands;80%;with moderate cues (50-74%)  -MP    Time Frame (Motor Planning Goal 1, SLP) short term goal (STG)  -ML short term goal (STG)  -MP short term goal (STG)  -MP    Progress (Motor Planning Goal 1, SLP) 0%;with maximum cues (25-49%)  -ML -- --    Progress/Outcomes (Motor Planning Goal 1, SLP) progress slower than expected  -ML goal ongoing  -MP --    Comment (Motor Planning Goal 1, SLP) Unable to comply with max cues.  -ML -- --              User Key  (r) = Recorded By, (t) = Taken By, (c) = Cosigned By      Initials Name Provider Type    Any Tenorio CCC-SLP Speech and Language Pathologist     iVnce Barnhart, MS CCC-SLP Speech and Language Pathologist                       Time Calculation:    Time Calculation- SLP       Row Name 08/30/23 1358             Time Calculation- SLP    SLP Start Time 1230  -ML      SLP Received On 08/30/23  -ML                User Key  (r) = Recorded By, (t) = Taken By, (c) = Cosigned By      Initials Name Provider Type    Any Tenorio CCC-SLP Speech and Language Pathologist                    Therapy Charges for Today       Code Description Service Date Service Provider Modifiers Qty    [id number]  ST TREATMENT SPEECH 2 8/30/2023 Any Burgos CCC-SLP GN 1    [id number]  HC ST TREATMENT SWALLOW 2 8/30/2023 Any Burgos, CCC-SLP GN 1                 Any Burgos CCC-SLP  8/30/2023

## 2023-08-30 NOTE — PROGRESS NOTES
Cardiology Progress Note      Reason for visit:    Paroxysmal atrial fibrillation    IDENTIFICATION: 83-year-old female who resides in [place], [place]    Active Hospital Problems    Diagnosis  POA    **CVA (cerebral vascular accident) [I63.9]  Yes     Priority: High    Acute ischemic left MCA stroke [I63.512]  Yes     Priority: High     Occurred 8/26/2023.  Status post TNK  Patient history of A-fib but not on NOAC as could not afford Eliquis due to cost.      Acute lower gastrointestinal bleeding [K92.2]  Yes     Priority: High     Recent admission at [facility] 8/2 23 through 825 for GI bleed.  EGD 08/24/23: Erosive gastritis. Duodenal diverticulum near ampulla.  Colonoscopy 08/24/23: Pandiverticulosis. No angiodysplasia. No active bleeding. Blood was present in the distal transverse colon and distally.      Essential hypertension [I10]  Yes     Priority: Medium    Paroxysmal atrial fibrillation [I48.0]  Yes     Priority: Medium     [id number]  On propafenone and aspirin      Dysphagia [R13.10]  Yes     Priority: Medium     Added automatically from request for surgery [id number]      Hypothyroidism (acquired) [E03.9]  Yes     Priority: Low    Hyperlipemia [E78.5]  Yes     Priority: Low            Patient sitting up in the chair.  She is aphasic and unable to speak.  Her brother and nieces are at bedside.  They tell me they have changed their minds regarding starting anticoagulation.  She is currently in normal sinus rhythm.           Vital Sign Min/Max for last 24 hours  Temp  Min: 98.2 °F (36.8 °C)  Max: 99.4 °F (37.4 °C)   BP  Min: 119/49  Max: 156/63   Pulse  Min: 60  Max: 85   Resp  Min: 16  Max: 16   SpO2  Min: 90 %  Max: 98 %   Flow (L/min)  Min: 2  Max: 2      Intake/Output Summary (Last 24 hours) at 8/30/2023 0821  Last data filed at 8/30/2023 0548  Gross per 24 hour   Intake 833.04 ml   Output 680 ml   Net 153.04 ml           Physical Exam  Constitutional:       General: She is awake.    Cardiovascular:      Rate and Rhythm: Normal rate and regular rhythm.   Pulmonary:      Effort: Pulmonary effort is normal.      Breath sounds: Decreased breath sounds present.   Skin:     General: Skin is warm and dry.   Neurological:      Mental Status: She is alert.   Psychiatric:         Behavior: Behavior is cooperative.   Patient is aphasic    Tele: Normal sinus rhythm    Results Review (reviewed the patient's recent labs in the electronic medical record):     EKG (8/29/2023): Normal sinus rhythm with low voltage QRS    CXR (8/26/2023): No acute cardiopulmonary abnormality    ECHO (8/26/2023): LVEF 51-55%    Results from last 7 days   Lab Units 08/30/23  0441 08/29/23  0219 08/28/23  1735 08/28/23  0546 08/27/23  2141 08/27/23  0331 08/26/23  0446   SODIUM mmol/L 141 142  --  139  --  141 138   POTASSIUM mmol/L 3.6 3.8 3.9 3.4*   < > 3.3* 3.9   CHLORIDE mmol/L 109* 110*  --  108*  --  106 107   BUN mg/dL 12 12  --  14  --  12 17   CREATININE mg/dL 0.52* 0.66  --  0.74  --  0.83 0.84   MAGNESIUM mg/dL 1.9 1.9  --  2.1  --   --  1.9    < > = values in this interval not displayed.     Results from last 7 days   Lab Units 08/26/23  0047   HSTROP T ng/L 18*     Results from last 7 days   Lab Units 08/30/23  0441 08/30/23  0029 08/29/23  1456 08/29/23  0219 08/28/23  1523 08/28/23  0546   WBC 10*3/mm3 11.55*  --   --  12.02*  --  13.86*   HEMOGLOBIN g/dL 9.2* 9.2* 9.6* 7.1*   < > 7.0*  7.0*   HEMATOCRIT % 27.6* 27.7* 28.6* 21.0*   < > 22.5*  22.5*   PLATELETS 10*3/mm3 237  --   --  209  --  222    < > = values in this interval not displayed.       Lab Results   Component Value Date    HGBA1C 5.80 (H) 08/27/2023       Lab Results   Component Value Date    CHOL 129 08/26/2023    TRIG 74 08/26/2023    HDL 41 08/26/2023    LDL 73 08/26/2023              Paroxysmal atrial fibrillation  Aspirin 324 mg daily  Historically not been on NOAC in the past.  No NOAC now due to recent administration TNK for stroke  Echo  normal LVEF  Nebivolol 2.5 mg daily  Propafenone 325 mg twice daily  Currently maintaining NSR      Acute CVA status post TNK  In the setting of history of A-fib and not on NOAC      Hypertension  Current /52        Hyperlipidemia  Lipitor 80 mg daily      GI bleed/anemia  GI following.  Colonoscopy pandiverticulosis but no blood  EGD several ulcerations in gastric antrum with largest 8 mm presumed source of recent GI hemorrhage.  Clip applied.  According to GI low risk for bleeding if anticoagulants are indicated  Required administration of PRBCs 8/28/2023 due to anemia current H&H 9.2 and 27.6       Start Eliquis 5 mg twice daily  Discontinue full-strength aspirin  Continue propafenone and Bystolic    Electronically signed by DIANNE Dunne, 08/30/23, 8:13 AM EDT.

## 2023-08-30 NOTE — THERAPY TREATMENT NOTE
CTR @1201   Patient Name: Laury Eugene  : 1940    MRN: [id number]                              Today's Date: 2023       Admit Date: 2023    Visit Dx:     ICD-10-CM ICD-9-CM   1. Acute CVA (cerebrovascular accident)  I63.9 434.91   2. Anemia, unspecified type  D64.9 285.9   3. Dysphagia, unspecified type  R13.10 787.20   4. Acute lower gastrointestinal bleeding  K92.2 578.9   5. ABLA (acute blood loss anemia)  D62 285.1     Patient Active Problem List   Diagnosis    Multinodular goiter    Abnormal thyroid function test    CVA (cerebral vascular accident)    Hypothyroidism (acquired)    Essential hypertension    Hyperlipemia    Paroxysmal atrial fibrillation    Acute ischemic left MCA stroke    Dysphagia    Acute lower gastrointestinal bleeding    ABLA (acute blood loss anemia)     Past Medical History:   Diagnosis Date    History of breast cancer     History of heart disease     Hypothyroidism     Multinodular goiter     Thyroid function test abnormal      Past Surgical History:   Procedure Laterality Date    BLEPHAROPLASTY      BREAST RECONSTRUCTION      CATARACT EXTRACTION Bilateral     COLONOSCOPY N/A 2023    Procedure: COLONOSCOPY;  Surgeon: Brunner, Mark I, MD;  Location:  [facility];  Service: Gastroenterology;  Laterality: N/A;    ENDOSCOPY N/A 2023    Procedure: ESOPHAGOGASTRODUODENOSCOPY;  Surgeon: Brunner, Mark I, MD;  Location:  [facility];  Service: Gastroenterology;  Laterality: N/A;    HYSTERECTOMY      SIMPLE MASTECTOMY        General Information       Row Name 2301          Physical Therapy Time and Intention    Document Type therapy note (daily note)  -AY     Mode of Treatment physical therapy  -AY       Row Name 23          General Information    Patient Profile Reviewed yes  -AY     Existing Precautions/Restrictions fall;other (see comments)  R sided weakness (UE>LE), expressive/receptive aphasia, R inattention  -AY     Barriers to Rehab medically  complex;cognitive status  -AY       Row Name 08/30/23 0930          Cognition    Orientation Status (Cognition) oriented to;person;disoriented to;place;situation;time  alerts to name; unable to verbalize this date  -AY       Row Name 08/30/23 0930          Safety Issues, Functional Mobility    Safety Issues Affecting Function (Mobility) insight into deficits/self-awareness;sequencing abilities;awareness of need for assistance;safety precaution awareness;ability to follow commands  -AY     Impairments Affecting Function (Mobility) balance;cognition;coordination;endurance/activity tolerance;motor planning;strength;visual/perceptual;postural/trunk control  -AY     Cognitive Impairments, Mobility Safety/Performance attention;safety precaution awareness;awareness, need for assistance;safety precaution follow-through;insight into deficits/self-awareness;sequencing abilities  -AY     Comment, Safety Issues/Impairments (Mobility) required repeated verbal cueing/command or tactile cueing  -AY               User Key  (r) = Recorded By, (t) = Taken By, (c) = Cosigned By      Initials Name Provider Type    AY Zonia Luciano, PT Physical Therapist                   Mobility       Row Name 08/30/23 0931          Bed Mobility    Bed Mobility supine-sit  -AY     Supine-Sit [place] (Bed Mobility) moderate assist (50% patient effort);1 person assist;verbal cues  -AY     Assistive Device (Bed Mobility) bed rails;draw sheet;head of bed elevated  -AY     Comment, (Bed Mobility) cueing for sequencing. Pt with difficulty with scooting; unable to use RUE to assist d/t increased pain/discomfort percieved with mobility  -AY       Row Name 08/30/23 0931          Bed-Chair Transfer    Bed-Chair [place] (Transfers) minimum assist (75% patient effort);1 person assist;verbal cues;nonverbal cues (demo/gesture)  -AY     Assistive Device (Bed-Chair Transfers) other (see comments)  BUE support  -AY       Row Name 08/30/23 0931           Sit-Stand Transfer    Sit-Stand [place] (Transfers) minimum assist (75% patient effort);verbal cues;1 person assist;nonverbal cues (demo/gesture)  -AY     Assistive Device (Sit-Stand Transfers) walker, front-wheeled  -AY     Comment, (Sit-Stand Transfer) performed x2. manual assist required for RUE  to walker  -AY       Row Name 08/30/23 0931          Gait/Stairs (Locomotion)    [place] Level (Gait) minimum assist (75% patient effort);1 person assist;1 person to manage equipment;verbal cues;nonverbal cues (demo/gesture)  +chair follow  -AY     Assistive Device (Gait) walker, front-wheeled  -AY     Distance in Feet (Gait) 10  -AY     Deviations/Abnormal Patterns (Gait) festinating/shuffling;yaritza decreased;gait speed decreased;base of support, narrow;stride length decreased;weight shifting decreased  -AY     Bilateral Gait Deviations heel strike decreased;forward flexed posture  -AY     Comment, (Gait/Stairs) pt demonstrated shuffling gait pattern with decreased yaritza and flexed posture. cueing for posture, increased stride length, and weight shifting required. Manual cueing/assist required for walker propulsion, chair follow, and sequencing/forward stepping. gait distance limited by worsening R knee buckling.  -AY               User Key  (r) = Recorded By, (t) = Taken By, (c) = Cosigned By      Initials Name Provider Type    AY Zonia Luciano, PT Physical Therapist                   Obj/Interventions       Row Name 08/30/23 0934          Motor Skills    Therapeutic Exercise --  pt family reported pt wanting to eat breakfast; refused to attempt seated exercises this date  -AY       Row Name 08/30/23 0934          Balance    Balance Assessment sitting static balance;sitting dynamic balance;sit to stand dynamic balance;standing dynamic balance;standing static balance  -AY     Static Sitting Balance standby assist  -AY     Dynamic Sitting Balance contact guard  -AY     Position, Sitting Balance  unsupported;sitting edge of bed  -AY     Sit to Stand Dynamic Balance minimal assist  -AY     Static Standing Balance contact guard  -AY     Dynamic Standing Balance minimal assist  -AY     Position/Device Used, Standing Balance supported;walker, rolling  -AY               User Key  (r) = Recorded By, (t) = Taken By, (c) = Cosigned By      Initials Name Provider Type    AY Zonia Luciano, PT Physical Therapist                   Goals/Plan    No documentation.                  Clinical Impression       Row Name 08/30/23 0934          Pain    Pain Intervention(s) Ambulation/increased activity;Repositioned;Elevated  -AY     Additional Documentation Pain Scale: FACES Pre/Post-Treatment (Group)  -AY       Row Name 08/30/23 0934          Pain Scale: FACES Pre/Post-Treatment    Pain: FACES Scale, Pretreatment 0-->no hurt  -AY     Posttreatment Pain Rating 2-->hurts little bit  -AY     Pain Location - Side/Orientation Right  -AY     Pain Location lower  -AY     Pain Location - extremity  -AY       Row Name 08/30/23 0934          Plan of Care Review    Plan of Care Reviewed With patient;family  -AY     Progress improving  -AY     Outcome Evaluation Pt showing improvement as she progressed with mobility to ambulated 10ft with RWx and min A + chair follow requiring manual assist for walker propulsion and sequencing weight shifting. Cont to progress as able; limtied by R weakness, aphasia, balance deficit, and and decreased functional endurance. d/c rec for IRF.  -AY       Row Name 08/30/23 0934          Vital Signs    Pre Systolic BP Rehab 128  -AY     Pre Treatment Diastolic BP 56  -AY     Post Systolic BP Rehab 129  -AY     Post Treatment Diastolic BP 53  -AY     Pretreatment Heart Rate (beats/min) 82  -AY     Posttreatment Heart Rate (beats/min) 79  -AY     Pre SpO2 (%) 95  -AY     O2 Delivery Pre Treatment room air  -AY     O2 Delivery Intra Treatment room air  -AY     Post SpO2 (%) 93  -AY     O2 Delivery Post Treatment  room air  -AY     Pre Patient Position Supine  -AY     Intra Patient Position Standing  -AY     Post Patient Position Sitting  -AY       Row Name 08/30/23 0934          Positioning and Restraints    Pre-Treatment Position in bed  -AY     Post Treatment Position chair  -AY     In Chair notified nsg;reclined;sitting;call light within reach;encouraged to call for assist;exit alarm on;with family/caregiver;RUE elevated;LUE elevated;on mechanical lift sling;waffle cushion;legs elevated  -AY               User Key  (r) = Recorded By, (t) = Taken By, (c) = Cosigned By      Initials Name Provider Type    Zonia Fleming PT Physical Therapist                   Outcome Measures       Row Name 08/30/23 0936          How much help from another person do you currently need...    Turning from your back to your side while in flat bed without using bedrails? 3  -AY     Moving from lying on back to sitting on the side of a flat bed without bedrails? 2  -AY     Moving to and from a bed to a chair (including a wheelchair)? 3  -AY     Standing up from a chair using your arms (e.g., wheelchair, bedside chair)? 3  -AY     Climbing 3-5 steps with a railing? 2  -AY     To walk in hospital room? 3  -AY     AM-PAC 6 Clicks Score (PT) 16  -AY     Highest level of mobility 5 --> Static standing  -AY       Row Name 08/30/23 0936          Modified [place] Scale    Modified [place] Scale 3 - Moderate disability.  Requiring some help, but able to walk without assistance.  -AY       Row Name 08/30/23 0936          Functional Assessment    Outcome Measure Options AM-PAC 6 Clicks Basic Mobility (PT);Modified [place]ad  -AY               User Key  (r) = Recorded By, (t) = Taken By, (c) = Cosigned By      Initials Name Provider Type    Zonia Fleming PT Physical Therapist                                 Physical Therapy Education       Title: PT OT SLP Therapies (In Progress)       Topic: Physical Therapy (In Progress)       Point: Mobility training  (In Progress)       Learning Progress Summary             Patient Acceptance, E,TB, NR by AY at 8/30/2023 0936    Acceptance, E,D, NR by MB at 8/28/2023 1610   Family Acceptance, E,TB, NR by AY at 8/30/2023 0936                         Point: Home exercise program (In Progress)       Learning Progress Summary             Patient Acceptance, E,TB, NR by AY at 8/30/2023 0936    Acceptance, E,D, NR by MB at 8/28/2023 1610   Family Acceptance, E,TB, NR by AY at 8/30/2023 0936                         Point: Body mechanics (In Progress)       Learning Progress Summary             Patient Acceptance, E,TB, NR by AY at 8/30/2023 0936    Acceptance, E,D, NR by MB at 8/28/2023 1610   Family Acceptance, E,TB, NR by AY at 8/30/2023 0936                         Point: Precautions (In Progress)       Learning Progress Summary             Patient Acceptance, E,TB, NR by AY at 8/30/2023 0936    Acceptance, E,D, NR by MB at 8/28/2023 1610   Family Acceptance, E,TB, NR by AY at 8/30/2023 0936                                         User Key       Initials Effective Dates Name Provider Type Discipline    MB 06/16/21 -  Monica Dominguez, PT Physical Therapist PT    AY 11/10/20 -  Zonia Luciano, SELWYN Physical Therapist PT                  PT Recommendation and Plan     Plan of Care Reviewed With: patient, family  Progress: improving  Outcome Evaluation: Pt showing improvement as she progressed with mobility to ambulated 10ft with RWx and min A + chair follow requiring manual assist for walker propulsion and sequencing weight shifting. Cont to progress as able; limtied by R weakness, aphasia, balance deficit, and and decreased functional endurance. d/c rec for IRF.     Time Calculation:         PT Charges       Row Name 08/30/23 0937             Time Calculation    Start Time 0800  -AY      PT Received On 08/30/23  -AY         Timed Charges    95161 - Gait Training Minutes  10  -AY      96357 - PT Therapeutic Activity Minutes 29  -AY          Total Minutes    Timed Charges Total Minutes 39  -AY       Total Minutes 39  -AY                User Key  (r) = Recorded By, (t) = Taken By, (c) = Cosigned By      Initials Name Provider Type    Zonia Fleming PT Physical Therapist                  Therapy Charges for Today       Code Description Service Date Service Provider Modifiers Qty    33727509582  PT THERAPEUTIC ACT EA 15 MIN 8/30/2023 Zonia Luciano, PT GP 2    [id number] HC GAIT TRAINING EA 15 MIN 8/30/2023 Zonia Luciano PT GP 1            PT G-Codes  Outcome Measure Options: AM-PAC 6 Clicks Basic Mobility (PT), Modified [place]  AM-PAC 6 Clicks Score (PT): 16  AM-PAC 6 Clicks Score (OT): 10  Modified [place] Scale: 3 - Moderate disability.  Requiring some help, but able to walk without assistance.  PT Discharge Summary  Anticipated Discharge Disposition (PT): inpatient rehabilitation facility    Zonia Luciano PT  8/30/2023

## 2023-08-30 NOTE — PROGRESS NOTES
"Critical Care Note     LOS: 4 days   Patient Care Team:  Leatha Melvin DO as PCP - General (Internal Medicine)  Roger Hodgson MD as Consulting Physician (Endocrinology)    Chief Complaint/Reason for visit:    Chief Complaint   Patient presents with    Stroke, left middle cerebral artery   Hypertension  Paroxysmal atrial fibrillation  Acute GI bleed  Gastric ulcer    Subjective     Interval History:     Today her NIH stroke score is a 10.  She remains mute but can follow commands.  Room air saturation 94%.  She is in sinus rhythm.  She did work with physical therapy, ambulated 10 feet with rolling walker and manual assist for walker propulsion.  She has right-sided weakness and poor balance.    Review of Systems:    All systems were reviewed and negative except as noted in subjective.    Medical history, surgical history, social history, family history reviewed    Objective     Intake/Output:    Intake/Output Summary (Last 24 hours) at 8/30/2023 1306  Last data filed at 8/30/2023 0900  Gross per 24 hour   Intake 913.04 ml   Output 580 ml   Net 333.04 ml       Nutrition:  Diet: Regular/House Diet; No Mixed Consistencies, No Straw; Texture: Soft to Chew (NDD 3); Soft to Chew: Ground Meat; Fluid Consistency: Nectar Thick    Infusions:       Mechanical Ventilator Settings:                                                Telemetry: Sinus rhythm             Vital Signs  Blood pressure 134/60, pulse 73, temperature 98.2 °F (36.8 °C), temperature source Axillary, resp. rate 16, height 154.9 cm (61\"), weight 77.4 kg (170 lb 10.2 oz), SpO2 95 %.    Physical Exam:  General Appearance:  Elderly woman awake in no distress   Head:  No visible trauma   Eyes:          No gaze deviation, conjunctiva pink   Ears:     Throat: Oral mucosa moist   Neck: Trachea midline, no palpable thyroid   Back:      Lungs:   Respirations are shallow, inspiratory crackles bases, left greater than right, mild end expiratory wheezing " bilaterally    Heart:  Regular rhythm, S1, S2 auscultated   Abdomen:   Bowel sounds present, soft, nontender   Rectal:   Deferred   Extremities: No pretibial edema   Pulses:    Skin: Warm and dry without rash   Lymph nodes: No cervical adenopathy   Neurologic: Alert, mute, able to raise her right arm but her  is slightly weak, right facial droop   Interval:  (Shift change)  1a. Level of Consciousness: 0-->Alert, keenly responsive  1b. LOC Questions: 2-->Answers neither question correctly  1c. LOC Commands: 0-->Performs both tasks correctly  2. Best Gaze: 0-->Normal  3. Visual: 0-->No visual loss  4. Facial Palsy: 2-->Partial paralysis (total or near-total paralysis of lower face)  5a. Motor Arm, Left: 0-->No drift, limb holds 90 (or 45) degrees for full 10 secs  5b. Motor Arm, Right: 0-->No drift, limb holds 90 (or 45) degrees for full 10 secs  6a. Motor Leg, Left: 0-->No drift, leg holds 30 degree position for full 5 secs  6b. Motor Leg, Right: 0-->No drift, leg holds 30 degree position for full 5 secs  7. Limb Ataxia: 0-->Absent  8. Sensory: 1-->Mild-to-moderate sensory loss, patient feels pinprick is less sharp or is dull on the affected side, or there is a loss of superficial pain with pinprick, but patient is aware of being touched  9. Best Language: 3-->Mute, global aphasia, no usable speech or auditory comprehension  10. Dysarthria: 2-->Severe dysarthria, patients speech is so slurred as to be unintelligible in the absence of or out of proportion to any dysphasia, or is mute/anarthric  11. Extinction and Inattention (formerly Neglect): 0-->No abnormality    Total (NIH Stroke Scale): 10         Results Review:     I reviewed the patient's new clinical results.   Results from last 7 days   Lab Units 08/30/23  0441 08/29/23  0219 08/28/23  1735 08/28/23  0546 08/27/23  0331 08/26/23  0446 08/26/23  0047   SODIUM mmol/L 141 142  --  139   < > 138  --    POTASSIUM mmol/L 3.6 3.8 3.9 3.4*   < > 3.9  --     CHLORIDE mmol/L 109* 110*  --  108*   < > 107  --    CO2 mmol/L 25.0 25.0  --  27.0   < > 25.0  --    BUN mg/dL 12 12  --  14   < > 17  --    CREATININE mg/dL 0.52* 0.66  --  0.74   < > 0.84  --    CALCIUM mg/dL 7.5* 6.9*  --  6.9*   < > 7.5*  --    BILIRUBIN mg/dL  --  0.5  --   --   --  0.2  --    ALK PHOS U/L  --  71  --   --   --  85  --    ALT (SGPT) U/L  --  6  --   --   --  8 9   AST (SGOT) U/L  --  14  --   --   --  14 16   GLUCOSE mg/dL 101* 110*  --  89   < > 127*  --     < > = values in this interval not displayed.     Results from last 7 days   Lab Units 08/30/23  0441 08/30/23  0029 08/29/23  1456 08/29/23  0219 08/28/23  1523 08/28/23  0546   WBC 10*3/mm3 11.55*  --   --  12.02*  --  13.86*   HEMOGLOBIN g/dL 9.2* 9.2* 9.6* 7.1*   < > 7.0*  7.0*   HEMATOCRIT % 27.6* 27.7* 28.6* 21.0*   < > 22.5*  22.5*   PLATELETS 10*3/mm3 237  --   --  209  --  222    < > = values in this interval not displayed.         No results found for: BLOODCX  No results found for: URINECX    I reviewed the patient's new imaging including images and reports.    Findings:  There is mild cardiomegaly. Pulmonary vessels are within normal limits. There is now a right-sided PICC line is in place the tip projecting over the superior vena cava. There is hazy left perihilar and left basilar airspace disease which may be secondary   to pneumonia or less likely atelectasis. No definite pleural effusion. Right lung is clear. No right pleural effusion. No evidence pneumothorax.   Impression:     Impression:    1. Cardiomegaly.  2. Hazy left perihilar and left basilar airspace disease which may be secondary to pneumonia.  3. Right-sided PICC line in place the tip projecting over the superior vena cava.      Electronically Signed: Yared Muñoz MD   8/30/2023 10:45 AM EDT     All medications reviewed.   apixaban, 5 mg, Oral, Q12H  atorvastatin, 80 mg, Oral, Nightly  escitalopram, 10 mg, Oral, Daily  ipratropium-albuterol, 3 mL,  Nebulization, 4x Daily - RT  levETIRAcetam XR, 500 mg, Oral, Daily  nebivolol, 2.5 mg, Oral, Q24H  pantoprazole, 40 mg, Oral, BID AC  propafenone SR, 325 mg, Oral, Q12H  sodium chloride, 10 mL, Intravenous, Q12H          Assessment & Plan       CVA (cerebral vascular accident)    Hypothyroidism (acquired)    Essential hypertension    Hyperlipemia    Paroxysmal atrial fibrillation    Acute ischemic left MCA stroke    Dysphagia    Acute lower gastrointestinal bleeding    83-year-old woman with paroxysmal atrial fibrillation, hypothyroidism, hypertension, breast cancer, GI bleed presenting with left facial droop, dysarthria and confusion.  She had an occluded M2 branch of her left middle cerebral artery.  She received TNK.  She developed angioedema treated with Solu-Medrol, Pepcid, Benadryl.    She was empirically started on Keppra for an abnormal EEG with some left frontal sharp waves and brief runs though no electrographic seizures seen.    Post thrombolytic therapy she developed GI bleeding.  She underwent an EGD that revealed several ulcerations in the gastrum the largest was 8 mm with a visible vessel and it was clipped.  Colonoscopy revealed pandiverticulosis but no active bleeding.  She remains on a proton pump inhibitor.  She did receive a total of 2 units of packed red cells.  Post endoscopy she has had no further signs of active bleeding.  Hemoglobin today is 9.2.  GI feels she is a low risk for rebleeding and have no objection to anticoagulation.    Today her NIH stroke scale is a 10.  She is participating with physical therapy.  Blood pressures ranging from 120-156.  Her Bystolic was restarted.  She remains in sinus rhythm.  She has a history of paroxysmal atrial fibrillation and her pirfenidone was restarted.  Echocardiogram revealed an EF of 51 to 55%.  No saline test performed.  Daughter had initially wanted to hold off on anticoagulation because of her frequent falling.  But given her embolic stroke I  feel strongly that we should restart Eliquis.  She is now agreeable.  CT of the head on August 27 revealed a developing left insular cortex subacute infarct without hemorrhage and a chronic right temporal lobe infarct.    Chest x-ray shows a left lower lobe infiltrate.  Room air saturations are adequate at 94%.  She does not have fever and white count is only 11.5.    She does not have a history of diabetes.  Her A1c is 6.1.  Blood sugars are running 100-138.      PLAN:    Restart Eliquis 5 mg twice daily  PT, OT, speech therapy  Aspirin, statin  Keppra    Protonix twice daily    Bystolic  Pirfenidone  Remove Alvarez catheter    Start bronchodilators  Incentive spirometry  Mobilize  Check a CRP, procalcitonin    Anticipate discharge to inpatient rehab    VTE Prophylaxis: anticoagulated    Stress Ulcer Prophylaxis: Protonix    Kelli Morales MD  08/30/23  13:06 EDT      Time: Critical care 35 min  I personally provided care to this critically ill patient as documented above.  Critical care time does not include time spent on separately billed procedures.  None of my critical care time was concurrent with other critical care providers.

## 2023-08-30 NOTE — PLAN OF CARE
Goal Outcome Evaluation: NIH 10. Pt alert and following commands. Pt still mute. One episode of emesis at beginning of shift resolved with PRN Zofran. KOFI PICC in place.

## 2023-08-30 NOTE — CASE MANAGEMENT/SOCIAL WORK
Continued Stay Note  [facility]     Patient Name: Laury Eugene  MRN: [id number]  Today's Date: 8/30/2023    Admit Date: 8/26/2023    Plan: [facility] Acute [facility]   Discharge Plan       Row Name 08/30/23 1049       Plan    Plan [facility]    Patient/Family in Agreement with Plan yes    Plan Comments PT/OT are recommending Acute Rehab. I spoke with pt, pt's spouse, and pt's niece/Mindy/POA, at the bedside. We discussed PT/OT recommendations. Mindy states they would like pt to go to [facility]. Mindy asked about family staying with pt at times. I explained that I would talk with [facility] about that when I make the referral. Pt is currently on a soft diet with nectar thick liquids. Pt has global aphasia, but OT/Kailyn states that pt follows commands well while they have worked with her. I spoke with April/[facility], on the phone, to make referral. She states that she will request a private room so family can stay when pt, as needed.  will continue to follow.    Final Discharge Disposition Code 62 - inpatient rehab facility                   Discharge Codes    No documentation.                 Expected Discharge Date and Time       Expected Discharge Date Expected Discharge Time    Sep 4, 2023               Regina Hurtado RN

## 2023-08-30 NOTE — PLAN OF CARE
Goal Outcome Evaluation:  Plan of Care Reviewed With: patient, family        Progress: improving  Outcome Evaluation: Pt showing improvement as she progressed with mobility to ambulated 10ft with RWx and min A + chair follow requiring manual assist for walker propulsion and sequencing weight shifting. Cont to progress as able; limtied by R weakness, aphasia, balance deficit, and and decreased functional endurance. d/c rec for IRF.      Anticipated Discharge Disposition (PT): inpatient rehabilitation facility

## 2023-08-31 LAB
ANION GAP SERPL CALCULATED.3IONS-SCNC: 7 MMOL/L (ref 5–15)
BASOPHILS # BLD AUTO: 0.03 10*3/MM3 (ref 0–0.2)
BASOPHILS NFR BLD AUTO: 0.3 % (ref 0–1.5)
BUN SERPL-MCNC: 15 MG/DL (ref 8–23)
BUN/CREAT SERPL: 22.1 (ref 7–25)
CALCIUM SPEC-SCNC: 7.8 MG/DL (ref 8.6–10.5)
CHLORIDE SERPL-SCNC: 107 MMOL/L (ref 98–107)
CO2 SERPL-SCNC: 26 MMOL/L (ref 22–29)
CREAT SERPL-MCNC: 0.68 MG/DL (ref 0.57–1)
CRP SERPL-MCNC: 2.31 MG/DL (ref 0–0.5)
DEPRECATED RDW RBC AUTO: 50.5 FL (ref 37–54)
EGFRCR SERPLBLD CKD-EPI 2021: 87.6 ML/MIN/1.73
EOSINOPHIL # BLD AUTO: 0.44 10*3/MM3 (ref 0–0.4)
EOSINOPHIL NFR BLD AUTO: 4.5 % (ref 0.3–6.2)
ERYTHROCYTE [DISTWIDTH] IN BLOOD BY AUTOMATED COUNT: 14.9 % (ref 12.3–15.4)
GLUCOSE BLDC GLUCOMTR-MCNC: 138 MG/DL (ref 70–130)
GLUCOSE BLDC GLUCOMTR-MCNC: 93 MG/DL (ref 70–130)
GLUCOSE BLDC GLUCOMTR-MCNC: 98 MG/DL (ref 70–130)
GLUCOSE SERPL-MCNC: 94 MG/DL (ref 65–99)
HCT VFR BLD AUTO: 29.4 % (ref 34–46.6)
HCT VFR BLD AUTO: 29.4 % (ref 34–46.6)
HCT VFR BLD AUTO: 31.9 % (ref 34–46.6)
HGB BLD-MCNC: 10.6 G/DL (ref 12–15.9)
HGB BLD-MCNC: 9.6 G/DL (ref 12–15.9)
HGB BLD-MCNC: 9.6 G/DL (ref 12–15.9)
IMM GRANULOCYTES # BLD AUTO: 0.05 10*3/MM3 (ref 0–0.05)
IMM GRANULOCYTES NFR BLD AUTO: 0.5 % (ref 0–0.5)
LYMPHOCYTES # BLD AUTO: 0.94 10*3/MM3 (ref 0.7–3.1)
LYMPHOCYTES NFR BLD AUTO: 9.7 % (ref 19.6–45.3)
MCH RBC QN AUTO: 31.5 PG (ref 26.6–33)
MCHC RBC AUTO-ENTMCNC: 32.7 G/DL (ref 31.5–35.7)
MCV RBC AUTO: 96.4 FL (ref 79–97)
MONOCYTES # BLD AUTO: 1.07 10*3/MM3 (ref 0.1–0.9)
MONOCYTES NFR BLD AUTO: 11 % (ref 5–12)
NEUTROPHILS NFR BLD AUTO: 7.2 10*3/MM3 (ref 1.7–7)
NEUTROPHILS NFR BLD AUTO: 74 % (ref 42.7–76)
NRBC BLD AUTO-RTO: 0 /100 WBC (ref 0–0.2)
PLATELET # BLD AUTO: 312 10*3/MM3 (ref 140–450)
PMV BLD AUTO: 9.8 FL (ref 6–12)
POTASSIUM SERPL-SCNC: 3.8 MMOL/L (ref 3.5–5.2)
PROCALCITONIN SERPL-MCNC: 0.09 NG/ML (ref 0–0.25)
QT INTERVAL: 370 MS
QTC INTERVAL: 464 MS
RBC # BLD AUTO: 3.05 10*6/MM3 (ref 3.77–5.28)
SODIUM SERPL-SCNC: 140 MMOL/L (ref 136–145)
WBC NRBC COR # BLD: 9.73 10*3/MM3 (ref 3.4–10.8)

## 2023-08-31 PROCEDURE — 85025 COMPLETE CBC W/AUTO DIFF WBC: CPT | Performed by: INTERNAL MEDICINE

## 2023-08-31 PROCEDURE — 84145 PROCALCITONIN (PCT): CPT | Performed by: INTERNAL MEDICINE

## 2023-08-31 PROCEDURE — 82948 REAGENT STRIP/BLOOD GLUCOSE: CPT

## 2023-08-31 PROCEDURE — 85018 HEMOGLOBIN: CPT | Performed by: INTERNAL MEDICINE

## 2023-08-31 PROCEDURE — 94761 N-INVAS EAR/PLS OXIMETRY MLT: CPT

## 2023-08-31 PROCEDURE — 85014 HEMATOCRIT: CPT | Performed by: INTERNAL MEDICINE

## 2023-08-31 PROCEDURE — 97116 GAIT TRAINING THERAPY: CPT

## 2023-08-31 PROCEDURE — 86140 C-REACTIVE PROTEIN: CPT | Performed by: INTERNAL MEDICINE

## 2023-08-31 PROCEDURE — 25010000002 MAGNESIUM SULFATE 2 GM/50ML SOLUTION: Performed by: NURSE PRACTITIONER

## 2023-08-31 PROCEDURE — 94799 UNLISTED PULMONARY SVC/PX: CPT

## 2023-08-31 PROCEDURE — 80048 BASIC METABOLIC PNL TOTAL CA: CPT | Performed by: INTERNAL MEDICINE

## 2023-08-31 PROCEDURE — 25010000002 FUROSEMIDE PER 20 MG: Performed by: INTERNAL MEDICINE

## 2023-08-31 PROCEDURE — 94664 DEMO&/EVAL PT USE INHALER: CPT

## 2023-08-31 PROCEDURE — 97530 THERAPEUTIC ACTIVITIES: CPT

## 2023-08-31 PROCEDURE — 99233 SBSQ HOSP IP/OBS HIGH 50: CPT | Performed by: INTERNAL MEDICINE

## 2023-08-31 PROCEDURE — 99232 SBSQ HOSP IP/OBS MODERATE 35: CPT | Performed by: NURSE PRACTITIONER

## 2023-08-31 RX ORDER — MAGNESIUM SULFATE HEPTAHYDRATE 40 MG/ML
2 INJECTION, SOLUTION INTRAVENOUS ONCE
Status: COMPLETED | OUTPATIENT
Start: 2023-08-31 | End: 2023-08-31

## 2023-08-31 RX ORDER — FUROSEMIDE 10 MG/ML
40 INJECTION INTRAMUSCULAR; INTRAVENOUS ONCE
Status: COMPLETED | OUTPATIENT
Start: 2023-08-31 | End: 2023-08-31

## 2023-08-31 RX ORDER — ATORVASTATIN CALCIUM 80 MG/1
80 TABLET, FILM COATED ORAL NIGHTLY
Qty: 90 TABLET | Refills: 0 | Status: SHIPPED | OUTPATIENT
Start: 2023-08-31

## 2023-08-31 RX ORDER — PROPAFENONE HYDROCHLORIDE 325 MG/1
325 CAPSULE, EXTENDED RELEASE ORAL EVERY 12 HOURS SCHEDULED
Qty: 120 CAPSULE | Refills: 0 | Status: SHIPPED | OUTPATIENT
Start: 2023-08-31

## 2023-08-31 RX ORDER — NEBIVOLOL 2.5 MG/1
2.5 TABLET ORAL
Qty: 90 TABLET | Refills: 0 | Status: SHIPPED | OUTPATIENT
Start: 2023-08-31

## 2023-08-31 RX ORDER — POTASSIUM CHLORIDE 1.5 G/1.58G
20 POWDER, FOR SOLUTION ORAL ONCE
Status: COMPLETED | OUTPATIENT
Start: 2023-08-31 | End: 2023-08-31

## 2023-08-31 RX ADMIN — PANTOPRAZOLE SODIUM 40 MG: 40 TABLET, DELAYED RELEASE ORAL at 09:44

## 2023-08-31 RX ADMIN — LEVETIRACETAM 500 MG: 500 TABLET, EXTENDED RELEASE ORAL at 13:31

## 2023-08-31 RX ADMIN — METOPROLOL TARTRATE 5 MG: 5 INJECTION INTRAVENOUS at 02:48

## 2023-08-31 RX ADMIN — FUROSEMIDE 40 MG: 10 INJECTION, SOLUTION INTRAMUSCULAR; INTRAVENOUS at 13:24

## 2023-08-31 RX ADMIN — ESCITALOPRAM OXALATE 10 MG: 10 TABLET ORAL at 09:45

## 2023-08-31 RX ADMIN — IPRATROPIUM BROMIDE AND ALBUTEROL SULFATE 3 ML: 2.5; .5 SOLUTION RESPIRATORY (INHALATION) at 17:01

## 2023-08-31 RX ADMIN — ATORVASTATIN CALCIUM 80 MG: 40 TABLET, FILM COATED ORAL at 20:47

## 2023-08-31 RX ADMIN — POTASSIUM CHLORIDE 20 MEQ: 1.5 POWDER, FOR SOLUTION ORAL at 13:25

## 2023-08-31 RX ADMIN — NEBIVOLOL 2.5 MG: 2.5 TABLET ORAL at 09:44

## 2023-08-31 RX ADMIN — APIXABAN 5 MG: 5 TABLET, FILM COATED ORAL at 09:58

## 2023-08-31 RX ADMIN — IPRATROPIUM BROMIDE AND ALBUTEROL SULFATE 3 ML: 2.5; .5 SOLUTION RESPIRATORY (INHALATION) at 13:13

## 2023-08-31 RX ADMIN — IPRATROPIUM BROMIDE AND ALBUTEROL SULFATE 3 ML: 2.5; .5 SOLUTION RESPIRATORY (INHALATION) at 20:11

## 2023-08-31 RX ADMIN — Medication 10 ML: at 20:56

## 2023-08-31 RX ADMIN — PROPAFENONE 325 MG: 325 CAPSULE, EXTENDED RELEASE ORAL at 09:44

## 2023-08-31 RX ADMIN — Medication 10 ML: at 09:45

## 2023-08-31 RX ADMIN — IPRATROPIUM BROMIDE AND ALBUTEROL SULFATE 3 ML: 2.5; .5 SOLUTION RESPIRATORY (INHALATION) at 07:15

## 2023-08-31 RX ADMIN — APIXABAN 5 MG: 5 TABLET, FILM COATED ORAL at 20:46

## 2023-08-31 RX ADMIN — PANTOPRAZOLE SODIUM 40 MG: 40 TABLET, DELAYED RELEASE ORAL at 17:37

## 2023-08-31 RX ADMIN — MAGNESIUM SULFATE HEPTAHYDRATE 2 G: 2 INJECTION, SOLUTION INTRAVENOUS at 02:49

## 2023-08-31 RX ADMIN — PROPAFENONE 325 MG: 325 CAPSULE, EXTENDED RELEASE ORAL at 20:46

## 2023-08-31 NOTE — PROGRESS NOTES
"Critical Care Note     LOS: 5 days   Patient Care Team:  Leatha Melvin DO as PCP - General (Internal Medicine)  Roger Hodgson MD as Consulting Physician (Endocrinology)    Chief Complaint/Reason for visit:    Chief Complaint   Patient presents with    Stroke, left middle cerebral artery   Hypertension  Paroxysmal atrial fibrillation  Acute GI bleed  Gastric ulcer    Subjective     Interval History:     Back in atrial fibrillation last night and given a dose of metoprolol.  NIH stroke scale is 11.  Blood pressure .  Room air saturation 96%.  Urine output yesterday 550 mL, creatinine this morning 0.68.    Review of Systems:    All systems were reviewed and negative except as noted in subjective.    Medical history, surgical history, social history, family history reviewed    Objective     Intake/Output:    Intake/Output Summary (Last 24 hours) at 8/31/2023 1230  Last data filed at 8/31/2023 0600  Gross per 24 hour   Intake 205 ml   Output 325 ml   Net -120 ml       Nutrition:  Diet: Regular/House Diet; No Mixed Consistencies, No Straw; Texture: Soft to Chew (NDD 3); Soft to Chew: Ground Meat; Fluid Consistency: Nectar Thick    Infusions:       Mechanical Ventilator Settings:                                                Telemetry: Sinus rhythm             Vital Signs  Blood pressure 120/57, pulse 98, temperature 98.3 °F (36.8 °C), temperature source Axillary, resp. rate 16, height 154.9 cm (61\"), weight 77.4 kg (170 lb 10.2 oz), SpO2 96 %.    Physical Exam:  General Appearance:  Elderly woman awake in no distress   Head:  No visible trauma   Eyes:          No gaze deviation, conjunctiva pink   Ears:     Throat: Oral mucosa moist   Neck: Trachea midline, no palpable thyroid   Back:      Lungs:   Respirations are shallow, inspiratory crackles bases, left greater than right, wheezing improved    Heart:  Regular rhythm, S1, S2 auscultated   Abdomen:   Bowel sounds present, soft, nontender "   Rectal:   Deferred   Extremities: No pretibial edema   Pulses:    Skin: Warm and dry without rash   Lymph nodes: No cervical adenopathy   Neurologic: Alert, mute, able to raise her right arm but her  is slightly weak, right facial droop   Interval:  (Shift change)  1a. Level of Consciousness: 0-->Alert, keenly responsive  1b. LOC Questions: 2-->Answers neither question correctly  1c. LOC Commands: 0-->Performs both tasks correctly  2. Best Gaze: 0-->Normal  3. Visual: 0-->No visual loss  4. Facial Palsy: 2-->Partial paralysis (total or near-total paralysis of lower face)  5a. Motor Arm, Left: 0-->No drift, limb holds 90 (or 45) degrees for full 10 secs  5b. Motor Arm, Right: 0-->No drift, limb holds 90 (or 45) degrees for full 10 secs  6a. Motor Leg, Left: 0-->No drift, leg holds 30 degree position for full 5 secs  6b. Motor Leg, Right: 0-->No drift, leg holds 30 degree position for full 5 secs  7. Limb Ataxia: 0-->Absent  8. Sensory: 1-->Mild-to-moderate sensory loss, patient feels pinprick is less sharp or is dull on the affected side, or there is a loss of superficial pain with pinprick, but patient is aware of being touched  9. Best Language: 3-->Mute, global aphasia, no usable speech or auditory comprehension  10. Dysarthria: 2-->Severe dysarthria, patients speech is so slurred as to be unintelligible in the absence of or out of proportion to any dysphasia, or is mute/anarthric  11. Extinction and Inattention (formerly Neglect): 0-->No abnormality    Total (NIH Stroke Scale): 10         Results Review:     I reviewed the patient's new clinical results.   Results from last 7 days   Lab Units 08/31/23  0503 08/30/23  1424 08/30/23  0441 08/29/23  0219 08/27/23  0331 08/26/23  0446 08/26/23  0047   SODIUM mmol/L 140  --  141 142   < > 138  --    POTASSIUM mmol/L 3.8 4.4 3.6 3.8   < > 3.9  --    CHLORIDE mmol/L 107  --  109* 110*   < > 107  --    CO2 mmol/L 26.0  --  25.0 25.0   < > 25.0  --    BUN mg/dL 15   --  12 12   < > 17  --    CREATININE mg/dL 0.68  --  0.52* 0.66   < > 0.84  --    CALCIUM mg/dL 7.8*  --  7.5* 6.9*   < > 7.5*  --    BILIRUBIN mg/dL  --   --   --  0.5  --  0.2  --    ALK PHOS U/L  --   --   --  71  --  85  --    ALT (SGPT) U/L  --   --   --  6  --  8 9   AST (SGOT) U/L  --   --   --  14  --  14 16   GLUCOSE mg/dL 94  --  101* 110*   < > 127*  --     < > = values in this interval not displayed.     Results from last 7 days   Lab Units 08/31/23  0503 08/30/23  2109 08/30/23  0441 08/29/23  1456 08/29/23  0219   WBC 10*3/mm3 9.73  --  11.55*  --  12.02*   HEMOGLOBIN g/dL 9.6*  9.6* 9.7* 9.2*   < > 7.1*   HEMATOCRIT % 29.4*  29.4* 29.4* 27.6*   < > 21.0*   PLATELETS 10*3/mm3 312  --  237  --  209    < > = values in this interval not displayed.         No results found for: BLOODCX  No results found for: URINECX    I reviewed the patient's new imaging including images and reports.    Findings:  There is mild cardiomegaly. Pulmonary vessels are within normal limits. There is now a right-sided PICC line is in place the tip projecting over the superior vena cava. There is hazy left perihilar and left basilar airspace disease which may be secondary   to pneumonia or less likely atelectasis. No definite pleural effusion. Right lung is clear. No right pleural effusion. No evidence pneumothorax.   Impression:     Impression:    1. Cardiomegaly.  2. Hazy left perihilar and left basilar airspace disease which may be secondary to pneumonia.  3. Right-sided PICC line in place the tip projecting over the superior vena cava.      Electronically Signed: Yared Muñoz MD   8/30/2023 10:45 AM EDT     All medications reviewed.   apixaban, 5 mg, Oral, Q12H  atorvastatin, 80 mg, Oral, Nightly  escitalopram, 10 mg, Oral, Daily  ipratropium-albuterol, 3 mL, Nebulization, 4x Daily - RT  levETIRAcetam XR, 500 mg, Oral, Daily  nebivolol, 2.5 mg, Oral, Q24H  pantoprazole, 40 mg, Oral, BID AC  propafenone SR, 325 mg, Oral,  Q12H  sodium chloride, 10 mL, Intravenous, Q12H          Assessment & Plan       CVA (cerebral vascular accident)    Hypothyroidism (acquired)    Essential hypertension    Hyperlipemia    Paroxysmal atrial fibrillation    Acute ischemic left MCA stroke    Dysphagia    Acute lower gastrointestinal bleeding    83-year-old woman with paroxysmal atrial fibrillation, hypothyroidism, hypertension, breast cancer, GI bleed presenting with left facial droop, dysarthria and confusion.  She had an occluded M2 branch of her left middle cerebral artery.  She received TNK.  She developed angioedema treated with Solu-Medrol, Pepcid, Benadryl.    She was empirically started on Keppra for an abnormal EEG with some left frontal sharp waves and brief runs though no electrographic seizures seen.    Post thrombolytic therapy she developed GI bleeding.  She underwent an EGD that revealed several ulcerations in the gastrum the largest was 8 mm with a visible vessel and it was clipped.  Colonoscopy revealed pandiverticulosis but no active bleeding.  She remains on a proton pump inhibitor.  She did receive a total of 2 units of packed red cells.  Post endoscopy she has had no further signs of active bleeding.  Hemoglobin today is 9.2.  GI feels she is a low risk for rebleeding and have no objection to anticoagulation.    Today her NIH stroke scale is a 11.  She is participating with physical therapy.  Blood pressures ranging from 100-160.  Her Bystolic was restarted.  She developed recurrent atrial fibrillation last night.  She remains on her propafenone.  Bystolic restarted.  Currently her rate is in the high 90s.  Discussed with cardiology and we feel strongly that she needs to be on Eliquis so it was restarted today and aspirin held.    Chest x-ray shows a left lower lobe infiltrate.  Room air saturations are adequate at 94%.  She does not have fever and white count is only 9.73.  Procalcitonin is low at 0.09.  Bronchodilators  initiated    She does not have a history of diabetes.  Her A1c is 6.1.  Blood sugars are running .      PLAN:    Restart Eliquis 5 mg twice daily  PT, OT, speech therapy  Aspirin, statin  Keppra    Protonix twice daily    Bystolic  Pirfenidone  Gentle diuresis    Continue bronchodilators  Incentive spirometry  Mobilize      Anticipate discharge to inpatient rehab    VTE Prophylaxis: anticoagulated    Stress Ulcer Prophylaxis: Protonix    Kelli Morales MD  08/31/23  12:30 EDT      Time: Critical care 35 min  I personally provided care to this critically ill patient as documented above.  Critical care time does not include time spent on separately billed procedures.  None of my critical care time was concurrent with other critical care providers.

## 2023-08-31 NOTE — PROGRESS NOTES
Cardiology Progress Note      Reason for visit:    Paroxysmal atrial fibrillation    IDENTIFICATION: 83-year-old female who resides in [place], [place]    Active Hospital Problems    Diagnosis  POA    **CVA (cerebral vascular accident) [I63.9]  Yes     Priority: High    Acute ischemic left MCA stroke [I63.512]  Yes     Priority: High     Occurred 8/26/2023.  Status post TNK  Patient history of A-fib but not on NOAC as could not afford Eliquis due to cost.      Acute lower gastrointestinal bleeding [K92.2]  Yes     Priority: High     Recent admission at [facility] 8/2 23 through 825 for GI bleed.  EGD 08/24/23: Erosive gastritis. Duodenal diverticulum near ampulla.  Colonoscopy 08/24/23: Pandiverticulosis. No angiodysplasia. No active bleeding. Blood was present in the distal transverse colon and distally.      Essential hypertension [I10]  Yes     Priority: Medium    Paroxysmal atrial fibrillation [I48.0]  Yes     Priority: Medium     [id number]  On propafenone and aspirin      Dysphagia [R13.10]  Yes     Priority: Medium     Added automatically from request for surgery [id number]      Hypothyroidism (acquired) [E03.9]  Yes     Priority: Low    Hyperlipemia [E78.5]  Yes     Priority: Low            Patient is lying in bed breathing easy on room air.  Ever since her stroke she has been unable to speak.  Her niece is at bedside.  She still has right facial drooping.           Vital Sign Min/Max for last 24 hours  Temp  Min: 98.2 °F (36.8 °C)  Max: 99.2 °F (37.3 °C)   BP  Min: 94/79  Max: 160/65   Pulse  Min: 63  Max: 107   Resp  Min: 16  Max: 20   SpO2  Min: 91 %  Max: 99 %   Flow (L/min)  Min: 2  Max: 2      Intake/Output Summary (Last 24 hours) at 8/31/2023 0755  Last data filed at 8/31/2023 0600  Gross per 24 hour   Intake 360 ml   Output 550 ml   Net -190 ml           Physical Exam  Constitutional:       General: She is awake.   Cardiovascular:      Rate and Rhythm: Normal rate. Rhythm irregular.       Heart sounds: No murmur heard.  Pulmonary:      Effort: Pulmonary effort is normal.      Breath sounds: Decreased breath sounds present.   Musculoskeletal:      Right lower leg: No edema.      Left lower leg: No edema.   Skin:     General: Skin is warm and dry.   Neurological:      Mental Status: She is alert.   Psychiatric:         Behavior: Behavior is cooperative.       Tele: Intermittent atrial fibrillation noted on telemetry     Results Review (reviewed the patient's recent labs in the electronic medical record):      EKG (8/29/2023): Normal sinus rhythm with low voltage QRS     CXR (8/30/2023): Cardiomegaly.  Hazy left perihilar and left basilar airspace disease which may be secondary to pneumonia.     ECHO (8/26/2023): LVEF 51-55%    Results from last 7 days   Lab Units 08/31/23  0503 08/30/23  1424 08/30/23  0441 08/29/23  0219 08/28/23  1735 08/28/23  0546 08/27/23  2141 08/27/23  0331 08/26/23  0446   SODIUM mmol/L 140  --  141 142  --  139  --  141 138   POTASSIUM mmol/L 3.8 4.4 3.6 3.8   < > 3.4*   < > 3.3* 3.9   CHLORIDE mmol/L 107  --  109* 110*  --  108*  --  106 107   BUN mg/dL 15  --  12 12  --  14  --  12 17   CREATININE mg/dL 0.68  --  0.52* 0.66  --  0.74  --  0.83 0.84   MAGNESIUM mg/dL  --   --  1.9 1.9  --  2.1  --   --  1.9    < > = values in this interval not displayed.     Results from last 7 days   Lab Units 08/26/23  0047   HSTROP T ng/L 18*     Results from last 7 days   Lab Units 08/31/23  0503 08/30/23  2109 08/30/23  0441 08/29/23  1456 08/29/23  0219   WBC 10*3/mm3 9.73  --  11.55*  --  12.02*   HEMOGLOBIN g/dL 9.6*  9.6* 9.7* 9.2*   < > 7.1*   HEMATOCRIT % 29.4*  29.4* 29.4* 27.6*   < > 21.0*   PLATELETS 10*3/mm3 312  --  237  --  209    < > = values in this interval not displayed.       Lab Results   Component Value Date    HGBA1C 5.80 (H) 08/27/2023       Lab Results   Component Value Date    CHOL 129 08/26/2023    TRIG 74 08/26/2023    HDL 41 08/26/2023    LDL 73 08/26/2023               Paroxysmal atrial fibrillation  Aspirin 324 mg daily  Historically not been on NOAC in the past.  No NOAC now due to recent administration TNK for stroke  Echo normal LVEF  Nebivolol 2.5 mg daily  Propafenone 325 mg twice daily  Intermittent atrial fibrillation noted on telemetry        Acute CVA status post TNK  In the setting of history of A-fib and not on NOAC  Likely cardioembolic source        Hypertension  Current /52        Hyperlipidemia  Lipitor 80 mg daily        GI bleed/anemia  GI following.  Colonoscopy pandiverticulosis but no blood  EGD several ulcerations in gastric antrum with largest 8 mm presumed source of recent GI hemorrhage.  Clip applied.  According to GI low risk for bleeding if anticoagulants are indicated  Required administration of PRBCs 8/28/2023 due to anemia   H&H currently stable 9.6 and 29.4                Recommend discontinuing aspirin and starting Eliquis 5 mg twice daily once cleared by neurology  Continue nebivolol and propafenone  Please call cardiology with any further questions.  She will follow-up with her primary cardiologist at [facility].  According to niece patient already has an appointment.  She is unsure of cardiologist's name.    Electronically signed by DIANNE Dunne, 08/31/23, 7:55 AM EDT.

## 2023-08-31 NOTE — PLAN OF CARE
Goal Outcome Evaluation: Pt sitting up in chair at shift change. NIH 11. Pt two assist back to bed. Purwick in place. Low urine output overnight. Pt flipping in and out of afib. Magnesium and metoprolol administered per MAR.

## 2023-08-31 NOTE — CASE MANAGEMENT/SOCIAL WORK
Continued Stay Note  [facility]     Patient Name: Laury Eugene  MRN: [id number]  Today's Date: 8/31/2023    Admit Date: 8/26/2023    Plan: [facility]   Discharge Plan       Row Name 08/31/23 1024       Plan    Plan [facility]    Patient/Family in Agreement with Plan yes    Plan Comments Patient was discussed in MDR today. Plan is [facility] Rehab-April is following. April has doctor's approval. CM will continue to follow.    Final Discharge Disposition Code 62 - inpatient rehab facility                   Discharge Codes    No documentation.                 Expected Discharge Date and Time       Expected Discharge Date Expected Discharge Time    Sep 4, 2023               Edward Matthew RN

## 2023-08-31 NOTE — THERAPY TREATMENT NOTE
Patient Name: Laury Eugene  : 1940    MRN: [id number]                              Today's Date: 2023       Admit Date: 2023    Visit Dx:     ICD-10-CM ICD-9-CM   1. Acute CVA (cerebrovascular accident)  I63.9 434.91   2. Anemia, unspecified type  D64.9 285.9   3. Dysphagia, unspecified type  R13.10 787.20   4. Acute lower gastrointestinal bleeding  K92.2 578.9   5. ABLA (acute blood loss anemia)  D62 285.1   6. Aphasia  R47.01 784.3     Patient Active Problem List   Diagnosis    Multinodular goiter    Abnormal thyroid function test    CVA (cerebral vascular accident)    Hypothyroidism (acquired)    Essential hypertension    Hyperlipemia    Paroxysmal atrial fibrillation    Acute ischemic left MCA stroke    Dysphagia    Acute lower gastrointestinal bleeding    ABLA (acute blood loss anemia)     Past Medical History:   Diagnosis Date    History of breast cancer     History of heart disease     Hypothyroidism     Multinodular goiter     Thyroid function test abnormal      Past Surgical History:   Procedure Laterality Date    BLEPHAROPLASTY      BREAST RECONSTRUCTION      CATARACT EXTRACTION Bilateral     COLONOSCOPY N/A 2023    Procedure: COLONOSCOPY;  Surgeon: Brunner, Mark I, MD;  Location:  [facility];  Service: Gastroenterology;  Laterality: N/A;    ENDOSCOPY N/A 2023    Procedure: ESOPHAGOGASTRODUODENOSCOPY;  Surgeon: Brunner, Mark I, MD;  Location:  [facility];  Service: Gastroenterology;  Laterality: N/A;    HYSTERECTOMY      SIMPLE MASTECTOMY        General Information       Row Name 23 1451          Physical Therapy Time and Intention    Document Type therapy note (daily note) (P)   -WB     Mode of Treatment individual therapy;physical therapy (P)   -WB       Row Name 23 1451          General Information    Patient Profile Reviewed yes (P)   -WB     Existing Precautions/Restrictions fall (P)   R sided weakness (UE>LE), expressive/receptive aphasia, R  inattention  -WB     Barriers to Rehab medically complex;previous functional deficit;cognitive status (P)   -WB       Row Name 08/31/23 1451          Cognition    Orientation Status (Cognition) oriented to;person;disoriented to;place;situation;time (P)   -WB       Row Name 08/31/23 1451          Safety Issues, Functional Mobility    Safety Issues Affecting Function (Mobility) insight into deficits/self-awareness;sequencing abilities;ability to follow commands;awareness of need for assistance;judgment;safety precaution awareness;safety precautions follow-through/compliance (P)   -WB     Impairments Affecting Function (Mobility) balance;cognition;coordination;endurance/activity tolerance;motor planning;strength;visual/perceptual;postural/trunk control (P)   -WB     Cognitive Impairments, Mobility Safety/Performance attention;awareness, need for assistance;insight into deficits/self-awareness;judgment;problem-solving/reasoning;safety precaution awareness;safety precaution follow-through;sequencing abilities (P)   -WB     Comment, Safety Issues/Impairments (Mobility) Frequent VC's required for safety awareness and sequencing (P)   -WB               User Key  (r) = Recorded By, (t) = Taken By, (c) = Cosigned By      Initials Name Provider Type    WB Joe Awan, PT Student PT Student                   Mobility       Row Name 08/31/23 1454          Bed Mobility    Bed Mobility supine-sit (P)   -WB     Supine-Sit Cameron (Bed Mobility) minimum assist (75% patient effort);verbal cues;nonverbal cues (demo/gesture);1 person assist (P)   -WB     Assistive Device (Bed Mobility) bed rails;draw sheet;head of bed elevated (P)   -WB     Comment, (Bed Mobility) VC required for hand placement. Pt able to maintain sitting EOB for ~5 mins with good upright posture. (P)   -WB       Row Name 08/31/23 1454          Transfers    Comment, (Transfers) BUE support (P)   -WB       Row Name 08/31/23 1454          Sit-Stand Transfer     Sit-Stand Daniels (Transfers) minimum assist (75% patient effort);1 person assist;verbal cues;nonverbal cues (demo/gesture) (P)   -WB     Assistive Device (Sit-Stand Transfers) other (see comments) (P)   BUE  -WB     Comment, (Sit-Stand Transfer) Performed x4. VC required for hand placement. Pt able to maintain static standing ~3mins x2 for dependent pericare. Demo'd forward flexed posture but able to improve with cueing. (P)   -WB       Row Name 08/31/23 1454          Gait/Stairs (Locomotion)    Daniels Level (Gait) minimum assist (75% patient effort);2 person assist;verbal cues;nonverbal cues (demo/gesture) (P)   -WB     Assistive Device (Gait) other (see comments) (P)   BUE  -WB     Distance in Feet (Gait) 4 (P)   -WB     Deviations/Abnormal Patterns (Gait) festinating/shuffling;gait speed decreased;base of support, narrow;stride length decreased;weight shifting decreased;bilateral deviations (P)   -WB     Bilateral Gait Deviations forward flexed posture (P)   -WB     Comment, (Gait/Stairs) Pt ambulated 4' with VC's required for improved R side attention and postural correction. Futher mobility limited by frequent BM and fatigue. (P)   -WB               User Key  (r) = Recorded By, (t) = Taken By, (c) = Cosigned By      Initials Name Provider Type    WB Joe Awan, PT Student PT Student                   Obj/Interventions       Row Name 08/31/23 1459          Balance    Balance Assessment sitting static balance;sitting dynamic balance;sit to stand dynamic balance;standing static balance;standing dynamic balance (P)   -WB     Static Sitting Balance minimal assist;verbal cues;1-person assist (P)   -WB     Dynamic Sitting Balance minimal assist;1-person assist;verbal cues (P)   -WB     Position, Sitting Balance supported;sitting in chair;sitting edge of bed (P)   -WB     Sit to Stand Dynamic Balance moderate assist;non-verbal cues (demo/gesture);verbal cues (P)   able to progress to Kirk  -WB      Static Standing Balance moderate assist;verbal cues;non-verbal cues (demo/gesture);2-person assist (P)   -WB     Dynamic Standing Balance moderate assist;2-person assist;1 person to manage equipment (P)   -WB     Position/Device Used, Standing Balance other (see comments) (P)   BUE  -WB     Balance Interventions sitting;standing;sit to stand;supported;static;dynamic;occupation based/functional task (P)   -WB     Comment, Balance No LOB (P)   -WB               User Key  (r) = Recorded By, (t) = Taken By, (c) = Cosigned By      Initials Name Provider Type    WB Joe Awan, PT Student PT Student                   Goals/Plan    No documentation.                  Clinical Impression       Row Name 08/31/23 1500          Pain    Pain Intervention(s) Repositioned;Ambulation/increased activity (P)   -WB     Additional Documentation Pain Scale: FACES Pre/Post-Treatment (Group) (P)   -WB       Row Name 08/31/23 1500          Pain Scale: FACES Pre/Post-Treatment    Pain: FACES Scale, Pretreatment 0-->no hurt (P)   -WB     Posttreatment Pain Rating 0-->no hurt (P)   -WB     Pre/Posttreatment Pain Comment tolerated, no indication of discomfort throughout session (P)   -WB       Row Name 08/31/23 1500          Plan of Care Review    Plan of Care Reviewed With patient;family (P)   -WB     Progress no change (P)   -WB     Outcome Evaluation Pt demonstrated impaired balance, gait, and strength and remains below functional level. Pt required modA for STS and modA x2 to attempt ambulation with VC's to improve R attention and hand placement. Will progress as appropriate, PT rec IRF at d/c. (P)   -WB       Row Name 08/31/23 1500          Therapy Assessment/Plan (PT)    Rehab Potential (PT) good, to achieve stated therapy goals (P)   -WB     Criteria for Skilled Interventions Met (PT) yes (P)   -WB     Therapy Frequency (PT) daily (P)   -WB       Row Name 08/31/23 1500          Vital Signs    Pre Systolic BP Rehab 146 (P)   -WB      Pre Treatment Diastolic BP 77 (P)   -WB     Post Systolic BP Rehab 137 (P)   -WB     Post Treatment Diastolic BP 79 (P)   -WB     Pretreatment Heart Rate (beats/min) 99 (P)   -WB     Posttreatment Heart Rate (beats/min) 96 (P)   -WB     Pre SpO2 (%) 100 (P)   -WB     O2 Delivery Pre Treatment room air (P)   -WB     O2 Delivery Intra Treatment room air (P)   -WB     Post SpO2 (%) 93 (P)   -WB     O2 Delivery Post Treatment room air (P)   -WB     Pre Patient Position Supine (P)   -WB     Intra Patient Position Standing (P)   -WB     Post Patient Position Sitting (P)   -WB       Row Name 08/31/23 1500          Positioning and Restraints    Pre-Treatment Position in bed (P)   -WB     Post Treatment Position chair (P)   -WB     In Chair notified nsg;reclined;sitting;call light within reach;encouraged to call for assist;exit alarm on;waffle cushion;legs elevated;with family/caregiver (P)   -WB               User Key  (r) = Recorded By, (t) = Taken By, (c) = Cosigned By      Initials Name Provider Type    WB Joe Awan, PT Student PT Student                   Outcome Measures       Row Name 08/31/23 1506          How much help from another person do you currently need...    Turning from your back to your side while in flat bed without using bedrails? 3 (P)   -WB     Moving from lying on back to sitting on the side of a flat bed without bedrails? 3 (P)   -WB     Moving to and from a bed to a chair (including a wheelchair)? 2 (P)   -WB     Standing up from a chair using your arms (e.g., wheelchair, bedside chair)? 3 (P)   -WB     Climbing 3-5 steps with a railing? 1 (P)   -WB     To walk in hospital room? 2 (P)   -WB     AM-PAC 6 Clicks Score (PT) 14 (P)   -WB     Highest level of mobility 4 --> Transferred to chair/commode (P)   -WB       Row Name 08/31/23 1506          Modified [place] Scale    Pre-Stroke Modified [place] Scale 6 - Unable to determine (UTD) from the medical record documentation (P)   -WB     Modified  [place] Scale 4 - Moderately severe disability.  Unable to walk without assistance, and unable to attend to own bodily needs without assistance. (P)   -WB       Row Name 08/31/23 6867          Functional Assessment    Outcome Measure Options AM-PAC 6 Clicks Basic Mobility (PT) (P)   -WB               User Key  (r) = Recorded By, (t) = Taken By, (c) = Cosigned By      Initials Name Provider Type    WB Joe Awan, PT Student PT Student                                 Physical Therapy Education       Title: PT OT SLP Therapies (In Progress)       Topic: Physical Therapy (In Progress)       Point: Mobility training (In Progress)       Learning Progress Summary             Patient Acceptance, E,TB, NR by AY at 8/30/2023 0936    Acceptance, E,D, NR by MB at 8/28/2023 1610   Family Acceptance, E,TB, NR by AY at 8/30/2023 0936                         Point: Home exercise program (In Progress)       Learning Progress Summary             Patient Acceptance, E,TB, NR by AY at 8/30/2023 0936    Acceptance, E,D, NR by MB at 8/28/2023 1610   Family Acceptance, E,TB, NR by AY at 8/30/2023 0936                         Point: Body mechanics (In Progress)       Learning Progress Summary             Patient Acceptance, E,TB, NR by AY at 8/30/2023 0936    Acceptance, E,D, NR by MB at 8/28/2023 1610   Family Acceptance, E,TB, NR by AY at 8/30/2023 0936                         Point: Precautions (In Progress)       Learning Progress Summary             Patient Acceptance, E,TB, NR by AY at 8/30/2023 0936    Acceptance, E,D, NR by MB at 8/28/2023 1610   Family Acceptance, E,TB, NR by AY at 8/30/2023 0936                                         User Key       Initials Effective Dates Name Provider Type Discipline    MB 06/16/21 -  Monica Dominguez, PT Physical Therapist PT    AY 11/10/20 -  Zonia Luciano, SELWYN Physical Therapist PT                  PT Recommendation and Plan     Plan of Care Reviewed With: (P) patient,  family  Progress: (P) no change  Outcome Evaluation: (P) Pt demonstrated impaired balance, gait, and strength and remains below functional level. Pt required modA for STS and modA x2 to attempt ambulation with VC's to improve R attention and hand placement. Will progress as appropriate, PT rec IRF at d/c.     Time Calculation:         PT Charges       Row Name 08/31/23 1507             Time Calculation    Start Time 1317 (P)   -WB      PT Received On 08/31/23 (P)   -WB      PT Goal Re-Cert Due Date 09/07/23 (P)   -WB         Time Calculation- PT    Total Timed Code Minutes- PT 42 minute(s) (P)   -WB         Timed Charges    01375 - Gait Training Minutes  18 (P)   -WB      15929 - PT Therapeutic Activity Minutes 24 (P)   -WB         Total Minutes    Timed Charges Total Minutes 42 (P)   -WB       Total Minutes 42 (P)   -WB                User Key  (r) = Recorded By, (t) = Taken By, (c) = Cosigned By      Initials Name Provider Type    WB Joe Awan, PT Student PT Student                  Therapy Charges for Today       Code Description Service Date Service Provider Modifiers Qty    00016490343 HC GAIT TRAINING EA 15 MIN 8/31/2023 Joe Awan, PT Student GP 1    25999766152 HC PT THERAPEUTIC ACT EA 15 MIN 8/31/2023 Joe Awan, PT Student GP 2            PT G-Codes  Outcome Measure Options: (P) AM-PAC 6 Clicks Basic Mobility (PT)  AM-PAC 6 Clicks Score (PT): (P) 14  AM-PAC 6 Clicks Score (OT): 10  Modified [place] Scale: (P) 4 - Moderately severe disability.  Unable to walk without assistance, and unable to attend to own bodily needs without assistance.  PT Discharge Summary  Anticipated Discharge Disposition (PT): (P) inpatient rehabilitation facility    Joe Awan PT Student  8/31/2023

## 2023-08-31 NOTE — PROGRESS NOTES
Clinical Nutrition   Nutrition Support Assessment  Reason for Visit: Follow-up protocol, Malnutrition Severity Assessment      Patient Name: Laury Eugene  YOB: 1940  MRN: [id number]  Date of Encounter: 08/31/23 19:52 EDT  Admission date: 8/26/2023    Comments: Pt meets criteria for moderate acute malnutrition based on intake hx w some wasting. See MSA note.    Nutrition Assessment   Admission Diagnosis:  CVA (cerebral vascular accident) [I63.9]  Acute ischemic left MCA stroke [I63.512]      Problem List:    CVA (cerebral vascular accident)    Hypothyroidism (acquired)    Essential hypertension    Hyperlipemia    Paroxysmal atrial fibrillation    Acute ischemic left MCA stroke    Dysphagia    Acute lower gastrointestinal bleeding        PMH:   She  has a past medical history of History of breast cancer, History of heart disease, Hypothyroidism, Multinodular goiter, and Thyroid function test abnormal.    PSH:  She  has a past surgical history that includes Blepharoplasty; Hysterectomy; Breast reconstruction; Simple mastectomy; Cataract extraction (Bilateral); Colonoscopy (N/A, 8/27/2023); and Esophagogastroduodenoscopy (N/A, 8/27/2023).    Applicable Nutrition Concerns:   Skin:  Oral:  GI: recent GIB adm @ [facility] [place] 8/23-8/25    Applicable Interval History:   SLP Swallowing Diagnosis: mild-moderate, oral dysphagia, pharyngeal dysphagia (08/28/23 1100)  SLP Diet Recommendation: soft to chew textures, ground, no mixed consistencies, nectar thick liquids .  Recommended Precautions and Strategies: no straw, small bites of food and sips of liquid, general aspiration precautions, upright posture during/after eating      Reported/Observed/Food/Nutrition Related History:     8/31  Allow pt taking some of suppl. Able to participate in nutrition exam.    8/28  RN reports pt adm w/  L MCA CVA s/p TNKase w/ some angioedema. Pt w/ recent adm at [facility] for GIB. She is s/p 1 u PRBCs, EGD w/ ulers clipped  "for blood clots from rectum , getting a PICC. Pt  remains mute she has a communication board. Passed FEES.    I spoke w/ friend, she is able to provide some diet hx. She was feeding pt, explained dysphagia diet. [place] to coem this evening.    Anthropometrics     Admission Height 154.9 cm (61\") Documented at 08/26/2023 0110   Admission Weight 77.6 kg (171 lb 1.2 oz) Documented at 08/26/2023 0110       Last Filed Weight: Weight: 77.4 kg (170 lb 10.2 oz) (08/27/23 0200)  Method: Weight Method: Bed scale  BMI: BMI (Calculated): 32.3  BMI classification: Obese Class I: 30-34.9kg/m2  IBW:      Weight Weight (kg) Weight (lbs) Weight Method Visit Report   8/27/2023 77.4 kg 170 lb 10.2 oz Bed scale -   8/26/2023 75.9 kg  77.6 kg 167 lb 5.3 oz  171 lb 1.2 oz Bed scale  Bed scale -   6/30/2023 75.297 kg 166 lb - Report   12/30/2022 75.297 kg 166 lb - Report   6/29/2022 74.39 kg 164 lb - Report       UBW:  Per EMR apparent ~ 166 lbs unable to confirm  Weight change:       Nutrition Focused Physical Exam     Date:  8/31       Pt meets criteria for moderate acute malnutrition based on intake hx w some wasting. See MSA note.    Current Nutrition Prescription   PO: Diet: Regular/House Diet; No Mixed Consistencies, No Straw; Texture: Soft to Chew (NDD 3); Soft to Chew: Ground Meat; Fluid Consistency: [place] Thick  Oral Nutrition Supplement: Magic Cup 2x/da  Intake: 5 DAYS 31% intake x 4 meals recorded      Nutrition Diagnosis   Date:   8/28           Updated:    Problem Predicted suboptimal energy intake   Etiology CVA dysphagia   Signs/Symptoms Requires feeding assistance; textures modified   Status: ongoing    Date:     8/28  Updated:     Problem Swallowing difficulty/chewing difficulty   Etiology CVA   Signs/Symptoms Mild-moderate oropharyngeal dysphagia per FEES evaluation   Status: ongoing    Date: 8/31  Updated:  Problem Malnutrition moderate acute   Etiology Period of depressed intake    Signs/Symptoms Intake hx and wasting "   Status: ongoing      Goal:   General: Nutrition to support treatment  PO: Increase intake  EN/PN: N/A    Nutrition Intervention      Follow treatment progress, Care plan reviewed, Menu provided, Encourage intake      Monitoring/Evaluation:   Per protocol, I&O, PO intake, Supplement intake, Symptoms, POC/GOC, Swallow function      Adrianne Mcgowan RD  Time Spent: 30 min

## 2023-08-31 NOTE — PLAN OF CARE
Goal Outcome Evaluation:  Plan of Care Reviewed With: (P) patient, family        Progress: (P) no change  Outcome Evaluation: (P) Pt demonstrated impaired balance, gait, and strength and remains below functional level. Pt required modA for STS and modA x2 to attempt ambulation with VC's to improve R attention and hand placement. Will progress as appropriate, PT rec IRF at d/c.      Anticipated Discharge Disposition (PT): (P) inpatient rehabilitation facility

## 2023-08-31 NOTE — PLAN OF CARE
Goal Outcome Evaluation:         NIH at shift change a 10. Up in chair in afternoon. Adequate UOP with diuretic given in AM. VSS. Questions answered and plan of care discussed with family at bedside. WC      Problem: Adult Inpatient Plan of Care  Goal: Plan of Care Review  Outcome: Ongoing, Progressing  Goal: Patient-Specific Goal (Individualized)  Outcome: Ongoing, Progressing  Goal: Absence of Hospital-Acquired Illness or Injury  Outcome: Ongoing, Progressing  Intervention: Identify and Manage Fall Risk  Intervention: Prevent Skin Injury  Intervention: Prevent and Manage VTE (Venous Thromboembolism) Risk  Intervention: Prevent Infection  Goal: Optimal Comfort and Wellbeing  Outcome: Ongoing, Progressing  Intervention: Monitor Pain and Promote Comfort  Intervention: Provide Person-Centered Care  Goal: Readiness for Transition of Care  Outcome: Ongoing, Progressing     Problem: Fall Injury Risk  Goal: Absence of Fall and Fall-Related Injury  Outcome: Ongoing, Progressing  Intervention: Identify and Manage Contributors  Intervention: Promote Injury-Free Environment     Problem: Skin Injury Risk Increased  Goal: Skin Health and Integrity  Outcome: Ongoing, Progressing  Intervention: Optimize Skin Protection     Problem: Adjustment to Illness (Stroke, Ischemic/Transient Ischemic Attack)  Goal: Optimal Coping  Outcome: Ongoing, Progressing  Intervention: Support Psychosocial Response to Stroke     Problem: Bowel Elimination Impaired (Stroke, Ischemic/Transient Ischemic Attack)  Goal: Effective Bowel Elimination  Outcome: Ongoing, Progressing     Problem: Cerebral Tissue Perfusion (Stroke, Ischemic/Transient Ischemic Attack)  Goal: Optimal Cerebral Tissue Perfusion  Outcome: Ongoing, Progressing  Intervention: Protect and Optimize Cerebral Perfusion     Problem: Cognitive Impairment (Stroke, Ischemic/Transient Ischemic Attack)  Goal: Optimal Cognitive Function  Outcome: Ongoing, Progressing  Intervention: Optimize  Cognitive Function     Problem: Communication Impairment (Stroke, Ischemic/Transient Ischemic Attack)  Goal: Improved Communication Skills  Outcome: Ongoing, Progressing  Intervention: Optimize Communication Skills     Problem: Functional Ability Impaired (Stroke, Ischemic/Transient Ischemic Attack)  Goal: Optimal Functional Ability  Outcome: Ongoing, Progressing  Intervention: Optimize Functional Ability     Problem: Respiratory Compromise (Stroke, Ischemic/Transient Ischemic Attack)  Goal: Effective Oxygenation and Ventilation  Outcome: Ongoing, Progressing  Intervention: Optimize Oxygenation and Ventilation     Problem: Sensorimotor Impairment (Stroke, Ischemic/Transient Ischemic Attack)  Goal: Improved Sensorimotor Function  Outcome: Ongoing, Progressing  Intervention: Optimize Range of Motion, Motor Control and Function  Intervention: Optimize Sensory and Perceptual Ability     Problem: Swallowing Impairment (Stroke, Ischemic/Transient Ischemic Attack)  Goal: Optimal Eating and Swallowing without Aspiration  Outcome: Ongoing, Progressing  Intervention: Optimize Eating and Swallowing     Problem: Urinary Elimination Impaired (Stroke, Ischemic/Transient Ischemic Attack)  Goal: Effective Urinary Elimination  Outcome: Ongoing, Progressing  Intervention: Promote Effective Bladder Elimination     Problem: Bleeding (Gastrointestinal Bleeding)  Goal: Hemostasis  Outcome: Ongoing, Progressing  Intervention: Manage Gastrointestinal Bleeding

## 2023-08-31 NOTE — PLAN OF CARE
Goal Outcome Evaluation:      NIH at shift change a 10. Pt alert and aphasic. VSS. No complaints of discomfort or pain. Up in chair with PT in AM. [facility]'ed and [place] in place. Minimal food intake due to pt not liking food. Family at bedside with care explained and questions answered. WCTM

## 2023-08-31 NOTE — PROGRESS NOTES
Pt continues with PAF.  EGD on 8/27 with clipping of gastric ulcer. Ok from a GI perspective to restart OAC. Discussed risk vs benefit of initiation of OAC with Eliquis with the patient's POA, Mindy. Pt has a hx of frequent falls. Mindy reports that the patient will not be living independently after d/c from rehab which she feels will decrease her fall risk. She would like to discontinue ASA and start Eliquis 5mg BID as she feels the benefit of administration outweighs the risk of hemorrhage at this time. Ok from a Neurology perspective to d/c ASA and start Eliquis per family request.     Plan discussed with the patient's family, Dr. Granado, and nursing staff. Please call with any questions or concerns.     Meghan DEAN, MACHO-BC

## 2023-09-01 PROBLEM — E44.0 MODERATE MALNUTRITION: Status: ACTIVE | Noted: 2023-09-01

## 2023-09-01 LAB
ANION GAP SERPL CALCULATED.3IONS-SCNC: 8 MMOL/L (ref 5–15)
BUN SERPL-MCNC: 13 MG/DL (ref 8–23)
BUN/CREAT SERPL: 21.3 (ref 7–25)
CALCIUM SPEC-SCNC: 7.7 MG/DL (ref 8.6–10.5)
CHLORIDE SERPL-SCNC: 104 MMOL/L (ref 98–107)
CO2 SERPL-SCNC: 28 MMOL/L (ref 22–29)
CREAT SERPL-MCNC: 0.61 MG/DL (ref 0.57–1)
EGFRCR SERPLBLD CKD-EPI 2021: 89.9 ML/MIN/1.73
GLUCOSE SERPL-MCNC: 131 MG/DL (ref 65–99)
HCT VFR BLD AUTO: 30 % (ref 34–46.6)
HCT VFR BLD AUTO: 31.5 % (ref 34–46.6)
HGB BLD-MCNC: 10 G/DL (ref 12–15.9)
HGB BLD-MCNC: 9.8 G/DL (ref 12–15.9)
MAGNESIUM SERPL-MCNC: 2.2 MG/DL (ref 1.6–2.4)
POTASSIUM SERPL-SCNC: 3.6 MMOL/L (ref 3.5–5.2)
POTASSIUM SERPL-SCNC: 4.1 MMOL/L (ref 3.5–5.2)
SODIUM SERPL-SCNC: 140 MMOL/L (ref 136–145)

## 2023-09-01 PROCEDURE — 80048 BASIC METABOLIC PNL TOTAL CA: CPT | Performed by: INTERNAL MEDICINE

## 2023-09-01 PROCEDURE — 94761 N-INVAS EAR/PLS OXIMETRY MLT: CPT

## 2023-09-01 PROCEDURE — 97535 SELF CARE MNGMENT TRAINING: CPT

## 2023-09-01 PROCEDURE — 94799 UNLISTED PULMONARY SVC/PX: CPT

## 2023-09-01 PROCEDURE — 85014 HEMATOCRIT: CPT | Performed by: INTERNAL MEDICINE

## 2023-09-01 PROCEDURE — 97110 THERAPEUTIC EXERCISES: CPT

## 2023-09-01 PROCEDURE — 97530 THERAPEUTIC ACTIVITIES: CPT

## 2023-09-01 PROCEDURE — 94664 DEMO&/EVAL PT USE INHALER: CPT

## 2023-09-01 PROCEDURE — 92507 TX SP LANG VOICE COMM INDIV: CPT

## 2023-09-01 PROCEDURE — 83735 ASSAY OF MAGNESIUM: CPT | Performed by: INTERNAL MEDICINE

## 2023-09-01 PROCEDURE — 0 POTASSIUM CHLORIDE PER 2 MEQ: Performed by: HOSPITALIST

## 2023-09-01 PROCEDURE — 84132 ASSAY OF SERUM POTASSIUM: CPT | Performed by: HOSPITALIST

## 2023-09-01 PROCEDURE — 85018 HEMOGLOBIN: CPT | Performed by: INTERNAL MEDICINE

## 2023-09-01 PROCEDURE — 92526 ORAL FUNCTION THERAPY: CPT

## 2023-09-01 RX ORDER — POTASSIUM CHLORIDE 20 MEQ/1
40 TABLET, EXTENDED RELEASE ORAL EVERY 4 HOURS
Status: DISCONTINUED | OUTPATIENT
Start: 2023-09-01 | End: 2023-09-01

## 2023-09-01 RX ORDER — POTASSIUM CHLORIDE 29.8 MG/ML
20 INJECTION INTRAVENOUS
Status: COMPLETED | OUTPATIENT
Start: 2023-09-01 | End: 2023-09-01

## 2023-09-01 RX ADMIN — PROPAFENONE 325 MG: 325 CAPSULE, EXTENDED RELEASE ORAL at 09:32

## 2023-09-01 RX ADMIN — POTASSIUM CHLORIDE 20 MEQ: 29.8 INJECTION, SOLUTION INTRAVENOUS at 11:43

## 2023-09-01 RX ADMIN — ESCITALOPRAM OXALATE 10 MG: 10 TABLET ORAL at 09:32

## 2023-09-01 RX ADMIN — ATORVASTATIN CALCIUM 80 MG: 40 TABLET, FILM COATED ORAL at 21:58

## 2023-09-01 RX ADMIN — IPRATROPIUM BROMIDE AND ALBUTEROL SULFATE 3 ML: 2.5; .5 SOLUTION RESPIRATORY (INHALATION) at 14:40

## 2023-09-01 RX ADMIN — POTASSIUM CHLORIDE 20 MEQ: 29.8 INJECTION, SOLUTION INTRAVENOUS at 10:02

## 2023-09-01 RX ADMIN — APIXABAN 5 MG: 5 TABLET, FILM COATED ORAL at 09:32

## 2023-09-01 RX ADMIN — NEBIVOLOL 2.5 MG: 2.5 TABLET ORAL at 09:32

## 2023-09-01 RX ADMIN — Medication 10 ML: at 21:00

## 2023-09-01 RX ADMIN — PANTOPRAZOLE SODIUM 40 MG: 40 TABLET, DELAYED RELEASE ORAL at 17:01

## 2023-09-01 RX ADMIN — Medication 10 ML: at 09:32

## 2023-09-01 RX ADMIN — LEVETIRACETAM 500 MG: 500 TABLET, EXTENDED RELEASE ORAL at 09:32

## 2023-09-01 RX ADMIN — APIXABAN 5 MG: 5 TABLET, FILM COATED ORAL at 21:58

## 2023-09-01 RX ADMIN — PROPAFENONE 325 MG: 325 CAPSULE, EXTENDED RELEASE ORAL at 21:59

## 2023-09-01 RX ADMIN — IPRATROPIUM BROMIDE AND ALBUTEROL SULFATE 3 ML: 2.5; .5 SOLUTION RESPIRATORY (INHALATION) at 08:46

## 2023-09-01 RX ADMIN — IPRATROPIUM BROMIDE AND ALBUTEROL SULFATE 3 ML: 2.5; .5 SOLUTION RESPIRATORY (INHALATION) at 22:36

## 2023-09-01 RX ADMIN — PANTOPRAZOLE SODIUM 40 MG: 40 TABLET, DELAYED RELEASE ORAL at 09:32

## 2023-09-01 NOTE — PLAN OF CARE
Problem: Adult Inpatient Plan of Care  Goal: Plan of Care Review  Outcome: Ongoing, Progressing  Flowsheets (Taken 9/1/2023 1708)  Plan of Care Reviewed With:   patient   spouse   family   Goal Outcome Evaluation:  Plan of Care Reviewed With: patient, spouse, family         SLP treatment completed. Will continue to address dysphagia, apraxia, and aphasia in tx. Please see note for further details and recommendations.

## 2023-09-01 NOTE — NURSING NOTE
"Family at bedside previously this shift requesting ice cream for patient. Explained that regular ice cream melts and is aspiration risk per current diet restrictions. Explained we have ice cream that can be used given dietary restrictions, informed family I requested some for dinner tray for patient to have. During tray pass Magic Cup ice cream supplied to patient and family at bedside seen assisting patient with eating.     During shift change report regular consistency ice cream found on bedside table. Attempted to discuss with current family at bedside who began to state repeatedly \"I know, I know, I know\". Emphasized aspiration risk. Family states they mixed it with cobbler to make it thicker. Attempted to emphasize again that this is not in accordance with dietary restrictions, family began to say \"I know, I know, I know\" before I could complete education. Oncoming RN aware of situation.   "

## 2023-09-01 NOTE — PLAN OF CARE
Goal Outcome Evaluation:  Plan of Care Reviewed With: patient, family, friend        Progress: improving  Outcome Evaluation: Pt demonstrates improved balance, functional endurance, and engagement in self-care tasks. Remains limited by R-sided inattention and weakness, continues to require [place] assist for initiation and safety/sequencing w/ RUE. Will cont IPOT per POC as tolerated. Rec d/c to IP rehab.      Anticipated Discharge Disposition (OT): inpatient rehabilitation facility

## 2023-09-01 NOTE — THERAPY TREATMENT NOTE
Acute Care - Speech Language Pathology   Swallow Treatment Note [facility]     Patient Name: Laury Eugene  : 1940  MRN: [id number]  Today's Date: 2023               Admit Date: 2023    Visit Dx:     ICD-10-CM ICD-9-CM   1. Acute CVA (cerebrovascular accident)  I63.9 434.91   2. Anemia, unspecified type  D64.9 285.9   3. Dysphagia, unspecified type  R13.10 787.20   4. Acute lower gastrointestinal bleeding  K92.2 578.9   5. ABLA (acute blood loss anemia)  D62 285.1   6. Aphasia  R47.01 784.3     Patient Active Problem List   Diagnosis    Multinodular goiter    Abnormal thyroid function test    CVA (cerebral vascular accident)    Hypothyroidism (acquired)    Essential hypertension    Hyperlipemia    Paroxysmal atrial fibrillation    Acute ischemic left MCA stroke    Dysphagia    Acute lower gastrointestinal bleeding    ABLA (acute blood loss anemia)    Moderate malnutrition     Past Medical History:   Diagnosis Date    History of breast cancer     History of heart disease     Hypothyroidism     Multinodular goiter     Thyroid function test abnormal      Past Surgical History:   Procedure Laterality Date    BLEPHAROPLASTY      BREAST RECONSTRUCTION      CATARACT EXTRACTION Bilateral     COLONOSCOPY N/A 2023    Procedure: COLONOSCOPY;  Surgeon: Brunner, Mark I, MD;  Location: [facility] ENDOSCOPY;  Service: Gastroenterology;  Laterality: N/A;    ENDOSCOPY N/A 2023    Procedure: ESOPHAGOGASTRODUODENOSCOPY;  Surgeon: Brunner, Mark I, MD;  Location:  [facility] ENDOSCOPY;  Service: Gastroenterology;  Laterality: N/A;    HYSTERECTOMY      SIMPLE MASTECTOMY         SLP Recommendation and Plan                                               Daily Summary of Progress (SLP): progress toward functional goals as expected (23 1430)               Treatment Assessment (SLP): continued, oral dysphagia, pharyngeal dysphagia, aphasia, apraxia (23 1430)  Treatment Assessment Comments (SLP): Pt continues  nonverbal with a dense R facial droop. No overt s/sx aspiration with current diet per limited trials completed in session. Pt is unable to follow commands necessary to complete dysphagia exercises. Pt did participate well in language tx, able to answer y/n with improved accuracy, able to spell name with letterboard, able to identify pictures on picture board, able to write name. Able to imitate pursed lips and smile, but no phonation achieved. (09/01/23 1430)  Plan for Continued Treatment (SLP): continue treatment per plan of care (09/01/23 1430)            Plan of Care Reviewed With: patient, spouse, family          EDUCATION  The patient has been educated in the following areas:   Cognitive Impairment Communication Impairment Dysphagia (Swallowing Impairment).        SLP GOALS       Row Name 09/01/23 1430 08/30/23 1230          (LTG) Patient will demonstrate functional swallow for    Diet Texture (Demonstrate functional swallow) soft to chew (ground) textures  -DV soft to chew (ground) textures  -ML     Liquid viscosity (Demonstrate functional swallow) nectar/ mildly thick liquids  -DV nectar/ mildly thick liquids  -ML     [place] (Demonstrate functional swallow) with minimal cues (75-90% accuracy)  -DV with minimal cues (75-90% accuracy)  -ML     Time Frame (Demonstrate functional swallow) by discharge  -DV by discharge  -ML     Progress/Outcomes (Demonstrate functional swallow) progress slower than expected  -DV progress slower than expected  -ML     Comment (Demonstrate functional swallow) -- Poor po intake.  Tolerates puree/thin but with pocketing and decreased clearance except with assistance. Not appropriate for diet upgrade, however poor intake with current diet.  -ML        (STG) Patient will tolerate trials of    Consistencies Trialed (Tolerate trials) soft to chew (ground) textures;nectar/ mildly thick liquids  -DV soft to chew (ground) textures;nectar/ mildly thick liquids  -ML     Desired Outcome  (Tolerate trials) without signs/symptoms of aspiration;without signs of distress;with adequate oral prep/transit/clearance  -DV without signs/symptoms of aspiration;without signs of distress;with adequate oral prep/transit/clearance  -ML     [place] (Tolerate trials) with minimal cues (75-90% accuracy)  -DV with minimal cues (75-90% accuracy)  -ML     Time Frame (Tolerate trials) by discharge  -DV by discharge  -ML     Progress/Outcomes (Tolerate trials) progress slower than expected  -DV progress slower than expected  -ML     Comment (Tolerate trials) tolerating with no overt s/sx aspiraton  -DV Poor po.  Pocketing on right without independent clearance.  No overt pharyngeal signs with trials presented. Wheezing persisent. No change in O2 sats with PO.  -ML        (STG) Lingual Strengthening Goal 1 (SLP)    Activity (Lingual Strengthening Goal 1, SLP) increase tongue back strength  -DV increase tongue back strength  -ML     Increase Tongue Back Strength swallow trials;lingual resistance exercises  -DV swallow trials;lingual resistance exercises  -ML     [place]/Accuracy (Lingual Strengthening Goal 1, SLP) with minimal cues (75-90% accuracy)  -DV with minimal cues (75-90% accuracy)  -ML     Time Frame (Lingual Strengthening Goal 1, SLP) short term goal (STG)  -DV short term goal (STG)  -ML     Progress/Outcomes (Lingual Strengthening Goal 1, SLP) progress slower than expected  -DV progress slower than expected  -ML     Comment (Lingual Strengthening Goal 1, SLP) pt unable to follow commands to complete  -DV Poor lingual strength- pt unable to comply with exercises to improve oral strength. Difficulty complying with directions- improved with functional tasks- eating vs. oral directions.  -ML        (STG) Pharyngeal Strengthening Exercise Goal 1 (SLP)    Activity (Pharyngeal Strengthening Goal 1, SLP) increase timing;increase closure at entrance to airway/closure of airway at glottis  -DV increase  timing;increase closure at entrance to airway/closure of airway at glottis  -ML     Increase Timing prepping - 3 second prep or suck swallow or 3-step swallow  -DV prepping - 3 second prep or suck swallow or 3-step swallow  -ML     Increase Closure at Entrance to Airway/Closure of Airway at Glottis supraglottic swallow;hard effortful swallow  -DV supraglottic swallow;hard effortful swallow  -ML     [place]/Accuracy (Pharyngeal Strengthening Goal 1, SLP) with minimal cues (75-90% accuracy)  -DV with minimal cues (75-90% accuracy)  -ML     Time Frame (Pharyngeal Strengthening Goal 1, SLP) short term goal (STG)  -DV short term goal (STG)  -ML     Barriers (Pharyngeal Strengthening Goal 1, SLP) Language impairment  -DV Language impairment  -ML     Progress/Outcomes (Pharyngeal Strengthening Goal 1, SLP) progress slower than expected  -DV progress slower than expected  -ML     Comment (Pharyngeal Strengthening Goal 1, SLP) pt unable to follow commands to complete  -DV Unable to comply with directions for 3 second prep or supraglottic.  Slow swallow trigger with nectar thick liquids despite cues to swallow quickly.  -ML        Patient will demonstrate functional communication skills for return to discharge environment     [place] with moderate cues  -DV with moderate cues  -ML     Time frame by discharge  -DV by discharge  -ML     Progress/Outcomes progress slower than expected  -DV goal ongoing  -ML        Comprehend Questions Goal 1 (SLP)    Improve Ability to Comprehend Questions Goal 1 (SLP) simple yes/no questions;80%;with moderate cues (50-74%)  -DV simple yes/no questions;80%;with moderate cues (50-74%)  -ML     Time Frame (Comprehend Questions Goal 1, SLP) short term goal (STG)  -DV short term goal (STG)  -ML     Progress (Ability to Comprehend Questions Goal 1, SLP) 70%;with minimal cues (75-90%)  -DV with maximum cues (25-49%);0%  -ML     Progress/Outcomes (Comprehend Questions Goal 1, SLP) continuing  progress toward goal  -DV progress slower than expected  -ML     Comment (Comprehend Questions Goal 1, SLP) with written y/n  -DV Unable to answer y/n questions with gesture or imitate y/n response with head gesture or thumbs up/down.  Shoulder shrug one time.  -ML        Follow Directions Goal 2 (SLP)    Improve Ability to Follow Directions Goal 1 (SLP) 1 step direction with objects;1 step direction without objects;80%;with minimal cues (75-90%)  -DV 1 step direction with objects;1 step direction without objects;80%;with minimal cues (75-90%)  -ML     Time Frame (Follow Directions Goal 1, SLP) short term goal (STG)  -DV short term goal (STG)  -ML     Progress (Ability to Follow Directions Goal 1, SLP) -- 20%;with maximum cues (25-49%)  -ML     Progress/Outcomes (Follow Directions Goal 1, SLP) goal ongoing  -DV progress slower than expected  -ML     Comment (Follow Directions Goal 1, SLP) -- Unable to follow directions except with hand over hand assist.  -ML        Word Retrieval Skills Goal 1 (SLP)    Improve Word Retrieval Skills By Goal 1 (SLP) repeating sounds;repeating words;completing automatic speech task, counting;completing automatic speech task, alphabet;completing automatic speech task, days of the week;completing automatic speech task, months;completing automatic speech task, Pledge of Allegiance;completing automatic speech task, sing “Happy Birthday”;80%;with moderate cues (50-74%)  -DV repeating sounds;repeating words;completing automatic speech task, counting;completing automatic speech task, alphabet;completing automatic speech task, days of the week;completing automatic speech task, months;completing automatic speech task, Pledge of Allegiance;completing automatic speech task, sing “Happy Birthday”;80%;with moderate cues (50-74%)  -ML     Time Frame (Word Retrieval Goal 1, SLP) short term goal (STG)  -DV short term goal (STG)  -ML     Progress (Word Retrieval Skills Goal 1, SLP) 0%  -DV 0%  -ML      Progress/Outcomes (Word Retrieval Goal 1, SLP) progress slower than expected  -DV progress slower than expected  -ML     Comment (Word Retrieval Goal 1, SLP) -- Unable to imitate sounds or mouth position for phonemes/vowels.  -ML        Motor Planning Goal 1 (SLP)    Improve Motor Planning to Reduce Apraxia by Goal 1 (SLP) imitating mouth postures;imitating vowels;following isolated oral commands;80%;with moderate cues (50-74%)  -DV imitating mouth postures;imitating vowels;following isolated oral commands;80%;with moderate cues (50-74%)  -ML     Time Frame (Motor Planning Goal 1, SLP) short term goal (STG)  -DV short term goal (STG)  -ML     Progress (Motor Planning Goal 1, SLP) 50%;with moderate cues (50-74%)  -DV 0%;with maximum cues (25-49%)  -ML     Progress/Outcomes (Motor Planning Goal 1, SLP) continuing progress toward goal  -DV progress slower than expected  -ML     Comment (Motor Planning Goal 1, SLP) imitated pursed lips and smile  -DV Unable to comply with max cues.  -ML        Augmentative/Alternative Communication Objectives Goal 1 (SLP    Communication (Augmentative/Alternative Communication Goal 1, SLP) improve ability to use non-verbal communication strategies;improve ability to use low tech augmentative/alternative communication device  -DV --     Improve Communication by (Augmentative/Alternative Communication Goal 1, SLP) alphabet/picture board;low tech communication device;80%;with moderate cues (50-74%)  -DV --     Time Frame (Augmentative/Alternative Communication Goal 1, SLP) short term goal (STG)  -DV --     Progress/Outcomes (Augmentative/Alternative Communication Goal 1, SLP) new goal  -DV --               User Key  (r) = Recorded By, (t) = Taken By, (c) = Cosigned By      Initials Name Provider Type    ML Any Burgos, CCC-SLP Speech and Language Pathologist    Inga Obregon MS CCC-SLP Speech and Language Pathologist                       Time Calculation:    Time Calculation-  SLP       Row Name 09/01/23 1706             Time Calculation- SLP    SLP Start Time 1430  -DV      SLP Received On 09/01/23  -DV         Untimed Charges    SLP Treatment ST Treatment Swallow Minutes  - 79566;ST Treatment/ST Modification Prosth Aug Alter-44088  -DV      84046-WG Treatment/ST Modification Prosth Aug Alter  45  -DV         Total Minutes    Untimed Charges Total Minutes 45  -DV       Total Minutes 45  -DV                User Key  (r) = Recorded By, (t) = Taken By, (c) = Cosigned By      Initials Name Provider Type    Inga Obregon, MS CCC-SLP Speech and Language Pathologist                    Therapy Charges for Today       Code Description Service Date Service Provider Modifiers Qty    22043777469 HC ST TREATMENT SWALLOW 1 9/1/2023 Inga Gifford MS CCC-SLP GN 1    [id number] HC ST TREATMENT SPEECH 2 9/1/2023 Inga Gifford MS CCC-DOLORES GN 1                 MS JEREMI Rodrigues  9/1/2023

## 2023-09-01 NOTE — THERAPY TREATMENT NOTE
Patient Name: Laury Eugene  : 1940    MRN: [id number]                              Today's Date: 2023       Admit Date: 2023    Visit Dx:     ICD-10-CM ICD-9-CM   1. Acute CVA (cerebrovascular accident)  I63.9 434.91   2. Anemia, unspecified type  D64.9 285.9   3. Dysphagia, unspecified type  R13.10 787.20   4. Acute lower gastrointestinal bleeding  K92.2 578.9   5. ABLA (acute blood loss anemia)  D62 285.1   6. Aphasia  R47.01 784.3     Patient Active Problem List   Diagnosis    Multinodular goiter    Abnormal thyroid function test    CVA (cerebral vascular accident)    Hypothyroidism (acquired)    Essential hypertension    Hyperlipemia    Paroxysmal atrial fibrillation    Acute ischemic left MCA stroke    Dysphagia    Acute lower gastrointestinal bleeding    ABLA (acute blood loss anemia)    Moderate malnutrition     Past Medical History:   Diagnosis Date    History of breast cancer     History of heart disease     Hypothyroidism     Multinodular goiter     Thyroid function test abnormal      Past Surgical History:   Procedure Laterality Date    BLEPHAROPLASTY      BREAST RECONSTRUCTION      CATARACT EXTRACTION Bilateral     COLONOSCOPY N/A 2023    Procedure: COLONOSCOPY;  Surgeon: Brunner, Mark I, MD;  Location:  [facility];  Service: Gastroenterology;  Laterality: N/A;    ENDOSCOPY N/A 2023    Procedure: ESOPHAGOGASTRODUODENOSCOPY;  Surgeon: Brunner, Mark I, MD;  Location:  [facility];  Service: Gastroenterology;  Laterality: N/A;    HYSTERECTOMY      SIMPLE MASTECTOMY        General Information       Row Name 23 1334          OT Time and Intention    Document Type therapy note (daily note)  -CS     Mode of Treatment occupational therapy  -CS       Row Name 23 1334          General Information    Patient Profile Reviewed yes  -CS     Existing Precautions/Restrictions fall  R sided weakness (UE>LE), expressive/receptive aphasia, R inattention  -CS     Barriers  to Rehab medically complex;previous functional deficit;cognitive status  -       Row Name 09/01/23 1334          Cognition    Orientation Status (Cognition) oriented to;person;unable/difficult to assess;place;situation;time  -       Row Name 09/01/23 1334          Safety Issues, Functional Mobility    Safety Issues Affecting Function (Mobility) awareness of need for assistance;insight into deficits/self-awareness;safety precaution awareness;safety precautions follow-through/compliance;sequencing abilities;positioning of assistive device  -     Impairments Affecting Function (Mobility) balance;cognition;coordination;endurance/activity tolerance;motor planning;strength;visual/perceptual;postural/trunk control;grasp;motor control  -     Cognitive Impairments, Mobility Safety/Performance awareness, need for assistance;insight into deficits/self-awareness;problem-solving/reasoning;judgment;safety precaution follow-through;sequencing abilities;safety precaution awareness  -     Comment, Safety Issues/Impairments (Mobility) R inattention  -               User Key  (r) = Recorded By, (t) = Taken By, (c) = Cosigned By      Initials Name Provider Type     Sandip Loo OT Occupational Therapist                     Mobility/ADL's       Row Name 09/01/23 1336          Bed Mobility    Comment, (Bed Mobility) UI upon arrival, returned to chair  -[facility] Name 09/01/23 1336          Transfers    Transfers stand-sit transfer;sit-stand transfer  -     Comment, (Transfers) STS x 4 during session, [place] and verbal cues for R hand placement/sequencing with RW use  -[facility] Name 09/01/23 1336          Sit-Stand Transfer    Sit-Stand [place] (Transfers) minimum assist (75% patient effort);1 person to manage equipment;1 person assist  -     Assistive Device (Sit-Stand Transfers) walker, front-wheeled  -       Row Name 09/01/23 1336          Stand-Sit Transfer    Stand-Sit [place] (Transfers)  minimum assist (75% patient effort);1 person to manage equipment;1 person assist;verbal cues;nonverbal cues (demo/gesture)  -       Row Name 09/01/23 1336          Functional Mobility    Functional Mobility- Comment [place] w/ (1+1 - IV pole mngt), RW and [place] assist for sustained R grasp on RW, therapist on R side during walk with frequent attentional cues to environmental landmarks  -       Row Name 09/01/23 1336          Activities of Daily Living    BADL Assessment/Intervention upper body dressing;lower body dressing  -       Row Name 09/01/23 1336          Lower Body Dressing Assessment/Training    [place] Level (Lower Body Dressing) don;socks;maximum assist (25% patient effort)  -CS     Position (Lower Body Dressing) unsupported sitting  -CS     Comment, (Lower Body Dressing) continues to be limited by bimanual skills and sequencing deficit(s), [place] for sock prep and full assist for threading, Pt able to reach tall socks below knee for adjustment w/ SBA  -       Row Name 09/01/23 1336          Grooming Assessment/Training    [place] Level (Grooming) wash face, hands;minimum assist (75% patient effort);hair care, combing/brushing;maximum assist (25% patient effort)  -CS     Assistive Devices (Grooming) hand over hand  -CS     Position (Grooming) supported sitting  -CS     Comment, (Grooming) [place] and tactile cues for RUE engagement  -               User Key  (r) = Recorded By, (t) = Taken By, (c) = Cosigned By      Initials Name Provider Type     Sandip Loo OT Occupational Therapist                   Obj/Interventions       Row Name 09/01/23 1340          Motor Skills    Therapeutic Exercise shoulder;elbow/forearm;other (see comments);hand  BUE AROM incorporated into targeted reaching activities crossing midline (RUE required AAROM assist with fatigue), foam blocks issued and Pt educated on simple grasp/pinch/coordination/dexterity activities and progression  -       Row Name 09/01/23  8760          Balance    Balance Assessment sitting static balance;sitting dynamic balance;standing static balance;standing dynamic balance  -     Static Sitting Balance standby assist  -     Dynamic Sitting Balance contact guard  -     Position, Sitting Balance unsupported;sitting in chair  -CS     Static Standing Balance contact guard  -CS     Dynamic Standing Balance minimal assist  -CS     Position/Device Used, Standing Balance supported;walker, front-wheeled  -     Balance Interventions sitting;sit to stand;standing;occupation based/functional task;UE activity with balance activity;dynamic reaching;core stability exercise;weight shifting activity  -               User Key  (r) = Recorded By, (t) = Taken By, (c) = Cosigned By      Initials Name Provider Type     Sandip Loo OT Occupational Therapist                   Goals/Plan    No documentation.                  Clinical Impression       Row Name 09/01/23 1342          Pain Assessment    Additional Documentation Pain Scale: FACES Pre/Post-Treatment (Group)  -[facility] Name 09/01/23 1342          Pain Scale: FACES Pre/Post-Treatment    Pain: FACES Scale, Pretreatment 0-->no hurt  -     Posttreatment Pain Rating 0-->no hurt  -       Row Name 09/01/23 1342          Plan of Care Review    Plan of Care Reviewed With patient;family;friend  -     Progress improving  -     Outcome Evaluation Pt demonstrates improved balance, functional endurance, and engagement in self-care tasks. Remains limited by R-sided inattention and weakness, continues to require [place] assist for initiation and safety/sequencing w/ RUE. Will cont IPOT per POC as tolerated. Rec d/c to IP rehab.  -       Row Name 09/01/23 1342          Therapy Plan Review/Discharge Plan (OT)    Anticipated Discharge Disposition (OT) inpatient rehabilitation facility  -       Row Name 09/01/23 1342          Vital Signs    Pre Systolic BP Rehab --  RN cleared for tx  -CS     Pre  Patient Position Sitting  -CS     Intra Patient Position Standing  -CS     Post Patient Position Sitting  -CS       Row Name 09/01/23 1342          Positioning and Restraints    Pre-Treatment Position sitting in chair/recliner  -CS     Post Treatment Position chair  -CS     In Chair notified nsg;reclined;sitting;call light within reach;encouraged to call for assist;exit alarm on;on mechanical lift sling;waffle cushion;legs elevated  -CS               User Key  (r) = Recorded By, (t) = Taken By, (c) = Cosigned By      Initials Name Provider Type    CS Sandip Loo OT Occupational Therapist                   Outcome Measures       Row Name 09/01/23 1347          How much help from another is currently needed...    Putting on and taking off regular lower body clothing? 2  -CS     Bathing (including washing, rinsing, and drying) 2  -CS     Toileting (which includes using toilet bed pan or urinal) 2  -CS     Putting on and taking off regular upper body clothing 3  -CS     Taking care of personal grooming (such as brushing teeth) 3  -CS     Eating meals 2  -CS     AM-PAC 6 Clicks Score (OT) 14  -CS       Row Name 09/01/23 1347          Modified [place] Scale    Modified [place] Scale 4 - Moderately severe disability.  Unable to walk without assistance, and unable to attend to own bodily needs without assistance.  -CS       Row Name 09/01/23 1340          Functional Assessment    Outcome Measure Options AM-PAC 6 Clicks Daily Activity (OT);Modified [place]  -CS               User Key  (r) = Recorded By, (t) = Taken By, (c) = Cosigned By      Initials Name Provider Type    Sandip Mayo OT Occupational Therapist                    Occupational Therapy Education       Title: PT OT SLP Therapies (In Progress)       Topic: Occupational Therapy (In Progress)       Point: ADL training (In Progress)       Description:   Instruct learner(s) on proper safety adaptation and remediation techniques during self care or  transfers.   Instruct in proper use of assistive devices.                  Learning Progress Summary             Patient Acceptance, E, NR by CS at 8/29/2023 1217    Acceptance, E, NR by CS at 8/28/2023 1439                         Point: Home exercise program (In Progress)       Description:   Instruct learner(s) on appropriate technique for monitoring, assisting and/or progressing therapeutic exercises/activities.                  Learning Progress Summary             Patient Acceptance, E, NR by CS at 8/29/2023 1217                         Point: Precautions (In Progress)       Description:   Instruct learner(s) on prescribed precautions during self-care and functional transfers.                  Learning Progress Summary             Patient Acceptance, E, NR by CS at 8/29/2023 1217    Acceptance, E, NR by CS at 8/28/2023 1439                         Point: Body mechanics (In Progress)       Description:   Instruct learner(s) on proper positioning and spine alignment during self-care, functional mobility activities and/or exercises.                  Learning Progress Summary             Patient Acceptance, E, NR by CS at 8/29/2023 1217    Acceptance, E, NR by CS at 8/28/2023 1439                                         User Key       Initials Effective Dates Name Provider Type Discipline     09/02/21 -  Kaye Weathers OT Occupational Therapist OT                  OT Recommendation and Plan     Plan of Care Review  Plan of Care Reviewed With: patient, family, friend  Progress: improving  Outcome Evaluation: Pt demonstrates improved balance, functional endurance, and engagement in self-care tasks. Remains limited by R-sided inattention and weakness, continues to require [place] assist for initiation and safety/sequencing w/ RUE. Will cont IPOT per POC as tolerated. Rec d/c to IP rehab.     Time Calculation:         Time Calculation- OT       Row Name 09/01/23 1348             Time Calculation- OT    OT Start Time  1248  -CS      OT Received On 09/01/23  -CS      OT Goal Re-Cert Due Date 09/07/23  -CS         Timed Charges    76369 - OT Therapeutic Exercise Minutes 10  -CS      70230 - OT Therapeutic Activity Minutes 20  -CS      22550 - OT Self Care/Mgmt Minutes 10  -CS         Total Minutes    Timed Charges Total Minutes 40  -CS       Total Minutes 40  -CS                User Key  (r) = Recorded By, (t) = Taken By, (c) = Cosigned By      Initials Name Provider Type    CS Sandip Loo OT Occupational Therapist                  Therapy Charges for Today       Code Description Service Date Service Provider Modifiers Qty    03230585899 HC OT THER PROC EA 15 MIN 9/1/2023 Sandip Loo OT GO 1    53367746552 HC OT THERAPEUTIC ACT EA 15 MIN 9/1/2023 Sandip Loo OT GO 1    98665971704 HC OT SELF CARE/MGMT/TRAIN EA 15 MIN 9/1/2023 Sandip Loo OT GO 1    85268399498 HC OT THER SUPP EA 15 MIN 9/1/2023 Sandip Loo OT GO 2                 Sandip Loo OT  9/1/2023

## 2023-09-01 NOTE — PROGRESS NOTES
Malnutrition Severity Assessment    Patient Name:  Laury Eugene  YOB: 1940  MRN: [id number]  Admit Date:  8/26/2023    Patient meets criteria for : Moderate (non-severe) Malnutrition (Pt meets criteria for moderate acute malnutrition based on intake hx w some wasting.)    Comments:      Malnutrition Severity Assessment  Malnutrition Type: Acute Disease or Injury - Related Malnutrition  Malnutrition Type (last 8 hours)       Malnutrition Severity Assessment       Row Name 08/31/23 1953       Malnutrition Severity Assessment    Malnutrition Type Acute Disease or Injury - Related Malnutrition      Row Name 08/31/23 1953       Insufficient Energy Intake     Insufficient Energy Intake Findings Severe    Insufficient Energy Intake  < or equal to 50% of est. energy requirement for > or equal to 5d)      Row Name 08/31/23 1953       Unintentional Weight Loss     Unintentional Weight Loss Findings --  no documented wt loss      Row Name 08/31/23 1953       Muscle Loss    Loss of Muscle Mass Findings Mild    [place] Region Moderate - slight depression    Clavicle Bone Region --  mild    Acromion Bone Region None    Scapular Bone Region --  mild    Dorsal Hand Region --  mild    Patellar Region --  mild    Anterior Thigh Region None    Posterior Calf Region Moderate - some roundness, slight firmness      Row Name 08/31/23 1953       Fat Loss    Subcutaneous Fat Loss Findings None    Orbital Region  None    Upper Arm Region None    Thoracic & Lumbar Region None      Row Name 08/31/23 1953       Criteria Met (Must meet criteria for severity in at least 2 of these categories: M Wasting, Fat Loss, Fluid, Secondary Signs, Wt. Status, Intake)    Patient meets criteria for  Moderate (non-severe) Malnutrition  Pt meets criteria for moderate acute malnutrition based on intake hx w some wasting.                    Electronically signed by:  Adrianne Mcgowan RD  08/31/23 20:06 EDT

## 2023-09-01 NOTE — PROGRESS NOTES
[facility] Medicine Services  PROGRESS NOTE    Patient Name: Laury Eugene  : 1940  MRN: [id number]    Date of Admission: 2023  Primary Care Physician: Leatha Melvin DO    Subjective   Subjective     CC: CVA    HPI: Aphasic. No new issues per family. Seemingly comfortable. Continues with RUE weakness/R facial droop.    ROS:  Limited     Objective   Objective     Vital Signs:   Temp:  [98 °F (36.7 °C)-98.8 °F (37.1 °C)] 98.4 °F (36.9 °C)  Heart Rate:  [] 74  Resp:  [18-26] 18  BP: (116-170)/(52-91) 133/63     Physical Exam:  NAD, alert  OP clear, dry MM  Neck supple  No LAD  RRR  CTAB  +BS, soft  JARA, RUE weakness, R facial droop, aphasic  No rashes    Results Reviewed:  LAB RESULTS:      Lab 23  2109 23  0503 23  2109 23  0441 23  0029 23  1456 23  0219 23  1523 23  0546 23  1615 23  0331 23  1053 23  0446 23  0047 23  0044 23  0043   WBC  --  9.73  --  11.55*  --   --  12.02*  --  13.86*  --  16.53*  --  8.21 9.18   < >  --    HEMOGLOBIN 10.6* 9.6*  9.6* 9.7* 9.2* 9.2*   < > 7.1*   < > 7.0*  7.0*   < > 8.5*   < > 8.6* 9.5*   < >  --    HEMOGLOBIN, POC  --   --   --   --   --   --   --   --   --   --   --   --   --   --    < >  --    HEMATOCRIT 31.9* 29.4*  29.4* 29.4* 27.6* 27.7*   < > 21.0*   < > 22.5*  22.5*   < > 25.9*   < > 26.0* 28.3*   < >  --    HEMATOCRIT POC  --   --   --   --   --   --   --   --   --   --   --   --   --   --    < >  --    PLATELETS  --  312  --  237  --   --  209  --  222  --  242  --  213 226   < >  --    NEUTROS ABS  --  7.20*  --   --   --   --  9.06*  --   --   --   --   --  5.99 5.27  --   --    IMMATURE GRANS (ABS)  --  0.05  --   --   --   --  0.06*  --   --   --   --   --  0.06* 0.06*  --   --    LYMPHS ABS  --  0.94  --   --   --   --  1.26  --   --   --   --   --  1.31 2.35  --   --    MONOS ABS  --  1.07*  --   --   --    --  1.27*  --   --   --   --   --  0.67 1.08*  --   --    EOS ABS  --  0.44*  --   --   --   --  0.35  --   --   --   --   --  0.15 0.38  --   --    MCV  --  96.4  --  95.2  --   --  95.9  --  99.1*  --  94.5  --  93.2 91.6   < >  --    CRP  --  2.31*  --   --   --   --   --   --   --   --   --   --   --   --   --   --    PROCALCITONIN  --  0.09  --   --   --   --   --   --   --   --   --   --   --   --   --   --    PROTIME  --   --   --   --   --   --   --   --   --   --   --   --   --   --   --  12.2*   APTT  --   --   --   --   --   --   --   --   --   --   --   --   --  23.0  --   --     < > = values in this interval not displayed.         Lab 08/31/23  0503 08/30/23  1424 08/30/23  0441 08/29/23  0219 08/28/23  1735 08/28/23  0546 08/27/23  2141 08/27/23  0331 08/26/23  0446   SODIUM 140  --  141 142  --  139  --  141 138   POTASSIUM 3.8 4.4 3.6 3.8 3.9 3.4*   < > 3.3* 3.9   CHLORIDE 107  --  109* 110*  --  108*  --  106 107   CO2 26.0  --  25.0 25.0  --  27.0  --  28.0 25.0   ANION GAP 7.0  --  7.0 7.0  --  4.0*  --  7.0 6.0   BUN 15  --  12 12  --  14  --  12 17   CREATININE 0.68  --  0.52* 0.66  --  0.74  --  0.83 0.84   EGFR 87.6  --  93.5 88.3  --  81.4  --  70.9 69.9   GLUCOSE 94  --  101* 110*  --  89  --  104* 127*   CALCIUM 7.8*  --  7.5* 6.9*  --  6.9*  --  7.8* 7.5*   MAGNESIUM  --   --  1.9 1.9  --  2.1  --   --  1.9   PHOSPHORUS  --   --  2.9 2.9  --  2.4*  --   --  2.6   HEMOGLOBIN A1C  --   --   --   --   --   --   --  5.80*  --    TSH  --   --   --   --   --   --   --   --  0.201*    < > = values in this interval not displayed.         Lab 08/29/23 0219 08/26/23 0446 08/26/23 0047   TOTAL PROTEIN 4.5* 5.0*  --    ALBUMIN 2.2* 2.8*  --    GLOBULIN 2.3 2.2  --    ALT (SGPT) 6 8 9   AST (SGOT) 14 14 16   BILIRUBIN 0.5 0.2  --    ALK PHOS 71 85  --          Lab 08/26/23 0047 08/26/23 0043   HSTROP T 18*  --    PROTIME  --  12.2*   INR  --  1.0         Lab 08/26/23 0446 08/26/23 0047    CHOLESTEROL 129 143   LDL CHOL 73 80   HDL CHOL 41 45   TRIGLYCERIDES 74 98         Lab 08/28/23  0546 08/26/23  0446   ABO TYPING A A   RH TYPING Positive Positive   ANTIBODY SCREEN  --  Negative         Brief Urine Lab Results       None            Microbiology Results Abnormal       None            XR Chest 1 View    Result Date: 8/30/2023  XR CHEST 1 VW Date of Exam: 8/30/2023 10:28 AM EDT Indication: inspiratory crackles, wheezing Comparison: 8/26/2023 Findings: There is mild cardiomegaly. Pulmonary vessels are within normal limits. There is now a right-sided PICC line is in place the tip projecting over the superior vena cava. There is hazy left perihilar and left basilar airspace disease which may be secondary  to pneumonia or less likely atelectasis. No definite pleural effusion. Right lung is clear. No right pleural effusion. No evidence pneumothorax.     Impression: Impression: 1. Cardiomegaly. 2. Hazy left perihilar and left basilar airspace disease which may be secondary to pneumonia. 3. Right-sided PICC line in place the tip projecting over the superior vena cava. Electronically Signed: Yared Muñoz MD  8/30/2023 10:45 AM EDT  Workstation ID: [id number]     Results for orders placed during the hospital encounter of 08/26/23    Adult Transthoracic Echo Complete W/ Cont if Necessary Per Protocol    Interpretation Summary    Left ventricular systolic function is normal. Left ventricular ejection fraction appears to be 51 - 55%.      Current medications:  Scheduled Meds:apixaban, 5 mg, Oral, Q12H  atorvastatin, 80 mg, Oral, Nightly  escitalopram, 10 mg, Oral, Daily  ipratropium-albuterol, 3 mL, Nebulization, 4x Daily - RT  levETIRAcetam XR, 500 mg, Oral, Daily  nebivolol, 2.5 mg, Oral, Q24H  pantoprazole, 40 mg, Oral, BID AC  propafenone SR, 325 mg, Oral, Q12H  sodium chloride, 10 mL, Intravenous, Q12H      Continuous Infusions:   PRN Meds:.  acetaminophen    hydrALAZINE    ipratropium-albuterol     ondansetron    Phosphorus Replacement - Follow Nurse / BPA Driven Protocol    Potassium Replacement - Follow Nurse / BPA Driven Protocol    sodium chloride    sodium chloride    Assessment & Plan   Assessment & Plan     Active Hospital Problems    Diagnosis  POA    **CVA (cerebral vascular accident) [I63.9]  Yes    Hypothyroidism (acquired) [E03.9]  Yes    Essential hypertension [I10]  Yes    Hyperlipemia [E78.5]  Yes    Paroxysmal atrial fibrillation [I48.0]  Yes    Acute ischemic left MCA stroke [I63.512]  Yes    Dysphagia [R13.10]  Yes    Acute lower gastrointestinal bleeding [K92.2]  Yes      Resolved Hospital Problems   No resolved problems to display.        Brief Hospital Course to date:  83-year-old woman with paroxysmal atrial fibrillation, hypothyroidism, hypertension, breast cancer, GI bleed presenting with left facial droop, dysarthria and confusion.  She had an occluded M2 branch of her left middle cerebral artery.  She received TNK.  She developed angioedema treated with Solu-Medrol, Pepcid, Benadryl.     She was empirically started on Keppra for an abnormal EEG with some left frontal sharp waves and brief runs though no electrographic seizures seen.     Post thrombolytic therapy she developed GI bleeding.  She underwent an EGD that revealed several ulcerations in the gastrum the largest was 8 mm with a visible vessel and it was clipped.  Colonoscopy revealed pandiverticulosis but no active bleeding.  She remains on a proton pump inhibitor.  She did receive a total of 2 units of packed red cells.  Post endoscopy she has had no further signs of active bleeding.  Hemoglobin today is 9.2.  GI feels she is a low risk for rebleeding and have no objection to anticoagulation.    CVA  -s/p TNK  -in setting of atrial fibrillation  -rehab    PAF  -Eliquis  -propafenone per cardiology    Angioedema s/p TNK  -s/p solumedrol/pepcid/benadryl    GIB  -s/p EGD/colonosocpy  -EGD with gastric ulcer s/p clip  -2 Units  PRBC    Hypothyroidism  HTN    Expected Discharge Location and Transportation: Rehab  Expected Discharge   Expected Discharge Date: 9/4/2023; Expected Discharge Time:      DVT prophylaxis:  Medical and mechanical DVT prophylaxis orders are present.     AM-PAC 6 Clicks Score (PT): 14 (08/31/23 1506)    CODE STATUS:   Code Status and Medical Interventions:   Ordered at: 08/30/23 1347     Code Status (Patient has no pulse and is not breathing):    CPR (Attempt to Resuscitate)     Medical Interventions (Patient has pulse or is breathing):    Full Support       Russel Dixon MD  09/01/23

## 2023-09-01 NOTE — CASE MANAGEMENT/SOCIAL WORK
Continued Stay Note  [facility]     Patient Name: Laury Eugene  MRN: [id number]  Today's Date: 9/1/2023    Admit Date: 8/26/2023    Plan: [facility]   Discharge Plan       Row Name 09/01/23 0906       Plan    Plan [facility]    Plan Comments April with [facility] is hoping to bring patient into rehab when medically ready and bed available (family wanting private room).  If bed available over week end, [facility] will reach out to . April tells me they may not have beds. She could ride wheelchair van. I will update patient.     Final Discharge Disposition Code 62 - inpatient rehab facility                   Discharge Codes    No documentation.                 Expected Discharge Date and Time       Expected Discharge Date Expected Discharge Time    Sep 4, 2023               Yumiko Pedraza RN

## 2023-09-02 LAB
ANION GAP SERPL CALCULATED.3IONS-SCNC: 7 MMOL/L (ref 5–15)
BUN SERPL-MCNC: 15 MG/DL (ref 8–23)
BUN/CREAT SERPL: 24.6 (ref 7–25)
CALCIUM SPEC-SCNC: 8 MG/DL (ref 8.6–10.5)
CHLORIDE SERPL-SCNC: 101 MMOL/L (ref 98–107)
CO2 SERPL-SCNC: 29 MMOL/L (ref 22–29)
CREAT SERPL-MCNC: 0.61 MG/DL (ref 0.57–1)
DEPRECATED RDW RBC AUTO: 50.6 FL (ref 37–54)
EGFRCR SERPLBLD CKD-EPI 2021: 89.9 ML/MIN/1.73
ERYTHROCYTE [DISTWIDTH] IN BLOOD BY AUTOMATED COUNT: 14.2 % (ref 12.3–15.4)
GLUCOSE SERPL-MCNC: 107 MG/DL (ref 65–99)
HCT VFR BLD AUTO: 29.5 % (ref 34–46.6)
HCT VFR BLD AUTO: 29.5 % (ref 34–46.6)
HCT VFR BLD AUTO: 31.6 % (ref 34–46.6)
HGB BLD-MCNC: 10.3 G/DL (ref 12–15.9)
HGB BLD-MCNC: 9.4 G/DL (ref 12–15.9)
HGB BLD-MCNC: 9.4 G/DL (ref 12–15.9)
MCH RBC QN AUTO: 31.4 PG (ref 26.6–33)
MCHC RBC AUTO-ENTMCNC: 31.9 G/DL (ref 31.5–35.7)
MCV RBC AUTO: 98.7 FL (ref 79–97)
PLATELET # BLD AUTO: 351 10*3/MM3 (ref 140–450)
PMV BLD AUTO: 10.1 FL (ref 6–12)
POTASSIUM SERPL-SCNC: 4.5 MMOL/L (ref 3.5–5.2)
RBC # BLD AUTO: 2.99 10*6/MM3 (ref 3.77–5.28)
SODIUM SERPL-SCNC: 137 MMOL/L (ref 136–145)
WBC NRBC COR # BLD: 9.69 10*3/MM3 (ref 3.4–10.8)

## 2023-09-02 PROCEDURE — 94799 UNLISTED PULMONARY SVC/PX: CPT

## 2023-09-02 PROCEDURE — 80048 BASIC METABOLIC PNL TOTAL CA: CPT | Performed by: HOSPITALIST

## 2023-09-02 PROCEDURE — 85027 COMPLETE CBC AUTOMATED: CPT | Performed by: HOSPITALIST

## 2023-09-02 PROCEDURE — 85018 HEMOGLOBIN: CPT | Performed by: INTERNAL MEDICINE

## 2023-09-02 PROCEDURE — 85014 HEMATOCRIT: CPT | Performed by: INTERNAL MEDICINE

## 2023-09-02 RX ADMIN — PANTOPRAZOLE SODIUM 40 MG: 40 TABLET, DELAYED RELEASE ORAL at 08:23

## 2023-09-02 RX ADMIN — IPRATROPIUM BROMIDE AND ALBUTEROL SULFATE 3 ML: 2.5; .5 SOLUTION RESPIRATORY (INHALATION) at 06:26

## 2023-09-02 RX ADMIN — LEVETIRACETAM 500 MG: 500 TABLET, EXTENDED RELEASE ORAL at 08:23

## 2023-09-02 RX ADMIN — PROPAFENONE 325 MG: 325 CAPSULE, EXTENDED RELEASE ORAL at 20:54

## 2023-09-02 RX ADMIN — APIXABAN 5 MG: 5 TABLET, FILM COATED ORAL at 08:23

## 2023-09-02 RX ADMIN — IPRATROPIUM BROMIDE AND ALBUTEROL SULFATE 3 ML: 2.5; .5 SOLUTION RESPIRATORY (INHALATION) at 15:43

## 2023-09-02 RX ADMIN — Medication 10 ML: at 08:24

## 2023-09-02 RX ADMIN — Medication 10 ML: at 20:55

## 2023-09-02 RX ADMIN — PROPAFENONE 325 MG: 325 CAPSULE, EXTENDED RELEASE ORAL at 08:23

## 2023-09-02 RX ADMIN — APIXABAN 5 MG: 5 TABLET, FILM COATED ORAL at 20:54

## 2023-09-02 RX ADMIN — NEBIVOLOL 2.5 MG: 2.5 TABLET ORAL at 08:23

## 2023-09-02 RX ADMIN — ESCITALOPRAM OXALATE 10 MG: 10 TABLET ORAL at 08:23

## 2023-09-02 RX ADMIN — IPRATROPIUM BROMIDE AND ALBUTEROL SULFATE 3 ML: 2.5; .5 SOLUTION RESPIRATORY (INHALATION) at 19:57

## 2023-09-02 RX ADMIN — ATORVASTATIN CALCIUM 80 MG: 40 TABLET, FILM COATED ORAL at 20:54

## 2023-09-02 RX ADMIN — IPRATROPIUM BROMIDE AND ALBUTEROL SULFATE 3 ML: 2.5; .5 SOLUTION RESPIRATORY (INHALATION) at 11:40

## 2023-09-02 RX ADMIN — PANTOPRAZOLE SODIUM 40 MG: 40 TABLET, DELAYED RELEASE ORAL at 16:45

## 2023-09-02 NOTE — PROGRESS NOTES
"    [facility] Medicine Services  PROGRESS NOTE    Patient Name: Laury Eugene  : 1940  MRN: [id number]    Date of Admission: 2023  Primary Care Physician: Leatha Melvin,     Subjective   Subjective     CC: CVA    HPI: Aphasic. Niece at bedside reporting patient is improving and stating even \"yeah\" to her earlier this morning. Did not wake patient     ROS:  Limited     Objective   Objective     Vital Signs:   Temp:  [98.4 °F (36.9 °C)-98.9 °F (37.2 °C)] 98.7 °F (37.1 °C)  Heart Rate:  [58-85] 72  Resp:  [16-18] 16  BP: (127-155)/(43-59) 155/54  Flow (L/min):  [0-1] 1     Physical Exam:  GEN: NAD, resting in bed, sleeping  HEENT: on room air, atraumatic, normocephalic  NECK: supple, no masses  RESP: on room air, normal effort  CV: on tele, sinus rhythm  NEURO: aphasic, facial droop noted   MSK: no edema noted  SKIN: no rashes noted       Results Reviewed:  LAB RESULTS:      Lab 23  1836 23  0845 23  2109 23  0503 23  2109 23  0441 23  1456 23  0219 23  1523 23  0546 23  1615 23  0331   WBC  --   --   --  9.73  --  11.55*  --  12.02*  --  13.86*  --  16.53*   HEMOGLOBIN 10.0* 9.8* 10.6* 9.6*  9.6* 9.7* 9.2*   < > 7.1*   < > 7.0*  7.0*   < > 8.5*   HEMATOCRIT 31.5* 30.0* 31.9* 29.4*  29.4* 29.4* 27.6*   < > 21.0*   < > 22.5*  22.5*   < > 25.9*   PLATELETS  --   --   --  312  --  237  --  209  --  222  --  242   NEUTROS ABS  --   --   --  7.20*  --   --   --  9.06*  --   --   --   --    IMMATURE GRANS (ABS)  --   --   --  0.05  --   --   --  0.06*  --   --   --   --    LYMPHS ABS  --   --   --  0.94  --   --   --  1.26  --   --   --   --    MONOS ABS  --   --   --  1.07*  --   --   --  1.27*  --   --   --   --    EOS ABS  --   --   --  0.44*  --   --   --  0.35  --   --   --   --    MCV  --   --   --  96.4  --  95.2  --  95.9  --  99.1*  --  94.5   CRP  --   --   --  2.31*  --   --   --   --   " --   --   --   --    PROCALCITONIN  --   --   --  0.09  --   --   --   --   --   --   --   --     < > = values in this interval not displayed.         Lab 09/01/23  1836 09/01/23  0845 08/31/23  0503 08/30/23  1424 08/30/23  0441 08/29/23  0219 08/28/23  1735 08/28/23  0546 08/27/23  2141 08/27/23  0331   SODIUM  --  140 140  --  141 142  --  139  --  141   POTASSIUM 4.1 3.6 3.8 4.4 3.6 3.8   < > 3.4*   < > 3.3*   CHLORIDE  --  104 107  --  109* 110*  --  108*  --  106   CO2  --  28.0 26.0  --  25.0 25.0  --  27.0  --  28.0   ANION GAP  --  8.0 7.0  --  7.0 7.0  --  4.0*  --  7.0   BUN  --  13 15  --  12 12  --  14  --  12   CREATININE  --  0.61 0.68  --  0.52* 0.66  --  0.74  --  0.83   EGFR  --  89.9 87.6  --  93.5 88.3  --  81.4  --  70.9   GLUCOSE  --  131* 94  --  101* 110*  --  89  --  104*   CALCIUM  --  7.7* 7.8*  --  7.5* 6.9*  --  6.9*  --  7.8*   MAGNESIUM  --  2.2  --   --  1.9 1.9  --  2.1  --   --    PHOSPHORUS  --   --   --   --  2.9 2.9  --  2.4*  --   --    HEMOGLOBIN A1C  --   --   --   --   --   --   --   --   --  5.80*    < > = values in this interval not displayed.         Lab 08/29/23 0219   TOTAL PROTEIN 4.5*   ALBUMIN 2.2*   GLOBULIN 2.3   ALT (SGPT) 6   AST (SGOT) 14   BILIRUBIN 0.5   ALK PHOS 71                     Lab 08/28/23  0546   ABO TYPING A   RH TYPING Positive         Brief Urine Lab Results       None            Microbiology Results Abnormal       None            No radiology results from the last 24 hrs    Results for orders placed during the hospital encounter of 08/26/23    Adult Transthoracic Echo Complete W/ Cont if Necessary Per Protocol    Interpretation Summary    Left ventricular systolic function is normal. Left ventricular ejection fraction appears to be 51 - 55%.      Current medications:  Scheduled Meds:apixaban, 5 mg, Oral, Q12H  atorvastatin, 80 mg, Oral, Nightly  escitalopram, 10 mg, Oral, Daily  ipratropium-albuterol, 3 mL, Nebulization, 4x Daily -  RT  levETIRAcetam XR, 500 mg, Oral, Daily  nebivolol, 2.5 mg, Oral, Q24H  pantoprazole, 40 mg, Oral, BID AC  propafenone SR, 325 mg, Oral, Q12H  sodium chloride, 10 mL, Intravenous, Q12H      Continuous Infusions:   PRN Meds:.  acetaminophen    hydrALAZINE    ipratropium-albuterol    ondansetron    Phosphorus Replacement - Follow Nurse / BPA Driven Protocol    Potassium Replacement - Follow Nurse / BPA Driven Protocol    sodium chloride    sodium chloride    Assessment & Plan   Assessment & Plan     Active Hospital Problems    Diagnosis  POA    **CVA (cerebral vascular accident) [I63.9]  Yes    Moderate malnutrition [E44.0]  Yes    Hypothyroidism (acquired) [E03.9]  Yes    Essential hypertension [I10]  Yes    Hyperlipemia [E78.5]  Yes    Paroxysmal atrial fibrillation [I48.0]  Yes    Acute ischemic left MCA stroke [I63.512]  Yes    Dysphagia [R13.10]  Yes    Acute lower gastrointestinal bleeding [K92.2]  Yes      Resolved Hospital Problems   No resolved problems to display.        Brief Hospital Course to date:  83-year-old woman with paroxysmal atrial fibrillation, hypothyroidism, hypertension, breast cancer, GI bleed presenting with left facial droop, dysarthria and confusion.  She had an occluded M2 branch of her left middle cerebral artery.  She received TNK.  She developed angioedema treated with Solu-Medrol, Pepcid, Benadryl.     She was empirically started on Keppra for an abnormal EEG with some left frontal sharp waves and brief runs though no electrographic seizures seen.     Post thrombolytic therapy she developed GI bleeding.  She underwent an EGD that revealed several ulcerations in the gastrum the largest was 8 mm with a visible vessel and it was clipped.  Colonoscopy revealed pandiverticulosis but no active bleeding.  She remains on a proton pump inhibitor.  She did receive a total of 2 units of packed red cells.  Post endoscopy she has had no further signs of active bleeding.  Hemoglobin today is 9.2.   GI feels she is a low risk for rebleeding and have no objection to anticoagulation.    CVA  -s/p TNK  -in setting of atrial fibrillation  -rehab    PAF  -Eliquis  -propafenone per cardiology    Angioedema s/p TNK  -s/p solumedrol/pepcid/benadryl    GIB  -s/p EGD/colonosocpy  -EGD with gastric ulcer s/p clip  -2 Units PRBC    Hypothyroidism  HTN    Expected Discharge Location and Transportation: Rehab  Expected Discharge   Expected Discharge Date: 9/4/2023; Expected Discharge Time:      DVT prophylaxis:  Medical and mechanical DVT prophylaxis orders are present.     AM-PAC 6 Clicks Score (PT): 14 (08/31/23 1506)    CODE STATUS:   Code Status and Medical Interventions:   Ordered at: 08/30/23 1347     Code Status (Patient has no pulse and is not breathing):    CPR (Attempt to Resuscitate)     Medical Interventions (Patient has pulse or is breathing):    Full Support       Yumiko Mclaughlin MD  09/02/23

## 2023-09-02 NOTE — PLAN OF CARE
Goal Outcome Evaluation: Pt had no complications overnight. VS WDL at this time. No family at bedside tonight. Pt still isnt communicating via speech. POC ongoing at this time. Adenike Gagnon RN.

## 2023-09-03 LAB
ALBUMIN SERPL-MCNC: 2.7 G/DL (ref 3.5–5.2)
ALBUMIN/GLOB SERPL: 1.4 G/DL
ALP SERPL-CCNC: 90 U/L (ref 39–117)
ALT SERPL W P-5'-P-CCNC: 15 U/L (ref 1–33)
ANION GAP SERPL CALCULATED.3IONS-SCNC: 6 MMOL/L (ref 5–15)
AST SERPL-CCNC: 18 U/L (ref 1–32)
BASOPHILS # BLD AUTO: 0.04 10*3/MM3 (ref 0–0.2)
BASOPHILS NFR BLD AUTO: 0.4 % (ref 0–1.5)
BILIRUB SERPL-MCNC: 0.3 MG/DL (ref 0–1.2)
BUN SERPL-MCNC: 12 MG/DL (ref 8–23)
BUN/CREAT SERPL: 17.6 (ref 7–25)
CALCIUM SPEC-SCNC: 7.9 MG/DL (ref 8.6–10.5)
CHLORIDE SERPL-SCNC: 101 MMOL/L (ref 98–107)
CO2 SERPL-SCNC: 30 MMOL/L (ref 22–29)
CREAT SERPL-MCNC: 0.68 MG/DL (ref 0.57–1)
DEPRECATED RDW RBC AUTO: 50.7 FL (ref 37–54)
EGFRCR SERPLBLD CKD-EPI 2021: 87.6 ML/MIN/1.73
EOSINOPHIL # BLD AUTO: 0.54 10*3/MM3 (ref 0–0.4)
EOSINOPHIL NFR BLD AUTO: 5.8 % (ref 0.3–6.2)
ERYTHROCYTE [DISTWIDTH] IN BLOOD BY AUTOMATED COUNT: 14.1 % (ref 12.3–15.4)
GLOBULIN UR ELPH-MCNC: 2 GM/DL
GLUCOSE SERPL-MCNC: 95 MG/DL (ref 65–99)
HCT VFR BLD AUTO: 31.1 % (ref 34–46.6)
HCT VFR BLD AUTO: 31.1 % (ref 34–46.6)
HGB BLD-MCNC: 9.7 G/DL (ref 12–15.9)
HGB BLD-MCNC: 9.7 G/DL (ref 12–15.9)
IMM GRANULOCYTES # BLD AUTO: 0.08 10*3/MM3 (ref 0–0.05)
IMM GRANULOCYTES NFR BLD AUTO: 0.9 % (ref 0–0.5)
LYMPHOCYTES # BLD AUTO: 1.04 10*3/MM3 (ref 0.7–3.1)
LYMPHOCYTES NFR BLD AUTO: 11.1 % (ref 19.6–45.3)
MCH RBC QN AUTO: 31 PG (ref 26.6–33)
MCHC RBC AUTO-ENTMCNC: 31.2 G/DL (ref 31.5–35.7)
MCV RBC AUTO: 99.4 FL (ref 79–97)
MONOCYTES # BLD AUTO: 1.22 10*3/MM3 (ref 0.1–0.9)
MONOCYTES NFR BLD AUTO: 13.1 % (ref 5–12)
NEUTROPHILS NFR BLD AUTO: 6.41 10*3/MM3 (ref 1.7–7)
NEUTROPHILS NFR BLD AUTO: 68.7 % (ref 42.7–76)
NRBC BLD AUTO-RTO: 0 /100 WBC (ref 0–0.2)
PLATELET # BLD AUTO: 369 10*3/MM3 (ref 140–450)
PMV BLD AUTO: 10.2 FL (ref 6–12)
POTASSIUM SERPL-SCNC: 4.3 MMOL/L (ref 3.5–5.2)
PROT SERPL-MCNC: 4.7 G/DL (ref 6–8.5)
RBC # BLD AUTO: 3.13 10*6/MM3 (ref 3.77–5.28)
SODIUM SERPL-SCNC: 137 MMOL/L (ref 136–145)
WBC NRBC COR # BLD: 9.33 10*3/MM3 (ref 3.4–10.8)

## 2023-09-03 PROCEDURE — 94799 UNLISTED PULMONARY SVC/PX: CPT

## 2023-09-03 PROCEDURE — 80053 COMPREHEN METABOLIC PANEL: CPT | Performed by: INTERNAL MEDICINE

## 2023-09-03 PROCEDURE — 97530 THERAPEUTIC ACTIVITIES: CPT

## 2023-09-03 PROCEDURE — 97116 GAIT TRAINING THERAPY: CPT

## 2023-09-03 PROCEDURE — 85018 HEMOGLOBIN: CPT | Performed by: INTERNAL MEDICINE

## 2023-09-03 PROCEDURE — 85014 HEMATOCRIT: CPT | Performed by: INTERNAL MEDICINE

## 2023-09-03 PROCEDURE — 85025 COMPLETE CBC W/AUTO DIFF WBC: CPT | Performed by: INTERNAL MEDICINE

## 2023-09-03 RX ADMIN — ATORVASTATIN CALCIUM 80 MG: 40 TABLET, FILM COATED ORAL at 22:08

## 2023-09-03 RX ADMIN — IPRATROPIUM BROMIDE AND ALBUTEROL SULFATE 3 ML: 2.5; .5 SOLUTION RESPIRATORY (INHALATION) at 07:30

## 2023-09-03 RX ADMIN — Medication 10 ML: at 22:09

## 2023-09-03 RX ADMIN — ESCITALOPRAM OXALATE 10 MG: 10 TABLET ORAL at 08:02

## 2023-09-03 RX ADMIN — PANTOPRAZOLE SODIUM 40 MG: 40 TABLET, DELAYED RELEASE ORAL at 17:09

## 2023-09-03 RX ADMIN — IPRATROPIUM BROMIDE AND ALBUTEROL SULFATE 3 ML: 2.5; .5 SOLUTION RESPIRATORY (INHALATION) at 15:12

## 2023-09-03 RX ADMIN — APIXABAN 5 MG: 5 TABLET, FILM COATED ORAL at 22:08

## 2023-09-03 RX ADMIN — PANTOPRAZOLE SODIUM 40 MG: 40 TABLET, DELAYED RELEASE ORAL at 08:02

## 2023-09-03 RX ADMIN — IPRATROPIUM BROMIDE AND ALBUTEROL SULFATE 3 ML: 2.5; .5 SOLUTION RESPIRATORY (INHALATION) at 11:05

## 2023-09-03 RX ADMIN — LEVETIRACETAM 500 MG: 500 TABLET, EXTENDED RELEASE ORAL at 08:02

## 2023-09-03 RX ADMIN — PROPAFENONE 325 MG: 325 CAPSULE, EXTENDED RELEASE ORAL at 22:08

## 2023-09-03 RX ADMIN — IPRATROPIUM BROMIDE AND ALBUTEROL SULFATE 3 ML: 2.5; .5 SOLUTION RESPIRATORY (INHALATION) at 18:59

## 2023-09-03 RX ADMIN — APIXABAN 5 MG: 5 TABLET, FILM COATED ORAL at 08:02

## 2023-09-03 RX ADMIN — Medication 10 ML: at 08:03

## 2023-09-03 RX ADMIN — PROPAFENONE 325 MG: 325 CAPSULE, EXTENDED RELEASE ORAL at 08:07

## 2023-09-03 NOTE — PROGRESS NOTES
[facility] Medicine Services  PROGRESS NOTE    Patient Name: Laury Eugene  : 1940  MRN: [id number]    Date of Admission: 2023  Primary Care Physician: Leatha Melvin DO    Subjective   Subjective     CC: CVA    HPI: Aphasic. But more alert and sitting up in bed. Family present today. No new complaints per family     ROS:  Limited     Objective   Objective     Vital Signs:   Temp:  [98 °F (36.7 °C)-98.9 °F (37.2 °C)] 98.1 °F (36.7 °C)  Heart Rate:  [57-70] 59  Resp:  [16-18] 16  BP: (125-156)/(49-67) 125/57     Physical Exam:  GEN: NAD, resting in bed, sleeping  HEENT: on room air, atraumatic, normocephalic  NECK: supple, no masses  RESP: on room air, normal effort  CV: on tele, sinus rhythm  NEURO: aphasic, facial droop noted   MSK: no edema noted  SKIN: no rashes noted       Results Reviewed:  LAB RESULTS:      Lab 23  1114 23  1836 23  0845 23  2109 23  0503 23  0441 23  1456 23  0219 23  1523 23  0546   WBC  --  9.69  --   --   --  9.73  --  11.55*  --  12.02*  --  13.86*   HEMOGLOBIN 10.3* 9.4*  9.4* 10.0* 9.8* 10.6* 9.6*  9.6*   < > 9.2*   < > 7.1*   < > 7.0*  7.0*   HEMATOCRIT 31.6* 29.5*  29.5* 31.5* 30.0* 31.9* 29.4*  29.4*   < > 27.6*   < > 21.0*   < > 22.5*  22.5*   PLATELETS  --  351  --   --   --  312  --  237  --  209  --  222   NEUTROS ABS  --   --   --   --   --  7.20*  --   --   --  9.06*  --   --    IMMATURE GRANS (ABS)  --   --   --   --   --  0.05  --   --   --  0.06*  --   --    LYMPHS ABS  --   --   --   --   --  0.94  --   --   --  1.26  --   --    MONOS ABS  --   --   --   --   --  1.07*  --   --   --  1.27*  --   --    EOS ABS  --   --   --   --   --  0.44*  --   --   --  0.35  --   --    MCV  --  98.7*  --   --   --  96.4  --  95.2  --  95.9  --  99.1*   CRP  --   --   --   --   --  2.31*  --   --   --   --   --   --    PROCALCITONIN  --   --    --   --   --  0.09  --   --   --   --   --   --     < > = values in this interval not displayed.         Lab 09/02/23  1114 09/01/23  1836 09/01/23  0845 08/31/23  0503 08/30/23  1424 08/30/23  0441 08/29/23  0219 08/28/23  1735 08/28/23  0546   SODIUM 137  --  140 140  --  141 142  --  139   POTASSIUM 4.5 4.1 3.6 3.8 4.4 3.6 3.8   < > 3.4*   CHLORIDE 101  --  104 107  --  109* 110*  --  108*   CO2 29.0  --  28.0 26.0  --  25.0 25.0  --  27.0   ANION GAP 7.0  --  8.0 7.0  --  7.0 7.0  --  4.0*   BUN 15  --  13 15  --  12 12  --  14   CREATININE 0.61  --  0.61 0.68  --  0.52* 0.66  --  0.74   EGFR 89.9  --  89.9 87.6  --  93.5 88.3  --  81.4   GLUCOSE 107*  --  131* 94  --  101* 110*  --  89   CALCIUM 8.0*  --  7.7* 7.8*  --  7.5* 6.9*  --  6.9*   MAGNESIUM  --   --  2.2  --   --  1.9 1.9  --  2.1   PHOSPHORUS  --   --   --   --   --  2.9 2.9  --  2.4*    < > = values in this interval not displayed.         Lab 08/29/23  0219   TOTAL PROTEIN 4.5*   ALBUMIN 2.2*   GLOBULIN 2.3   ALT (SGPT) 6   AST (SGOT) 14   BILIRUBIN 0.5   ALK PHOS 71                     Lab 08/28/23  0546   ABO TYPING A   RH TYPING Positive         Brief Urine Lab Results       None            Microbiology Results Abnormal       None            No radiology results from the last 24 hrs    Results for orders placed during the hospital encounter of 08/26/23    Adult Transthoracic Echo Complete W/ Cont if Necessary Per Protocol    Interpretation Summary    Left ventricular systolic function is normal. Left ventricular ejection fraction appears to be 51 - 55%.      Current medications:  Scheduled Meds:apixaban, 5 mg, Oral, Q12H  atorvastatin, 80 mg, Oral, Nightly  escitalopram, 10 mg, Oral, Daily  ipratropium-albuterol, 3 mL, Nebulization, 4x Daily - RT  levETIRAcetam XR, 500 mg, Oral, Daily  nebivolol, 2.5 mg, Oral, Q24H  pantoprazole, 40 mg, Oral, BID AC  propafenone SR, 325 mg, Oral, Q12H  sodium chloride, 10 mL, Intravenous, Q12H      Continuous  Infusions:   PRN Meds:.  acetaminophen    hydrALAZINE    ipratropium-albuterol    ondansetron    Phosphorus Replacement - Follow Nurse / BPA Driven Protocol    Potassium Replacement - Follow Nurse / BPA Driven Protocol    sodium chloride    sodium chloride    Assessment & Plan   Assessment & Plan     Active Hospital Problems    Diagnosis  POA    **CVA (cerebral vascular accident) [I63.9]  Yes    Moderate malnutrition [E44.0]  Yes    Hypothyroidism (acquired) [E03.9]  Yes    Essential hypertension [I10]  Yes    Hyperlipemia [E78.5]  Yes    Paroxysmal atrial fibrillation [I48.0]  Yes    Acute ischemic left MCA stroke [I63.512]  Yes    Dysphagia [R13.10]  Yes    Acute lower gastrointestinal bleeding [K92.2]  Yes      Resolved Hospital Problems   No resolved problems to display.        Brief Hospital Course to date:  83-year-old woman with paroxysmal atrial fibrillation, hypothyroidism, hypertension, breast cancer, GI bleed presenting with left facial droop, dysarthria and confusion.  She had an occluded M2 branch of her left middle cerebral artery.  She received TNK.  She developed angioedema treated with Solu-Medrol, Pepcid, Benadryl.     She was empirically started on Keppra for an abnormal EEG with some left frontal sharp waves and brief runs though no electrographic seizures seen.     Post thrombolytic therapy she developed GI bleeding.  She underwent an EGD that revealed several ulcerations in the gastrum the largest was 8 mm with a visible vessel and it was clipped.  Colonoscopy revealed pandiverticulosis but no active bleeding.  She remains on a proton pump inhibitor.  She did receive a total of 2 units of packed red cells.  Post endoscopy she has had no further signs of active bleeding.  Hemoglobin today is 9.2.  GI feels she is a low risk for rebleeding and have no objection to anticoagulation.    CVA  -s/p TNK  -in setting of atrial fibrillation  -rehab    PAF  -Eliquis  -propafenone per  cardiology    Angioedema s/p TNK  -s/p solumedrol/pepcid/benadryl    GIB  -s/p EGD/colonosocpy  -EGD with gastric ulcer s/p clip  -2 Units PRBC    Hypothyroidism  HTN    Expected Discharge Location and Transportation: Rehab  Expected Discharge   Expected Discharge Date: 9/4/2023; Expected Discharge Time:      DVT prophylaxis:  Medical and mechanical DVT prophylaxis orders are present.     AM-PAC 6 Clicks Score (PT): 14 (08/31/23 1506)    CODE STATUS:   Code Status and Medical Interventions:   Ordered at: 08/30/23 1347     Code Status (Patient has no pulse and is not breathing):    CPR (Attempt to Resuscitate)     Medical Interventions (Patient has pulse or is breathing):    Full Support       Yumiko Mclaughlin MD  09/03/23

## 2023-09-03 NOTE — THERAPY TREATMENT NOTE
Patient Name: Laury Eugene  : 1940    MRN: [id number]                              Today's Date: 9/3/2023       Admit Date: 2023    Visit Dx:     ICD-10-CM ICD-9-CM   1. Acute CVA (cerebrovascular accident)  I63.9 434.91   2. Anemia, unspecified type  D64.9 285.9   3. Dysphagia, unspecified type  R13.10 787.20   4. Acute lower gastrointestinal bleeding  K92.2 578.9   5. ABLA (acute blood loss anemia)  D62 285.1   6. Aphasia  R47.01 784.3     Patient Active Problem List   Diagnosis    Multinodular goiter    Abnormal thyroid function test    CVA (cerebral vascular accident)    Hypothyroidism (acquired)    Essential hypertension    Hyperlipemia    Paroxysmal atrial fibrillation    Acute ischemic left MCA stroke    Dysphagia    Acute lower gastrointestinal bleeding    ABLA (acute blood loss anemia)    Moderate malnutrition     Past Medical History:   Diagnosis Date    History of breast cancer     History of heart disease     Hypothyroidism     Multinodular goiter     Thyroid function test abnormal      Past Surgical History:   Procedure Laterality Date    BLEPHAROPLASTY      BREAST RECONSTRUCTION      CATARACT EXTRACTION Bilateral     COLONOSCOPY N/A 2023    Procedure: COLONOSCOPY;  Surgeon: Brunner, Mark I, MD;  Location:  [facility];  Service: Gastroenterology;  Laterality: N/A;    ENDOSCOPY N/A 2023    Procedure: ESOPHAGOGASTRODUODENOSCOPY;  Surgeon: Brunner, Mark I, MD;  Location:  [facility];  Service: Gastroenterology;  Laterality: N/A;    HYSTERECTOMY      SIMPLE MASTECTOMY        General Information       Row Name 23 1344          Physical Therapy Time and Intention    Document Type therapy note (daily note)  -KR     Mode of Treatment physical therapy;individual therapy  -KR       Row Name 23 1344          General Information    Patient Profile Reviewed yes  -KR     Existing Precautions/Restrictions fall;other (see comments)  R sided weakness (UE>LE),  expressive/receptive aphasia, R inattention  -KR     Barriers to Rehab medically complex;previous functional deficit;cognitive status  -KR       Row Name 09/03/23 1344          Safety Issues, Functional Mobility    Safety Issues Affecting Function (Mobility) awareness of need for assistance;insight into deficits/self-awareness;positioning of assistive device;problem-solving;safety precaution awareness;sequencing abilities;safety precautions follow-through/compliance;judgment  -KR     Impairments Affecting Function (Mobility) balance;cognition;coordination;endurance/activity tolerance;motor planning;strength;visual/perceptual;postural/trunk control;grasp;motor control  -KR     Cognitive Impairments, Mobility Safety/Performance awareness, need for assistance;insight into deficits/self-awareness;judgment;problem-solving/reasoning;sequencing abilities;safety precaution follow-through;safety precaution awareness  -KR               User Key  (r) = Recorded By, (t) = Taken By, (c) = Cosigned By      Initials Name Provider Type    KR Altagracia Vu, PT Physical Therapist                   Mobility       Row Name 09/03/23 1344          Bed Mobility    Bed Mobility supine-sit  -KR     Supine-Sit [place] (Bed Mobility) moderate assist (50% patient effort);1 person assist  -KR     Assistive Device (Bed Mobility) bed rails;draw sheet;head of bed elevated  -KR     Comment, (Bed Mobility) pt required assist at RLE. Cues for reaching across with RUE to bedrail and for scooting hips to EOB.  -       Row Name 09/03/23 1344          Bed-Chair Transfer    Bed-Chair [place] (Transfers) contact guard  -KR     Assistive Device (Bed-Chair Transfers) walker, front-wheeled  -KR     Comment, (Bed-Chair Transfer) lateral steps to chair towards L. Cues for reaching back to chair for controlled lower to sit.  -       Row Name 09/03/23 1344          Sit-Stand Transfer    Sit-Stand [place] (Transfers) contact guard;1 person  assist  -KR     Assistive Device (Sit-Stand Transfers) walker, front-wheeled  -KR     Comment, (Sit-Stand Transfer) 1x from bed, 6x from chair. Cues for LUE push up to stand and reaching back with LUE for controlled lowering to sit.  -KR       Row Name 09/03/23 1344          Gait/Stairs (Locomotion)    [place] Level (Gait) minimum assist (75% patient effort);1 person assist  -KR     Assistive Device (Gait) walker, front-wheeled  -KR     Distance in Feet (Gait) 60  -KR     Deviations/Abnormal Patterns (Gait) base of support, narrow;stride length decreased;weight shifting decreased;bilateral deviations  -KR     Bilateral Gait Deviations forward flexed posture  -KR     Comment, (Gait/Stairs) pt ambulating at very quick pace requiring cues to slow down. Pt with R lateral excursion and maintaining positioning towards R of FWW requiring cues for positioning within walker and R side attention. Sky required for walker management.  -KR               User Key  (r) = Recorded By, (t) = Taken By, (c) = Cosigned By      Initials Name Provider Type    KR Altagracia Vu, PT Physical Therapist                   Obj/Interventions       Row Name 09/03/23 1347          Hip (Therapeutic Exercise)    Hip (Therapeutic Exercise) strengthening exercise  -KR     Hip Strengthening (Therapeutic Exercise) 10 repetitions;bilateral;marching while standing  -KR       Row Name 09/03/23 1347          Balance    Balance Assessment sitting static balance;sitting dynamic balance;standing static balance;standing dynamic balance  -KR     Static Sitting Balance standby assist  -KR     Dynamic Sitting Balance contact guard  -KR     Position, Sitting Balance unsupported;sitting in chair;sitting edge of bed  -KR     Static Standing Balance contact guard  -KR     Dynamic Standing Balance minimal assist;1-person assist;verbal cues;non-verbal cues (demo/gesture)  -KR     Position/Device Used, Standing Balance supported;walker, front-wheeled  -KR      Balance Interventions sitting;standing;sit to stand;supported;static;dynamic  -KR               User Key  (r) = Recorded By, (t) = Taken By, (c) = Cosigned By      Initials Name Provider Type    Altagracia Buckner PT Physical Therapist                   Goals/Plan    No documentation.                  Clinical Impression       Row Name 09/03/23 1340          Pain Scale: FACES Pre/Post-Treatment    Pain: FACES Scale, Pretreatment 0-->no hurt  -KR     Posttreatment Pain Rating 0-->no hurt  -KR       Row Name 09/03/23 1349          Plan of Care Review    Plan of Care Reviewed With patient;family  -KR     Progress improving  -KR     Outcome Evaluation Pt with significant increase in ambulation distance this date. Pt continues to demo R-side inattention, as well as strength, balance, endurance, and safety awareness deficits and will continue to benefit from PT to address these ongoing deficits.  -KR       Row Name 09/03/23 1340          Vital Signs    Pre Patient Position Supine  -KR     Intra Patient Position Standing  -KR     Post Patient Position Sitting  -KR       Row Name 09/03/23 1349          Positioning and Restraints    Pre-Treatment Position in bed  -KR     Post Treatment Position chair  -KR     In Chair notified nsg;reclined;call light within reach;encouraged to call for assist;with family/caregiver;waffle cushion;on mechanical lift sling;legs elevated  pt's chair turned to encourage increased R side attention with visitors sitting on R, thus chair alarm not on. Family present in room, RN and nurse tech aware.  -KR               User Key  (r) = Recorded By, (t) = Taken By, (c) = Cosigned By      Initials Name Provider Type    Altagracia Buckner PT Physical Therapist                   Outcome Measures       Row Name 09/03/23 8773          How much help from another person do you currently need...    Turning from your back to your side while in flat bed without using bedrails? 3  -KR     Moving from lying on  back to sitting on the side of a flat bed without bedrails? 2  -KR     Moving to and from a bed to a chair (including a wheelchair)? 3  -KR     Standing up from a chair using your arms (e.g., wheelchair, bedside chair)? 3  -KR     Climbing 3-5 steps with a railing? 2  -KR     To walk in hospital room? 3  -KR     AM-PAC 6 Clicks Score (PT) 16  -KR     Highest level of mobility 5 --> Static standing  -KR               User Key  (r) = Recorded By, (t) = Taken By, (c) = Cosigned By      Initials Name Provider Type    Altagracia Buckner PT Physical Therapist                                 Physical Therapy Education       Title: PT OT SLP Therapies (In Progress)       Topic: Physical Therapy (In Progress)       Point: Mobility training (In Progress)       Learning Progress Summary             Patient Acceptance, E, NR by KR at 9/3/2023 1354    Acceptance, E,TB, NR by DUARTE at 8/30/2023 0936    Acceptance, E,D, NR by MB at 8/28/2023 1610   Family Acceptance, E, NR by KR at 9/3/2023 1354    Acceptance, E,TB, NR by DUARTE at 8/30/2023 0936                         Point: Home exercise program (In Progress)       Learning Progress Summary             Patient Acceptance, E, NR by KR at 9/3/2023 1354    Acceptance, E,TB, NR by DUARTE at 8/30/2023 0936    Acceptance, E,D, NR by MB at 8/28/2023 1610   Family Acceptance, E, NR by KR at 9/3/2023 1354    Acceptance, E,TB, NR by DUARTE at 8/30/2023 0936                         Point: Body mechanics (In Progress)       Learning Progress Summary             Patient Acceptance, E, NR by KR at 9/3/2023 1354    Acceptance, E,TB, NR by DUARTE at 8/30/2023 0936    Acceptance, E,D, NR by MB at 8/28/2023 1610   Family Acceptance, E, NR by KR at 9/3/2023 1354    Acceptance, E,TB, NR by DUARTE at 8/30/2023 0936                         Point: Precautions (In Progress)       Learning Progress Summary             Patient Acceptance, E, NR by KR at 9/3/2023 1354    Acceptance, E,TB, NR by DUARTE at 8/30/2023 0936     Acceptance, E,D, NR by MB at 8/28/2023 1610   Family Acceptance, E, NR by KR at 9/3/2023 1354    Acceptance, E,TB, NR by AY at 8/30/2023 0936                                         User Key       Initials Effective Dates Name Provider Type Discipline    MB 06/16/21 -  Monica Dominguez, PT Physical Therapist PT    AY 11/10/20 -  Zonia Luciano PT Physical Therapist PT    FAHAD 12/30/22 -  Altagracia Vu PT Physical Therapist PT                  PT Recommendation and Plan     Plan of Care Reviewed With: patient, family  Progress: improving  Outcome Evaluation: Pt with significant increase in ambulation distance this date. Pt continues to demo R-side inattention, as well as strength, balance, endurance, and safety awareness deficits and will continue to benefit from PT to address these ongoing deficits.     Time Calculation:         PT Charges       Row Name 09/03/23 1355             Time Calculation    Start Time 1314  -KR      PT Received On 09/03/23  -KR      PT Goal Re-Cert Due Date 09/07/23  -KR         Timed Charges    23969 - Gait Training Minutes  10  -KR      67111 - PT Therapeutic Activity Minutes 15  -KR         Total Minutes    Timed Charges Total Minutes 25  -KR       Total Minutes 25  -KR                User Key  (r) = Recorded By, (t) = Taken By, (c) = Cosigned By      Initials Name Provider Type    Altagracia Buckner, SELWYN Physical Therapist                  Therapy Charges for Today       Code Description Service Date Service Provider Modifiers Qty    67287845935 HC GAIT TRAINING EA 15 MIN 9/3/2023 Altagracia Vu, PT GP 1    52037110169 HC PT THERAPEUTIC ACT EA 15 MIN 9/3/2023 Altagracia Vu, PT GP 1            PT G-Codes  Outcome Measure Options: AM-PAC 6 Clicks Daily Activity (OT), Modified [place]  AM-PAC 6 Clicks Score (PT): 16  AM-PAC 6 Clicks Score (OT): 14  Modified [place] Scale: 4 - Moderately severe disability.  Unable to walk without assistance, and unable to attend to own bodily needs  without assistance.       Altagracia Vu, PT  9/3/2023

## 2023-09-03 NOTE — PLAN OF CARE
Goal Outcome Evaluation:  Plan of Care Reviewed With: patient, family        Progress: improving  Outcome Evaluation: Pt with significant increase in ambulation distance this date. Pt continues to demo R-side inattention, as well as strength, balance, endurance, and safety awareness deficits and will continue to benefit from PT to address these ongoing deficits.

## 2023-09-03 NOTE — PLAN OF CARE
Goal Outcome Evaluation: Pt did well overnight. VS WDL at this time. POC ongoing. Adenike Gagnon RN

## 2023-09-04 VITALS
WEIGHT: 179.4 LBS | HEIGHT: 61 IN | TEMPERATURE: 98.1 F | HEART RATE: 70 BPM | OXYGEN SATURATION: 90 % | SYSTOLIC BLOOD PRESSURE: 129 MMHG | RESPIRATION RATE: 18 BRPM | BODY MASS INDEX: 33.87 KG/M2 | DIASTOLIC BLOOD PRESSURE: 54 MMHG

## 2023-09-04 PROCEDURE — 94664 DEMO&/EVAL PT USE INHALER: CPT

## 2023-09-04 PROCEDURE — 94799 UNLISTED PULMONARY SVC/PX: CPT

## 2023-09-04 RX ORDER — ESCITALOPRAM OXALATE 10 MG/1
10 TABLET ORAL DAILY
Start: 2023-09-05

## 2023-09-04 RX ORDER — LEVETIRACETAM 500 MG/1
500 TABLET, EXTENDED RELEASE ORAL DAILY
Start: 2023-09-05

## 2023-09-04 RX ADMIN — PANTOPRAZOLE SODIUM 40 MG: 40 TABLET, DELAYED RELEASE ORAL at 07:26

## 2023-09-04 RX ADMIN — PROPAFENONE 325 MG: 325 CAPSULE, EXTENDED RELEASE ORAL at 09:13

## 2023-09-04 RX ADMIN — Medication 10 ML: at 09:11

## 2023-09-04 RX ADMIN — LEVETIRACETAM 500 MG: 500 TABLET, EXTENDED RELEASE ORAL at 09:13

## 2023-09-04 RX ADMIN — APIXABAN 5 MG: 5 TABLET, FILM COATED ORAL at 09:13

## 2023-09-04 RX ADMIN — ESCITALOPRAM OXALATE 10 MG: 10 TABLET ORAL at 09:13

## 2023-09-04 RX ADMIN — IPRATROPIUM BROMIDE AND ALBUTEROL SULFATE 3 ML: 2.5; .5 SOLUTION RESPIRATORY (INHALATION) at 08:28

## 2023-09-04 NOTE — CASE MANAGEMENT/SOCIAL WORK
Case Management Discharge Note    Final Note:     Ms. Eugnee has an inpatient rehab bed at [facility] today, Monday, 9/4/23, if medically ready.  Confirmed bed with Marisa at [facility].      Updated Ms. Eugene's great grand niece, Namrata, by phone, and she is agreeable to the DC plan.      [facility] is scheduled for transport today at 1:15pm.  Please bring Ms. Eugene to the [address] entrance by 1:10pm.      Please call report to [facility] at ph [phone].    If the [facility] RN is unable to take report, please call the [facility] House Supervisor at ph [phone].     will fax the DC summary to fax [phone].      Thank you.           Selected Continued Care - Admitted Since 8/26/2023       Destination Coordination complete.      Service Provider Selected Services Address Phone Fax Patient Preferred    [facility] [address] [id number] [phone] --                    Addendum:  Transport is [facility]    Final Discharge Disposition Code: 62 - inpatient rehab facility

## 2023-09-04 NOTE — PLAN OF CARE
Problem: Adult Inpatient Plan of Care  Goal: Plan of Care Review  Outcome: Adequate for Care Transition  Goal: Patient-Specific Goal (Individualized)  Outcome: Adequate for Care Transition  Goal: Absence of Hospital-Acquired Illness or Injury  Outcome: Adequate for Care Transition  Intervention: Identify and Manage Fall Risk  Description: Perform standard risk assessment on admission using a validated tool or comprehensive approach appropriate to the patient; reassess fall risk frequently, with change in status or transfer to another level of care.  Communicate fall injury risk to interprofessional healthcare team.  Determine need for increased observation, equipment and environmental modification, such as low bed, signage and supportive, nonskid footwear.  Adjust safety measures to individual developmental age, stage and identified risk factors.  Reinforce the importance of safety and physical activity with patient and family.  Perform regular intentional rounding to assess need for position change, pain assessment and personal needs, including assistance with toileting.  Recent Flowsheet Documentation  Taken 9/4/2023 1315 by Keke Mendoza RN  Safety Promotion/Fall Prevention: patient off unit  Taken 9/4/2023 1245 by Keke Mendoza RN  Safety Promotion/Fall Prevention: safety round/check completed  Taken 9/4/2023 1010 by Keke Mendoza RN  Safety Promotion/Fall Prevention: safety round/check completed  Taken 9/4/2023 0916 by Keke Mendoza RN  Safety Promotion/Fall Prevention:   activity supervised   safety round/check completed  Taken 9/4/2023 0726 by Keke Mendoza RN  Safety Promotion/Fall Prevention: safety round/check completed  Intervention: Prevent Skin Injury  Description: Perform a screening for skin injury risk, such as pressure or moisture associated skin damage on admission and at regular intervals throughout hospital stay.  Keep all areas of skin (especially folds) clean and  dry.  Maintain adequate skin hydration.  Relieve and redistribute pressure and protect bony prominences; implement measures based on patient-specific risk factors.  Match turning and repositioning schedule to clinical condition.  Encourage weight shift frequently; assist with reposition if unable to complete independently.  Float heels off bed; avoid pressure on the Achilles tendon.  Keep skin free from extended contact with medical devices.  Encourage functional activity and mobility, as early as tolerated.  Use aids (e.g., slide boards, mechanical lift) during transfer.  Recent Flowsheet Documentation  Taken 9/4/2023 1245 by Keke Mendoza RN  Body Position: supine, legs elevated  Taken 9/4/2023 1010 by Keke Mendoza RN  Body Position: sitting up in bed  Skin Protection: adhesive use limited  Taken 9/4/2023 0726 by Keke Mendoza RN  Body Position: supine, legs elevated  Skin Protection:   adhesive use limited   tubing/devices free from skin contact  Intervention: Prevent and Manage VTE (Venous Thromboembolism) Risk  Description: Assess for VTE (venous thromboembolism) risk.  Encourage and assist with early ambulation.  Initiate and maintain compression or other therapy, as indicated, based on identified risk in accordance with organizational protocol and provider order.  Encourage both active and passive leg exercises while in bed, if unable to ambulate.  Recent Flowsheet Documentation  Taken 9/4/2023 1245 by Keke Mendoza RN  Activity Management: activity encouraged  Taken 9/4/2023 1010 by Keke Mendoza RN  Activity Management: activity encouraged  Taken 9/4/2023 0726 by Keke Mendoza RN  Activity Management: activity encouraged  VTE Prevention/Management: (Eliquis) other (see comments)  Goal: Optimal Comfort and Wellbeing  Outcome: Adequate for Care Transition  Intervention: Provide Person-Centered Care  Description: Use a family-focused approach to care.  Develop trust and rapport by  proactively providing information, encouraging questions, addressing concerns and offering reassurance.  Acknowledge emotional response to hospitalization.  Recognize and utilize personal coping strategies.  Honor spiritual and cultural preferences.  Recent Flowsheet Documentation  Taken 9/4/2023 9226 by Keke Mendoza, RN  Trust Relationship/Rapport:   care explained   choices provided  Goal: Readiness for Transition of Care  Outcome: Adequate for Care Transition   Goal Outcome Evaluation:      Pt alert and follows commands, FELIPE orientation. Nonverbal indicators of pain absent. Family not present, niece removed personal CPAP to take to facility. PICC removed by Charge RN. Tele box removed, cleaned and returned to [facility]. Discharge teaching reviewed. Report called to Giulia at [facility]. [facility] transport staff on unit to take pt to [place].

## 2023-09-04 NOTE — DISCHARGE SUMMARY
[facility] Services  DISCHARGE SUMMARY    Patient Name: Laury Eugene  : 1940  MRN: [id number]    Date of Admission: 2023 12:26 AM  Date of Discharge:  23  Primary Care Physician: Leatha Melvin DO    Consults       Date and Time Order Name Status Description    2023  6:49 AM Inpatient Gastroenterology Consult Completed     2023  4:22 AM Inpatient Gastroenterology Consult Completed     2023  2:57 AM Inpatient Cardiology Consult Completed     2023 12:46 AM Inpatient Neurology Consult Stroke Completed             Hospital Course     Presenting Problem: cva    Active Hospital Problems    Diagnosis  POA    **CVA (cerebral vascular accident) [I63.9]  Yes    Moderate malnutrition [E44.0]  Yes    Hypothyroidism (acquired) [E03.9]  Yes    Essential hypertension [I10]  Yes    Hyperlipemia [E78.5]  Yes    Paroxysmal atrial fibrillation [I48.0]  Yes    Acute ischemic left MCA stroke [I63.512]  Yes    Dysphagia [R13.10]  Yes    Acute lower gastrointestinal bleeding [K92.2]  Yes      Resolved Hospital Problems   No resolved problems to display.          Hospital Course:  Laury Eugene is a 83-year-old woman with paroxysmal atrial fibrillation, hypothyroidism, hypertension, breast cancer, GI bleed presenting with left facial droop, dysarthria and confusion.  She had an occluded M2 branch of her left middle cerebral artery.  She received TNK.  She developed angioedema treated with Solu-Medrol, Pepcid, Benadryl.     She was empirically started on Keppra for an abnormal EEG with some left frontal sharp waves and brief runs though no electrographic seizures seen.     Post thrombolytic therapy she developed GI bleeding.  She underwent an EGD that revealed several ulcerations in the gastrum the largest was 8 mm with a visible vessel and it was clipped.  Colonoscopy revealed pandiverticulosis but no active bleeding.  She remains on a proton pump  inhibitor.  She did receive a total of 2 units of packed red cells.  Post endoscopy she has had no further signs of active bleeding.  Hemoglobin today is 9.2.  GI feels she is a low risk for rebleeding and have no objection to anticoagulation.    Patient was also seen by cardiology during admission, he stroke was felt related to afib and patient continued on propafenone and eliquis. Discussion from neurology was had regarding her anticoagulant/antiplatelet regimen and it was felt that patient should dc asa and start eliquis as d/w between neurology and patient's family feels the benefit of administration outweighs the risk of hemorrhage at this time.     Patient will f/u with neurology in 1 month upon discharge and f/u with cardiology as scheduled      Discharge Follow Up Recommendations for outpatient labs/diagnostics:  PCP 1-2 weeks  Cardiology as scheduled  Stroke 1 month     Day of Discharge     HPI:   Aphasic. Has a bed at [facility]. Family at bedside    Review of Systems  Uto     Vital Signs:   Temp:  [98.1 °F (36.7 °C)-98.6 °F (37 °C)] 98.4 °F (36.9 °C)  Heart Rate:  [62-77] 69  Resp:  [16-20] 16  BP: (129-174)/(48-97) 135/56  Flow (L/min):  [4] 4      Physical Exam:  GEN: NAD, resting in bed, sleeping  HEENT: on room air, atraumatic, normocephalic  NECK: supple, no masses  RESP: on room air, normal effort  CV: on tele, sinus rhythm  NEURO: aphasic, facial droop noted   MSK: no edema noted  SKIN: no rashes noted       Pertinent  and/or Most Recent Results     LAB RESULTS:      Lab 09/03/23  0654 09/02/23 2004 09/02/23  1114 09/01/23  1836 09/01/23  0845 08/31/23  2109 08/31/23  0503 08/30/23  2109 08/30/23  0441 08/29/23  1456 08/29/23  0219   WBC 9.33  --  9.69  --   --   --  9.73  --  11.55*  --  12.02*   HEMOGLOBIN 9.7*  9.7* 10.3* 9.4*  9.4* 10.0* 9.8*   < > 9.6*  9.6*   < > 9.2*   < > 7.1*   HEMATOCRIT 31.1*  31.1* 31.6* 29.5*  29.5* 31.5* 30.0*   < > 29.4*  29.4*   < > 27.6*   < > 21.0*   PLATELETS  369  --  351  --   --   --  312  --  237  --  209   NEUTROS ABS 6.41  --   --   --   --   --  7.20*  --   --   --  9.06*   IMMATURE GRANS (ABS) 0.08*  --   --   --   --   --  0.05  --   --   --  0.06*   LYMPHS ABS 1.04  --   --   --   --   --  0.94  --   --   --  1.26   MONOS ABS 1.22*  --   --   --   --   --  1.07*  --   --   --  1.27*   EOS ABS 0.54*  --   --   --   --   --  0.44*  --   --   --  0.35   MCV 99.4*  --  98.7*  --   --   --  96.4  --  95.2  --  95.9   CRP  --   --   --   --   --   --  2.31*  --   --   --   --    PROCALCITONIN  --   --   --   --   --   --  0.09  --   --   --   --     < > = values in this interval not displayed.         Lab 09/03/23  0654 09/02/23  1114 09/01/23  1836 09/01/23  0845 08/31/23  0503 08/30/23  1424 08/30/23  0441 08/29/23  0219   SODIUM 137 137  --  140 140  --  141 142   POTASSIUM 4.3 4.5 4.1 3.6 3.8   < > 3.6 3.8   CHLORIDE 101 101  --  104 107  --  109* 110*   CO2 30.0* 29.0  --  28.0 26.0  --  25.0 25.0   ANION GAP 6.0 7.0  --  8.0 7.0  --  7.0 7.0   BUN 12 15  --  13 15  --  12 12   CREATININE 0.68 0.61  --  0.61 0.68  --  0.52* 0.66   EGFR 87.6 89.9  --  89.9 87.6  --  93.5 88.3   GLUCOSE 95 107*  --  131* 94  --  101* 110*   CALCIUM 7.9* 8.0*  --  7.7* 7.8*  --  7.5* 6.9*   MAGNESIUM  --   --   --  2.2  --   --  1.9 1.9   PHOSPHORUS  --   --   --   --   --   --  2.9 2.9    < > = values in this interval not displayed.         Lab 09/03/23  0654 08/29/23  0219   TOTAL PROTEIN 4.7* 4.5*   ALBUMIN 2.7* 2.2*   GLOBULIN 2.0 2.3   ALT (SGPT) 15 6   AST (SGOT) 18 14   BILIRUBIN 0.3 0.5   ALK PHOS 90 71                     Brief Urine Lab Results       None          Microbiology Results (last 10 days)       ** No results found for the last 240 hours. **            Adult Transthoracic Echo Complete W/ Cont if Necessary Per Protocol    Result Date: 8/26/2023    Left ventricular systolic function is normal. Left ventricular ejection fraction appears to be 51 - 55%.      EEG    Result Date: 2023  Reason for referral: 83 y.o.female with speech changes, left hemispheric stroke Technical Summary:  A 19 channel digital EEG was performed using the international 10-20 placement system, including eye leads and EKG leads. Duration: 21 minutes Findings: The patient is awake.  Medium amplitude intermixed theta and alpha activity is seen over the right hemisphere with scattered 4 Hz delta present.  By contrast, slower somewhat higher amplitude 2-5 Hz delta and theta activity are prominent over the left hemisphere.  Sharp waves are noted somewhat broadly over the left frontal leads.  These occur singly and at times a brief runs at slightly faster than 1 Hz.  Electrographic seizures are not seen.  Sleep is not seen.  Photic stimulation does not change the background.  Hyperventilation is not performed. Video: Available Technical quality: Superior EK-80 bpm SUMMARY: Mild generalized slow Independent left hemispheric slow Left frontal sharp waves, occurring singly and in brief runs at about 1 Hz (lateralized periodic discharges) No electrographic seizures are seen     Focal cerebral dysfunction primarily impacting the left hemisphere The sharp waves and lateralized periodic discharges noted above may be seen in the setting of acute CNS injury (i.e., stroke) as well as in the setting of seizure disorders of the partial type.  Clinical correlation is suggested regarding the latter This report is transcribed using the Dragon dictation system.      CT Abdomen Pelvis With & Without Contrast    Result Date: 2023  CT ABDOMEN PELVIS W WO CONTRAST Date of Exam: 2023 9:20 AM EDT Indication: GI bleeding, recent EGD/colonoscopy, concern for perf. Comparison: None available. Technique: Axial CT images were obtained of the abdomen and pelvis before and after the uneventful intravenous administration of 95 mL Isovue-370. Sagittal and coronal reconstructions were performed.  Automated  exposure control and iterative reconstruction methods were used. Findings: Lower thorax: Bilateral dependent atelectasis. Trace right pleural effusion. Coronary artery calcifications seen. Trace pericardial fluid. No evidence of pulmonary embolus in the visualized pulmonary arteries. Liver: No focal hepatic lesions seen.  Normal hepatic size. Normal density of the liver. Gallbladder and bile ducts: Cholecystectomy clips. No intra- or extra- hepatic biliary ductal dilatation. Spleen: Normal appearance of the spleen. Pancreas: Normal appearance of the pancreas. Main pancreatic duct is nondilated. Adrenals: Normal appearance of the adrenal glands. Kidneys: Scattered punctate hypodensities are too small to characterize. Symmetric enhancement and excretion of contrast. No nephrolithiasis.  Normal caliber of the ureters. Bowel: Small hiatal hernia. Duodenal diverticulum seen with internal high density material is present on noncontrast images. Normal caliber of the bowel. No evidence of active arterial extravasation. Normal appearance of the appendix. Diverticulosis without diverticulitis. Pelvis: Normal appearance of the bladder. Hysterectomy. Ovaries are not visualized. Peritoneum: No free air. No free fluid. No peritoneal nodularity. Vessels: Normal aortic caliber.  Atherosclerotic calcification of the aorta.  Celiac artery, splenic artery, superior mesenteric artery, inferior mesenteric artery, renal arteries and iliac arteries appear patent. Iliac veins, inferior vena cava, superior mesenteric vein, renal veins, portal vein and splenic vein are patent. Lymph nodes: No enlarged or suspicious adenopathy. Bones: No acute osseous abnormality. Degenerative changes of the spine. Soft tissues: Left breast implant.     Impression: No evidence of active arterial extravasation. Trace right pleural effusion. Electronically Signed: Jose Gomez MD  8/26/2023 9:47 AM EDT  Workstation ID: [id number]    CT Head Without  Contrast    Result Date: 8/27/2023  CT HEAD WO CONTRAST Date of Exam: 8/27/2023 2:00 AM EDT Indication: Stroke, follow up. 24 Hours Post Thrombolytic Administration. Comparison: 8/26/2023 CT head and brain MRI. Technique: Axial CT images were obtained of the head without contrast administration.  Automated exposure control and iterative construction methods were used. Findings: There is no acute intracranial hemorrhage. No mass effect or midline shift. No abnormal extra-axial collections. There is a developing small cortical infarct in the left insular cortex. There is a stable small chronic infarct in the right temporal lobe. Stable patchy white matter hypoattenuation. There are intracranial vascular calcifications. There is thinning of the orbital lenses bilaterally. The calvarium appears intact. Paranasal sinuses and mastoid air cells appear well aerated.     Impression: 1. Developing small area of hypoattenuation in the left insular cortex corresponding to known acute/subacute infarct. No acute intracranial hemorrhage. 2. Stable chronic right temporal lobe infarct and mild chronic small vessel ischemic change. Electronically Signed: Jim Guillen MD  8/27/2023 2:09 AM EDT  Workstation ID: [id number]    CT Head Without Contrast    Result Date: 8/26/2023  CT HEAD WO CONTRAST Date of Exam: 8/26/2023 5:55 PM EDT Indication: Increased NIH. Comparison: Head CT and brain MRI from earlier today Technique: Axial CT images were obtained of the head without contrast administration.  Automated exposure control and iterative construction methods were used. FINDINGS:  There is focal encephalomalacia within the right temporal lobe. Foci of periventricular and subcortical white matter hypoattenuation are consistent with chronic microvascular ischemic change. There is minimal hypodensity about the left insula corresponding to known acute infarct seen on MRI from today. No significant mass effect, midline shift, intracranial  hemorrhage, or hydrocephalus is identified. No extra-axial fluid collection is identified.   The calvarium and overlying soft tissues are unremarkable. The paranasal sinuses and bilateral mastoid air cells are adequately aerated. The visualized bony orbits, globes, and retrobulbar soft tissues are unremarkable.     1.No acute intracranial hemorrhage is identified. 2.There is minimal hypodensity about the left insula corresponding to known acute infarct seen on MRI from today. 3.Findings compatible with chronic microvascular ischemic change. 4.Mild encephalomalacia within the right temporal lobe again noted. Electronically Signed: Geoff Marte MD  8/26/2023 6:10 PM EDT  Workstation ID: [id number]    CT Angiogram Neck    Result Date: 8/26/2023  CT ANGIOGRAM NECK CT ANGIOGRAM HEAD W AI ANALYSIS OF LVO Date of Exam: 8/26/2023 12:34 AM EDT Indication: stroke. Comparison: CT head 8/26/2023. Technique: CTA of the head and neck was performed before and after the uneventful intravenous administration of 115 mL Isovue-370. Reconstructed coronal and sagittal images were also obtained. In addition, a 3-D volume rendered image was created for interpretation. Automated exposure control and iterative reconstruction methods were used. Findings: Aorta: The aortic arch is unremarkable. There is conventional three-vessel arch anatomy. The right brachiocephalic and bilateral subclavian arteries appear patent. Right carotid: The right CCA follows a normal course and appears normal caliber. Carotid bifurcation is normal. ECA and distal branches appear normal. Cervical ICA is normal. The intracranial ICA segments appear widely patent. There is no definite P-comm. There is a patent A-Comm. The A1 and M1 segments and distal NESSA and MCA branches appear patent. Left carotid: The left CCA contains minimal amount stenosing plaque. The carotid bifurcation, ECA and distal branches and cervical ICA appear within normal limits. The intracranial  ICA segments appear widely patent. There is a small patent P-comm. The A1  and M1 segments and distal NESSA branches appear normal. There is abrupt tapering of a distal left M2 branch within the anterior insular region. The remainder of the left MCA branches appear patent. Posterior circulation: Vertebral arteries are codominant. Vertebral arteries follow a normal course and appear normal caliber throughout the neck. There is mild left V4 and right V4 calcific disease without stenosis. The basilar artery, superior cerebellar and posterior cerebral arteries appear patent. Nonvascular findings: Lung apices appear clear. Superior mediastinal structures are unremarkable. There is an 11 mm right thyroid lobe nodule. No evidence of a neck mass or adenopathy. There is thinning of the orbital lenses bilaterally. There is no focal soft tissue inflammation or fluid collection. There are no acute osseous abnormalities or destructive bone lesions. There is moderate cervical spondylosis.     Impression: 1. There is abrupt tapering/occlusion of a distal left MCA/M2 branch in the region of the anterior insula. This is age-indeterminate, but corresponds to the region of perfusion abnormality on CT perfusion imaging. 2. No evidence of aneurysm or dissection. Electronically Signed: Jim Guillen MD  8/26/2023 1:03 AM EDT  Workstation ID: [id number]    MRI Brain Without Contrast    Result Date: 8/26/2023  MRI BRAIN WO CONTRAST Date of Exam: 8/26/2023 3:03 AM EDT Indication: Stroke, follow up.  Comparison: CT head, perfusion and angiography 8/26/2023. Technique:  Routine multiplanar/multisequence sequence images of the brain were obtained without contrast administration. Findings: There is a small focus of diffusion restriction in the anterior left insular cortex consistent with acute/subacute infarction. There is no additional diffusion restriction. There is a chronic infarct in the posterior right insular cortex and right temporal lobe.  There is mild patchy FLAIR hyperintense signal within the cerebral white matter. There is mild generalized parenchymal volume loss. There is no acute or chronic intracranial hemorrhage. There is no mass effect or midline shift. No abnormal  extra-axial collections. The major arterial flow voids appear intact. The calvarium appears normal signal. Superficial soft tissues are unremarkable. The cerebellum is normal. There are prominent perivascular spaces. There is thinning of the orbital lenses bilaterally. There is mild left sphenoid and posterior left ethmoid sinus mucosal disease. The mastoid air cells appear well aerated.     Impression: 1. Small acute/subacute cortical infarct in the anterior left insular cortex. 2. Mild chronic small vessel ischemic change and chronic right temporal infarct. Electronically Signed: Jim Guillen MD  8/26/2023 4:10 AM EDT  Workstation ID: [id number]    SLP FEES - Fiberoptic Endo Eval Swallow    Result Date: 8/28/2023  This procedure was auto-finalized with no dictation required.    XR Chest 1 View    Result Date: 8/30/2023  XR CHEST 1 VW Date of Exam: 8/30/2023 10:28 AM EDT Indication: inspiratory crackles, wheezing Comparison: 8/26/2023 Findings: There is mild cardiomegaly. Pulmonary vessels are within normal limits. There is now a right-sided PICC line is in place the tip projecting over the superior vena cava. There is hazy left perihilar and left basilar airspace disease which may be secondary  to pneumonia or less likely atelectasis. No definite pleural effusion. Right lung is clear. No right pleural effusion. No evidence pneumothorax.     Impression: 1. Cardiomegaly. 2. Hazy left perihilar and left basilar airspace disease which may be secondary to pneumonia. 3. Right-sided PICC line in place the tip projecting over the superior vena cava. Electronically Signed: Yared Muñoz MD  8/30/2023 10:45 AM EDT  Workstation ID: [id number]    XR Chest 1 View    Result Date:  8/26/2023  XR CHEST 1 VW Date of Exam: 8/26/2023 12:53 AM EDT Indication: Acute Stroke Protocol (onset < 12 hrs) Comparison: None available. Findings: There is no pneumothorax, pleural effusion or focal airspace consolidation. Heart size and pulmonary vasculature appear within normal limits. Regional bones appear intact.     Impression: No acute cardiopulmonary abnormality. Electronically Signed: Jim Guillen MD  8/26/2023 1:15 AM EDT  Workstation ID: [id number]    CT Head Without Contrast Stroke Protocol    Result Date: 8/26/2023  CT HEAD WO CONTRAST STROKE PROTOCOL Date of Exam: 8/26/2023 12:27 AM EDT Indication: Stroke. Comparison: 4/23/2009. Technique: Axial CT images were obtained of the head without contrast administration.  Reconstructed coronal images were also obtained. Automated exposure control and iterative construction methods were used. Scan Time: 0026 hours Results discussed with stroke team at 0036 hours. Findings: There is patchy hypoattenuation and loss of gray-white differentiation within the posterior right insular region and right temporal lobe compatible with age-indeterminate, but likely chronic infarct. There is no acute intracranial hemorrhage. No additional hypoattenuating lesions in the brain. There are intracranial vascular calcifications. There is no mass effect or midline shift. No abnormal extra-axial collections. The paranasal sinuses and mastoid air cells appear well aerated. The calvarium  is intact. There is a small focus of mucosal thickening within the left sphenoid sinus and in the left nasal cavity. Superficial soft tissues are unremarkable. There is thinning of the orbital lenses bilaterally.     Impression: 1. Focal region of hypoattenuation in the right insula and right temporal lobe. This appears to represent chronic infarct, but is ultimately age indeterminate. If there is concern for acute infarct, consider MRI. 2. No acute intracranial hemorrhage Electronically Signed:  Jim Guillen MD  8/26/2023 12:36 AM EDT  Workstation ID: [id number]    XR Abdomen KUB    Result Date: 8/26/2023  XR ABDOMEN KUB Date of Exam: 8/26/2023 1:11 AM EDT Indication: mri clearance Comparison: None available. Findings: Surgical clips overlie the left axilla and left breast. There are multiple small surgical clips in the abdomen. There is no evidence of a metallic foreign body. There is contrast media in the urinary bladder. The visualized lung parenchyma is clear. No bowel distention, pneumatosis intestinalis or evidence of pneumoperitoneum.     Impression: No contraindication to MRI. Electronically Signed: Jim Guillen MD  8/26/2023 1:39 AM EDT  Workstation ID: [id number]    CT Angiogram Head w AI Analysis of LVO    Result Date: 8/26/2023  CT ANGIOGRAM NECK CT ANGIOGRAM HEAD W AI ANALYSIS OF LVO Date of Exam: 8/26/2023 12:34 AM EDT Indication: stroke. Comparison: CT head 8/26/2023. Technique: CTA of the head and neck was performed before and after the uneventful intravenous administration of 115 mL Isovue-370. Reconstructed coronal and sagittal images were also obtained. In addition, a 3-D volume rendered image was created for interpretation. Automated exposure control and iterative reconstruction methods were used. Findings: Aorta: The aortic arch is unremarkable. There is conventional three-vessel arch anatomy. The right brachiocephalic and bilateral subclavian arteries appear patent. Right carotid: The right CCA follows a normal course and appears normal caliber. Carotid bifurcation is normal. ECA and distal branches appear normal. Cervical ICA is normal. The intracranial ICA segments appear widely patent. There is no definite P-comm. There is a patent A-Comm. The A1 and M1 segments and distal NESSA and MCA branches appear patent. Left carotid: The left CCA contains minimal amount stenosing plaque. The carotid bifurcation, ECA and distal branches and cervical ICA appear within normal limits. The  intracranial ICA segments appear widely patent. There is a small patent P-comm. The A1  and M1 segments and distal NESSA branches appear normal. There is abrupt tapering of a distal left M2 branch within the anterior insular region. The remainder of the left MCA branches appear patent. Posterior circulation: Vertebral arteries are codominant. Vertebral arteries follow a normal course and appear normal caliber throughout the neck. There is mild left V4 and right V4 calcific disease without stenosis. The basilar artery, superior cerebellar and posterior cerebral arteries appear patent. Nonvascular findings: Lung apices appear clear. Superior mediastinal structures are unremarkable. There is an 11 mm right thyroid lobe nodule. No evidence of a neck mass or adenopathy. There is thinning of the orbital lenses bilaterally. There is no focal soft tissue inflammation or fluid collection. There are no acute osseous abnormalities or destructive bone lesions. There is moderate cervical spondylosis.     Impression: 1. There is abrupt tapering/occlusion of a distal left MCA/M2 branch in the region of the anterior insula. This is age-indeterminate, but corresponds to the region of perfusion abnormality on CT perfusion imaging. 2. No evidence of aneurysm or dissection. Electronically Signed: Jim Giullen MD  8/26/2023 1:03 AM EDT  Workstation ID: [id number]    CT CEREBRAL PERFUSION WITH & WITHOUT CONTRAST    Result Date: 8/26/2023  CT CEREBRAL PERFUSION W WO CONTRAST Date of Exam: 8/26/2023 12:34 AM EDT Indication: stroke.  Comparison: CT head 8/26/2023. Technique: Axial CT images of the brain were obtained prior to and after the administration of 115 mL Isovue-370. Core blood volume, core blood flow, mean transit time, and Tmax images were obtained utilizing the Rapid software protocol. A limited CT angiogram of the head was also performed to measure the blood vessel density. The radiation dose reduction device was turned on for  each scan per the ALARA (As Low as Reasonably Achievable) protocol. Findings: There is a region of perfusion abnormality in the left frontal lobe; left MCA distribution consisting of Tmax greater than 6 seconds with volume measuring 25 mL. CBF<30% volume: 0 mL Tmax>6sec volume: 25 mL mL Mismatch volume: 25 mL mL Mismatch ratio: Infinite     Impression: Tmax perfusion abnormality in the left MCA distribution measuring approximately 25 mL suspicious for brain tissue at risk of ischemia. Electronically Signed: Jim Guillen MD  8/26/2023 12:57 AM EDT  Workstation ID: [id number]             Results for orders placed during the hospital encounter of 08/26/23    Adult Transthoracic Echo Complete W/ Cont if Necessary Per Protocol    Interpretation Summary    Left ventricular systolic function is normal. Left ventricular ejection fraction appears to be 51 - 55%.      Plan for Follow-up of Pending Labs/Results:     Discharge Details        Discharge Medications        New Medications        Instructions Start Date   apixaban 5 MG tablet tablet  Commonly known as: ELIQUIS   5 mg, Oral, Every 12 Hours Scheduled      atorvastatin 80 MG tablet  Commonly known as: LIPITOR   80 mg, Oral, Nightly      escitalopram 10 MG tablet  Commonly known as: LEXAPRO   10 mg, Oral, Daily   Start Date: September 5, 2023     levETIRAcetam  MG 24 hr tablet  Commonly known as: KEPPRA XR   500 mg, Oral, Daily   Start Date: September 5, 2023     nebivolol 2.5 MG tablet  Commonly known as: BYSTOLIC   2.5 mg, Oral, Every 24 Hours Scheduled      propafenone  MG 12 hr capsule  Commonly known as: RYTHMOL SR  Replaces: propafenone 300 MG tablet   325 mg, Oral, Every 12 Hours Scheduled             Continue These Medications        Instructions Start Date   folic acid 1 MG tablet  Commonly known as: FOLVITE   800 mcg, Oral, Daily      multivitamin with minerals tablet tablet   1 tablet, Oral, Daily      pantoprazole 40 MG EC tablet  Commonly known  as: PROTONIX   40 mg, Oral, Daily, Started at [facility]       traMADol 50 MG tablet  Commonly known as: ULTRAM   50 mg, Oral, Every 6 Hours PRN, Started at [facility]      vitamin B-12 250 MCG tablet  Commonly known as: CYANOCOBALAMIN   1,000 mcg, Oral, Daily             Stop These Medications      aspirin 325 MG tablet     propafenone 300 MG tablet  Commonly known as: RYTHMOL  Replaced by: propafenone  MG 12 hr capsule              Allergies   Allergen Reactions    Lidocaine Other (See Comments)     seizure    Novocain [Procaine] Other (See Comments)     seizures    Tnkase [Tenecteplase] Angioedema     Treated 8/26         Discharge Disposition:  Rehab Facility or Unit (DC - External)    Diet:  Hospital:  Diet Order   Procedures    Diet: Regular/House Diet; No Mixed Consistencies, No Straw; Texture: Soft to Chew (NDD 3); Soft to Chew: Ground Meat; Fluid Consistency: Nectar Thick       Activity:  as tolerated    Restrictions or Other Recommendations:  As tolerated       CODE STATUS:    Code Status and Medical Interventions:   Ordered at: 08/30/23 1347     Code Status (Patient has no pulse and is not breathing):    CPR (Attempt to Resuscitate)     Medical Interventions (Patient has pulse or is breathing):    Full Support       Future Appointments   Date Time Provider Department Center   1/2/2024  1:30 PM Roger Hodgson MD [facility]       Additional Instructions for the Follow-ups that You Need to Schedule       Discharge Follow-up with PCP   As directed       Currently Documented PCP:    Leatha Melvin DO    PCP Phone Number:    [phone]     Follow Up Details: 1-2 weeks        Discharge Follow-up with PCP   As directed       Currently Documented PCP:    Leatha Melvin DO    PCP Phone Number:    [phone]     Follow Up Details: 1-2 weeks        Discharge Follow-up with Specified Provider: Neuro stroke; 1 Month   As directed      To: Neuro stroke   Follow Up: 1 Month                       Yumiko Mclaughlin MD  09/04/23      Time Spent on Discharge:  I spent  35  minutes on this discharge activity which included: face-to-face encounter with the patient, reviewing the data in the system, coordination of the care with the nursing staff as well as consultants, documentation, and entering orders.

## 2023-09-04 NOTE — PLAN OF CARE
Goal Outcome Evaluation:                      RA. Alert, but FELIPE orientation. NSR on monitor NIHSS score is 12 at 1900 which is no change from previous assessment.Communication board used. Pt unable to speak and has L facial palsy r/t current admission diagnosis. No acute concerns of distress at this time. POC discussed with family. POC to continue as appropriate per provider orders.

## 2023-09-21 ENCOUNTER — TELEPHONE (OUTPATIENT)
Dept: NEUROLOGY | Facility: CLINIC | Age: 83
End: 2023-09-21
Payer: MEDICARE

## 2023-09-21 NOTE — TELEPHONE ENCOUNTER
LVM with appointment  date and time.  (10/19/23 at 1:00 PM.)  If this appointment does not work to please call the office.

## 2023-10-18 NOTE — PROGRESS NOTES
Stroke Clinic Office Visit     Encounter Date: 10/19/2023   Patient Name: Laury Eugene  : 1940  MRN: [id number]   PCP: Leatha Melvin DO  Referring Provider: No ref. provider found     Chief Complaint No chief complaint on file.    History of Present Illness  Laury Eugene is a 83 y.o. female with a past medical history of  past medical history of breast cancer in remission, atrial fibrillation, hypertension, hypothyroidism, recent GI bleed 2023, multiple falls in past year (most recent 2023), chronic right temporal lobe infarct who presented to [facility] on 2023 with acute left facial droop, aphasia, and confusion.  CT head 2023 showed chronic right temporal infarct and no hemorrhage.  Decision made to proceed with TNK.  CT angiogram head and neck 2023 showed distal left M2 occlusion.  The patient was not taken for thrombectomy due to NIHSS 3 on admission.  MRI brain 2023 shows chronic right temporal infarct and acute left insular infarct.  TTE 2023 LVEF 51-55%, normal left atrium, no thrombus. The patient was cleared from the GI perspective to restart apixaban and this medication was restarted for atrial fibrillation prior to discharge on 2023.     The patient presents to clinic today with her niece Mindy who helps to provide some of the history.The patient has been working with Physical Therapy, Occupational Therapy, and Speech Therapy. She and her niece report persistent expressive aphasia, right hemiparesis, and right facial droop. She and her niece deny new neurologic symptoms since her hospital discharge. She reports one fall since she has been on apixaban. She denies seizure activity. The patient questions if she could be cleared to drive.       Subjective      Past Medical History:   Diagnosis Date    History of breast cancer     History of heart disease     Hypothyroidism     Multinodular goiter      Thyroid function test abnormal       Past Surgical History:   Procedure Laterality Date    BLEPHAROPLASTY      BREAST RECONSTRUCTION      CATARACT EXTRACTION Bilateral     COLONOSCOPY N/A 2023    Procedure: COLONOSCOPY;  Surgeon: Brunner, Mark I, MD;  Location:  [facility];  Service: Gastroenterology;  Laterality: N/A;    ENDOSCOPY N/A 2023    Procedure: ESOPHAGOGASTRODUODENOSCOPY;  Surgeon: Brunner, Mark I, MD;  Location:  [facility];  Service: Gastroenterology;  Laterality: N/A;    HYSTERECTOMY      SIMPLE MASTECTOMY       Family History   Problem Relation Age of Onset    Hypothyroidism Sister     Stomach cancer Mother     Heart attack Father       Social History     Socioeconomic History    Marital status:    Tobacco Use    Smoking status: Former     Types: Cigarettes     Quit date:      Years since quittin.8     Passive exposure: Past    Smokeless tobacco: Never   Vaping Use    Vaping Use: Never used   Substance and Sexual Activity    Alcohol use: Yes     Alcohol/week: 1.0 - 2.0 standard drink of alcohol     Types: 1 - 2 Glasses of wine per week     Comment: social    Drug use: Never    Sexual activity: Never       Current Outpatient Medications:     apixaban (ELIQUIS) 5 MG tablet tablet, Take 1 tablet by mouth Every 12 (Twelve) Hours. Indications: Atrial Fibrillation, Disp: 60 tablet, Rfl:     atorvastatin (LIPITOR) 80 MG tablet, Take 1 tablet by mouth Every Night., Disp: 90 tablet, Rfl: 0    escitalopram (LEXAPRO) 10 MG tablet, Take 1 tablet by mouth Daily., Disp: , Rfl:     folic acid (FOLVITE) 1 MG tablet, Take 800 mcg by mouth Daily., Disp: , Rfl:     levETIRAcetam XR (KEPPRA XR) 500 MG 24 hr tablet, Take 1 tablet by mouth Daily., Disp: , Rfl:     multivitamin with minerals tablet tablet, Take 1 tablet by mouth Daily., Disp: , Rfl:     nebivolol (BYSTOLIC) 2.5 MG tablet, Take 1 tablet by mouth Daily., Disp: 90 tablet, Rfl: 0    pantoprazole (PROTONIX) 40 MG EC tablet,  "Take 1 tablet by mouth Daily. Started at [facility], Disp: , Rfl:     propafenone SR (RYTHMOL SR) 325 MG 12 hr capsule, Take 1 capsule by mouth Every 12 (Twelve) Hours., Disp: 120 capsule, Rfl: 0    traMADol (ULTRAM) 50 MG tablet, Take 1 tablet by mouth Every 6 (Six) Hours As Needed for Moderate Pain. Started at [facility], Disp: , Rfl:     vitamin B-12 (CYANOCOBALAMIN) 250 MCG tablet, Take 4 tablets by mouth Daily., Disp: , Rfl:    Allergies   Allergen Reactions    Lidocaine Other (See Comments)     seizure    Novocain [Procaine] Other (See Comments)     seizures    Tnkase [Tenecteplase] Angioedema     Treated 8/26        Objective     Physical Exam:  Vital Signs:   Vitals:    10/19/23 1250   BP: 122/64   Pulse: 65   Temp: 98.9 °F (37.2 °C)   SpO2: 94%   Weight: 75.3 kg (166 lb)   Height: 154.9 cm (60.98\")      Body mass index is 31.38 kg/m².     Physical Exam:  General Appearance: Alert  Eyes: Anicteric sclera  HEENT: no scleral injection   Lungs: respirations appear comfortable, no obvious increased work of breathing  Extremities: No cyanosis or fingernail clubbing   Skin: No rashes in exposed skin areas     Neurological Examination:   Mental status: Alert. Expressive aphasia. Moderate difficulty with naming and repeating. Slowed speech and interactions. She is able to read. Speech with no dysarthria.  Cranial Nerves: Visual fields intact. Extraocular movements are intact with no nystagmus. Facial sensation decreased on the right. Right lower facial droop. Hearing grossly intact. Palate movement is symmetric. Full shoulder shrug bilaterally. Tongue protrudes midline.   Sensory: Right upper and lower extremity numbness   Motor: Normal tone throughout.   Left upper extremity: 5/5 deltoid, tricep, bicep, interosseous, hand .   Right upper extremity: Drift. 5/5 deltoid, tricep, bicep, interosseous, hand .   Left lower extremity: 5/5 iliopsoas, knee extension/flexion, foot dorsi/plantarflexion.  Right lower " extremity: Drift. 5/5 iliopsoas, knee extension/flexion, foot dorsi/plantarflexion.  Cerebellar: Finger-to-nose intact. Rapid alternating movements are slowed on the right.   Gait: Normal.    NIHSS: 6    1a  Level of consciousness: 0=alert; keenly responsive   1b. LOC questions:  0=Performs both tasks correctly   1c. LOC commands: 0=Answers both questions correctly   2.  Best Gaze: 0=normal   3.  Visual: 0=No visual loss   4. Facial Palsy: 2=Partial paralysis (total or near total paralysis of the lower face)   5a.  Motor left arm: 0=No drift, limb holds 90 (or 45) degrees for full 10 seconds   5b.  Motor right arm: 1=Drift, limb holds 90 (or 45) degrees but drifts down before full 10 seconds: does not hit bed   6a. motor left le=No drift, limb holds 90 (or 45) degrees for full 10 seconds   6b  Motor right le=Drift, limb holds 90 (or 45) degrees but drifts down before full 10 seconds: does not hit bed   7. Limb Ataxia: 0=Absent   8.  Sensory: 1=Mild to moderate sensory loss; patient feels pinprick is less sharp or is dull on the affected side; there is a loss of superficial pain with pinprick but patient is aware She is being touched   9. Best Language:  1=Mild to moderate aphasia; some obvious loss of fluency or facility of comprehension without significant limitation on ideas expressed or form of expression.   10. Dysarthria: 0=Normal   11. Extinction and Inattention: 0=No abnormality     Modified Thad Score based on in-office assessment of alertness, orientation, and physical mobility. Further assessment with neurocognitive testing may be needed to elucidate full extent of cognitive abilities: 3 = Moderate disability (Requires some help, but able to walk unassisted)    PHQ-9 total score: 0  Patient fall risk assessment total score: 0    Imaging Reviewed:   CT head 2023.  Impression: 1. Focal region of hypoattenuation in the right insula and right temporal lobe. This appears to represent chronic  infarct, but is ultimately age indeterminate. If there is concern for acute infarct, consider MRI. 2. No acute intracranial hemorrhage.     CT angiogram head and neck 8/26/2023.  Impression: 1. There is abrupt tapering/occlusion of a distal left MCA/M2 branch in the region of the anterior insula. This is age-indeterminate, but corresponds to the region of perfusion abnormality on CT perfusion imaging. 2. No evidence of aneurysm or dissection.     MRI brain without contrast 8/26/2023.  Impression: 1. Small acute/subacute cortical infarct in the anterior left insular cortex. 2. Mild chronic small vessel ischemic change and chronic right temporal infarct.     CT head 8/26/2023.  Impression: 1. No acute intracranial hemorrhage is identified. 2. There is minimal hypodensity about the left insula corresponding to known acute infarct seen on MRI from today. 3. Findings compatible with chronic microvascular ischemic change. 4. Mild encephalomalacia within the right temporal lobe again noted.     CT head 8/27/2023.  Impression: 1. Developing small area of hypoattenuation in the left insular cortex corresponding to known acute/subacute infarct. No acute intracranial hemorrhage. 2. Stable chronic right temporal lobe infarct and mild chronic small vessel ischemic change.     TTE 8/26/2023 left ventricular ejection fraction 51-55%, normal left atrium, no thrombus noted     EKG 8/26/2023 sinus rhythm with first-degree AV block  EKG 8/26/2023 narrow complex rhythm previously noted to be sinus rhythm with first-degree AV block  EKG 8/27/2023 with nonspecific ST and T wave abnormality  EKG 8/29/2023 normal sinus rhythm     EEG 8/28/2023 left hemisphere dysfunction which could be suggestive of stroke or partial seizure       Laboratory Results:   Lab Results   Component Value Date    HGBA1C 5.80 (H) 08/27/2023     Lab Results   Component Value Date    LDL 73 08/26/2023         Assessment / Plan      Assessment and Plan    Laury  Valorie is an 83-year-old female with a past medical history of breast cancer in remission, atrial fibrillation, hypertension, hypothyroidism, recent GI bleed 8/23/2023, multiple falls in past year (most recent 8/23/2023), chronic right temporal lobe infarct who presented to [facility] emergency department on 8/26/2023 with acute left facial droop, aphasia, and confusion.  CT head 8/26/2023 showed chronic right temporal infarct and no hemorrhage.  Decision made to proceed with TNK.  CT angiogram head and neck 8/26/2023 showed distal left M2 occlusion.  The patient was not taken for thrombectomy due to NIHSS 3 on admission.  MRI brain 8/26/2023 shows chronic right temporal infarct and acute left insular infarct.  TTE 8/26/2023 LVEF 51-55%, normal left atrium, no thrombus.  Hospital course complicated by acute blood loss anemia requiring multiple transfusion.     # Acute left insular infarct  # Chronic right temporal infarct  The etiology of the patient's cortical infarcts in multiple vascular territories is suggestive of atrial fibrillation. GI was consulted and did clear the patient to start anticoagulation if indicated. Risk benefit discussion with the patient and her nieces as inpatient and shared decision making to restart apixaban for atrial fibrillation.     -Continue apixaban 5 mg BID   -Continue atorvastatin 80 mg daily (LDL 80)  -Continue Physical Therapy, Occupational Therapy, and Speech Therapy  -Patient or niece to call clinic if need renewal orders for therapies   -The patient and her niece were advised that she will continue to improve over the course of the first year after her stroke. At the year chiquis, her improvement with plateau.   -Would not recommend patient drive at this time. I have concerns about her reaction times.   -Patients niece will discuss driving simulation testing with Occupational Therapy. I offered to send a referral to the clinic at , but the patient and her niece  declined at this time.   -Discussed with patient that unfortunately, even with the best medical management, repeat stroke risk will never be zero.   -Stroke education counseling provided: common manifestations of stroke include unilateral face, arm, or leg weakness, numbness, or paresthesias, unilateral facial droop, speech deficits (dysarthria, aphasia), acute unremitting dizziness with nausea, vomiting, nystagmus, and incoordination, visual deficits (amarosis fugax or hemianopsia), or severe onset headache       #Abnormal EEG  Consideration of left hemispheric stroke versus partial seizure.    -Continue Keppra 500 mg BID  -Repeat EEG as needed for recurrent staring spells. Altered mental status, or abnormal rhythmic movements concerning for seizure         I spent 45 minutes caring for Laury on this date of service. This time includes time spent by me in the following activities:preparing for the visit, reviewing tests, performing a medically appropriate examination and/or evaluation , counseling and educating the patient/family/caregiver, documenting information in the medical record, and care coordination    Follow Up  Return to clinic in 8 months for routine monitoring at the 1 year post stroke chiquis.     Regina Granado MD  [facility] NEURO STROKE

## 2023-10-19 ENCOUNTER — OFFICE VISIT (OUTPATIENT)
Dept: NEUROLOGY | Facility: CLINIC | Age: 83
End: 2023-10-19
Payer: MEDICARE

## 2023-10-19 VITALS
WEIGHT: 166 LBS | HEIGHT: 61 IN | TEMPERATURE: 98.9 F | DIASTOLIC BLOOD PRESSURE: 64 MMHG | HEART RATE: 65 BPM | BODY MASS INDEX: 31.34 KG/M2 | SYSTOLIC BLOOD PRESSURE: 122 MMHG | OXYGEN SATURATION: 94 %

## 2023-10-19 DIAGNOSIS — Z86.73 HISTORY OF STROKE: Primary | ICD-10-CM

## 2023-10-19 RX ORDER — METOPROLOL SUCCINATE 25 MG/1
25 TABLET, EXTENDED RELEASE ORAL DAILY
COMMUNITY
Start: 2023-10-05

## 2023-10-19 NOTE — PATIENT INSTRUCTIONS
- You were seen today for stroke follow-up   - Long term stroke plan: continue apixaban and statin. Continue keppra   - Your stroke symptoms are expected to improve within the first year. If needed, working with Physical Therapy, Occupational Therapy, and Speech Therapy can improve your recovery.  - Unfortunately, even with the best medical management, your repeat stroke risk will never be zero.   - Please present to the Emergency Department immediately (within 3 hours) for any stroke symptom including one-sided weakness, numbness, or tingling, one-sided facial droop, vision problems (sudden onset blurred or double vision or eyes not moving correctly), speech problems (cannot speak or understand), dizziness, unremitting nausea/vomiting, and incoordination or nystagmus, or severe headache which could be a bleed.   - It is important to present to the Emergency Department as soon as possible after the above symptoms are discovered (and within 4.5 hours as able) to maximize acute stroke treatment options   - Recommend exercise for at least 30 minutes three times per week  - Recommend to eat a Mediterranean diet which limits beef, pork, lard, and sugar and increases the amount of fish, tofu, fruits, vegetables, nuts, and olive oil.  - Follow-up with primary care physician to address any stroke risk factors of diabetes, high cholesterol, elevated BMI, smoking cessation, or high blood pressure. Goal hemoglobin A1c less than 7.0%, goal BMI less than 24, goal LDL less than 70, and goal blood pressure less than 120/80.  - Please call clinic with any questions.  - Follow up in 8 months

## 2023-10-20 PROBLEM — I63.512 ACUTE ISCHEMIC LEFT MCA STROKE: Status: RESOLVED | Noted: 2023-08-26 | Resolved: 2023-10-20

## 2023-10-20 PROBLEM — Z86.73 HISTORY OF STROKE: Status: ACTIVE | Noted: 2023-10-20

## 2023-10-20 PROBLEM — I63.9 CVA (CEREBRAL VASCULAR ACCIDENT): Status: RESOLVED | Noted: 2023-08-26 | Resolved: 2023-10-20

## 2023-11-30 ENCOUNTER — CALL CENTER PROGRAMS (OUTPATIENT)
Dept: CALL CENTER | Facility: HOSPITAL | Age: 83
End: 2023-11-30
Payer: MEDICARE

## 2023-11-30 NOTE — OUTREACH NOTE
Stroke Thad Survey      Flowsheet Row Responses   Facility patient discharged from? [facility]   Attempt successful No   Unsuccessful attempts Attempt 1  [ATtempted patients phone number and caregiver not on list answered. Family not available. Attempted to reach fiorella Guillen POA, no answer.]            XAVIER BELLE - Registered Nurse

## 2023-12-07 ENCOUNTER — CALL CENTER PROGRAMS (OUTPATIENT)
Dept: CALL CENTER | Facility: HOSPITAL | Age: 83
End: 2023-12-07
Payer: MEDICARE

## 2023-12-07 NOTE — OUTREACH NOTE
Stroke [place] Survey      Flowsheet Row Responses   Facility patient discharged from? [facility]   Attempt successful Yes   Call start time 1253   Person spoke with today (if not patient) and relationship Caregiver  [Mindy, eufemiabiju, POA]   Call end time 1323   Patient location 30 days post discharge if known Home  [Went to [facility] for 21 days and then home.]   Was the patient readmitted within 30 days of discharge? No   Could you live alone without any help from another person? No  [Nibiju reports that patient requires around the clock supervision due to cognitive changes. She reports patient is impulsive and there is safety concerns. Patient unable to cook and nibiju reports patient sometimes doesn't remember how to use microwave.]   Can you do everything that you were doing right before your stroke even if slower and not as much? No  [Unable to drive, cook, previous household chores.]   Are you completely back to the way you were right before your stroke? No  [Niece reports patient with improving dysarthria and improvement in expressive aphasia. Continued, improving right sided numbness. Nibiju reports cognitive changes.]   Can you walk from one room to another without help from another person? Yes  [Able to ambulate in home independently, also uses a walker.]   Can the patient sit up in bed without any help? Yes   Call Center [place] Score 3   Thad score call completed Yes   Comments Camelia reports that patient is unable to live by herself due to safety concerns due to cognitive changes. Patient requires niece to manage finances and help with managing household. She reports that patient is able to bathe and dress herself, and get to toilet herself. But that she does things like open the car door before the car stops, and unable to use phone or text or write. Patient with moderate disability.            XAVIER BELLE - Registered Nurse

## 2023-12-21 RX ORDER — PROPAFENONE HYDROCHLORIDE 325 MG/1
325 CAPSULE, EXTENDED RELEASE ORAL EVERY 12 HOURS
Qty: 60 CAPSULE | OUTPATIENT
Start: 2023-12-21

## 2024-01-02 ENCOUNTER — OFFICE VISIT (OUTPATIENT)
Dept: ENDOCRINOLOGY | Facility: CLINIC | Age: 84
End: 2024-01-02
Payer: MEDICARE

## 2024-01-02 VITALS
SYSTOLIC BLOOD PRESSURE: 122 MMHG | HEIGHT: 61 IN | OXYGEN SATURATION: 98 % | DIASTOLIC BLOOD PRESSURE: 54 MMHG | BODY MASS INDEX: 27.94 KG/M2 | WEIGHT: 148 LBS | HEART RATE: 62 BPM

## 2024-01-02 DIAGNOSIS — E04.2 MULTINODULAR GOITER: Primary | ICD-10-CM

## 2024-01-02 DIAGNOSIS — R94.6 ABNORMAL THYROID FUNCTION TEST: ICD-10-CM

## 2024-01-02 LAB
ALBUMIN SERPL-MCNC: 3.9 G/DL (ref 3.5–5.2)
ALBUMIN/GLOB SERPL: 1.5 G/DL
ALP SERPL-CCNC: 122 U/L (ref 39–117)
ALT SERPL W P-5'-P-CCNC: 16 U/L (ref 1–33)
ANION GAP SERPL CALCULATED.3IONS-SCNC: 9 MMOL/L (ref 5–15)
AST SERPL-CCNC: 19 U/L (ref 1–32)
BILIRUB SERPL-MCNC: 0.3 MG/DL (ref 0–1.2)
BUN SERPL-MCNC: 17 MG/DL (ref 8–23)
BUN/CREAT SERPL: 17.7 (ref 7–25)
CALCIUM SPEC-SCNC: 9.1 MG/DL (ref 8.6–10.5)
CHLORIDE SERPL-SCNC: 101 MMOL/L (ref 98–107)
CO2 SERPL-SCNC: 28 MMOL/L (ref 22–29)
CREAT SERPL-MCNC: 0.96 MG/DL (ref 0.57–1)
DEPRECATED RDW RBC AUTO: 46 FL (ref 37–54)
EGFRCR SERPLBLD CKD-EPI 2021: 59.6 ML/MIN/1.73
ERYTHROCYTE [DISTWIDTH] IN BLOOD BY AUTOMATED COUNT: 14.4 % (ref 12.3–15.4)
GLOBULIN UR ELPH-MCNC: 2.6 GM/DL
GLUCOSE SERPL-MCNC: 106 MG/DL (ref 65–99)
HCT VFR BLD AUTO: 31.5 % (ref 34–46.6)
HGB BLD-MCNC: 9.7 G/DL (ref 12–15.9)
MCH RBC QN AUTO: 27.2 PG (ref 26.6–33)
MCHC RBC AUTO-ENTMCNC: 30.8 G/DL (ref 31.5–35.7)
MCV RBC AUTO: 88.5 FL (ref 79–97)
PLATELET # BLD AUTO: 250 10*3/MM3 (ref 140–450)
PMV BLD AUTO: 11 FL (ref 6–12)
POTASSIUM SERPL-SCNC: 4.2 MMOL/L (ref 3.5–5.2)
PROT SERPL-MCNC: 6.5 G/DL (ref 6–8.5)
RBC # BLD AUTO: 3.56 10*6/MM3 (ref 3.77–5.28)
SODIUM SERPL-SCNC: 138 MMOL/L (ref 136–145)
T4 FREE SERPL-MCNC: 1.12 NG/DL (ref 0.93–1.7)
TSH SERPL DL<=0.05 MIU/L-ACNC: 0.28 UIU/ML (ref 0.27–4.2)
WBC NRBC COR # BLD AUTO: 5.27 10*3/MM3 (ref 3.4–10.8)

## 2024-01-02 PROCEDURE — 36415 COLL VENOUS BLD VENIPUNCTURE: CPT | Performed by: INTERNAL MEDICINE

## 2024-01-02 PROCEDURE — 80053 COMPREHEN METABOLIC PANEL: CPT | Performed by: INTERNAL MEDICINE

## 2024-01-02 PROCEDURE — 3074F SYST BP LT 130 MM HG: CPT | Performed by: INTERNAL MEDICINE

## 2024-01-02 PROCEDURE — 3078F DIAST BP <80 MM HG: CPT | Performed by: INTERNAL MEDICINE

## 2024-01-02 PROCEDURE — 84443 ASSAY THYROID STIM HORMONE: CPT | Performed by: INTERNAL MEDICINE

## 2024-01-02 PROCEDURE — 99213 OFFICE O/P EST LOW 20 MIN: CPT | Performed by: INTERNAL MEDICINE

## 2024-01-02 PROCEDURE — 1160F RVW MEDS BY RX/DR IN RCRD: CPT | Performed by: INTERNAL MEDICINE

## 2024-01-02 PROCEDURE — 85027 COMPLETE CBC AUTOMATED: CPT | Performed by: INTERNAL MEDICINE

## 2024-01-02 PROCEDURE — 84439 ASSAY OF FREE THYROXINE: CPT | Performed by: INTERNAL MEDICINE

## 2024-01-02 PROCEDURE — 1159F MED LIST DOCD IN RCRD: CPT | Performed by: INTERNAL MEDICINE

## 2024-01-02 RX ORDER — ASCORBIC ACID 500 MG
500 TABLET ORAL DAILY
COMMUNITY

## 2024-01-02 NOTE — ASSESSMENT & PLAN NOTE
Given the stability of the nodules, her age and health status, I don't feel that we need to keep doing neck u/s.  Will continue with periodic neck exams.

## 2024-01-02 NOTE — PROGRESS NOTES
"     Office Note      Date: 2024  Patient Name: Laury Eugene  MRN: [id number]  : 1940    Chief Complaint   Patient presents with    Goiter       History of Present Illness:   Laury Eugene is a 83 y.o. female who presents for Goiter    She isn't taking any thyroid meds. She denies any excess iodine intake. She denies any recent steroids. She hasn't noted any change in the size of her neck. She denies any compressive sxs. She denies any sxs of hypo- or hyperthyroidism at this time, aside from fatigue.  She is on MVI with low dose biotin.      She was hospitalized with CVA since her last visit here.  She has h/o paroxysmal a.fib.  She is on rythmol and metoprolol.    Subjective      Review of Systems:   Review of Systems   Constitutional:  Positive for fatigue.   Cardiovascular: Negative.    Gastrointestinal: Negative.    Endocrine: Negative.        The following portions of the patient's history were reviewed and updated as appropriate: allergies, current medications, past family history, past medical history, past social history, past surgical history, and problem list.    Objective     Visit Vitals  /54   Pulse 62   Ht 154.9 cm (61\")   Wt 67.1 kg (148 lb)   SpO2 98%   BMI 27.96 kg/m²       Physical Exam:  Physical Exam  Constitutional:       Appearance: Normal appearance.   Neck:      Thyroid: No thyroid mass, thyromegaly or thyroid tenderness.   Lymphadenopathy:      Cervical: No cervical adenopathy.   Neurological:      Mental Status: She is alert.         Labs:    TSH  No results found for: \"TSHBASE\"     Free T4  Free T4   Date Value Ref Range Status   2023 1.30 0.93 - 1.70 ng/dL Final       T3  T3, Total   Date Value Ref Range Status   2020 98.4 80.0 - 200.0 ng/dl Final         TPO  No results found for: \"THYROIDAB\"    TG AB  No results found for: \"THGAB\"    TG  No results found for: \"THYROGLB\"    CBC w/DIFF  Lab Results   Component Value Date    WBC 9.33 2023    RBC " Worsening hypoxemia and tachypnea. CXR with increased pulm edema or ARDS  Case discussed with pulmonary medicine who recommends intubation. Ordered lasix 40mg IV and reduce IVF to 50ml per hour  Positive troponin I of 1.98. Case was discussed with general surgery, pulmonary medicine and cardiology. EKG without acute ST or T-wave abnormalities. We'll begin heparin drip without bolus.   Order echocardiogram. 3.13 (L) 09/03/2023    HGB 9.7 (L) 09/03/2023    HGB 9.7 (L) 09/03/2023    HCT 31.1 (L) 09/03/2023    HCT 31.1 (L) 09/03/2023    MCV 99.4 (H) 09/03/2023    MCH 31.0 09/03/2023    MCHC 31.2 (L) 09/03/2023    RDW 14.1 09/03/2023    RDWSD 50.7 09/03/2023    MPV 10.2 09/03/2023     09/03/2023    NEUTRORELPCT 68.7 09/03/2023    LYMPHORELPCT 11.1 (L) 09/03/2023    MONORELPCT 13.1 (H) 09/03/2023    EOSRELPCT 5.8 09/03/2023    BASORELPCT 0.4 09/03/2023    AUTOIGPER 0.9 (H) 09/03/2023    NEUTROABS 6.41 09/03/2023    LYMPHSABS 1.04 09/03/2023    MONOSABS 1.22 (H) 09/03/2023    EOSABS 0.54 (H) 09/03/2023    BASOSABS 0.04 09/03/2023    AUTOIGNUM 0.08 (H) 09/03/2023    NRBC 0.0 09/03/2023           Assessment / Plan      Assessment & Plan:  Diagnoses and all orders for this visit:    1. Multinodular goiter (Primary)  Assessment & Plan:  Given the stability of the nodules, her age and health status, I don't feel that we need to keep doing neck u/s.  Will continue with periodic neck exams.      2. Abnormal thyroid function test  Assessment & Plan:  She has h/o mildly low TSH.  Will recheck TFTs today.  We discussed use of methimazole if TSH is low, especially with h/o PAF and CVA.  Potential s/e discussed.    Orders:  -     Cancel: TSH; Future  -     Cancel: T4, Free; Future  -     TSH; Future  -     T4, Free; Future  -     Comprehensive Metabolic Panel; Future  -     CBC (No Diff); Future      Current Outpatient Medications   Medication Instructions    apixaban (ELIQUIS) 5 mg, Oral, Every 12 Hours Scheduled    ascorbic acid (VITAMIN C) 500 mg, Oral, Daily    atorvastatin (LIPITOR) 80 mg, Oral, Nightly    escitalopram (LEXAPRO) 10 mg, Oral, Daily    folic acid (FOLVITE) 800 mcg, Oral, Daily    levETIRAcetam XR (KEPPRA XR) 500 mg, Oral, Daily    metoprolol succinate XL (TOPROL-XL) 25 mg, Oral, Daily    pantoprazole (PROTONIX) 40 mg, Oral, Daily, Started at [facility]     propafenone SR (RYTHMOL SR) 325 mg, Oral, Every 12  Hours Scheduled    traMADol (ULTRAM) 50 mg, Oral, Every 6 Hours PRN, Started at [place]    vitamin B-12 (CYANOCOBALAMIN) 1,000 mcg, Oral, Daily      Return in about 3 months (around 4/2/2024) for Recheck with TSH, free T4, CMP, CBC.    Roger Hodgson MD   01/02/2024

## 2024-01-02 NOTE — ASSESSMENT & PLAN NOTE
She has h/o mildly low TSH.  Will recheck TFTs today.  We discussed use of methimazole if TSH is low, especially with h/o PAF and CVA.  Potential s/e discussed.

## 2024-01-04 ENCOUNTER — TELEPHONE (OUTPATIENT)
Dept: ENDOCRINOLOGY | Facility: CLINIC | Age: 84
End: 2024-01-04
Payer: MEDICARE

## 2024-01-04 NOTE — TELEPHONE ENCOUNTER
Pt aditya Guillen called checking the status on pt labs done 01/02/24. Please contact Mindy at [phone]

## 2024-04-13 ENCOUNTER — HOSPITAL ENCOUNTER (INPATIENT)
Facility: HOSPITAL | Age: 84
LOS: 2 days | Discharge: HOME | End: 2024-04-16
Attending: EMERGENCY MEDICINE | Admitting: HOSPITALIST
Payer: MEDICARE

## 2024-04-13 ENCOUNTER — APPOINTMENT (OUTPATIENT)
Dept: MRI IMAGING | Facility: HOSPITAL | Age: 84
End: 2024-04-13
Payer: MEDICARE

## 2024-04-13 DIAGNOSIS — R56.9 SEIZURES: Primary | ICD-10-CM

## 2024-04-13 DIAGNOSIS — R41.82 ALTERED MENTAL STATUS, UNSPECIFIED ALTERED MENTAL STATUS TYPE: ICD-10-CM

## 2024-04-13 PROBLEM — G40.919 BREAKTHROUGH SEIZURE: Status: ACTIVE | Noted: 2024-04-13

## 2024-04-13 LAB
ALBUMIN SERPL-MCNC: 3.8 G/DL (ref 3.5–5.2)
ALBUMIN/GLOB SERPL: 1.2 G/DL
ALP SERPL-CCNC: 114 U/L (ref 39–117)
ALT SERPL W P-5'-P-CCNC: 16 U/L (ref 1–33)
ANION GAP SERPL CALCULATED.3IONS-SCNC: 7 MMOL/L (ref 5–15)
AST SERPL-CCNC: 23 U/L (ref 1–32)
B PARAPERT DNA SPEC QL NAA+PROBE: NOT DETECTED
B PERT DNA SPEC QL NAA+PROBE: NOT DETECTED
BASOPHILS # BLD AUTO: 0.04 10*3/MM3 (ref 0–0.2)
BASOPHILS NFR BLD AUTO: 0.6 % (ref 0–1.5)
BILIRUB SERPL-MCNC: 0.2 MG/DL (ref 0–1.2)
BUN SERPL-MCNC: 17 MG/DL (ref 8–23)
BUN/CREAT SERPL: 16.7 (ref 7–25)
C PNEUM DNA NPH QL NAA+NON-PROBE: NOT DETECTED
CALCIUM SPEC-SCNC: 8.9 MG/DL (ref 8.6–10.5)
CHLORIDE SERPL-SCNC: 99 MMOL/L (ref 98–107)
CK SERPL-CCNC: 36 U/L (ref 20–180)
CO2 SERPL-SCNC: 30 MMOL/L (ref 22–29)
CREAT SERPL-MCNC: 1.02 MG/DL (ref 0.57–1)
DEPRECATED RDW RBC AUTO: 58.6 FL (ref 37–54)
EGFRCR SERPLBLD CKD-EPI 2021: 55 ML/MIN/1.73
EOSINOPHIL # BLD AUTO: 0.21 10*3/MM3 (ref 0–0.4)
EOSINOPHIL NFR BLD AUTO: 3 % (ref 0.3–6.2)
ERYTHROCYTE [DISTWIDTH] IN BLOOD BY AUTOMATED COUNT: 17.5 % (ref 12.3–15.4)
ETHANOL BLD-MCNC: <10 MG/DL (ref 0–10)
FLUAV SUBTYP SPEC NAA+PROBE: NOT DETECTED
FLUBV RNA ISLT QL NAA+PROBE: NOT DETECTED
GLOBULIN UR ELPH-MCNC: 3.1 GM/DL
GLUCOSE SERPL-MCNC: 100 MG/DL (ref 65–99)
HADV DNA SPEC NAA+PROBE: NOT DETECTED
HCOV 229E RNA SPEC QL NAA+PROBE: NOT DETECTED
HCOV HKU1 RNA SPEC QL NAA+PROBE: NOT DETECTED
HCOV NL63 RNA SPEC QL NAA+PROBE: NOT DETECTED
HCOV OC43 RNA SPEC QL NAA+PROBE: NOT DETECTED
HCT VFR BLD AUTO: 32.3 % (ref 34–46.6)
HGB BLD-MCNC: 10.2 G/DL (ref 12–15.9)
HMPV RNA NPH QL NAA+NON-PROBE: NOT DETECTED
HPIV1 RNA ISLT QL NAA+PROBE: NOT DETECTED
HPIV2 RNA SPEC QL NAA+PROBE: NOT DETECTED
HPIV3 RNA NPH QL NAA+PROBE: NOT DETECTED
HPIV4 P GENE NPH QL NAA+PROBE: NOT DETECTED
IMM GRANULOCYTES # BLD AUTO: 0.01 10*3/MM3 (ref 0–0.05)
IMM GRANULOCYTES NFR BLD AUTO: 0.1 % (ref 0–0.5)
LYMPHOCYTES # BLD AUTO: 1.95 10*3/MM3 (ref 0.7–3.1)
LYMPHOCYTES NFR BLD AUTO: 28.3 % (ref 19.6–45.3)
M PNEUMO IGG SER IA-ACNC: NOT DETECTED
MAGNESIUM SERPL-MCNC: 1.8 MG/DL (ref 1.6–2.4)
MCH RBC QN AUTO: 29 PG (ref 26.6–33)
MCHC RBC AUTO-ENTMCNC: 31.6 G/DL (ref 31.5–35.7)
MCV RBC AUTO: 91.8 FL (ref 79–97)
MONOCYTES # BLD AUTO: 0.97 10*3/MM3 (ref 0.1–0.9)
MONOCYTES NFR BLD AUTO: 14.1 % (ref 5–12)
NEUTROPHILS NFR BLD AUTO: 3.72 10*3/MM3 (ref 1.7–7)
NEUTROPHILS NFR BLD AUTO: 53.9 % (ref 42.7–76)
NRBC BLD AUTO-RTO: 0 /100 WBC (ref 0–0.2)
PLATELET # BLD AUTO: 259 10*3/MM3 (ref 140–450)
PMV BLD AUTO: 10.6 FL (ref 6–12)
POTASSIUM SERPL-SCNC: 4.3 MMOL/L (ref 3.5–5.2)
PROT SERPL-MCNC: 6.9 G/DL (ref 6–8.5)
RBC # BLD AUTO: 3.52 10*6/MM3 (ref 3.77–5.28)
RHINOVIRUS RNA SPEC NAA+PROBE: NOT DETECTED
RSV RNA NPH QL NAA+NON-PROBE: NOT DETECTED
SARS-COV-2 RNA NPH QL NAA+NON-PROBE: NOT DETECTED
SODIUM SERPL-SCNC: 136 MMOL/L (ref 136–145)
TSH SERPL DL<=0.05 MIU/L-ACNC: 1.12 UIU/ML (ref 0.27–4.2)
WBC NRBC COR # BLD AUTO: 6.9 10*3/MM3 (ref 3.4–10.8)

## 2024-04-13 PROCEDURE — G0378 HOSPITAL OBSERVATION PER HR: HCPCS

## 2024-04-13 PROCEDURE — 82077 ASSAY SPEC XCP UR&BREATH IA: CPT | Performed by: STUDENT IN AN ORGANIZED HEALTH CARE EDUCATION/TRAINING PROGRAM

## 2024-04-13 PROCEDURE — 99285 EMERGENCY DEPT VISIT HI MDM: CPT

## 2024-04-13 PROCEDURE — 84443 ASSAY THYROID STIM HORMONE: CPT | Performed by: STUDENT IN AN ORGANIZED HEALTH CARE EDUCATION/TRAINING PROGRAM

## 2024-04-13 PROCEDURE — 83735 ASSAY OF MAGNESIUM: CPT | Performed by: EMERGENCY MEDICINE

## 2024-04-13 PROCEDURE — 80053 COMPREHEN METABOLIC PANEL: CPT | Performed by: EMERGENCY MEDICINE

## 2024-04-13 PROCEDURE — 70551 MRI BRAIN STEM W/O DYE: CPT

## 2024-04-13 PROCEDURE — 25010000002 LEVETRIRACETAM PER 10 MG: Performed by: EMERGENCY MEDICINE

## 2024-04-13 PROCEDURE — 36415 COLL VENOUS BLD VENIPUNCTURE: CPT

## 2024-04-13 PROCEDURE — 25810000003 LACTATED RINGERS PER 1000 ML: Performed by: STUDENT IN AN ORGANIZED HEALTH CARE EDUCATION/TRAINING PROGRAM

## 2024-04-13 PROCEDURE — 25010000002 MAGNESIUM SULFATE 4 GM/100ML SOLUTION: Performed by: STUDENT IN AN ORGANIZED HEALTH CARE EDUCATION/TRAINING PROGRAM

## 2024-04-13 PROCEDURE — 93005 ELECTROCARDIOGRAM TRACING: CPT | Performed by: STUDENT IN AN ORGANIZED HEALTH CARE EDUCATION/TRAINING PROGRAM

## 2024-04-13 PROCEDURE — 0202U NFCT DS 22 TRGT SARS-COV-2: CPT | Performed by: STUDENT IN AN ORGANIZED HEALTH CARE EDUCATION/TRAINING PROGRAM

## 2024-04-13 PROCEDURE — 25010000002 LORAZEPAM PER 2 MG: Performed by: EMERGENCY MEDICINE

## 2024-04-13 PROCEDURE — 82550 ASSAY OF CK (CPK): CPT | Performed by: STUDENT IN AN ORGANIZED HEALTH CARE EDUCATION/TRAINING PROGRAM

## 2024-04-13 PROCEDURE — 85025 COMPLETE CBC W/AUTO DIFF WBC: CPT | Performed by: EMERGENCY MEDICINE

## 2024-04-13 PROCEDURE — 99222 1ST HOSP IP/OBS MODERATE 55: CPT | Performed by: STUDENT IN AN ORGANIZED HEALTH CARE EDUCATION/TRAINING PROGRAM

## 2024-04-13 RX ORDER — LEVETIRACETAM 500 MG/5ML
750 INJECTION, SOLUTION, CONCENTRATE INTRAVENOUS EVERY 12 HOURS SCHEDULED
Status: DISCONTINUED | OUTPATIENT
Start: 2024-04-14 | End: 2024-04-14

## 2024-04-13 RX ORDER — SODIUM CHLORIDE 9 MG/ML
40 INJECTION, SOLUTION INTRAVENOUS AS NEEDED
Status: DISCONTINUED | OUTPATIENT
Start: 2024-04-13 | End: 2024-04-16 | Stop reason: HOSPADM

## 2024-04-13 RX ORDER — SODIUM CHLORIDE 0.9 % (FLUSH) 0.9 %
10 SYRINGE (ML) INJECTION AS NEEDED
Status: DISCONTINUED | OUTPATIENT
Start: 2024-04-13 | End: 2024-04-16 | Stop reason: HOSPADM

## 2024-04-13 RX ORDER — POLYETHYLENE GLYCOL 3350 17 G/17G
17 POWDER, FOR SOLUTION ORAL DAILY PRN
Status: DISCONTINUED | OUTPATIENT
Start: 2024-04-13 | End: 2024-04-16 | Stop reason: HOSPADM

## 2024-04-13 RX ORDER — BISACODYL 5 MG/1
5 TABLET, DELAYED RELEASE ORAL DAILY PRN
Status: DISCONTINUED | OUTPATIENT
Start: 2024-04-13 | End: 2024-04-16 | Stop reason: HOSPADM

## 2024-04-13 RX ORDER — MAGNESIUM SULFATE HEPTAHYDRATE 40 MG/ML
4 INJECTION, SOLUTION INTRAVENOUS ONCE
Status: COMPLETED | OUTPATIENT
Start: 2024-04-14 | End: 2024-04-14

## 2024-04-13 RX ORDER — ATORVASTATIN CALCIUM 40 MG/1
80 TABLET, FILM COATED ORAL NIGHTLY
Status: DISCONTINUED | OUTPATIENT
Start: 2024-04-14 | End: 2024-04-16 | Stop reason: HOSPADM

## 2024-04-13 RX ORDER — METOPROLOL SUCCINATE 25 MG/1
25 TABLET, EXTENDED RELEASE ORAL DAILY
Status: DISCONTINUED | OUTPATIENT
Start: 2024-04-14 | End: 2024-04-16 | Stop reason: HOSPADM

## 2024-04-13 RX ORDER — ESCITALOPRAM OXALATE 10 MG/1
10 TABLET ORAL DAILY
Status: DISCONTINUED | OUTPATIENT
Start: 2024-04-14 | End: 2024-04-15

## 2024-04-13 RX ORDER — PROPAFENONE HYDROCHLORIDE 325 MG/1
325 CAPSULE, EXTENDED RELEASE ORAL EVERY 12 HOURS SCHEDULED
Status: DISCONTINUED | OUTPATIENT
Start: 2024-04-14 | End: 2024-04-15

## 2024-04-13 RX ORDER — ACETAMINOPHEN 325 MG/1
650 TABLET ORAL EVERY 4 HOURS PRN
Status: DISCONTINUED | OUTPATIENT
Start: 2024-04-13 | End: 2024-04-16 | Stop reason: HOSPADM

## 2024-04-13 RX ORDER — PANTOPRAZOLE SODIUM 40 MG/1
40 TABLET, DELAYED RELEASE ORAL DAILY
Status: DISCONTINUED | OUTPATIENT
Start: 2024-04-14 | End: 2024-04-16 | Stop reason: HOSPADM

## 2024-04-13 RX ORDER — LEVETIRACETAM 500 MG/5ML
1000 INJECTION, SOLUTION, CONCENTRATE INTRAVENOUS ONCE
Status: COMPLETED | OUTPATIENT
Start: 2024-04-13 | End: 2024-04-13

## 2024-04-13 RX ORDER — SODIUM CHLORIDE 0.9 % (FLUSH) 0.9 %
10 SYRINGE (ML) INJECTION EVERY 12 HOURS SCHEDULED
Status: DISCONTINUED | OUTPATIENT
Start: 2024-04-14 | End: 2024-04-16 | Stop reason: HOSPADM

## 2024-04-13 RX ORDER — ACETAMINOPHEN 160 MG/5ML
650 SOLUTION ORAL EVERY 4 HOURS PRN
Status: DISCONTINUED | OUTPATIENT
Start: 2024-04-13 | End: 2024-04-16 | Stop reason: HOSPADM

## 2024-04-13 RX ORDER — LORAZEPAM 2 MG/ML
1 INJECTION INTRAMUSCULAR EVERY 4 HOURS PRN
Status: COMPLETED | OUTPATIENT
Start: 2024-04-13 | End: 2024-04-13

## 2024-04-13 RX ORDER — BISACODYL 10 MG
10 SUPPOSITORY, RECTAL RECTAL DAILY PRN
Status: DISCONTINUED | OUTPATIENT
Start: 2024-04-13 | End: 2024-04-16 | Stop reason: HOSPADM

## 2024-04-13 RX ORDER — ACETAMINOPHEN 650 MG/1
650 SUPPOSITORY RECTAL EVERY 4 HOURS PRN
Status: DISCONTINUED | OUTPATIENT
Start: 2024-04-13 | End: 2024-04-16 | Stop reason: HOSPADM

## 2024-04-13 RX ORDER — SODIUM CHLORIDE, SODIUM LACTATE, POTASSIUM CHLORIDE, CALCIUM CHLORIDE 600; 310; 30; 20 MG/100ML; MG/100ML; MG/100ML; MG/100ML
100 INJECTION, SOLUTION INTRAVENOUS CONTINUOUS
Status: DISCONTINUED | OUTPATIENT
Start: 2024-04-14 | End: 2024-04-16 | Stop reason: HOSPADM

## 2024-04-13 RX ORDER — LORAZEPAM 2 MG/ML
1 INJECTION INTRAMUSCULAR EVERY 4 HOURS PRN
Status: DISCONTINUED | OUTPATIENT
Start: 2024-04-13 | End: 2024-04-13

## 2024-04-13 RX ORDER — AMOXICILLIN 250 MG
2 CAPSULE ORAL 2 TIMES DAILY PRN
Status: DISCONTINUED | OUTPATIENT
Start: 2024-04-13 | End: 2024-04-16 | Stop reason: HOSPADM

## 2024-04-13 RX ADMIN — LEVETIRACETAM 1000 MG: 100 INJECTION, SOLUTION INTRAVENOUS at 20:22

## 2024-04-13 RX ADMIN — LORAZEPAM 1 MG: 2 INJECTION INTRAMUSCULAR; INTRAVENOUS at 20:21

## 2024-04-13 RX ADMIN — MAGNESIUM SULFATE IN WATER FOR 4 G: 40 INJECTION INTRAVENOUS at 23:29

## 2024-04-13 RX ADMIN — Medication 10 ML: at 23:28

## 2024-04-13 RX ADMIN — SODIUM CHLORIDE, POTASSIUM CHLORIDE, SODIUM LACTATE AND CALCIUM CHLORIDE 100 ML/HR: 600; 310; 30; 20 INJECTION, SOLUTION INTRAVENOUS at 23:24

## 2024-04-13 NOTE — Clinical Note
Level of Care: Telemetry [5]   Diagnosis: Breakthrough seizure [260134]   Admitting Physician: JAROCHO VELA [[id number]]   Attending Physician: JAROCHO VELA [[id number]]

## 2024-04-14 LAB
ANION GAP SERPL CALCULATED.3IONS-SCNC: 7 MMOL/L (ref 5–15)
BACTERIA UR QL AUTO: ABNORMAL /HPF
BILIRUB UR QL STRIP: NEGATIVE
BUN SERPL-MCNC: 14 MG/DL (ref 8–23)
BUN/CREAT SERPL: 16.7 (ref 7–25)
CALCIUM SPEC-SCNC: 9.1 MG/DL (ref 8.6–10.5)
CHLORIDE SERPL-SCNC: 103 MMOL/L (ref 98–107)
CLARITY UR: ABNORMAL
CO2 SERPL-SCNC: 30 MMOL/L (ref 22–29)
COLOR UR: YELLOW
CREAT SERPL-MCNC: 0.84 MG/DL (ref 0.57–1)
D-LACTATE SERPL-SCNC: 1.7 MMOL/L (ref 0.5–2)
DEPRECATED RDW RBC AUTO: 59 FL (ref 37–54)
EGFRCR SERPLBLD CKD-EPI 2021: 69.5 ML/MIN/1.73
ERYTHROCYTE [DISTWIDTH] IN BLOOD BY AUTOMATED COUNT: 17.4 % (ref 12.3–15.4)
GLUCOSE SERPL-MCNC: 95 MG/DL (ref 65–99)
GLUCOSE UR STRIP-MCNC: NEGATIVE MG/DL
HCT VFR BLD AUTO: 31.7 % (ref 34–46.6)
HGB BLD-MCNC: 10 G/DL (ref 12–15.9)
HGB UR QL STRIP.AUTO: ABNORMAL
HYALINE CASTS UR QL AUTO: ABNORMAL /LPF
KETONES UR QL STRIP: NEGATIVE
LEUKOCYTE ESTERASE UR QL STRIP.AUTO: ABNORMAL
MAGNESIUM SERPL-MCNC: 3.2 MG/DL (ref 1.6–2.4)
MCH RBC QN AUTO: 29.2 PG (ref 26.6–33)
MCHC RBC AUTO-ENTMCNC: 31.5 G/DL (ref 31.5–35.7)
MCV RBC AUTO: 92.7 FL (ref 79–97)
NITRITE UR QL STRIP: POSITIVE
PH UR STRIP.AUTO: 7.5 [PH] (ref 5–8)
PLATELET # BLD AUTO: 235 10*3/MM3 (ref 140–450)
PMV BLD AUTO: 10.8 FL (ref 6–12)
POTASSIUM SERPL-SCNC: 4.3 MMOL/L (ref 3.5–5.2)
PROT UR QL STRIP: NEGATIVE
RBC # BLD AUTO: 3.42 10*6/MM3 (ref 3.77–5.28)
RBC # UR STRIP: ABNORMAL /HPF
REF LAB TEST METHOD: ABNORMAL
SODIUM SERPL-SCNC: 140 MMOL/L (ref 136–145)
SP GR UR STRIP: 1.01 (ref 1–1.03)
SQUAMOUS #/AREA URNS HPF: ABNORMAL /HPF
UROBILINOGEN UR QL STRIP: ABNORMAL
WBC # UR STRIP: ABNORMAL /HPF
WBC NRBC COR # BLD AUTO: 8.61 10*3/MM3 (ref 3.4–10.8)

## 2024-04-14 PROCEDURE — 81001 URINALYSIS AUTO W/SCOPE: CPT

## 2024-04-14 PROCEDURE — 83605 ASSAY OF LACTIC ACID: CPT

## 2024-04-14 PROCEDURE — 83735 ASSAY OF MAGNESIUM: CPT | Performed by: STUDENT IN AN ORGANIZED HEALTH CARE EDUCATION/TRAINING PROGRAM

## 2024-04-14 PROCEDURE — 25010000002 LEVETRIRACETAM PER 10 MG: Performed by: STUDENT IN AN ORGANIZED HEALTH CARE EDUCATION/TRAINING PROGRAM

## 2024-04-14 PROCEDURE — 87086 URINE CULTURE/COLONY COUNT: CPT | Performed by: INTERNAL MEDICINE

## 2024-04-14 PROCEDURE — 87077 CULTURE AEROBIC IDENTIFY: CPT | Performed by: INTERNAL MEDICINE

## 2024-04-14 PROCEDURE — 85027 COMPLETE CBC AUTOMATED: CPT | Performed by: STUDENT IN AN ORGANIZED HEALTH CARE EDUCATION/TRAINING PROGRAM

## 2024-04-14 PROCEDURE — 97165 OT EVAL LOW COMPLEX 30 MIN: CPT

## 2024-04-14 PROCEDURE — 93010 ELECTROCARDIOGRAM REPORT: CPT | Performed by: INTERNAL MEDICINE

## 2024-04-14 PROCEDURE — 97161 PT EVAL LOW COMPLEX 20 MIN: CPT

## 2024-04-14 PROCEDURE — 97116 GAIT TRAINING THERAPY: CPT

## 2024-04-14 PROCEDURE — 87186 SC STD MICRODIL/AGAR DIL: CPT | Performed by: INTERNAL MEDICINE

## 2024-04-14 PROCEDURE — 99232 SBSQ HOSP IP/OBS MODERATE 35: CPT | Performed by: INTERNAL MEDICINE

## 2024-04-14 PROCEDURE — 99222 1ST HOSP IP/OBS MODERATE 55: CPT

## 2024-04-14 PROCEDURE — 80048 BASIC METABOLIC PNL TOTAL CA: CPT | Performed by: STUDENT IN AN ORGANIZED HEALTH CARE EDUCATION/TRAINING PROGRAM

## 2024-04-14 RX ORDER — LEVETIRACETAM 500 MG/1
1000 TABLET, FILM COATED, EXTENDED RELEASE ORAL DAILY
Status: DISCONTINUED | OUTPATIENT
Start: 2024-04-14 | End: 2024-04-14

## 2024-04-14 RX ORDER — LEVETIRACETAM 500 MG/1
1000 TABLET, FILM COATED, EXTENDED RELEASE ORAL NIGHTLY
Status: DISCONTINUED | OUTPATIENT
Start: 2024-04-14 | End: 2024-04-16 | Stop reason: HOSPADM

## 2024-04-14 RX ADMIN — ATORVASTATIN CALCIUM 80 MG: 40 TABLET, FILM COATED ORAL at 20:23

## 2024-04-14 RX ADMIN — APIXABAN 5 MG: 5 TABLET, FILM COATED ORAL at 20:23

## 2024-04-14 RX ADMIN — Medication 10 ML: at 20:26

## 2024-04-14 RX ADMIN — LEVETIRACETAM 1000 MG: 500 TABLET, EXTENDED RELEASE ORAL at 20:24

## 2024-04-14 RX ADMIN — PANTOPRAZOLE SODIUM 40 MG: 40 TABLET, DELAYED RELEASE ORAL at 08:54

## 2024-04-14 RX ADMIN — LEVETIRACETAM 750 MG: 100 INJECTION, SOLUTION INTRAVENOUS at 08:54

## 2024-04-14 RX ADMIN — METOPROLOL SUCCINATE 25 MG: 25 TABLET, EXTENDED RELEASE ORAL at 08:55

## 2024-04-14 RX ADMIN — APIXABAN 5 MG: 5 TABLET, FILM COATED ORAL at 08:55

## 2024-04-14 NOTE — CONSULTS
[facility] Neurology  Consult Note    Patient Name: Laury Eugene  : 1940  MRN: [id number]  Primary Care Physician:  Leatha Melvin DO  Referring Physician: No ref. provider found  Date of admission: 2024    Subjective     Reason for Consult/ Chief Complaint: Breakthrough seizure, unclear etiology, has been well-controlled, history of CVA    Laury Eugene is a 84 y.o. female with a past medical history of left MCA stroke with residual speech and right-sided facial deficits, A-fib on Eliquis, NOVA on CPAP, hypothyroidism, multinodular goiter and history of breast cancer s/p chemo and radiation who presented to [facility] ED with complaint of abnormal movements of her face with change in mental status.  Patient lives alone in her house with stairs and is able to ambulate independently.  She does have family members/caregivers that check on her and provide care, they also aid with her medications. Patient was having dinner with one of her caregivers, while sitting in her chair the caregiver noticed that the right side of her face started to twitch and she was having abnormal fleeting movements with her eyes.  She has baseline expressive aphasia however her speech was much more difficult and was unable to acknowledge others in the room.  Family decided to bring her to the emergency room.  Per family while they were en route she began to speak normal words and appeared closer back to her baseline.  However while in the ED she did experience 2 more episodes and received Ativan and loading dose of Keppra.  Per family member the first episode in the ED was similar to the previous events however the second was more of a tonic-clonic like seizure.    Patient was last seen in our stroke neurology clinic on 10/19/2023.  At that appointment she was continued on apixaban 5 mg twice daily, atorvastatin 80 mg daily and Keppra 500 mg twice daily.  However per prescription record patient has been taking  Keppra 500 mg extended release nightly    Patient appears slightly dehydrated on labs with elevated creatinine.  Family states she has had a decrease in oral intake.  CK 36, MRI negative for acute abnormality, WBC 8.61, afebrile, TSH 1.120.  Respiratory panel negative.    Review Of Systems   Constitutional: Well-developed female  Cardiovascular: Negative for chest pain or palpitations.  Respiratory: Negative for shortness of breath or cough.  Gastrointestinal: Negative for nausea and vomiting.  Genitourinary: Negative for bladder incontinence.  Musculoskeletal: Negative for aches and pains in the muscles or joints.  Dermatological: Negative for skin breakdown.   Neurological: Positive for seizure-like activity    Personal History     Past Medical History:   Diagnosis Date    History of breast cancer     History of heart disease     Hypothyroidism     Multinodular goiter     Sleep apnea     Stroke     Thyroid function test abnormal        Past Surgical History:   Procedure Laterality Date    BLEPHAROPLASTY      BREAST RECONSTRUCTION      CATARACT EXTRACTION Bilateral     COLONOSCOPY N/A 8/27/2023    Procedure: COLONOSCOPY;  Surgeon: Brunner, Mark I, MD;  Location:  [facility] ENDOSCOPY;  Service: Gastroenterology;  Laterality: N/A;    ENDOSCOPY N/A 8/27/2023    Procedure: ESOPHAGOGASTRODUODENOSCOPY;  Surgeon: Brunner, Mark I, MD;  Location:  [facility] ENDOSCOPY;  Service: Gastroenterology;  Laterality: N/A;    HYSTERECTOMY      SIMPLE MASTECTOMY         Family History: family history includes Heart attack in her father; Hypothyroidism in her sister; Stomach cancer in her mother. Otherwise pertinent FHx was reviewed and not pertinent to current issue.    Social History:  reports that she quit smoking about 54 years ago. Her smoking use included cigarettes. She has been exposed to tobacco smoke. She has never used smokeless tobacco. She reports current alcohol use of about 1.0 - 2.0 standard drink of alcohol per week. She  reports that she does not use drugs.    Home Medications:   apixaban, ascorbic acid, atorvastatin, escitalopram, folic acid, levETIRAcetam XR, metoprolol succinate XL, pantoprazole, propafenone SR, traMADol, and vitamin B-12    Current Medications:     Current Facility-Administered Medications:     acetaminophen (TYLENOL) tablet 650 mg, 650 mg, Oral, Q4H PRN **OR** acetaminophen (TYLENOL) 160 MG/5ML oral solution 650 mg, 650 mg, Oral, Q4H PRN **OR** acetaminophen (TYLENOL) suppository 650 mg, 650 mg, Rectal, Q4H PRN, Syed Parish MD    apixaban (ELIQUIS) tablet 5 mg, 5 mg, Oral, Q12H, Syed Parish MD, 5 mg at 04/14/24 0855    atorvastatin (LIPITOR) tablet 80 mg, 80 mg, Oral, Nightly, Syed Parish MD    sennosides-docusate (PERICOLACE) 8.6-50 MG per tablet 2 tablet, 2 tablet, Oral, BID PRN **AND** polyethylene glycol (MIRALAX) packet 17 g, 17 g, Oral, Daily PRN **AND** bisacodyl (DULCOLAX) EC tablet 5 mg, 5 mg, Oral, Daily PRN **AND** bisacodyl (DULCOLAX) suppository 10 mg, 10 mg, Rectal, Daily PRN, Syed Parish MD    Calcium Replacement - Follow Nurse / BPA Driven Protocol, , Does not apply, PRANGELA, Seyd Parish MD    [Held by provider] escitalopram (LEXAPRO) tablet 10 mg, 10 mg, Oral, Daily, Syed Parish MD    lactated ringers infusion, 100 mL/hr, Intravenous, Continuous, Syed Parish MD, Last Rate: 100 mL/hr at 04/13/24 2324, 100 mL/hr at 04/13/24 2324    levETIRAcetam (KEPPRA) injection 750 mg, 750 mg, Intravenous, Q12H, Syed Parish MD, 750 mg at 04/14/24 0854    Magnesium Cardiology Dose Replacement - Follow Nurse / BPA Driven Protocol, , Does not apply, PRN, Syed Parish MD    metoprolol succinate XL (TOPROL-XL) 24 hr tablet 25 mg, 25 mg, Oral, Daily, Syed Parish MD, 25 mg at 04/14/24 0855    pantoprazole (PROTONIX) EC tablet 40 mg, 40 mg, Oral, Daily, Syed Parish MD, 40 mg at 04/14/24 0854    Phosphorus Replacement - Follow Nurse / BPA Driven Protocol, ,  Does not apply, Марина JAMES John L, MD    Potassium Replacement - Follow Nurse / BPA Driven Protocol, , Does not apply, Марина JAMES John L, MD    [Held by provider] propafenone SR (RYTHMOL SR) 12 hr capsule 325 mg, 325 mg, Oral, Q12H, Syed Parish MD    sodium chloride 0.9 % flush 10 mL, 10 mL, Intravenous, Q12H, Syed Parish MD, 10 mL at 04/13/24 2328    sodium chloride 0.9 % flush 10 mL, 10 mL, Intravenous, PRМарина MILLER John L, MD    sodium chloride 0.9 % infusion 40 mL, 40 mL, Intravenous, Марина JAMES John L, MD     Allergies:  Allergies   Allergen Reactions    Lidocaine Other (See Comments)     seizure    Novocain [Procaine] Other (See Comments)     seizures    Tnkase [Tenecteplase] Angioedema     Treated 8/26    Nsaids Other (See Comments)     GIB       Objective     Physical Exam  Vitals and nursing note reviewed.   Constitutional:       General: She is not in acute distress.     Appearance: She is not ill-appearing.   Eyes:      Extraocular Movements: Extraocular movements intact.      Pupils: Pupils are equal, round, and reactive to light.      Comments: No nystagmus or deviated gaze noted   Cardiovascular:      Rate and Rhythm: Normal rate.   Pulmonary:      Effort: Pulmonary effort is normal.   Neurological:      Mental Status: She is alert. Mental status is at baseline.      Cranial Nerves: Dysarthria and facial asymmetry present.      Sensory: No sensory deficit.      Motor: No weakness or seizure activity.      Deep Tendon Reflexes:      Reflex Scores:       Bicep reflexes are 1+ on the right side and 1+ on the left side.       Patellar reflexes are 1+ on the right side and 1+ on the left side.     Comments:     Cranial Nerves   CN II: Pupils are equal, round, and reactive to light. Normal visual acuity and visual fields.    CN III IV VI: Extraocular movements are full without nystagmus.  CN V: Normal facial sensation and strength of muscles of mastication.  CN VII: Facial asymmetry  "from previous stroke, right-sided facial droop  CN VIII:  Auditory acuity is normal.  CN IX & X:  Symmetric palatal movement.  CN XI: Sternocleidomastoid and trapezius are normal.  No weakness.  CN XII: The tongue is midline.  No atrophy or fasciculations.             Vitals:  Temp:  [97.7 °F (36.5 °C)-98.2 °F (36.8 °C)] 98.2 °F (36.8 °C)  Heart Rate:  [55-79] 60  Resp:  [17-18] 17  BP: (118-147)/(60-88) 142/60    Laboratory Results:   Lab Results   Component Value Date    GLUCOSE 95 04/14/2024    CALCIUM 9.1 04/14/2024     04/14/2024    K 4.3 04/14/2024    CO2 30.0 (H) 04/14/2024     04/14/2024    BUN 14 04/14/2024    CREATININE 0.84 04/14/2024    EGFRIFNONA 54 (L) 12/29/2020    BCR 16.7 04/14/2024    ANIONGAP 7.0 04/14/2024     Lab Results   Component Value Date    WBC 8.61 04/14/2024    HGB 10.0 (L) 04/14/2024    HCT 31.7 (L) 04/14/2024    MCV 92.7 04/14/2024     04/14/2024     Lab Results   Component Value Date    CHOL 129 08/26/2023    CHOL 143 08/26/2023     Lab Results   Component Value Date    HDL 41 08/26/2023    HDL 45 08/26/2023     Lab Results   Component Value Date    LDL 73 08/26/2023    LDL 80 08/26/2023     Lab Results   Component Value Date    TRIG 74 08/26/2023    TRIG 98 08/26/2023     Lab Results   Component Value Date    HGBA1C 5.80 (H) 08/27/2023     Lab Results   Component Value Date    INR 1.0 08/26/2023    PROTIME 12.2 (L) 08/26/2023     No results found for: \"[id number]\"  No results found for: \"FOLATE\"    MRI Brain Without Contrast    Result Date: 4/13/2024  MRI BRAIN WO CONTRAST Date of Exam: 4/13/2024 8:29 PM EDT Indication: AMS, possible seizure, R facial twitching, history of stroke in August with residual R sided deficits.  Comparison: 8/26/2023 and head CT same date. CT head/angiography/perfusion also 8/26/2023. Technique:  Routine multiplanar/multisequence sequence images of the brain were obtained without contrast administration. Findings: There is no diffusion " restriction to suggest an acute infarct. There is a chronic left MCA territory infarct affecting the insular cortex and lateral posterior left frontal lobe. There is a smaller chronic cortical infarct in the right temporal lobe. There are mild patchy periventricular foci of FLAIR hyperintense signal abnormality. There is wallerian degeneration extending through the posterior limb of the left internal capsule into the midbrain. There are no new signal abnormalities in the brain parenchyma. Thinning of the orbital lenses with suggest prior cataract surgery. There is moderate left sphenoid sinus mucosal disease. Mastoid air cells appear well aerated. There is diminished flow void in the distal left M1 segment. This could be due to motion. There is no mass effect, midline shift or abnormal extra-axial collection. No acute or chronic intracranial hemorrhage. Midline structures appear intact. Calvarial and superficial soft tissue signal is within normal limits. 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 Parotid glands appear symmetric.     Impression: Impression: 1. No acute intracranial abnormality. 2. Chronic infarcts in the left MCA territory and right temporal lobe. 3. Mild chronic small vessel ischemic change. 4. Moderate left sphenoid sinus mucosal disease. Electronically Signed: Jim Guillen MD  4/13/2024 9:19 PM EDT  Workstation ID: [id number]      Assessment / Plan   Brief Patient Summary:  Laury Eugene is a 84 y.o. female with a past medical history of left MCA stroke with residual speech and right-sided facial deficits, A-fib on Eliquis, NOVA on CPAP, hypothyroidism, multinodular goiter and history of breast cancer s/p chemo and radiation who presented to [facility] ED with complaint of abnormal movements of her face with change in mental status.     Plan:   History of left MCA stroke  History of abnormal EEG  Breakthrough seizure  Unclear etiology of patient's likely breakthrough seizure.  Keppra 1000 mg extended release  nightly.  (Increased from home regimen of Keppra 500 extended release nightly)  EEG in a.m.  Continue seizure precautions  MRI brain with no acute abnormalities.  Chronic infarcts noted, chronic small vessel ischemic changes noted.  UA  Lactate  Creatinine improved from 0.84, continue to trend  General neurology continue to follow    I have discussed the above with the patient, bedside RN, Dr. Rosenbaum and Dr. Clements  Time spent with patient: 80 minutes in face-to-face evaluation and management of the patient.       DIANNE Gutiérrez

## 2024-04-14 NOTE — H&P
[facility] Medicine Services  HISTORY AND PHYSICAL    Patient Name: Laury Eugene  : 1940  MRN: [id number]  Primary Care Physician: Leatha Melvin DO  Date of admission: 2024      Subjective   Subjective     Chief Complaint:  Abnormal movements    HPI:  Laury Eugene is a 84 y.o. 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  She normally lives alone in a house with stairs, ambulates independently.  She does have family members that come in and provide care for her and they check on her very frequently and help her with medications.  Today she was eating with one of her caregivers and she was sitting in her chair when they noticed that her right face started twitching and they noticed abnormal fleeting movements of her right eye.  She has baseline expressive aphasia but she had more difficulty with her speech in that moment and did not acknowledge anyone else in the room.  Caregiver had her raise both of her arms and keep them up which she was able to do without 1 dropping and they did not notice a tremor at that time.  Family drove her to ED, and while she was on her way she started to speak some words and appeared closer to the baseline by the time she got to the ED.  However while she was in the ED she had 2 more episodes that were very similar.  She did receive Ativan.  She is currently very sleepy but able to wake up and answer short questions.  She is slightly difficult to understand due to sleepiness and baseline aphasia.  She is not in pain, only current complaint is that she feels tired.  No other recent symptoms such as fevers, chills, trouble breathing, congestion, abdominal  pain.  Her  passed 2 months ago and she has been more depressed since then, family thinks oral intake has been diminished.  She has been taking all of her meds as prescribed because they assist her with her pillbox.      Personal History     Past Medical History:   Diagnosis Date    History of breast cancer     History of heart disease     Hypothyroidism     Multinodular goiter     Sleep apnea     Stroke     Thyroid function test abnormal            Past Surgical History:   Procedure Laterality Date    BLEPHAROPLASTY      BREAST RECONSTRUCTION      CATARACT EXTRACTION Bilateral     COLONOSCOPY N/A 8/27/2023    Procedure: COLONOSCOPY;  Surgeon: Brunner, Mark I, MD;  Location:  [facility];  Service: Gastroenterology;  Laterality: N/A;    ENDOSCOPY N/A 8/27/2023    Procedure: ESOPHAGOGASTRODUODENOSCOPY;  Surgeon: Brunner, Mark I, MD;  Location:  [facility];  Service: Gastroenterology;  Laterality: N/A;    HYSTERECTOMY      SIMPLE MASTECTOMY         Family History: family history includes Heart attack in her father; Hypothyroidism in her sister; Stomach cancer in her mother.     Social History:  reports that she quit smoking about 54 years ago. Her smoking use included cigarettes. She has been exposed to tobacco smoke. She has never used smokeless tobacco. She reports current alcohol use of about 1.0 - 2.0 standard drink of alcohol per week. She reports that she does not use drugs.  Social History     Social History Narrative    Not on file       Medications:  Available home medication information reviewed.  apixaban, ascorbic acid, atorvastatin, escitalopram, folic acid, levETIRAcetam XR, metoprolol succinate XL, pantoprazole, propafenone SR, traMADol, and vitamin B-12    Allergies   Allergen Reactions    Lidocaine Other (See Comments)     seizure    Novocain [Procaine] Other (See Comments)     seizures    Tnkase [Tenecteplase] Angioedema     Treated 8/26    Nsaids Other (See Comments)     GIB        Objective   Objective     Vital Signs:   Temp:  [97.9 °F (36.6 °C)] 97.9 °F (36.6 °C)  Heart Rate:  [55-79] 56  Resp:  [17] 17  BP: (118-144)/(63-88) 135/63       Physical Exam   She is somnolent but arouses to verbal stimulus  She is oriented fully  Extraocular movements intact, right pupil is 1 to 2 mm larger than left pupil both pupils are reactive to light  Facial expression is intact with slight right lower corner of mouth drooping  There is no sinus tenderness  Tongue protrudes at midline  Thyroid is irregularly enlarged and nontender  Heart regular rate and rhythm  Lungs are clear to auscultation bilaterally  Abdomen is soft and nontender  There is slight pedal bilateral lower extremity edema  There is no pronator drift upper or lower extremities  There is no tremor noted    Result Review:  I have personally reviewed the results from the time of this admission to 4/13/2024 22:39 EDT and agree with these findings:  [x]  Laboratory list / accordion  []  Microbiology  [x]  Radiology  [x]  EKG/Telemetry   []  Cardiology/Vascular   []  Pathology  [x]  Old records  []  Other:  Most notable findings include: See assessment and plan      LAB RESULTS:      Lab 04/13/24 1951   WBC 6.90   HEMOGLOBIN 10.2*   HEMATOCRIT 32.3*   PLATELETS 259   NEUTROS ABS 3.72   IMMATURE GRANS (ABS) 0.01   LYMPHS ABS 1.95   MONOS ABS 0.97*   EOS ABS 0.21   MCV 91.8         Lab 04/13/24 1951   SODIUM 136   POTASSIUM 4.3   CHLORIDE 99   CO2 30.0*   ANION GAP 7.0   BUN 17   CREATININE 1.02*   EGFR 55.0*   GLUCOSE 100*   CALCIUM 8.9   MAGNESIUM 1.8   TSH 1.120         Lab 04/13/24 1951   TOTAL PROTEIN 6.9   ALBUMIN 3.8   GLOBULIN 3.1   ALT (SGPT) 16   AST (SGOT) 23   BILIRUBIN 0.2   ALK PHOS 114                         Microbiology Results (last 10 days)       ** No results found for the last 240 hours. **            MRI Brain Without Contrast    Result Date: 4/13/2024  MRI BRAIN WO CONTRAST Date of Exam: 4/13/2024 8:29 PM EDT  Indication: AMS, possible seizure, R facial twitching, history of stroke in August with residual R sided deficits.  Comparison: 8/26/2023 and head CT same date. CT head/angiography/perfusion also 8/26/2023. Technique:  Routine multiplanar/multisequence sequence images of the brain were obtained without contrast administration. Findings: There is no diffusion restriction to suggest an acute infarct. There is a chronic left MCA territory infarct affecting the insular cortex and lateral posterior left frontal lobe. There is a smaller chronic cortical infarct in the right temporal lobe. There are mild patchy periventricular foci of FLAIR hyperintense signal abnormality. There is wallerian degeneration extending through the posterior limb of the left internal capsule into the midbrain. There are no new signal abnormalities in the brain parenchyma. Thinning of the orbital lenses with suggest prior cataract surgery. There is moderate left sphenoid sinus mucosal disease. Mastoid air cells appear well aerated. There is diminished flow void in the distal left M1 segment. This could be due to motion. There is no mass effect, midline shift or abnormal extra-axial collection. No acute or chronic intracranial hemorrhage. Midline structures appear intact. Calvarial and superficial soft tissue signal is within normal limits. 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 Parotid glands appear symmetric.     Impression: Impression: 1. No acute intracranial abnormality. 2. Chronic infarcts in the left MCA territory and right temporal lobe. 3. Mild chronic small vessel ischemic change. 4. Moderate left sphenoid sinus mucosal disease. Electronically Signed: Jim Guillen MD  4/13/2024 9:19 PM EDT  Workstation ID: [id number]     Results for orders placed during the hospital encounter of 08/26/23    Adult Transthoracic Echo Complete W/ Cont if Necessary Per Protocol    Interpretation Summary    Left ventricular systolic function is normal. Left  ventricular ejection fraction appears to be 51 - 55%.      Assessment & Plan   Assessment & Plan       Breakthrough seizure    Multinodular goiter    Abnormal thyroid function test    Essential hypertension    Hyperlipemia    Paroxysmal atrial fibrillation    History of stroke    Seizure disorder with possible breakthrough  History of left MCA stroke and right temporal stroke  -She is currently been taking all medicines as prescribed.  She has never had a seizure before and is generally controlled however EEG during prior stroke admission showed abnormal epileptiform activity and she was started on Keppra which she has been taking since that time without issue.  MRI negative for any acute change, shows expected changes following prior stroke.  No other definite triggers on workup thus far besides mild elevation in baseline creatinine.  Suspect breakthrough seizure versus recrudescence of stroke symptoms given poor oral intake and slight dehydration  -Loaded with Keppra and given 1 dose of Ativan in the ED, currently very sleepy, will try to hold off on further Ativan for now unless needed for prolonged activity.  Switch Keppra to  twice daily  -Obtain RVP, CK, EKG  -Neurology consult morning    A-fib  HTN  -Currently rate controlled, no lapses in anticoagulation.  Will reinitiate Eliquis, propafenone, metoprolol when she is awake enough to take p.o.    Chronic iron deficiency anemia, history of GI bleed  -Hemoglobin improving on iron supplementation, has been tolerating anticoagulation.  Continue PPI    History of hyperthyroidism and multinodular goiter  -Follows with endocrinology, currently not taking any medications for this, TSH within normal limits.  Avoid iodinated contrast if possible    Obstructive sleep apnea  -She is only tolerant to her home mask, given extra somnolence due to benzodiazepine dose ideally would start her on CPAP tonight, he states she can bring in her home CPAP because she does not  live that far away      DVT prophylaxis:  Mechanical and medical DVT prophylaxis orders are signed and held.           CODE STATUS:    Code Status and Medical Interventions:   Ordered at: 04/13/24 2239     Medical Intervention Limits:    NO intubation (DNI)     Level Of Support Discussed With:    Health Care Surrogate     Code Status (Patient has no pulse and is not breathing):    No CPR (Do Not Attempt to Resuscitate)     Medical Interventions (Patient has pulse or is breathing):    Limited Support       Expected Discharge   Expected discharge date/ time has not been documented.     Syed Parish MD  04/13/24

## 2024-04-14 NOTE — ED PROVIDER NOTES
"Subjective   History of Present Illness    Pt presents with altered mental status.  Family says she they were looking at pictures together and pt had acute onset of twitching to right periorbital region and a \"panicked\" look on her face and inability to speak.  She kept eyes open and did not lose posture.  Within a short time she was able to follow commands and eventually was able to talk again.  She was able to walk but ad unsteady gait which is not baseline.    She had a stroke last year leaving her with R face, R arm and some speech deficit.  She was started on Keppra at that time but family says never had a seizure.  Still takes it.  Recently added oral iron but no other med changes.    History provided by:  Patient and relative  History limited by:  Mental status change      Review of Systems   Constitutional:  Negative for fever.   Respiratory:  Negative for cough.    Gastrointestinal:  Negative for vomiting.   Neurological:  Positive for seizures.   All other systems reviewed and are negative.      Past Medical History:   Diagnosis Date    History of breast cancer     History of heart disease     Hypothyroidism     Multinodular goiter     Sleep apnea     Stroke     Thyroid function test abnormal        Allergies   Allergen Reactions    Lidocaine Other (See Comments)     seizure    Novocain [Procaine] Other (See Comments)     seizures    Tnkase [Tenecteplase] Angioedema     Treated 8/26    Nsaids Other (See Comments)     GIB       Past Surgical History:   Procedure Laterality Date    BLEPHAROPLASTY      BREAST RECONSTRUCTION      CATARACT EXTRACTION Bilateral     COLONOSCOPY N/A 8/27/2023    Procedure: COLONOSCOPY;  Surgeon: Brunner, Mark I, MD;  Location:  [facility];  Service: Gastroenterology;  Laterality: N/A;    ENDOSCOPY N/A 8/27/2023    Procedure: ESOPHAGOGASTRODUODENOSCOPY;  Surgeon: Brunner, Mark I, MD;  Location:  [facility];  Service: Gastroenterology;  Laterality: N/A;    HYSTERECTOMY   "    SIMPLE MASTECTOMY         Family History   Problem Relation Age of Onset    Hypothyroidism Sister     Stomach cancer Mother     Heart attack Father        Social History     Socioeconomic History    Marital status:    Tobacco Use    Smoking status: Former     Current packs/day: 0.00     Types: Cigarettes     Quit date: 1970     Years since quittin.3     Passive exposure: Past    Smokeless tobacco: Never   Vaping Use    Vaping status: Never Used   Substance and Sexual Activity    Alcohol use: Yes     Alcohol/week: 1.0 - 2.0 standard drink of alcohol     Types: 1 - 2 Glasses of wine per week     Comment: social    Drug use: Never    Sexual activity: Never           Objective   Physical Exam  Vitals and nursing note reviewed.   Constitutional:       General: She is not in acute distress.     Appearance: Normal appearance. She is not ill-appearing.   HENT:      Head: Normocephalic and atraumatic.      Mouth/Throat:      Mouth: Mucous membranes are moist.   Eyes:      General: No scleral icterus.        Right eye: No discharge.         Left eye: No discharge.      Conjunctiva/sclera: Conjunctivae normal.   Cardiovascular:      Rate and Rhythm: Normal rate and regular rhythm.      Heart sounds: No murmur heard.  Pulmonary:      Effort: Pulmonary effort is normal. No respiratory distress.      Breath sounds: Normal breath sounds. No wheezing.   Abdominal:      General: Bowel sounds are normal. There is no distension.      Palpations: Abdomen is soft.      Tenderness: There is no abdominal tenderness. There is no guarding or rebound.   Musculoskeletal:         General: No swelling. Normal range of motion.      Cervical back: Normal range of motion and neck supple.   Skin:     General: Skin is warm and dry.      Findings: No rash.   Neurological:      General: No focal deficit present.      Mental Status: She is alert. Mental status is at baseline.      Comments: Mild dysarthria, slow speech but appropriate.  No facial droop.  Mildly weak R arm compared to left.   Psychiatric:         Mood and Affect: Mood normal.         Behavior: Behavior normal.         Thought Content: Thought content normal.         Procedures           ED Course    I reviewed previous records.  Discharge summary 9/4/23 after admission for stroke, she received TNK and had angioedema as a result.  She had an abnormal EEG and was started on Keppra.      MRI negative for acute insult, shows old disease.  Labs benign.    Pt had a witnessed seizure in the ED with right arm and face twitching and AMS.  Resolved with Ativan.  Keppra load given.    Improved after meds but pretty sedated by the meds.  Not fit for discharge.    Patient stable on serial rechecks.  Discussed exam findings, test results so far and concerns in detail at the bedside.  Discussed need for admission for further evaluation and treatment.  I discussed the patient's presentation, test results and need for admission with the hospitalist.                                           Medical Decision Making  Problems Addressed:  Altered mental status, unspecified altered mental status type: complicated acute illness or injury  Seizures: complicated acute illness or injury with systemic symptoms    Amount and/or Complexity of Data Reviewed  Independent Historian: caregiver     Details: family  External Data Reviewed: notes.  Labs: ordered. Decision-making details documented in ED Course.  Radiology: ordered. Decision-making details documented in ED Course.  ECG/medicine tests: ordered and independent interpretation performed. Decision-making details documented in ED Course.     Details: EKG read by RUBY sullivan with 1st degree AV block  Discussion of management or test interpretation with external provider(s): hospitalist    Risk  Prescription drug management.  Parenteral controlled substances.  Decision regarding hospitalization.        Final diagnoses:   Seizures   Altered mental status,  unspecified altered mental status type       ED Disposition  ED Disposition       ED Disposition   Decision to Admit    Condition   --    Comment   Level of Care: Telemetry [5]   Diagnosis: Breakthrough seizure [[id number]]   Admitting Physician: JAROCHO VELA [[id number]]   Attending Physician: JAROCHO VELA [[id number]]                 No follow-up provider specified.       Medication List      No changes were made to your prescriptions during this visit.            Julien Tracy MD  04/14/24 0118

## 2024-04-14 NOTE — ED NOTES
Laury Eugene    Nursing Report ED to Floor:  Mental status: A+Ox1  Ambulatory status: Bedfast  Oxygen Therapy:  RA  Cardiac Rhythm: NSR  Admitted from: Home  Safety Concerns:  Falls  Social Issues: N/A  ED Room #:  11    ED Nurse Phone Extension - [phone] or may call [phone].      HPI:   Chief Complaint   Patient presents with    Altered Mental Status    Seizures       Past Medical History:  Past Medical History:   Diagnosis Date    History of breast cancer     History of heart disease     Hypothyroidism     Multinodular goiter     Sleep apnea     Stroke     Thyroid function test abnormal         Past Surgical History:  Past Surgical History:   Procedure Laterality Date    BLEPHAROPLASTY      BREAST RECONSTRUCTION      CATARACT EXTRACTION Bilateral     COLONOSCOPY N/A 8/27/2023    Procedure: COLONOSCOPY;  Surgeon: Brunner, Mark I, MD;  Location:  [facility];  Service: Gastroenterology;  Laterality: N/A;    ENDOSCOPY N/A 8/27/2023    Procedure: ESOPHAGOGASTRODUODENOSCOPY;  Surgeon: Brunner, Mark I, MD;  Location:  [facility];  Service: Gastroenterology;  Laterality: N/A;    HYSTERECTOMY      SIMPLE MASTECTOMY          Admitting Doctor:   Syed Parish MD    Consulting Provider(s):  Consults       No orders found from 3/15/2024 to 4/14/2024.             Admitting Diagnosis:   The primary encounter diagnosis was Seizures. A diagnosis of Altered mental status, unspecified altered mental status type was also pertinent to this visit.    Most Recent Vitals:   Vitals:    04/13/24 1916 04/13/24 1930 04/13/24 2005 04/13/24 2030   BP:  144/65 143/65 135/63   Pulse: 79 59 55 56   Resp:       Temp:       SpO2: 97% 97% 98% 97%   Weight:       Height:           Active LDAs/IV Access:   Lines, Drains & Airways       Active LDAs       Name Placement date Placement time Site Days    PICC Triple Lumen 08/28/23 Right Basilic 08/28/23  1200  Basilic  229    Peripheral IV 04/13/24 2005 Right Antecubital 04/13/24 2005   Antecubital  less than 1                    Labs (abnormal labs have a star):   Labs Reviewed   COMPREHENSIVE METABOLIC PANEL - Abnormal; Notable for the following components:       Result Value    Glucose 100 (*)     Creatinine 1.02 (*)     CO2 30.0 (*)     eGFR 55.0 (*)     All other components within normal limits    Narrative:     GFR Normal >60  Chronic Kidney Disease <60  Kidney Failure <15    The GFR formula is only valid for adults with stable renal function between ages 18 and 70.   CBC WITH AUTO DIFFERENTIAL - Abnormal; Notable for the following components:    RBC 3.52 (*)     Hemoglobin 10.2 (*)     Hematocrit 32.3 (*)     RDW 17.5 (*)     RDW-SD 58.6 (*)     Monocyte % 14.1 (*)     Monocytes, Absolute 0.97 (*)     All other components within normal limits   MAGNESIUM - Normal   TSH RFX ON ABNORMAL TO FREE T4   ETHANOL   CBC AND DIFFERENTIAL    Narrative:     The following orders were created for panel order CBC & Differential.  Procedure                               Abnormality         Status                     ---------                               -----------         ------                     CBC Auto Differential[[id number]]        Abnormal            Final result                 Please view results for these tests on the individual orders.       Meds Given in ED:   Medications   levETIRAcetam (KEPPRA) injection 1,000 mg (1,000 mg Intravenous Given 4/13/24 2022)   LORazepam (ATIVAN) injection 1 mg (1 mg Intravenous Given 4/13/24 2021)     No current facility-administered medications for this encounter.       Last NIH score:                                                          Dysphagia screening results:        [place] Coma Scale:  No data recorded     CIWA:        Restraint Type:            Isolation Status:  No active isolations

## 2024-04-14 NOTE — THERAPY DISCHARGE NOTE
Patient Name: Laury Eugene  : 1940    MRN: [id number]                              Today's Date: 2024       Admit Date: 2024    Visit Dx:     ICD-10-CM ICD-9-CM   1. Seizures  R56.9 780.39   2. Altered mental status, unspecified altered mental status type  R41.82 780.97     Patient Active Problem List   Diagnosis    Multinodular goiter    Abnormal thyroid function test    Hypothyroidism (acquired)    Essential hypertension    Hyperlipemia    Paroxysmal atrial fibrillation    Dysphagia    Acute lower gastrointestinal bleeding    ABLA (acute blood loss anemia)    Moderate malnutrition    History of stroke    Breakthrough seizure     Past Medical History:   Diagnosis Date    History of breast cancer     History of heart disease     Hypothyroidism     Multinodular goiter     Sleep apnea     Stroke     Thyroid function test abnormal      Past Surgical History:   Procedure Laterality Date    BLEPHAROPLASTY      BREAST RECONSTRUCTION      CATARACT EXTRACTION Bilateral     COLONOSCOPY N/A 2023    Procedure: COLONOSCOPY;  Surgeon: Brunner, Mark I, MD;  Location:  [facility];  Service: Gastroenterology;  Laterality: N/A;    ENDOSCOPY N/A 2023    Procedure: ESOPHAGOGASTRODUODENOSCOPY;  Surgeon: Brunner, Mark I, MD;  Location:  [facility];  Service: Gastroenterology;  Laterality: N/A;    HYSTERECTOMY      SIMPLE MASTECTOMY        General Information       Row Name 24 1325          Physical Therapy Time and Intention    Document Type discharge evaluation/summary  -CD     Mode of Treatment physical therapy  -CD       Row Name 24 1325          General Information    Patient Profile Reviewed yes  -CD     Prior Level of Function independent:;all household mobility;gait;transfer;bed mobility;min assist:;using stairs;dressing;bathing  PT IS APHASIC AND DIFFICULT TO UNDERSTAND CONSISTENTLY. NIECE/CG REPORT PT HAD RECENTLY PROGRESSED TO NO A.D. IN HOME. STILL USING R WALKER FOR OUT  IN COMMUNITY. HAS SUPERVISION FOR BATHING AND DRESSING AND CGA FOR STIR CLIMBING. PT HAS 24/7 CG'S/FAMILY  -CD     Existing Precautions/Restrictions fall  IMPULSIVITY.,  -CD     Barriers to Rehab medically complex;previous functional deficit  -CD       Row Name 04/14/24 1325          Living Environment    People in Home other (see comments)  WITH 24/7 CAREGIVERS.  -CD       Row Name 04/14/24 1325          Home Main Entrance    Number of Stairs, Main Entrance eight  -CD     Stair Railings, Main Entrance railings safe and in good condition  -CD       Row Name 04/14/24 1325          Stairs Within Home, Primary    Number of Stairs, Within Home, Primary nine  -CD     Stair Railings, Within Home, Primary railings safe and in good condition  -CD       Row Name 04/14/24 1325          Cognition    Orientation Status (Cognition) oriented to;person  KNEW BIRTHDATE.  -CD       Row Name 04/14/24 1325          Safety Issues, Functional Mobility    Safety Issues Affecting Function (Mobility) insight into deficits/self-awareness;safety precaution awareness;safety precautions follow-through/compliance  -CD     Impairments Affecting Function (Mobility) balance;strength  -CD               User Key  (r) = Recorded By, (t) = Taken By, (c) = Cosigned By      Initials Name Provider Type    CD Amanda Cortez, PT Physical Therapist                   Mobility       Row Name 04/14/24 1331          Bed Mobility    Bed Mobility supine-sit  -CD     Supine-Sit [place] (Bed Mobility) standby assist  -CD     Assistive Device (Bed Mobility) bed rails;head of bed elevated  -CD       Row Name 04/14/24 1331          Sit-Stand Transfer    Sit-Stand [place] (Transfers) contact guard;verbal cues  -CD     Assistive Device (Sit-Stand Transfers) walker, front-wheeled  -CD       Row Name 04/14/24 1331          Gait/Stairs (Locomotion)    [place] Level (Gait) contact guard;verbal cues  -CD     Assistive Device (Gait) walker, front-wheeled  -CD      Distance in Feet (Gait) 400  -CD     Deviations/Abnormal Patterns (Gait) yaritza decreased;stride length decreased;gait speed decreased  -CD     Bilateral Gait Deviations forward flexed posture  -CD     Comment, (Gait/Stairs) VEERS R OCCASIONALLY BUT [place] REPORTS THIS IS BASELINE FROM PRIOR CVA 8/2023. NIECE REPORTS PT APPEARS BACK TO BASELINE WITH FUNCTIONAL MOBILITY AND MENTAL STATUS.  -CD               User Key  (r) = Recorded By, (t) = Taken By, (c) = Cosigned By      Initials Name Provider Type    CD Amanda Cortez PT Physical Therapist                   Obj/Interventions       Row Name 04/14/24 1333          Range of Motion Comprehensive    General Range of Motion bilateral lower extremity ROM WFL  -CD       Row Name 04/14/24 1333          Strength Comprehensive (MMT)    General Manual Muscle Testing (MMT) Assessment lower extremity strength deficits identified  -CD     Comment, General Manual Muscle Testing (MMT) Assessment B LE GROSSLY 4+ TO 5/5 AND SYMMETRICAL.  -CD       Row Name 04/14/24 1333          Motor Skills    Motor Skills functional endurance  -CD     Functional Endurance O2 SATS STABLE ON RA.  -CD       Row Name 04/14/24 1333          Balance    Balance Assessment sitting static balance;sitting dynamic balance;sit to stand dynamic balance;standing static balance;standing dynamic balance  -CD     Static Sitting Balance independent  -CD     Dynamic Sitting Balance independent  -CD     Position, Sitting Balance unsupported;sitting edge of bed  -CD     Sit to Stand Dynamic Balance contact guard  -CD     Static Standing Balance contact guard  -CD     Dynamic Standing Balance contact guard  -CD     Position/Device Used, Standing Balance walker, rolling  -CD     Comment, Balance NO OVERT LOB WITH USE OF R WALKER. ABLE TO COMPLETED TOILETING HYGIENE FROM SEATED POSITION INDEPENDENTLY AND KANE/DOFF UNDERWEAR WITH CGA FOR SAFETY,  -CD               User Key  (r) = Recorded By, (t) = Taken By, (c) =  Cosigned By      Initials Name Provider Type    CD Amanda Cortez, PT Physical Therapist                   Goals/Plan       Row Name 04/14/24 1347          Bed Mobility Goal 1 (PT)    Activity/Assistive Device (Bed Mobility Goal 1, PT) sit to supine/supine to sit  -CD     [place] Level/Cues Needed (Bed Mobility Goal 1, PT) standby assist  -CD     Time Frame (Bed Mobility Goal 1, PT) 1 day;short term goal (STG)  -CD     Progress/Outcomes (Bed Mobility Goal 1, PT) goal met  -CD       Row Name 04/14/24 1347          Transfer Goal 1 (PT)    Activity/Assistive Device (Transfer Goal 1, PT) sit-to-stand/stand-to-sit;bed-to-chair/chair-to-bed;walker, rolling  -CD     [place] Level/Cues Needed (Transfer Goal 1, PT) contact guard required  -CD     Time Frame (Transfer Goal 1, PT) short term goal (STG);1 day  -CD       Row Name 04/14/24 1347          Gait Training Goal 1 (PT)    Activity/Assistive Device (Gait Training Goal 1, PT) gait (walking locomotion);walker, rolling  -CD     [place] Level (Gait Training Goal 1, PT) contact guard required  -CD     Distance (Gait Training Goal 1, PT) 300 FEET  -CD     Time Frame (Gait Training Goal 1, PT) short term goal (STG);1 day  -CD     Progress/Outcome (Gait Training Goal 1, PT) goal met  -CD               User Key  (r) = Recorded By, (t) = Taken By, (c) = Cosigned By      Initials Name Provider Type    CD Amanda Cortez PT Physical Therapist                   Clinical Impression       Row Name 04/14/24 1338          Pain    Pretreatment Pain Rating 0/10 - no pain  -CD     Posttreatment Pain Rating 0/10 - no pain  -CD       Row Name 04/14/24 1335          Plan of Care Review    Plan of Care Reviewed With patient;family  -CD     Outcome Evaluation GOALS FOR EVAL MET. PT IS LIKELY CLOSE TO RECENT BASELINE WITH FUNCTIONAL MOBILITY AND MENTAL STATUS. COMPLETED BED MOBILITY WITH SBA AND TRANSFERS/GAIT WITH R WALKER AND CGA BUT NO OVERT LOB. PT IS APHASIC BUT FOLLOWS ALL  COMMANDS. PT HAS 24/7 CAREGIVERS AT HOME. RECOMMEND PT RESUME  OP THERAPY AT D/C.  -CD       Row Name 04/14/24 1338          Therapy Assessment/Plan (PT)    Patient/Family Therapy Goals Statement (PT) TO GO HOME.  -CD     Criteria for Skilled Interventions Met (PT) no;no problems identified which require skilled intervention  -CD     Therapy Frequency (PT) evaluation only  -CD       Row Name 04/14/24 1338          Vital Signs    Pre Systolic BP Rehab 142  -CD     Pre Treatment Diastolic BP 60  -CD     Post Systolic BP Rehab 141  -CD     Post Treatment Diastolic BP 61  -CD     Posttreatment Heart Rate (beats/min) 61  -CD     Pre SpO2 (%) 95  -CD     O2 Delivery Pre Treatment room air  -CD     O2 Delivery Intra Treatment room air  -CD     O2 Delivery Post Treatment room air  -CD     Pre Patient Position Supine  -CD     Intra Patient Position Standing  -CD     Post Patient Position Sitting  -CD       Row Name 04/14/24 1338          Positioning and Restraints    Pre-Treatment Position in bed  -CD     Post Treatment Position chair  -CD     In Chair reclined;call light within reach;encouraged to call for assist;exit alarm on;with family/caregiver;legs elevated;notified nsg  NSG NOTIFIED OF CURRENT MOBILITY STATUS AND IV BEEPING.  -CD               User Key  (r) = Recorded By, (t) = Taken By, (c) = Cosigned By      Initials Name Provider Type    CD Amanda Cortez, PT Physical Therapist                   Outcome Measures       Row Name 04/14/24 1344 04/14/24 0800       How much help from another person do you currently need...    Turning from your back to your side while in flat bed without using bedrails? 4  -CD 4  -SN    Moving from lying on back to sitting on the side of a flat bed without bedrails? 4  -CD 3  -SN    Moving to and from a bed to a chair (including a wheelchair)? 3  -CD 3  -SN    Standing up from a chair using your arms (e.g., wheelchair, bedside chair)? 3  -CD 3  -SN    Climbing 3-5 steps with a railing? 3   -CD 2  -SN    To walk in hospital room? 3  -CD 3  -SN    AM-PAC 6 Clicks Score (PT) 20  -CD 18  -SN    Highest Level of Mobility Goal 6 --> Walk 10 steps or more  -CD 6 --> Walk 10 steps or more  -SN      Row Name 04/14/24 9976          Modified [place] Scale    Modified [place] Scale 3 - Moderate disability.  Requiring some help, but able to walk without assistance.  -CD       Row Name 04/14/24 1344          Functional Assessment    Outcome Measure Options AM-PAC 6 Clicks Basic Mobility (PT);Modified [place]  -CD               User Key  (r) = Recorded By, (t) = Taken By, (c) = Cosigned By      Initials Name Provider Type    Amanda Jackson, PT Physical Therapist    Nai Bush RN Registered Nurse                                 Physical Therapy Education       Title: PT OT SLP Therapies (In Progress)       Topic: Physical Therapy (Done)       Point: Mobility training (Done)       Learning Progress Summary             Patient Acceptance, E, VU,NR by CD at 4/14/2024 1344    Comment: BENEFITS OF OOB ACTIVITY, SAFETY WITH MOBILITY (PT HAS R WALKER AT HOME) AND D/C REC'S                         Point: Home exercise program (Done)       Learning Progress Summary             Patient Acceptance, E, VU,NR by CD at 4/14/2024 1344    Comment: BENEFITS OF OOB ACTIVITY, SAFETY WITH MOBILITY (PT HAS R WALKER AT HOME) AND D/C REC'S                         Point: Body mechanics (Done)       Learning Progress Summary             Patient Acceptance, E, VU,NR by CD at 4/14/2024 1344    Comment: BENEFITS OF OOB ACTIVITY, SAFETY WITH MOBILITY (PT HAS R WALKER AT HOME) AND D/C REC'S                         Point: Precautions (Done)       Learning Progress Summary             Patient Acceptance, E, VU,NR by CD at 4/14/2024 1344    Comment: BENEFITS OF OOB ACTIVITY, SAFETY WITH MOBILITY (PT HAS R WALKER AT HOME) AND D/C REC'S                                         User Key       Initials Effective Dates Name Provider Type  Discipline    CD 02/03/23 -  Amanda Cortez, PT Physical Therapist PT                  PT Recommendation and Plan     Plan of Care Reviewed With: patient, family  Outcome Evaluation: GOALS FOR EVAL MET. PT IS LIKELY CLOSE TO RECENT BASELINE WITH FUNCTIONAL MOBILITY AND MENTAL STATUS. COMPLETED BED MOBILITY WITH SBA AND TRANSFERS/GAIT WITH R WALKER AND CGA BUT NO OVERT LOB. PT IS APHASIC BUT FOLLOWS ALL COMMANDS. PT HAS 24/7 CAREGIVERS AT HOME. RECOMMEND PT RESUME  OP THERAPY AT D/C.     Time Calculation:   PT Evaluation Complexity  History, PT Evaluation Complexity: 3 or more personal factors and/or comorbidities  Examination of Body Systems (PT Eval Complexity): total of 4 or more elements  Clinical Presentation (PT Evaluation Complexity): evolving  Clinical Decision Making (PT Evaluation Complexity): low complexity  Overall Complexity (PT Evaluation Complexity): low complexity     PT Charges       Row Name 04/14/24 1345             Time Calculation    Start Time 0950  -CD      PT Received On 04/14/24  -CD         Time Calculation- PT    Total Timed Code Minutes- PT 11 minute(s)  -CD         Timed Charges    25783 - Gait Training Minutes  11  -CD         Untimed Charges    PT Eval/Re-eval Minutes 60  -CD         Total Minutes    Timed Charges Total Minutes 11  -CD      Untimed Charges Total Minutes 60  -CD       Total Minutes 71  -CD                User Key  (r) = Recorded By, (t) = Taken By, (c) = Cosigned By      Initials Name Provider Type    CD Amanda Cortez, PT Physical Therapist                  Therapy Charges for Today       Code Description Service Date Service Provider Modifiers Qty    87458744028 HC GAIT TRAINING EA 15 MIN 4/14/2024 Amanda Cortez, PT GP 1    87971390293 HC PT EVAL LOW COMPLEXITY 4 4/14/2024 Amanda Cortez, PT GP 1            PT G-Codes  Outcome Measure Options: AM-PAC 6 Clicks Basic Mobility (PT), Modified [place]  AM-PAC 6 Clicks Score (PT): 20  Modified [place] Scale: 3 - Moderate  disability.  Requiring some help, but able to walk without assistance.  PT Discharge Summary  Anticipated Discharge Disposition (PT): home with assist, home with 24/7 care  Reason for Discharge: At baseline function  Discharge Destination: Home with assist, Home with home health    Amanda Cortez, PT  4/14/2024

## 2024-04-14 NOTE — PROGRESS NOTES
[facility] Medicine Services  PROGRESS NOTE    Patient Name: Laury Eugene  : 1940  MRN: [id number]    Date of Admission: 2024  Primary Care Physician: Leatha Melvin DO    Subjective   Subjective     CC: Follow-up for likely seizure    HPI: Elderly female, awake but speech is incoherent      Objective   Objective     Vital Signs:   Temp:  [97.7 °F (36.5 °C)-97.9 °F (36.6 °C)] 97.7 °F (36.5 °C)  Heart Rate:  [55-79] 70  Resp:  [17-18] 18  BP: (118-147)/(63-88) 147/68     Physical Exam:  Constitutional: Elderly female, in no acute distress, awake  HENT: NCAT, mucous membranes moist  Respiratory: Clear to auscultation bilaterally, respiratory effort normal   Cardiovascular: RRR, no murmurs, rubs, or gallops  Gastrointestinal: Positive bowel sounds, soft, nontender, nondistended  Musculoskeletal: No bilateral ankle edema  Psychiatric: Appropriate affect, cooperative  Neurologic: Oriented x 3, dysarthria, incoherent speech      Results Reviewed:  LAB RESULTS:      Lab 24  0518 24   WBC 8.61 6.90   HEMOGLOBIN 10.0* 10.2*   HEMATOCRIT 31.7* 32.3*   PLATELETS 235 259   NEUTROS ABS  --  3.72   IMMATURE GRANS (ABS)  --  0.01   LYMPHS ABS  --  1.95   MONOS ABS  --  0.97*   EOS ABS  --  0.21   MCV 92.7 91.8         Lab 24   SODIUM 136   POTASSIUM 4.3   CHLORIDE 99   CO2 30.0*   ANION GAP 7.0   BUN 17   CREATININE 1.02*   EGFR 55.0*   GLUCOSE 100*   CALCIUM 8.9   MAGNESIUM 1.8   TSH 1.120         Lab 24   TOTAL PROTEIN 6.9   ALBUMIN 3.8   GLOBULIN 3.1   ALT (SGPT) 16   AST (SGOT) 23   BILIRUBIN 0.2   ALK PHOS 114                     Brief Urine Lab Results       None            Microbiology Results Abnormal       Procedure Component Value - Date/Time    Respiratory Panel PCR w/COVID-19(SARS-CoV-2) SHAUNA/LIZ/AILYN/PAD/COR/PRAVEENA In-House, NP Swab in UTM/VTM, 2 HR TAT - Swab, Nasopharynx [[id number]]  (Normal) Collected: 24 2278    Lab  Status: Final result Specimen: Swab from Nasopharynx Updated: 04/13/24 0395     ADENOVIRUS, PCR Not Detected     Coronavirus 229E Not Detected     Coronavirus HKU1 Not Detected     Coronavirus NL63 Not Detected     Coronavirus OC43 Not Detected     COVID19 Not Detected     Human Metapneumovirus Not Detected     Human Rhinovirus/Enterovirus Not Detected     Influenza A PCR Not Detected     Influenza B PCR Not Detected     Parainfluenza Virus 1 Not Detected     Parainfluenza Virus 2 Not Detected     Parainfluenza Virus 3 Not Detected     Parainfluenza Virus 4 Not Detected     RSV, PCR Not Detected     Bordetella pertussis pcr Not Detected     Bordetella parapertussis PCR Not Detected     Chlamydophila pneumoniae PCR Not Detected     Mycoplasma pneumo by PCR Not Detected    Narrative:      In the setting of a positive respiratory panel with a viral infection PLUS a negative procalcitonin without other underlying concern for bacterial infection, consider observing off antibiotics or discontinuation of antibiotics and continue supportive care. If the respiratory panel is positive for atypical bacterial infection (Bordetella pertussis, Chlamydophila pneumoniae, or Mycoplasma pneumoniae), consider antibiotic de-escalation to target atypical bacterial infection.            MRI Brain Without Contrast    Result Date: 4/13/2024  MRI BRAIN WO CONTRAST Date of Exam: 4/13/2024 8:29 PM EDT Indication: AMS, possible seizure, R facial twitching, history of stroke in August with residual R sided deficits.  Comparison: 8/26/2023 and head CT same date. CT head/angiography/perfusion also 8/26/2023. Technique:  Routine multiplanar/multisequence sequence images of the brain were obtained without contrast administration. Findings: There is no diffusion restriction to suggest an acute infarct. There is a chronic left MCA territory infarct affecting the insular cortex and lateral posterior left frontal lobe. There is a smaller chronic  cortical infarct in the right temporal lobe. There are mild patchy periventricular foci of FLAIR hyperintense signal abnormality. There is wallerian degeneration extending through the posterior limb of the left internal capsule into the midbrain. There are no new signal abnormalities in the brain parenchyma. Thinning of the orbital lenses with suggest prior cataract surgery. There is moderate left sphenoid sinus mucosal disease. Mastoid air cells appear well aerated. There is diminished flow void in the distal left M1 segment. This could be due to motion. There is no mass effect, midline shift or abnormal extra-axial collection. No acute or chronic intracranial hemorrhage. Midline structures appear intact. Calvarial and superficial soft tissue signal is within normal limits. 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 Parotid glands appear symmetric.     Impression: Impression: 1. No acute intracranial abnormality. 2. Chronic infarcts in the left MCA territory and right temporal lobe. 3. Mild chronic small vessel ischemic change. 4. Moderate left sphenoid sinus mucosal disease. Electronically Signed: Jim Guillen MD  4/13/2024 9:19 PM EDT  Workstation ID: [id number]     Results for orders placed during the hospital encounter of 08/26/23    Adult Transthoracic Echo Complete W/ Cont if Necessary Per Protocol    Interpretation Summary    Left ventricular systolic function is normal. Left ventricular ejection fraction appears to be 51 - 55%.      Current medications:  Scheduled Meds:apixaban, 5 mg, Oral, Q12H  atorvastatin, 80 mg, Oral, Nightly  [Held by provider] escitalopram, 10 mg, Oral, Daily  levETIRAcetam, 750 mg, Intravenous, Q12H  metoprolol succinate XL, 25 mg, Oral, Daily  pantoprazole, 40 mg, Oral, Daily  [Held by provider] propafenone SR, 325 mg, Oral, Q12H  sodium chloride, 10 mL, Intravenous, Q12H      Continuous Infusions:lactated ringers, 100 mL/hr, Last Rate: 100 mL/hr (04/13/24 5426)      PRN Meds:.   acetaminophen **OR** acetaminophen **OR** acetaminophen    senna-docusate sodium **AND** polyethylene glycol **AND** bisacodyl **AND** bisacodyl    Calcium Replacement - Follow Nurse / BPA Driven Protocol    Magnesium Cardiology Dose Replacement - Follow Nurse / BPA Driven Protocol    Phosphorus Replacement - Follow Nurse / BPA Driven Protocol    Potassium Replacement - Follow Nurse / BPA Driven Protocol    sodium chloride    sodium chloride    Assessment & Plan   Assessment & Plan     Active Hospital Problems    Diagnosis  POA    **Breakthrough seizure [G40.919]  Yes    History of stroke [Z86.73]  Not Applicable    Paroxysmal atrial fibrillation [I48.0]  Yes    Essential hypertension [I10]  Yes    Hyperlipemia [E78.5]  Yes    Multinodular goiter [E04.2]  Yes    Abnormal thyroid function test [R94.6]  Yes      Resolved Hospital Problems   No resolved problems to display.        Brief Hospital Course to date:  Laury Eugene is a 84 y.o. female with history of left MCA stroke with residual speech and right-sided facial deficits, atrial fibrillation on Eliquis, hyperthyroidism and multinodular goiter, NOVA on CPAP, prior history of breast cancer status post chemo and radiation who presented with abnormal face movements and mental status changes admitted with likely seizure    This patient's problems and plans were partially entered by my partner and updated as appropriate by me 04/14/24.     Suspected breakthrough seizure  History of left MCA stroke and right temporal stroke  -Patient with no known history of seizure, however, EEG during prior stroke admission showed abnormal epileptiform activity and she was started on Keppra, has reportedly been compliant.  -MRI here is negative for any acute change, shows expected changes following prior stroke.    -Suspect breakthrough seizure versus recrudescence of stroke symptoms  -She is s/p loading with Keppra and 1 dose of Ativan in the ED   -Continue Keppra 750 mg IV twice  daily, can consider repeat EEG, currently awaiting evaluation by neurology.     A-fib  -Rates currently remain controlled  -Continue home Eliquis, propafenone and metoprolol    Hypertension  -BP currently stable     Chronic iron deficiency anemia, history of GI bleed  -H&H is stable, continue PPI     History of hyperthyroidism and multinodular goiter  -Follows with endocrinology, currently not taking any medications for this  -TSH is within normal limits.  Avoid iodinated contrast if possible     Obstructive sleep apnea  -Continue home CPAP    All problems listed above are new to me today    Expected Discharge Location and Transportation: TBD  Expected Discharge   Expected discharge date/ time has not been documented.     DVT prophylaxis:  Medical and mechanical DVT prophylaxis orders are present.         AM-PAC 6 Clicks Score (PT): 16 (04/14/24 0101)    CODE STATUS:   Code Status and Medical Interventions:   Ordered at: 04/13/24 9549     Medical Intervention Limits:    NO intubation (DNI)     Level Of Support Discussed With:    Health Care Surrogate     Code Status (Patient has no pulse and is not breathing):    No CPR (Do Not Attempt to Resuscitate)     Medical Interventions (Patient has pulse or is breathing):    Limited Support       Gabriele Rosenbaum MD  04/14/24

## 2024-04-14 NOTE — PLAN OF CARE
----- Message from Xenia Arnold sent at 11/5/2020  8:35 AM CST -----  Regarding: orders  Caller request MRI CT orders sent to [facility] [place] fax: [phone] .. .[phone] (home)      Goal Outcome Evaluation:  Plan of Care Reviewed With: patient        Progress: no change  Outcome Evaluation: Pt presents at baseline for ADL performance and related mobility. OT signing off, please reconsult if needed. Rec return to home and 24/7 caregiver support and resume OP OT/PT.      Anticipated Discharge Disposition (OT): home with 24/7 care, home with outpatient therapy services

## 2024-04-14 NOTE — PLAN OF CARE
Goal Outcome Evaluation:     Problem: Skin Injury Risk Increased  Goal: Skin Health and Integrity  Outcome: Ongoing, Progressing  Intervention: Optimize Skin Protection  Recent Flowsheet Documentation  Taken 4/14/2024 0400 by Marco Monte RN  Head of Bed (HOB) Positioning: HOB elevated  Taken 4/14/2024 0200 by Marco Monte RN  Head of Bed (HOB) Positioning: HOB elevated  Taken 4/13/2024 2310 by Marco Monte RN  Head of Bed (HOB) Positioning: HOB elevated     Problem: Adult Inpatient Plan of Care  Goal: Plan of Care Review  Outcome: Ongoing, Progressing  Goal: Patient-Specific Goal (Individualized)  Outcome: Ongoing, Progressing  Goal: Absence of Hospital-Acquired Illness or Injury  Outcome: Ongoing, Progressing  Intervention: Identify and Manage Fall Risk  Recent Flowsheet Documentation  Taken 4/14/2024 0400 by Marco Monte RN  Safety Promotion/Fall Prevention: safety round/check completed  Taken 4/14/2024 0200 by Marco Monte RN  Safety Promotion/Fall Prevention: safety round/check completed  Taken 4/13/2024 2310 by Marco Monte RN  Safety Promotion/Fall Prevention:   nonskid shoes/slippers when out of bed   safety round/check completed  Intervention: Prevent Skin Injury  Recent Flowsheet Documentation  Taken 4/14/2024 0400 by Marco Monte RN  Body Position: position changed independently  Taken 4/14/2024 0200 by Marco Monte RN  Body Position: position changed independently  Taken 4/13/2024 2310 by Marco Monte RN  Body Position: position changed independently  Intervention: Prevent and Manage VTE (Venous Thromboembolism) Risk  Recent Flowsheet Documentation  Taken 4/14/2024 0400 by Marco Monte RN  Activity Management: activity encouraged  Taken 4/14/2024 0200 by Marco Monte RN  Activity Management: activity encouraged  Taken 4/13/2024 2310 by Marco Monte RN  Activity Management: activity minimized  VTE Prevention/Management:   bilateral   sequential compression  devices on  Goal: Optimal Comfort and Wellbeing  Outcome: Ongoing, Progressing  Intervention: Provide Person-Centered Care  Recent Flowsheet Documentation  Taken 4/13/2024 2310 by Marco Monte, RN  Trust Relationship/Rapport:   care explained   questions answered   reassurance provided  Goal: Readiness for Transition of Care  Outcome: Ongoing, Progressing  Intervention: Mutually Develop Transition Plan  Recent Flowsheet Documentation  Taken 4/14/2024 0102 by Marco Monte, RN  Equipment Currently Used at Home: none  Transportation Anticipated: family or friend will provide  Patient/Family Anticipated Services at Transition:   Patient/Family Anticipates Transition to: home with family

## 2024-04-14 NOTE — THERAPY EVALUATION
Lilia Dixon is here for IV hydration and electrolyte replacement due to dehydration and hypomagnesemia    Is the patient on an active anti-cancer treatment plan, supportive treatment plan, or receiving medications for side effect management (e.g. hydration, zometa, anti-cancer agents, anti-nausea, GSFCs, etc)? Yes.   Regimen: Abraxane  Cycle/Day: C3 D10    Oral Chemotherapy: No    Is this patient status post Stem Cell Transplant? No    ECOG:    ECOG [01/26/22 1450]   ECOG Performance Status 1       Nursing Assessment:  A comprehensive nursing assessment was performed and the patient reports the following:    Nausea: NO  Vomiting: NO  Fever: NO  Chills: NO  Other signs of infection: NO  Bleeding: NO  Mucositis: NO  Diarrhea: YES, Patient states improved, 4-5 times per day, patient states Imodium helps.  Constipation: NO  Anorexia: YES, loss of appetite.  Dysuria: NO  Urinary Bleeding: NO  Cough: NO  Shortness of Breath: NO  Fatigue/Weakness: YES  Numbness/Tingling: NO  Other Neuropathies: NO  Edema: NO  Rash: NO  Hand/Foot Syndrome: NO  Anxiety/Depression/Insomnia: NO  Pain: NO  Other: Dizziness NO    Vitals:  Orthostatic BP Reading:  Vitals:    01/26/22 1122 01/26/22 1336   BP: (!) 84/59 101/57   BP Location: LUE - Left upper extremity LUE - Left upper extremity   Patient Position: Sitting Sitting   Cuff Size: Regular Regular   Pulse: 79 72   Resp: 20    Temp: 97.6 °F (36.4 °C)    TempSrc: Oral    SpO2: 100%    LMP: 12/11/2005      Weight:  Wt Readings from Last 1 Encounters:   01/24/22 56.3 kg (124 lb 0.6 oz)     Labs:  Sodium (mmol/L)   Date Value   01/24/2022 139     Potassium (mmol/L)   Date Value   01/24/2022 2.9 (L)     Chloride (mmol/L)   Date Value   01/24/2022 109 (H)     Glucose (mg/dL)   Date Value   01/24/2022 162 (H)     Calcium (mg/dL)   Date Value   01/24/2022 8.7     Carbon Dioxide (mmol/L)   Date Value   01/24/2022 14 (L)     BUN (mg/dL)   Date Value   01/24/2022 16     Creatinine (mg/dL)   Date  Patient Name: Laury Eugene  : 1940    MRN: [id number]                              Today's Date: 2024       Admit Date: 2024    Visit Dx:     ICD-10-CM ICD-9-CM   1. Seizures  R56.9 780.39   2. Altered mental status, unspecified altered mental status type  R41.82 780.97     Patient Active Problem List   Diagnosis    Multinodular goiter    Abnormal thyroid function test    Hypothyroidism (acquired)    Essential hypertension    Hyperlipemia    Paroxysmal atrial fibrillation    Dysphagia    Acute lower gastrointestinal bleeding    ABLA (acute blood loss anemia)    Moderate malnutrition    History of stroke    Breakthrough seizure     Past Medical History:   Diagnosis Date    History of breast cancer     History of heart disease     Hypothyroidism     Multinodular goiter     Sleep apnea     Stroke     Thyroid function test abnormal      Past Surgical History:   Procedure Laterality Date    BLEPHAROPLASTY      BREAST RECONSTRUCTION      CATARACT EXTRACTION Bilateral     COLONOSCOPY N/A 2023    Procedure: COLONOSCOPY;  Surgeon: Brunner, Mark I, MD;  Location:  [facility];  Service: Gastroenterology;  Laterality: N/A;    ENDOSCOPY N/A 2023    Procedure: ESOPHAGOGASTRODUODENOSCOPY;  Surgeon: Brunner, Mark I, MD;  Location:  [facility];  Service: Gastroenterology;  Laterality: N/A;    HYSTERECTOMY      SIMPLE MASTECTOMY        General Information       Row Name 24 1529          OT Time and Intention    Document Type discharge evaluation/summary  -CS     Mode of Treatment occupational therapy  -CS       Row Name 24 1529          General Information    Patient Profile Reviewed yes  -CS     Prior Level of Function independent:;transfer;feeding;grooming;min assist:;dressing;bathing;using stairs  Pt recently progressed to no AD use in home with supervision. caregiver assist/supervsion . seating available for bathing tasks. increased assist for stair use  -CS     Existing  Precautions/Restrictions fall;other (see comments)  impulsive, dysarthria (hx CVA)  -CS     Barriers to Rehab medically complex;previous functional deficit  -CS       Row Name 04/14/24 1529          Living Environment    People in Home other (see comments)  24/7 caregivers  -CS       Row Name 04/14/24 1529          Home Main Entrance    Number of Stairs, Main Entrance eight  -CS     Stair Railings, Main Entrance railings safe and in good condition  -CS       Row Name 04/14/24 1529          Stairs Within Home, Primary    Stairs, Within Home, Primary assist for stairs  -CS     Number of Stairs, Within Home, Primary nine  -CS     Stair Railings, Within Home, Primary railings safe and in good condition  -CS       Row Name 04/14/24 1529          Cognition    Orientation Status (Cognition) oriented to;person  -CS       Row Name 04/14/24 1529          Safety Issues, Functional Mobility    Safety Issues Affecting Function (Mobility) insight into deficits/self-awareness;safety precaution awareness;impulsivity;safety precautions follow-through/compliance  -CS     Impairments Affecting Function (Mobility) cognition;balance  -CS     Comment, Safety Issues/Impairments (Mobility) dynamic balance deficit, supervision and cues throughout for  -CS               User Key  (r) = Recorded By, (t) = Taken By, (c) = Cosigned By      Initials Name Provider Type    CS Sandip Loo OT Occupational Therapist                     Mobility/ADL's       Row Name 04/14/24 1532          Bed Mobility    Comment, (Bed Mobility) UIC upon arrival, returned to chair  -CS       Row Name 04/14/24 1532          Transfers    Transfers sit-stand transfer;toilet transfer  -CS     Comment, (Transfers) RUE support upon standing, cues for grab bar use at toilet, no dizziness reported  -CS       Row Name 04/14/24 1532          Sit-Stand Transfer    Sit-Stand [place] (Transfers) standby assist  -CS       Row Name 04/14/24 1532          Toilet Transfer     Value   01/24/2022 1.15 (H)     Magnesium (mg/dL)   Date Value   01/24/2022 1.5 (L)       Treatment:  Refer to [facility] and MAR for line assessment and medication administration    Post Treatment: Treatment tolerated well; no adverse reaction    Does the Patient have a central line? yes:   Device: Port  Central Line Site: Chest  Central Line Site Appearance: WDL  Is this a port? Yes: Implanted port accessed using a 20 gauge non-coring needle primed with 0.9% sodium chloride. Good blood return obtained.  Blood obtained and sent to lab.  Site Care: Occlusive transparent dressing applied and Biopatch applied  Central line flushed with: 10 ml 0.9 normal saline and 10 un/ml- 50 units heparin  Central line removed: no  Dunn Needle: Dunn needle left in place    Education: Patient Education: No new instructions needed    Patient Discharged: patient discharged to home per self, ambulatory    Next appointment scheduled:   Future Appointments   Date Time Provider Department Center   1/28/2022 11:30 AM [facility] TREATMENT CHAIR [facility]   1/31/2022  1:00 PM [facility] ACL LAB [facility]   1/31/2022  1:30 PM Tri Hassan NP [facility]   2/2/2022 11:30 AM [facility] INFUSION RN [facility]   2/4/2022 11:30 AM [facility] INFUSION RN [facility]   2/7/2022  8:45 AM [facility] INFUSION RN [facility]   2/7/2022  9:30 AM KENT[facility] CT [facility]   2/7/2022 11:00 AM [facility] INFUSION RN [facility]   2/14/2022 10:30 AM [facility] ACL LAB [facility]   2/14/2022 11:00 AM Tri Hassan NP [facility]   5/3/2022 12:00 PM [facility] [place]   5/12/2022 12:30 PM Ana Corley MD [facility]     Patient instructed to call the office with any questions or concerns.  Dr. Crispin Bryant is supervising clinician today.       Type (Toilet Transfer) stand-sit;sit-stand  -CS     [place] Level (Toilet Transfer) standby assist;verbal cues  -CS     Assistive Device (Toilet Transfer) grab bars/safety frame  -CS       Row Name 04/14/24 1532          Functional Mobility    Functional Mobility- Comment defer to PT for specifics, SBA and R hand held support for in-room and hallway distance, no LOB  -CS       Row Name 04/14/24 1532          Activities of Daily Living    BADL Assessment/Intervention grooming;feeding;toileting;lower body dressing  -CS       Row Name 04/14/24 1532          Lower Body Dressing Assessment/Training    [place] Level (Lower Body Dressing) don;pants/bottoms;standby assist  -CS     Comment, (Lower Body Dressing) undergarment  -CS       Row Name 04/14/24 1532          Grooming Assessment/Training    [place] Level (Grooming) hair care, combing/brushing;wash face, hands;verbal cues;supervision  -CS     Position (Grooming) sink side  -CS       Row Name 04/14/24 1532          Self-Feeding Assessment/Training    [place] Level (Feeding) liquids to mouth;finger foods;set up  -CS     Position (Self-Feeding) supported sitting  -CS       Row Name 04/14/24 1532          Toileting Assessment/Training    [place] Level (Toileting) adjust/manage clothing;perform perineal hygiene;standby assist;verbal cues;change pad/brief  -CS     Position (Toileting) unsupported sitting;supported standing  -CS     Comment, (Toileting) cues for grab bar use to stabilize during brief pull up  -CS               User Key  (r) = Recorded By, (t) = Taken By, (c) = Cosigned By      Initials Name Provider Type    CS Sandip Loo OT Occupational Therapist                   Obj/Interventions       Row Name 04/14/24 1534          Sensory Assessment (Somatosensory)    Sensory Assessment (Somatosensory) UE sensation intact  -CS       Row Name 04/14/24 1534          Vision Assessment/Intervention    Visual Impairment/Limitations WFL  -CS      Vision Assessment Comment full to confrontation, tracking L/R intact  -CS       Row Name 04/14/24 1534          Strength Comprehensive (MMT)    Comment, General Manual Muscle Testing (MMT) Assessment BUE grossly 4+/5  -CS       Row Name 04/14/24 1534          Motor Skills    Motor Skills coordination;functional endurance  -CS     Coordination bimanual skills;WFL  -CS     Functional Endurance fair  -CS       Row Name 04/14/24 1534          Balance    Balance Assessment sitting static balance;sitting dynamic balance;standing static balance;standing dynamic balance  -CS     Static Sitting Balance supervision  -CS     Dynamic Sitting Balance supervision  -CS     Position, Sitting Balance unsupported;sitting edge of bed  -CS     Static Standing Balance supervision  -CS     Dynamic Standing Balance contact guard  -CS     Position/Device Used, Standing Balance unsupported  -CS     Balance Interventions standing;sitting;sit to stand;occupation based/functional task  -CS     Comment, Balance no LOB during eval  -CS               User Key  (r) = Recorded By, (t) = Taken By, (c) = Cosigned By      Initials Name Provider Type    Sandip Mayo, OT Occupational Therapist                   Goals/Plan    No documentation.                  Clinical Impression       Row Name 04/14/24 1536          Pain Assessment    Pretreatment Pain Rating 0/10 - no pain  -CS     Posttreatment Pain Rating 0/10 - no pain  -CS       Row Name 04/14/24 1536          Plan of Care Review    Plan of Care Reviewed With patient  -CS     Progress no change  -CS     Outcome Evaluation Pt presents at baseline for ADL performance and related mobility. OT signing off, please reconsult if needed. Rec return to home and 24/7 caregiver support and resume OP OT/PT.  -       Row Name 04/14/24 1536          Therapy Assessment/Plan (OT)    Criteria for Skilled Therapeutic Interventions Met (OT) no;does not meet criteria for skilled intervention  -CS      Therapy Frequency (OT) evaluation only  -CS       Row Name 04/14/24 1536          Therapy Plan Review/Discharge Plan (OT)    Anticipated Discharge Disposition (OT) home with 24/7 care;home with outpatient therapy services  -CS       Row Name 04/14/24 1536          Vital Signs    Pre Systolic BP Rehab --  RN cleared for eval, present  -CS     O2 Delivery Pre Treatment room air  -CS     O2 Delivery Intra Treatment room air  -CS     O2 Delivery Post Treatment room air  -CS     Pre Patient Position Sitting  -CS     Intra Patient Position Standing  -CS     Post Patient Position Sitting  -CS       Row Name 04/14/24 1536          Positioning and Restraints    Pre-Treatment Position sitting in chair/recliner  -CS     Post Treatment Position chair  -CS     In Chair notified nsg;sitting;reclined;encouraged to call for assist;call light within reach;with nsg;exit alarm on;with family/caregiver;legs elevated;waffle cushion  -CS               User Key  (r) = Recorded By, (t) = Taken By, (c) = Cosigned By      Initials Name Provider Type     Sandip Loo, OT Occupational Therapist                   Outcome Measures       Row Name 04/14/24 1537          How much help from another is currently needed...    Putting on and taking off regular lower body clothing? 3  -CS     Bathing (including washing, rinsing, and drying) 3  -CS     Toileting (which includes using toilet bed pan or urinal) 3  -CS     Putting on and taking off regular upper body clothing 4  -CS     Taking care of personal grooming (such as brushing teeth) 4  -CS     Eating meals 4  -CS     AM-PAC 6 Clicks Score (OT) 21  -CS       Row Name 04/14/24 1344 04/14/24 0800       How much help from another person do you currently need...    Turning from your back to your side while in flat bed without using bedrails? 4  -CD 4  -SN    Moving from lying on back to sitting on the side of a flat bed without bedrails? 4  -CD 3  -SN    Moving to and from a bed to a chair  (including a wheelchair)? 3  -CD 3  -SN    Standing up from a chair using your arms (e.g., wheelchair, bedside chair)? 3  -CD 3  -SN    Climbing 3-5 steps with a railing? 3  -CD 2  -SN    To walk in hospital room? 3  -CD 3  -SN    AM-PAC 6 Clicks Score (PT) 20  -CD 18  -SN    Highest Level of Mobility Goal 6 --> Walk 10 steps or more  -CD 6 --> Walk 10 steps or more  -SN      Row Name 04/14/24 1537 04/14/24 1344       Modified [place] Scale    Modified Thad Scale 3 - Moderate disability.  Requiring some help, but able to walk without assistance.  -CS 3 - Moderate disability.  Requiring some help, but able to walk without assistance.  -CD      Row Name 04/14/24 1537 04/14/24 1344       Functional Assessment    Outcome Measure Options AM-PAC 6 Clicks Daily Activity (OT);Modified Thad  -CS AM-PAC 6 Clicks Basic Mobility (PT);Modified [place]  -CD              User Key  (r) = Recorded By, (t) = Taken By, (c) = Cosigned By      Initials Name Provider Type    CD Amanda Cortez, PT Physical Therapist    Sandip Mayo, OT Occupational Therapist    Nai Bush, RN Registered Nurse                    Occupational Therapy Education       Title: PT OT SLP Therapies (In Progress)       Topic: Occupational Therapy (In Progress)       Point: ADL training (Done)       Description:   Instruct learner(s) on proper safety adaptation and remediation techniques during self care or transfers.   Instruct in proper use of assistive devices.                  Learning Progress Summary             Patient Acceptance, E, DU,VU by  at 4/14/2024 1538   Family Acceptance, E, DU,VU by  at 4/14/2024 1538   Caregiver Acceptance, E, DU,VU by  at 4/14/2024 1538                         Point: Home exercise program (Not Started)       Description:   Instruct learner(s) on appropriate technique for monitoring, assisting and/or progressing therapeutic exercises/activities.                  Learner Progress:  Not documented in this  visit.              Point: Precautions (Done)       Description:   Instruct learner(s) on prescribed precautions during self-care and functional transfers.                  Learning Progress Summary             Patient Acceptance, E, DU,VU by  at 4/14/2024 1538   Family Acceptance, E, DU,VU by CS at 4/14/2024 1538   Caregiver Acceptance, E, DU,VU by CS at 4/14/2024 1538                         Point: Body mechanics (Done)       Description:   Instruct learner(s) on proper positioning and spine alignment during self-care, functional mobility activities and/or exercises.                  Learning Progress Summary             Patient Acceptance, E, DU,VU by  at 4/14/2024 1538   Family Acceptance, E, DU,VU by CS at 4/14/2024 1538   Caregiver Acceptance, E, DU,VU by  at 4/14/2024 1538                                         User Key       Initials Effective Dates Name Provider Type Discipline     06/16/21 -  Sandip Loo OT Occupational Therapist OT                  OT Recommendation and Plan  Therapy Frequency (OT): evaluation only  Plan of Care Review  Plan of Care Reviewed With: patient  Progress: no change  Outcome Evaluation: Pt presents at baseline for ADL performance and related mobility. OT signing off, please reconsult if needed. Rec return to home and 24/7 caregiver support and resume OP OT/PT.     Time Calculation:   Evaluation Complexity (OT)  Review Occupational Profile/Medical/Therapy History Complexity: expanded/moderate complexity  Assessment, Occupational Performance/Identification of Deficit Complexity: 3-5 performance deficits  Clinical Decision Making Complexity (OT): problem focused assessment/low complexity  Overall Complexity of Evaluation (OT): low complexity     Time Calculation- OT       Row Name 04/14/24 1538 04/14/24 1345          Time Calculation- OT    OT Start Time 1415  - --     OT Received On 04/14/24  - --        Timed Charges    09840 - Gait Training Minutes  -- 11  -CD         Untimed Charges    OT Eval/Re-eval Minutes 50  -CS --        Total Minutes    Timed Charges Total Minutes -- 11  -CD     Untimed Charges Total Minutes 50  -CS --      Total Minutes 50  -CS 11  -CD               User Key  (r) = Recorded By, (t) = Taken By, (c) = Cosigned By      Initials Name Provider Type    Amanda Jackson, PT Physical Therapist    CS Sandip Loo, OT Occupational Therapist                  Therapy Charges for Today       Code Description Service Date Service Provider Modifiers Qty    [id number]  OT EVAL LOW COMPLEXITY 4 4/14/2024 Sandip Loo, JOSE RAUL GO 1                 Sandip Loo OT  4/14/2024

## 2024-04-15 ENCOUNTER — APPOINTMENT (OUTPATIENT)
Dept: NEUROLOGY | Facility: HOSPITAL | Age: 84
End: 2024-04-15
Payer: MEDICARE

## 2024-04-15 LAB
D-LACTATE SERPL-SCNC: 1.3 MMOL/L (ref 0.5–2)
QT INTERVAL: 664 MS
QTC INTERVAL: 668 MS

## 2024-04-15 PROCEDURE — 83605 ASSAY OF LACTIC ACID: CPT | Performed by: INTERNAL MEDICINE

## 2024-04-15 PROCEDURE — 93005 ELECTROCARDIOGRAM TRACING: CPT | Performed by: INTERNAL MEDICINE

## 2024-04-15 PROCEDURE — 25010000002 CEFTRIAXONE PER 250 MG: Performed by: INTERNAL MEDICINE

## 2024-04-15 PROCEDURE — 99233 SBSQ HOSP IP/OBS HIGH 50: CPT

## 2024-04-15 PROCEDURE — 99232 SBSQ HOSP IP/OBS MODERATE 35: CPT | Performed by: INTERNAL MEDICINE

## 2024-04-15 PROCEDURE — 95816 EEG AWAKE AND DROWSY: CPT

## 2024-04-15 PROCEDURE — 95819 EEG AWAKE AND ASLEEP: CPT | Performed by: PSYCHIATRY & NEUROLOGY

## 2024-04-15 PROCEDURE — 93010 ELECTROCARDIOGRAM REPORT: CPT | Performed by: INTERNAL MEDICINE

## 2024-04-15 PROCEDURE — 95819 EEG AWAKE AND ASLEEP: CPT

## 2024-04-15 PROCEDURE — 87040 BLOOD CULTURE FOR BACTERIA: CPT | Performed by: INTERNAL MEDICINE

## 2024-04-15 RX ORDER — PROPAFENONE HYDROCHLORIDE 325 MG/1
325 CAPSULE, EXTENDED RELEASE ORAL EVERY 12 HOURS SCHEDULED
Status: DISCONTINUED | OUTPATIENT
Start: 2024-04-15 | End: 2024-04-16 | Stop reason: HOSPADM

## 2024-04-15 RX ORDER — ESCITALOPRAM OXALATE 10 MG/1
10 TABLET ORAL DAILY
Status: DISCONTINUED | OUTPATIENT
Start: 2024-04-15 | End: 2024-04-16 | Stop reason: HOSPADM

## 2024-04-15 RX ADMIN — APIXABAN 5 MG: 5 TABLET, FILM COATED ORAL at 20:47

## 2024-04-15 RX ADMIN — APIXABAN 5 MG: 5 TABLET, FILM COATED ORAL at 09:31

## 2024-04-15 RX ADMIN — Medication 10 ML: at 09:31

## 2024-04-15 RX ADMIN — PANTOPRAZOLE SODIUM 40 MG: 40 TABLET, DELAYED RELEASE ORAL at 09:31

## 2024-04-15 RX ADMIN — LEVETIRACETAM 1000 MG: 500 TABLET, EXTENDED RELEASE ORAL at 20:47

## 2024-04-15 RX ADMIN — PROPAFENONE HYDROCHLORIDE 325 MG: 325 CAPSULE, EXTENDED RELEASE ORAL at 20:47

## 2024-04-15 RX ADMIN — Medication 10 ML: at 20:47

## 2024-04-15 RX ADMIN — SODIUM CHLORIDE 1000 MG: 900 INJECTION INTRAVENOUS at 12:30

## 2024-04-15 RX ADMIN — METOPROLOL SUCCINATE 25 MG: 25 TABLET, EXTENDED RELEASE ORAL at 09:31

## 2024-04-15 RX ADMIN — ATORVASTATIN CALCIUM 80 MG: 40 TABLET, FILM COATED ORAL at 20:47

## 2024-04-15 NOTE — CASE MANAGEMENT/SOCIAL WORK
Discharge Planning Assessment  [facility]     Patient Name: Laury Eugene  MRN: [id number]  Today's Date: 4/15/2024    Admit Date: 4/13/2024    Plan: Home with 24/7 Caregivers   Discharge Needs Assessment       Row Name 04/15/24 1145       Living Environment    People in Home alone  has 24/7 caregivers    Current Living Arrangements home    Primary Care Provided by self  caregivers    Provides Primary Care For no one, unable/limited ability to care for self    Family Caregiver if Needed other relative(s)  caregivers    Family Caregiver Names Nibiju Guillen-POA    Quality of Family Relationships supportive    Able to Return to Prior Arrangements yes       Resource/Environmental Concerns    Transportation Concerns none       Transition Planning    Patient/Family Anticipates Transition to home  with caregivers    Transportation Anticipated family or friend will provide       Discharge Needs Assessment    Readmission Within the Last 30 Days no previous admission in last 30 days    Equipment Currently Used at Home walker, rolling;shower chair                   Discharge Plan       Row Name 04/15/24 1147       Plan    Plan Home with 24/7 Caregivers    Patient/Family in Agreement with Plan yes    Plan Comments Spoke with Ms. Eugene and Camelia at the bedside. She lives alone with 24/7 caregivers in [facility]. She needs assistance with ADL's. She has a walker and shower chair. She uses a CPAP machine at night. She does not use home health. Her Niece Mindy is her POA and she has a living will. Her PCP is Leatha Melvin and she has Medicare and [place] [employer] insurance. Discharge plan is home. CM will continue to follow for discharge needs.    Final Discharge Disposition Code 01 - home or self-care                  Continued Care and Services - Admitted Since 4/13/2024    No active coordination exists for this encounter.       Expected Discharge Date and Time       Expected Discharge Date Expected Discharge Time     Apr 16, 2024            Demographic Summary       Row Name 04/15/24 1133       General Information    Admission Type inpatient    Arrived From home    Referral Source admission list    Reason for Consult discharge planning    Preferred Language English       Contact Information    Permission Granted to Share Info With                    Functional Status       Row Name 04/15/24 1143       Functional Status, IADL    Medications assistive person    Meal Preparation assistive person    Housekeeping assistive person    Laundry assistive person    Shopping assistive person       Mental Status    General Appearance WDL WDL                   Psychosocial    No documentation.                  Abuse/Neglect    No documentation.                  Legal    No documentation.                  Substance Abuse    No documentation.                  Patient Forms    No documentation.                     Tiff Hernadez, RN

## 2024-04-15 NOTE — PROGRESS NOTES
[facility] Neurology    Progress Note    Patient Name: Laury Eugene  : 1940  MRN: [id number]  Primary Care Physician:  Leatha Melvin DO  Date of admission: 2024    Subjective     Chief Complaint: Breakthrough seizure    History of Present Illness   Patient seen resting comfortably in bed.  Currently eating her breakfast.  Was able to state her name, location and current year.  No seizure-like activity overnight.    Review of Systems   General: Negative for fever, nausea, or vomiting.   Neurological: Negative for headache, pain, or weakness.     Objective     Physical Exam  Vitals and nursing note reviewed.   Constitutional:       General: She is not in acute distress.     Appearance: She is not ill-appearing.   Eyes:      General: No visual field deficit.     Extraocular Movements: Extraocular movements intact.      Pupils: Pupils are equal, round, and reactive to light.      Comments: No nystagmus or deviated gaze noted   Cardiovascular:      Rate and Rhythm: Normal rate.   Pulmonary:      Effort: Pulmonary effort is normal.   Neurological:      Mental Status: She is alert and oriented to person, place, and time.      Cranial Nerves: Dysarthria and facial asymmetry present. No cranial nerve deficit.      Sensory: Sensory deficit present.      Motor: Weakness present.      Comments:     Cranial Nerves   CN II: Pupils are equal, round, and reactive to light. Normal visual acuity and visual fields.    CN III IV VI: Extraocular movements are full without nystagmus.  CN V: Normal facial sensation and strength of muscles of mastication.  CN VII: Slight right-sided facial droop at baseline from previous stroke.  CN VIII:  Auditory acuity is normal.  CN IX & X:  Symmetric palatal movement.  CN XI: Sternocleidomastoid and trapezius are normal.  No weakness.  CN XII: The tongue is midline.  No atrophy or fasciculations.    Motor: Generalized weakness          Vitals:   Temp:  [97.5 °F (36.4  °C)-98.4 °F (36.9 °C)] 98 °F (36.7 °C)  Heart Rate:  [58-79] 60  Resp:  [16-17] 17  BP: (108-146)/(44-63) 131/49    Current Medications    Current Facility-Administered Medications:     acetaminophen (TYLENOL) tablet 650 mg, 650 mg, Oral, Q4H PRN **OR** acetaminophen (TYLENOL) 160 MG/5ML oral solution 650 mg, 650 mg, Oral, Q4H PRN **OR** acetaminophen (TYLENOL) suppository 650 mg, 650 mg, Rectal, Q4H PRN, Syed Parish MD    apixaban (ELIQUIS) tablet 5 mg, 5 mg, Oral, Q12H, Syed Parish MD, 5 mg at 04/14/24 2023    atorvastatin (LIPITOR) tablet 80 mg, 80 mg, Oral, Nightly, Syed Parish MD, 80 mg at 04/14/24 2023    sennosides-docusate (PERICOLACE) 8.6-50 MG per tablet 2 tablet, 2 tablet, Oral, BID PRN **AND** polyethylene glycol (MIRALAX) packet 17 g, 17 g, Oral, Daily PRN **AND** bisacodyl (DULCOLAX) EC tablet 5 mg, 5 mg, Oral, Daily PRN **AND** bisacodyl (DULCOLAX) suppository 10 mg, 10 mg, Rectal, Daily PRN, Syed Parish MD    Calcium Replacement - Follow Nurse / BPA Driven Protocol, , Does not apply, PRN, Syed Parish MD    [Held by provider] escitalopram (LEXAPRO) tablet 10 mg, 10 mg, Oral, Daily, Syed Parish MD    lactated ringers infusion, 100 mL/hr, Intravenous, Continuous, Syed Parish MD, Last Rate: 100 mL/hr at 04/14/24 1901, 100 mL/hr at 04/14/24 1901    levETIRAcetam XR (KEPPRA XR) tablet 1,000 mg, 1,000 mg, Oral, Nightly, Samantha Pisano, APRN, 1,000 mg at 04/14/24 2024    Magnesium Cardiology Dose Replacement - Follow Nurse / BPA Driven Protocol, , Does not apply, Марина JAMES John L, MD    metoprolol succinate XL (TOPROL-XL) 24 hr tablet 25 mg, 25 mg, Oral, Daily, Syed Parish MD, 25 mg at 04/14/24 0855    pantoprazole (PROTONIX) EC tablet 40 mg, 40 mg, Oral, Daily, Syed Parish MD, 40 mg at 04/14/24 0854    Phosphorus Replacement - Follow Nurse / BPA Driven Protocol, , Does not apply, Марина JAMES John L, MD    Potassium Replacement - Follow Nurse /  "BPA Driven Protocol, , Does not apply, Марина JAMES John L, MD    [Held by provider] propafenone SR (RYTHMOL SR) 12 hr capsule 325 mg, 325 mg, Oral, Q12H, Syed Parish MD    sodium chloride 0.9 % flush 10 mL, 10 mL, Intravenous, Q12H, Syed Parish MD, 10 mL at 04/14/24 2026    sodium chloride 0.9 % flush 10 mL, 10 mL, Intravenous, PRN, Syed Parish MD    sodium chloride 0.9 % infusion 40 mL, 40 mL, Intravenous, Марина JAMES John L, MD    Laboratory Results:   Lab Results   Component Value Date    GLUCOSE 95 04/14/2024    CALCIUM 9.1 04/14/2024     04/14/2024    K 4.3 04/14/2024    CO2 30.0 (H) 04/14/2024     04/14/2024    BUN 14 04/14/2024    CREATININE 0.84 04/14/2024    EGFRIFNONA 54 (L) 12/29/2020    BCR 16.7 04/14/2024    ANIONGAP 7.0 04/14/2024     Lab Results   Component Value Date    WBC 8.61 04/14/2024    HGB 10.0 (L) 04/14/2024    HCT 31.7 (L) 04/14/2024    MCV 92.7 04/14/2024     04/14/2024     Lab Results   Component Value Date    CHOL 129 08/26/2023    CHOL 143 08/26/2023     Lab Results   Component Value Date    HDL 41 08/26/2023    HDL 45 08/26/2023     Lab Results   Component Value Date    LDL 73 08/26/2023    LDL 80 08/26/2023     Lab Results   Component Value Date    TRIG 74 08/26/2023    TRIG 98 08/26/2023     Lab Results   Component Value Date    HGBA1C 5.80 (H) 08/27/2023     Lab Results   Component Value Date    INR 1.0 08/26/2023    PROTIME 12.2 (L) 08/26/2023     No results found for: \"FOLATE\"  No results found for: \"[id number]\"    MRI Brain Without Contrast    Result Date: 4/13/2024  MRI BRAIN WO CONTRAST Date of Exam: 4/13/2024 8:29 PM EDT Indication: AMS, possible seizure, R facial twitching, history of stroke in August with residual R sided deficits.  Comparison: 8/26/2023 and head CT same date. CT head/angiography/perfusion also 8/26/2023. Technique:  Routine multiplanar/multisequence sequence images of the brain were obtained without contrast " administration. Findings: There is no diffusion restriction to suggest an acute infarct. There is a chronic left MCA territory infarct affecting the insular cortex and lateral posterior left frontal lobe. There is a smaller chronic cortical infarct in the right temporal lobe. There are mild patchy periventricular foci of FLAIR hyperintense signal abnormality. There is wallerian degeneration extending through the posterior limb of the left internal capsule into the midbrain. There are no new signal abnormalities in the brain parenchyma. Thinning of the orbital lenses with suggest prior cataract surgery. There is moderate left sphenoid sinus mucosal disease. Mastoid air cells appear well aerated. There is diminished flow void in the distal left M1 segment. This could be due to motion. There is no mass effect, midline shift or abnormal extra-axial collection. No acute or chronic intracranial hemorrhage. Midline structures appear intact. Calvarial and superficial soft tissue signal is within normal limits. 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 Parotid glands appear symmetric.     Impression: Impression: 1. No acute intracranial abnormality. 2. Chronic infarcts in the left MCA territory and right temporal lobe. 3. Mild chronic small vessel ischemic change. 4. Moderate left sphenoid sinus mucosal disease. Electronically Signed: Jim Guillen MD  4/13/2024 9:19 PM EDT  Workstation ID: [id number]        Assessment / Plan   Brief Patient Summary:  Laury Eugene is a 84 y.o. female with a past medical history of left MCA stroke with residual speech and right-sided facial deficits, A-fib on Eliquis, NOVA on CPAP, hypothyroidism, multinodular goiter and history of breast cancer s/p chemo and radiation who presented to [facility] ED with complaint of abnormal movements of her face with change in mental status.      Plan:   History of left MCA stroke  History of abnormal EEG  Breakthrough seizure-stable  Continue Keppra 1000 mg extended  release nightly.  (Increased from home regimen of Keppra 500 extended release nightly)  EEG results pending  Continue seizure precautions  MRI brain with no acute abnormalities.  Chronic infarcts noted, chronic small vessel ischemic changes noted.  UA consistent with UTI, antibiotics per hospitalist team.  Started on Rocephin  Lactate 1.7  Creatinine improved from 0.84, continue to trend  General neurology continue to follow    I have discussed the above with the patient, bedside RN Dr. Rosenbaum  Time spent with patient: 50 minutes in face-to-face evaluation and management of the patient.    Copied text in this note has been reviewed and is accurate as of 04/15/24.     Samantha Pisano, DIANNE

## 2024-04-15 NOTE — PROGRESS NOTES
[facility] Medicine Services  PROGRESS NOTE    Patient Name: Laury Eugene  : 1940  MRN: [id number]    Date of Admission: 2024  Primary Care Physician: Leatha Melvin DO    Subjective   Subjective     CC: Follow-up seizure    HPI: No acute events overnight, patient awake, seat in chair eating breakfast      Objective   Objective     Vital Signs:   Temp:  [97.5 °F (36.4 °C)-98.4 °F (36.9 °C)] 97.9 °F (36.6 °C)  Heart Rate:  [58-79] 79  Resp:  [16-17] 16  BP: (108-146)/(44-63) 146/63     Physical Exam:  Constitutional: Elderly female, in no acute distress, awake, alert eating breakfast  HENT: NCAT, mucous membranes moist  Respiratory: Clear to auscultation bilaterally, respiratory effort normal   Cardiovascular: RRR, no murmurs, rubs, or gallops  Gastrointestinal: Positive bowel sounds, soft, nontender, nondistended  Musculoskeletal: No bilateral ankle edema  Psychiatric: Appropriate affect, cooperative  Neurologic: Dysarthria    Results Reviewed:  LAB RESULTS:      Lab 24  1342 24  0518 24   WBC  --  8.61 6.90   HEMOGLOBIN  --  10.0* 10.2*   HEMATOCRIT  --  31.7* 32.3*   PLATELETS  --  235 259   NEUTROS ABS  --   --  3.72   IMMATURE GRANS (ABS)  --   --  0.01   LYMPHS ABS  --   --  1.95   MONOS ABS  --   --  0.97*   EOS ABS  --   --  0.21   MCV  --  92.7 91.8   LACTATE 1.7  --   --          Lab 24  0518 24   SODIUM 140 136   POTASSIUM 4.3 4.3   CHLORIDE 103 99   CO2 30.0* 30.0*   ANION GAP 7.0 7.0   BUN 14 17   CREATININE 0.84 1.02*   EGFR 69.5 55.0*   GLUCOSE 95 100*   CALCIUM 9.1 8.9   MAGNESIUM 3.2* 1.8   TSH  --  1.120         Lab 24   TOTAL PROTEIN 6.9   ALBUMIN 3.8   GLOBULIN 3.1   ALT (SGPT) 16   AST (SGOT) 23   BILIRUBIN 0.2   ALK PHOS 114                     Brief Urine Lab Results  (Last result in the past 365 days)        Color   Clarity   Blood   Leuk Est   Nitrite   Protein   CREAT   Urine HCG         04/14/24 1520 Yellow   Cloudy   Small (1+)   Large (3+)   Positive   Negative                   Microbiology Results Abnormal       Procedure Component Value - Date/Time    Respiratory Panel PCR w/COVID-19(SARS-CoV-2) SHAUNA/LIZ/AILYN/PAD/COR/PRAVEENA In-House, NP Swab in UTM/VTM, 2 HR TAT - Swab, Nasopharynx [[id number]]  (Normal) Collected: 04/13/24 2249    Lab Status: Final result Specimen: Swab from Nasopharynx Updated: 04/13/24 8090     ADENOVIRUS, PCR Not Detected     Coronavirus 229E Not Detected     Coronavirus HKU1 Not Detected     Coronavirus NL63 Not Detected     Coronavirus OC43 Not Detected     COVID19 Not Detected     Human Metapneumovirus Not Detected     Human Rhinovirus/Enterovirus Not Detected     Influenza A PCR Not Detected     Influenza B PCR Not Detected     Parainfluenza Virus 1 Not Detected     Parainfluenza Virus 2 Not Detected     Parainfluenza Virus 3 Not Detected     Parainfluenza Virus 4 Not Detected     RSV, PCR Not Detected     Bordetella pertussis pcr Not Detected     Bordetella parapertussis PCR Not Detected     Chlamydophila pneumoniae PCR Not Detected     Mycoplasma pneumo by PCR Not Detected    Narrative:      In the setting of a positive respiratory panel with a viral infection PLUS a negative procalcitonin without other underlying concern for bacterial infection, consider observing off antibiotics or discontinuation of antibiotics and continue supportive care. If the respiratory panel is positive for atypical bacterial infection (Bordetella pertussis, Chlamydophila pneumoniae, or Mycoplasma pneumoniae), consider antibiotic de-escalation to target atypical bacterial infection.            MRI Brain Without Contrast    Result Date: 4/13/2024  MRI BRAIN WO CONTRAST Date of Exam: 4/13/2024 8:29 PM EDT Indication: AMS, possible seizure, R facial twitching, history of stroke in August with residual R sided deficits.  Comparison: 8/26/2023 and head CT same date. CT head/angiography/perfusion  also 8/26/2023. Technique:  Routine multiplanar/multisequence sequence images of the brain were obtained without contrast administration. Findings: There is no diffusion restriction to suggest an acute infarct. There is a chronic left MCA territory infarct affecting the insular cortex and lateral posterior left frontal lobe. There is a smaller chronic cortical infarct in the right temporal lobe. There are mild patchy periventricular foci of FLAIR hyperintense signal abnormality. There is wallerian degeneration extending through the posterior limb of the left internal capsule into the midbrain. There are no new signal abnormalities in the brain parenchyma. Thinning of the orbital lenses with suggest prior cataract surgery. There is moderate left sphenoid sinus mucosal disease. Mastoid air cells appear well aerated. There is diminished flow void in the distal left M1 segment. This could be due to motion. There is no mass effect, midline shift or abnormal extra-axial collection. No acute or chronic intracranial hemorrhage. Midline structures appear intact. Calvarial and superficial soft tissue signal is within normal limits. 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 Parotid glands appear symmetric.     Impression: Impression: 1. No acute intracranial abnormality. 2. Chronic infarcts in the left MCA territory and right temporal lobe. 3. Mild chronic small vessel ischemic change. 4. Moderate left sphenoid sinus mucosal disease. Electronically Signed: Jim Guillen MD  4/13/2024 9:19 PM EDT  Workstation ID: [id number]     Results for orders placed during the hospital encounter of 08/26/23    Adult Transthoracic Echo Complete W/ Cont if Necessary Per Protocol    Interpretation Summary    Left ventricular systolic function is normal. Left ventricular ejection fraction appears to be 51 - 55%.      Current medications:  Scheduled Meds:apixaban, 5 mg, Oral, Q12H  atorvastatin, 80 mg, Oral, Nightly  [Held by provider] escitalopram, 10  mg, Oral, Daily  levETIRAcetam XR, 1,000 mg, Oral, Nightly  metoprolol succinate XL, 25 mg, Oral, Daily  pantoprazole, 40 mg, Oral, Daily  [Held by provider] propafenone SR, 325 mg, Oral, Q12H  sodium chloride, 10 mL, Intravenous, Q12H      Continuous Infusions:lactated ringers, 100 mL/hr, Last Rate: 100 mL/hr (04/14/24 1901)      PRN Meds:.  acetaminophen **OR** acetaminophen **OR** acetaminophen    senna-docusate sodium **AND** polyethylene glycol **AND** bisacodyl **AND** bisacodyl    Calcium Replacement - Follow Nurse / BPA Driven Protocol    Magnesium Cardiology Dose Replacement - Follow Nurse / BPA Driven Protocol    Phosphorus Replacement - Follow Nurse / BPA Driven Protocol    Potassium Replacement - Follow Nurse / BPA Driven Protocol    sodium chloride    sodium chloride    Assessment & Plan   Assessment & Plan     Active Hospital Problems    Diagnosis  POA    **Breakthrough seizure [G40.919]  Yes    History of stroke [Z86.73]  Not Applicable    Paroxysmal atrial fibrillation [I48.0]  Yes    Essential hypertension [I10]  Yes    Hyperlipemia [E78.5]  Yes    Multinodular goiter [E04.2]  Yes    Abnormal thyroid function test [R94.6]  Yes      Resolved Hospital Problems   No resolved problems to display.      Brief Hospital Course to date:  Laury Eugene is a 84 y.o. female with history of left MCA stroke with residual speech and right-sided facial deficits, atrial fibrillation on Eliquis, hyperthyroidism and multinodular goiter, NOVA on CPAP, prior history of breast cancer status post chemo and radiation who presented with abnormal face movements and mental status changes admitted with likely seizure     Suspected breakthrough seizure  History of left MCA stroke and right temporal stroke  -Patient with no known history of seizure, however, EEG during prior stroke admission showed abnormal epileptiform activity and she was started on Keppra, has reportedly been compliant.  -MRI here is negative for any acute  change, shows expected changes following prior stroke.    -Suspect breakthrough seizure versus recrudescence of stroke secondary to UTI  -She is s/p loading with Keppra and 1 dose of Ativan in the ED   -Neurology following, follow-up EEG Keppra dose increased to 1000 mg extended release nightly.     UTI  -UA with 4+ bacteria, large leuks and positive nitrites  -Follow-up blood and urine cultures  -Will start IV Rocephin, plan for 3-day course    A-fib  -Rates currently remain controlled  -Continue home Eliquis, propafenone and metoprolol     Hypertension  -BP currently stable     Chronic iron deficiency anemia, history of GI bleed  -H&H is stable, continue PPI     History of hyperthyroidism and multinodular goiter  -Follows with endocrinology, currently not taking any medications for this  -TSH is within normal limits.  Avoid iodinated contrast if possible     Obstructive sleep apnea  -Continue home CPAP    Prolonged QTc  -Please continue holding propafenone and Lexapro  -Follow-up repeat EKG this morning  Addendum  QTc is much improved, currently 414, okay to resume above meds     Expected Discharge Location and Transportation: Home with assist  Expected Discharge   Expected Discharge Date: 4/16/2024; Expected Discharge Time:      DVT prophylaxis:  Medical and mechanical DVT prophylaxis orders are present.         AM-PAC 6 Clicks Score (PT): 20 (04/14/24 1833)    CODE STATUS:   Code Status and Medical Interventions:   Ordered at: 04/13/24 3882     Medical Intervention Limits:    NO intubation (DNI)     Level Of Support Discussed With:    Health Care Surrogate     Code Status (Patient has no pulse and is not breathing):    No CPR (Do Not Attempt to Resuscitate)     Medical Interventions (Patient has pulse or is breathing):    Limited Support       Gabriele Rosenbaum MD  04/15/24

## 2024-04-16 VITALS
RESPIRATION RATE: 17 BRPM | WEIGHT: 146 LBS | OXYGEN SATURATION: 97 % | HEIGHT: 61 IN | BODY MASS INDEX: 27.56 KG/M2 | SYSTOLIC BLOOD PRESSURE: 133 MMHG | TEMPERATURE: 97.6 F | HEART RATE: 56 BPM | DIASTOLIC BLOOD PRESSURE: 61 MMHG

## 2024-04-16 PROCEDURE — 99238 HOSP IP/OBS DSCHRG MGMT 30/<: CPT | Performed by: HOSPITALIST

## 2024-04-16 PROCEDURE — 25010000002 CEFTRIAXONE PER 250 MG: Performed by: INTERNAL MEDICINE

## 2024-04-16 PROCEDURE — 99232 SBSQ HOSP IP/OBS MODERATE 35: CPT

## 2024-04-16 RX ORDER — CEFUROXIME AXETIL 250 MG/1
250 TABLET ORAL 2 TIMES DAILY
Qty: 6 TABLET | Refills: 0 | Status: SHIPPED | OUTPATIENT
Start: 2024-04-16 | End: 2024-04-19

## 2024-04-16 RX ADMIN — SODIUM CHLORIDE 1000 MG: 900 INJECTION INTRAVENOUS at 10:26

## 2024-04-16 RX ADMIN — PANTOPRAZOLE SODIUM 40 MG: 40 TABLET, DELAYED RELEASE ORAL at 10:26

## 2024-04-16 RX ADMIN — METOPROLOL SUCCINATE 25 MG: 25 TABLET, EXTENDED RELEASE ORAL at 10:26

## 2024-04-16 RX ADMIN — APIXABAN 5 MG: 5 TABLET, FILM COATED ORAL at 10:26

## 2024-04-16 RX ADMIN — ESCITALOPRAM OXALATE 10 MG: 10 TABLET ORAL at 10:26

## 2024-04-16 RX ADMIN — PROPAFENONE HYDROCHLORIDE 325 MG: 325 CAPSULE, EXTENDED RELEASE ORAL at 10:26

## 2024-04-16 RX ADMIN — Medication 10 ML: at 10:27

## 2024-04-16 NOTE — PROGRESS NOTES
[facility] Neurology    Progress Note    Patient Name: Laury Eugene  : 1940  MRN: [id number]  Primary Care Physician:  Leatha Melvin DO  Date of admission: 2024    Subjective     Chief Complaint: Breakthrough seizure    History of Present Illness   Patient seen resting comfortably in bed.  Oriented x 3.  Family bedside.  No seizure-like events.     Review of Systems   General: Negative for fever, nausea, or vomiting.   Neurological: Negative for headache, pain, or weakness.     Objective     Physical Exam  Vitals and nursing note reviewed.   Eyes:      Extraocular Movements: Extraocular movements intact.      Pupils: Pupils are equal, round, and reactive to light.      Comments: No nystagmus or deviated gaze noted   Cardiovascular:      Rate and Rhythm: Normal rate.   Pulmonary:      Effort: Pulmonary effort is normal.   Neurological:      Mental Status: She is alert and oriented to person, place, and time.      Cranial Nerves: Dysarthria and facial asymmetry present. No cranial nerve deficit.      Sensory: No sensory deficit.      Motor: No weakness or seizure activity.      Comments:     Cranial Nerves   CN II: Pupils are equal, round, and reactive to light. Normal visual acuity and visual fields.    CN III IV VI: Extraocular movements are full without nystagmus.  CN V: Normal facial sensation and strength of muscles of mastication.  CN VII: Right-sided facial droop at baseline  CN VIII:  Auditory acuity is normal.  CN IX & X:  Symmetric palatal movement.  CN XI: Sternocleidomastoid and trapezius are normal.  No weakness.  CN XII: The tongue is midline.  No atrophy or fasciculations.        Motor: No weakness noticeable as such in the upper and lower extremities.    Reflexes: 2+ in the upper and lower extremities. Plantar responses are flexor.    Sensation: Normal to light touch, pinprick, vibration, temperature, and proprioception in arms and legs. .    Station and Gait:  Fait  intact Romberg negative for weakness    Coordination: Finger to nose test shows no dysmetria.             Vitals:   Temp:  [97.5 °F (36.4 °C)-98 °F (36.7 °C)] 98 °F (36.7 °C)  Heart Rate:  [54-86] 56  Resp:  [16-17] 16  BP: (112-145)/(41-64) 118/41    Current Medications    Current Facility-Administered Medications:     acetaminophen (TYLENOL) tablet 650 mg, 650 mg, Oral, Q4H PRN **OR** acetaminophen (TYLENOL) 160 MG/5ML oral solution 650 mg, 650 mg, Oral, Q4H PRN **OR** acetaminophen (TYLENOL) suppository 650 mg, 650 mg, Rectal, Q4H PRN, Syed Parish MD    apixaban (ELIQUIS) tablet 5 mg, 5 mg, Oral, Q12H, Syed Parish MD, 5 mg at 04/15/24 2047    atorvastatin (LIPITOR) tablet 80 mg, 80 mg, Oral, Nightly, Syed Parish MD, 80 mg at 04/15/24 2047    sennosides-docusate (PERICOLACE) 8.6-50 MG per tablet 2 tablet, 2 tablet, Oral, BID PRN **AND** polyethylene glycol (MIRALAX) packet 17 g, 17 g, Oral, Daily PRN **AND** bisacodyl (DULCOLAX) EC tablet 5 mg, 5 mg, Oral, Daily PRN **AND** bisacodyl (DULCOLAX) suppository 10 mg, 10 mg, Rectal, Daily PRN, Syed Parish MD    Calcium Replacement - Follow Nurse / BPA Driven Protocol, , Does not apply, PRN, Syed Parish MD    cefTRIAXone (ROCEPHIN) 1,000 mg in sodium chloride 0.9 % 100 mL MBP, 1,000 mg, Intravenous, Q24H, Gabriele Rosenbaum MD, Stopped at 04/15/24 1242    escitalopram (LEXAPRO) tablet 10 mg, 10 mg, Oral, Daily, Gabriele Rosenbaum MD    lactated ringers infusion, 100 mL/hr, Intravenous, Continuous, Syed Parish MD, Last Rate: 100 mL/hr at 04/14/24 1901, 100 mL/hr at 04/14/24 1901    levETIRAcetam XR (KEPPRA XR) tablet 1,000 mg, 1,000 mg, Oral, Nightly, Samantha Pisano, APRN, 1,000 mg at 04/15/24 2047    Magnesium Cardiology Dose Replacement - Follow Nurse / BPA Driven Protocol, , Does not apply, PRN, Syed Parish MD    metoprolol succinate XL (TOPROL-XL) 24 hr tablet 25 mg, 25 mg, Oral, Daily, Syed Parish MD, 25 mg at 04/15/24  "0931    pantoprazole (PROTONIX) EC tablet 40 mg, 40 mg, Oral, Daily, Syed Parish MD, 40 mg at 04/15/24 0931    Phosphorus Replacement - Follow Nurse / BPA Driven Protocol, , Does not apply, Марина JAMES John L, MD    Potassium Replacement - Follow Nurse / BPA Driven Protocol, , Does not apply, Марина JAMES John L, MD    propafenone SR (RYTHMOL SR) 12 hr capsule 325 mg, 325 mg, Oral, Q12H, Gabriele Rosenbaum MD, 325 mg at 04/15/24 2047    sodium chloride 0.9 % flush 10 mL, 10 mL, Intravenous, Q12H, Syed Parish MD, 10 mL at 04/15/24 2047    sodium chloride 0.9 % flush 10 mL, 10 mL, Intravenous, PRN, Syed Parish MD    sodium chloride 0.9 % infusion 40 mL, 40 mL, Intravenous, Марина JAMES John L, MD    Laboratory Results:   Lab Results   Component Value Date    GLUCOSE 95 04/14/2024    CALCIUM 9.1 04/14/2024     04/14/2024    K 4.3 04/14/2024    CO2 30.0 (H) 04/14/2024     04/14/2024    BUN 14 04/14/2024    CREATININE 0.84 04/14/2024    EGFRIFNONA 54 (L) 12/29/2020    BCR 16.7 04/14/2024    ANIONGAP 7.0 04/14/2024     Lab Results   Component Value Date    WBC 8.61 04/14/2024    HGB 10.0 (L) 04/14/2024    HCT 31.7 (L) 04/14/2024    MCV 92.7 04/14/2024     04/14/2024     Lab Results   Component Value Date    CHOL 129 08/26/2023    CHOL 143 08/26/2023     Lab Results   Component Value Date    HDL 41 08/26/2023    HDL 45 08/26/2023     Lab Results   Component Value Date    LDL 73 08/26/2023    LDL 80 08/26/2023     Lab Results   Component Value Date    TRIG 74 08/26/2023    TRIG 98 08/26/2023     Lab Results   Component Value Date    HGBA1C 5.80 (H) 08/27/2023     Lab Results   Component Value Date    INR 1.0 08/26/2023    PROTIME 12.2 (L) 08/26/2023     No results found for: \"FOLATE\"  No results found for: \"AWBWODMO58\"    MRI Brain Without Contrast    Result Date: 4/13/2024  MRI BRAIN WO CONTRAST Date of Exam: 4/13/2024 8:29 PM EDT Indication: AMS, possible seizure, R facial " twitching, history of stroke in August with residual R sided deficits.  Comparison: 8/26/2023 and head CT same date. CT head/angiography/perfusion also 8/26/2023. Technique:  Routine multiplanar/multisequence sequence images of the brain were obtained without contrast administration. Findings: There is no diffusion restriction to suggest an acute infarct. There is a chronic left MCA territory infarct affecting the insular cortex and lateral posterior left frontal lobe. There is a smaller chronic cortical infarct in the right temporal lobe. There are mild patchy periventricular foci of FLAIR hyperintense signal abnormality. There is wallerian degeneration extending through the posterior limb of the left internal capsule into the midbrain. There are no new signal abnormalities in the brain parenchyma. Thinning of the orbital lenses with suggest prior cataract surgery. There is moderate left sphenoid sinus mucosal disease. Mastoid air cells appear well aerated. There is diminished flow void in the distal left M1 segment. This could be due to motion. There is no mass effect, midline shift or abnormal extra-axial collection. No acute or chronic intracranial hemorrhage. Midline structures appear intact. Calvarial and superficial soft tissue signal is within normal limits. 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 Parotid glands appear symmetric.     Impression: Impression: 1. No acute intracranial abnormality. 2. Chronic infarcts in the left MCA territory and right temporal lobe. 3. Mild chronic small vessel ischemic change. 4. Moderate left sphenoid sinus mucosal disease. Electronically Signed: Jim Guillen MD  4/13/2024 9:19 PM EDT  Workstation ID: [id number]        Assessment / Plan   Brief Patient Summary:  Laury Eugene is a 84 y.o. female with a past medical history of left MCA stroke with residual speech and right-sided facial deficits, A-fib on Eliquis, NOVA on CPAP, hypothyroidism, multinodular goiter and history of  breast cancer s/p chemo and radiation who presented to [facility] ED with complaint of abnormal movements of her face with change in mental status.      Plan:   History of left MCA stroke  History of abnormal EEG  Breakthrough seizure-stable  UTI  Continue Keppra ER 1000 mg nightly.  (Increased from home regimen of Keppra  mg nightly)  EEG with no ongoing seizure-like activity.  Diffuse cerebral dysfunction affecting the left hemisphere greater than the right.  Continue seizure precautions  MRI brain with no acute abnormalities.  Chronic infarcts noted, chronic small vessel ischemic changes noted.  UA consistent with UTI, antibiotics per hospitalist team.  Started on Rocephin  Lactate 1.7  Creatinine improved from 0.84, continue to trend  Patient to follow-up in our stroke clinic, appointment scheduled for 6/19/24  Patient okay to discharge from a neurologic standpoint.  Please feel free to reach out for questions.    I have discussed the above with the patient, bedside RN Dr. Gracia  Time spent with patient: 35 minutes in face-to-face evaluation and management of the patient.    Copied text in this note has been reviewed and is accurate as of 04/16/24.     Samantha Pisano, DIANNE

## 2024-04-16 NOTE — PLAN OF CARE
Goal Outcome Evaluation:         Pt appears to be resting comfortably. At home caregiver at bedside. AOx4 forgetful at times. Very impulsive. Sinus Andrew on the monitor. RA. No complaints of pain at this time.

## 2024-04-16 NOTE — DISCHARGE SUMMARY
[facility] Medicine Services  DISCHARGE SUMMARY    Patient Name: Laury Eugene  : 1940  MRN: [id number]    Date of Admission: 2024  7:08 PM  Date of Discharge:  24  Primary Care Physician: Leatha Melvin DO    Consults       Date and Time Order Name Status Description    2024 11:06 PM Inpatient Neurology Consult General Completed             Hospital Course     Presenting Problem: AMS    Active Hospital Problems    Diagnosis  POA    **Breakthrough seizure [G40.919]  Yes    History of stroke [Z86.73]  Not Applicable    Paroxysmal atrial fibrillation [I48.0]  Yes    Essential hypertension [I10]  Yes    Hyperlipemia [E78.5]  Yes    Multinodular goiter [E04.2]  Yes    Abnormal thyroid function test [R94.6]  Yes      Resolved Hospital Problems   No resolved problems to display.          Hospital Course:  Laury Eugene is a 84 y.o. female with history of left MCA stroke with residual speech and right-sided facial deficits, atrial fibrillation on Eliquis, hyperthyroidism and multinodular goiter, NOVA on CPAP, and prior history of breast cancer s/p chemo and radiation who presented with abnormal facial movements and mental status changes, admitted with likely seizure.    This patient's problems and plans were partially entered by my partner and updated as appropriate by me 24. Copied text in this note has been reviewed and is accurate as of today's date.      Suspected breakthrough seizure  History of left MCA stroke and right temporal stroke  -Patient with no known history of seizure, however, EEG during prior stroke admission showed abnormal epileptiform activity and she was started on Keppra, has reportedly been compliant  -MRI here is negative for any acute change, shows expected changes following prior stroke    -Suspect breakthrough seizure vs recrudescence of stroke secondary to UTI  -She is s/p loading with Keppra and 1 dose of Ativan in the ED    -Neurology following, Keppra dose increased to 1000 mg extended release nightly  -EEG showed no ongoing seizure activity, diffuse cerebral dysfunction affecting left hemisphere greater than right  -Continue increased dose of Keppra as above, F/U with Stroke Neurology clinic as previously scheduled 6/19/24     UTI  -UA with 4+ bacteria, large leuks and positive nitrites  -Blood cultures NGTD  -Urine culture >100,000 gram negative bacilli  -s/p two doses IV Rocephin, switch to PO Ceftin x 3 more days to complete 5 day course     Afib  -Rates currently remain controlled  -Continue home Eliquis, propafenone, and metoprolol     Hypertension  -BP currently stable     Chronic iron deficiency anemia, history of GI bleed  -H&H is stable, continue PPI     History of hyperthyroidism and multinodular goiter  -Follows with Endocrinology, currently not taking any medications for this  -TSH is within normal limits.  Avoid iodinated contrast if possible.     Obstructive sleep apnea  -Continue home CPAP     Prolonged QTc  -Please continue holding propafenone and Lexapro  -Follow-up repeat EKG: QTc is much improved at 414, okay to resume above meds      Discharge Follow Up Recommendations for outpatient labs/diagnostics:  F/U with PCP 1 week  F/U with Stroke clinic 6/19/24    Day of Discharge     HPI:   Seen this morning, eating breakfast. No complaints.     Review of Systems  Gen-no fevers, no chills  CV-no chest pain, no palpitations  Resp-no cough, no dyspnea  GI-no N/V/D, no abd pain        Vital Signs:   Temp:  [97.5 °F (36.4 °C)-98 °F (36.7 °C)] 97.9 °F (36.6 °C)  Heart Rate:  [52-66] 66  Resp:  [16-17] 17  BP: (112-145)/(41-64) 138/64      Physical Exam:  Gen-no acute distress  HENT-NCAT, mucous membranes moist  CV-RRR, S1 S2 normal, no m/r/g  Resp-CTAB, no wheezes or rales  Abd-soft, NT, ND, +BS  Ext-no edema  Neuro-awake and alert, slow to respond to questions, dysarthria noted  Skin-no rashes  Psych-appropriate  mood      Pertinent  and/or Most Recent Results     LAB RESULTS:      Lab 04/15/24  1336 04/14/24  1342 04/14/24  0518 04/13/24 1951   WBC  --   --  8.61 6.90   HEMOGLOBIN  --   --  10.0* 10.2*   HEMATOCRIT  --   --  31.7* 32.3*   PLATELETS  --   --  235 259   NEUTROS ABS  --   --   --  3.72   IMMATURE GRANS (ABS)  --   --   --  0.01   LYMPHS ABS  --   --   --  1.95   MONOS ABS  --   --   --  0.97*   EOS ABS  --   --   --  0.21   MCV  --   --  92.7 91.8   LACTATE 1.3 1.7  --   --          Lab 04/14/24  0518 04/13/24 1951   SODIUM 140 136   POTASSIUM 4.3 4.3   CHLORIDE 103 99   CO2 30.0* 30.0*   ANION GAP 7.0 7.0   BUN 14 17   CREATININE 0.84 1.02*   EGFR 69.5 55.0*   GLUCOSE 95 100*   CALCIUM 9.1 8.9   MAGNESIUM 3.2* 1.8   TSH  --  1.120         Lab 04/13/24 1951   TOTAL PROTEIN 6.9   ALBUMIN 3.8   GLOBULIN 3.1   ALT (SGPT) 16   AST (SGOT) 23   BILIRUBIN 0.2   ALK PHOS 114                     Brief Urine Lab Results  (Last result in the past 365 days)        Color   Clarity   Blood   Leuk Est   Nitrite   Protein   CREAT   Urine HCG        04/14/24 1520 Yellow   Cloudy   Small (1+)   Large (3+)   Positive   Negative                 Microbiology Results (last 10 days)       Procedure Component Value - Date/Time    Blood Culture - Blood, Hand, Right [[id number]]  (Normal) Collected: 04/15/24 1035    Lab Status: Preliminary result Specimen: Blood from Hand, Right Updated: 04/16/24 1101     Blood Culture No growth at 24 hours    Narrative:      Less than seven (7) mL's of blood was collected.  Insufficient quantity may yield false negative results.    Urine Culture - Urine, Urine, Clean Catch [[id number]]  (Abnormal) Collected: 04/14/24 1520    Lab Status: Preliminary result Specimen: Urine, Clean Catch Updated: 04/16/24 0935     Urine Culture >100,000 CFU/mL Gram Negative Bacilli    Narrative:      Colonization of the urinary tract without infection is common. Treatment is discouraged unless the patient is symptomatic,  pregnant, or undergoing an invasive urologic procedure.    Respiratory Panel PCR w/COVID-19(SARS-CoV-2) SHAUNA/LIZ/AILYN/PAD/COR/PRAVEENA In-House, NP Swab in UTM/VTM, 2 HR TAT - Swab, Nasopharynx [[id number]]  (Normal) Collected: 04/13/24 6597    Lab Status: Final result Specimen: Swab from Nasopharynx Updated: 04/13/24 6309     ADENOVIRUS, PCR Not Detected     Coronavirus 229E Not Detected     Coronavirus HKU1 Not Detected     Coronavirus NL63 Not Detected     Coronavirus OC43 Not Detected     COVID19 Not Detected     Human Metapneumovirus Not Detected     Human Rhinovirus/Enterovirus Not Detected     Influenza A PCR Not Detected     Influenza B PCR Not Detected     Parainfluenza Virus 1 Not Detected     Parainfluenza Virus 2 Not Detected     Parainfluenza Virus 3 Not Detected     Parainfluenza Virus 4 Not Detected     RSV, PCR Not Detected     Bordetella pertussis pcr Not Detected     Bordetella parapertussis PCR Not Detected     Chlamydophila pneumoniae PCR Not Detected     Mycoplasma pneumo by PCR Not Detected    Narrative:      In the setting of a positive respiratory panel with a viral infection PLUS a negative procalcitonin without other underlying concern for bacterial infection, consider observing off antibiotics or discontinuation of antibiotics and continue supportive care. If the respiratory panel is positive for atypical bacterial infection (Bordetella pertussis, Chlamydophila pneumoniae, or Mycoplasma pneumoniae), consider antibiotic de-escalation to target atypical bacterial infection.            EEG    Result Date: 4/15/2024  Reason for referral: 84 y.o.female with altered mental status Technical Summary:  A 19 channel digital EEG was performed using the international 10-20 placement system, including eye leads and EKG leads. Duration: 20 minutes Findings: The patient is awake.  Diffuse low amplitude theta activity is seen over the over both hemispheres, somewhat slower over the left hemisphere.  Brief bursts  of 2-4 hz delta are requently as a study proceeds, always more prominent over the left hemisphere and at times present independently over the left hemisphere.  Light sleep is seen with overall mild slowing of the background.  No epileptiform activity is now seen.  No electrographic seizures are seen.    Photic stimulation does not change the background.  Hyperventilation is not performed. Video: Available Technical quality: Superior EKG: Regular, 70 bpm SUMMARY: Mild generalized slow with paroxysmal generalized delta, greater over the left hemisphere Independent left hemispheric slow No epileptiform activity or electrographic seizures are seen     Diffuse cerebral dysfunction, affecting the left hemisphere to a greater degree No ongoing seizures are seen This report is transcribed using the Dragon dictation system.      MRI Brain Without Contrast    Result Date: 4/13/2024  MRI BRAIN WO CONTRAST Date of Exam: 4/13/2024 8:29 PM EDT Indication: AMS, possible seizure, R facial twitching, history of stroke in August with residual R sided deficits.  Comparison: 8/26/2023 and head CT same date. CT head/angiography/perfusion also 8/26/2023. Technique:  Routine multiplanar/multisequence sequence images of the brain were obtained without contrast administration. Findings: There is no diffusion restriction to suggest an acute infarct. There is a chronic left MCA territory infarct affecting the insular cortex and lateral posterior left frontal lobe. There is a smaller chronic cortical infarct in the right temporal lobe. There are mild patchy periventricular foci of FLAIR hyperintense signal abnormality. There is wallerian degeneration extending through the posterior limb of the left internal capsule into the midbrain. There are no new signal abnormalities in the brain parenchyma. Thinning of the orbital lenses with suggest prior cataract surgery. There is moderate left sphenoid sinus mucosal disease. Mastoid air cells appear  well aerated. There is diminished flow void in the distal left M1 segment. This could be due to motion. There is no mass effect, midline shift or abnormal extra-axial collection. No acute or chronic intracranial hemorrhage. Midline structures appear intact. Calvarial and superficial soft tissue signal is within normal limits. 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 Parotid glands appear symmetric.     Impression: 1. No acute intracranial abnormality. 2. Chronic infarcts in the left MCA territory and right temporal lobe. 3. Mild chronic small vessel ischemic change. 4. Moderate left sphenoid sinus mucosal disease. Electronically Signed: Jim Guillen MD  4/13/2024 9:19 PM EDT  Workstation ID: [id number]             Results for orders placed during the hospital encounter of 08/26/23    Adult Transthoracic Echo Complete W/ Cont if Necessary Per Protocol    Interpretation Summary    Left ventricular systolic function is normal. Left ventricular ejection fraction appears to be 51 - 55%.        Pending Labs       Order Current Status    Blood Culture - Blood, Hand, Right In process    Blood Culture - Blood, Hand, Right Preliminary result    Urine Culture - Urine, Urine, Clean Catch Preliminary result          Discharge Details        Discharge Medications        New Medications        Instructions Start Date   cefuroxime 250 MG tablet  Commonly known as: CEFTIN   250 mg, Oral, 2 Times Daily             Changes to Medications        Instructions Start Date   levETIRAcetam ER 1000 MG tablet sustained-release 24 hour tablet  Commonly known as: KEPPRA XR  What changed:   medication strength  how much to take  additional instructions   1,000 mg, Oral, Daily, Can take two of your 500 mg tablets until you run out, then start 1000 mg tablets             Continue These Medications        Instructions Start Date   apixaban 5 MG tablet tablet  Commonly known as: ELIQUIS   5 mg, Oral, Every 12 Hours Scheduled      ascorbic acid 500 MG  tablet  Commonly known as: VITAMIN C   500 mg, Oral, Daily      atorvastatin 80 MG tablet  Commonly known as: LIPITOR   80 mg, Oral, Nightly      escitalopram 10 MG tablet  Commonly known as: LEXAPRO   10 mg, Oral, Daily      folic acid 1 MG tablet  Commonly known as: FOLVITE   800 mcg, Oral, Daily      metoprolol succinate XL 25 MG 24 hr tablet  Commonly known as: TOPROL-XL   25 mg, Oral, Daily      pantoprazole 40 MG EC tablet  Commonly known as: PROTONIX   40 mg, Oral, Daily, Started at [facility]       propafenone  MG 12 hr capsule  Commonly known as: RYTHMOL SR   325 mg, Oral, Every 12 Hours Scheduled      traMADol 50 MG tablet  Commonly known as: ULTRAM   50 mg, Oral, Every 6 Hours PRN, Started at [facility]      vitamin B-12 250 MCG tablet  Commonly known as: CYANOCOBALAMIN   1,000 mcg, Oral, Daily               Allergies   Allergen Reactions    Lidocaine Other (See Comments)     seizure    Novocain [Procaine] Other (See Comments)     seizures    Tnkase [Tenecteplase] Angioedema     Treated 8/26    Nsaids Other (See Comments)     GIB         Discharge Disposition:  Home or Self Care    Diet:  Hospital:  Diet Order   Procedures    Diet: Cardiac, Diabetic; Healthy Heart (2-3 Na+); Consistent Carbohydrate; Fluid Consistency: Thin (IDDSI 0)       Diet Instructions       Diet: Cardiac Diets; Healthy Heart (2-3 Na+); Regular (IDDSI 7); Thin (IDDSI 0)      Discharge Diet: Cardiac Diets    Cardiac Diet: Healthy Heart (2-3 Na+)    Texture: Regular (IDDSI 7)    Fluid Consistency: Thin (IDDSI 0)             Activity:  Activity Instructions       Activity as Tolerated                   CODE STATUS:    Code Status and Medical Interventions:   Ordered at: 04/13/24 2239     Medical Intervention Limits:    NO intubation (DNI)     Level Of Support Discussed With:    Health Care Surrogate     Code Status (Patient has no pulse and is not breathing):    No CPR (Do Not Attempt to Resuscitate)     Medical Interventions (Patient  has pulse or is breathing):    Limited Support       Future Appointments   Date Time Provider Department Center   5/17/2024  1:00 PM Roger Hodgson MD [facility]   6/19/2024  1:30 PM Lynnette Baker APRN [facility]       Additional Instructions for the Follow-ups that You Need to Schedule       Discharge Follow-up with PCP   As directed       Currently Documented PCP:    Leatha Melvin DO    PCP Phone Number:    [phone]     Follow Up Details: 1 week                      Ana Gracia MD  04/16/24      Time Spent on Discharge:  I spent  18  minutes on this discharge activity which included: face-to-face encounter with the patient, reviewing the data in the system, coordination of the care with the nursing staff as well as consultants, documentation, and entering orders.

## 2024-04-16 NOTE — CASE MANAGEMENT/SOCIAL WORK
Continued Stay Note  [facility]     Patient Name: Laury Eugene  MRN: [id number]  Today's Date: 4/16/2024    Admit Date: 4/13/2024    Plan: Home   Discharge Plan       Row Name 04/16/24 1114       Plan    Plan Home    Patient/Family in Agreement with Plan yes    Plan Comments Pt is being discharged home today. No needs identified. Niece to transport.    Final Discharge Disposition Code 01 - home or self-care                   Discharge Codes    No documentation.                 Expected Discharge Date and Time       Expected Discharge Date Expected Discharge Time    Apr 16, 2024               Regina Hurtado RN

## 2024-04-17 LAB — BACTERIA SPEC AEROBE CULT: ABNORMAL

## 2024-04-18 LAB
QT INTERVAL: 430 MS
QTC INTERVAL: 414 MS

## 2024-04-20 LAB
BACTERIA SPEC AEROBE CULT: NORMAL
BACTERIA SPEC AEROBE CULT: NORMAL

## 2024-05-21 ENCOUNTER — OFFICE VISIT (OUTPATIENT)
Dept: ENDOCRINOLOGY | Facility: CLINIC | Age: 84
End: 2024-05-21
Payer: MEDICARE

## 2024-05-21 VITALS
HEIGHT: 61 IN | DIASTOLIC BLOOD PRESSURE: 68 MMHG | WEIGHT: 144 LBS | BODY MASS INDEX: 27.19 KG/M2 | HEART RATE: 58 BPM | SYSTOLIC BLOOD PRESSURE: 132 MMHG | OXYGEN SATURATION: 100 %

## 2024-05-21 DIAGNOSIS — E04.2 MULTINODULAR GOITER: Primary | ICD-10-CM

## 2024-05-21 DIAGNOSIS — R94.6 ABNORMAL THYROID FUNCTION TEST: ICD-10-CM

## 2024-05-21 PROCEDURE — 1159F MED LIST DOCD IN RCRD: CPT | Performed by: INTERNAL MEDICINE

## 2024-05-21 PROCEDURE — 1160F RVW MEDS BY RX/DR IN RCRD: CPT | Performed by: INTERNAL MEDICINE

## 2024-05-21 PROCEDURE — 3078F DIAST BP <80 MM HG: CPT | Performed by: INTERNAL MEDICINE

## 2024-05-21 PROCEDURE — 3075F SYST BP GE 130 - 139MM HG: CPT | Performed by: INTERNAL MEDICINE

## 2024-05-21 PROCEDURE — 99213 OFFICE O/P EST LOW 20 MIN: CPT | Performed by: INTERNAL MEDICINE

## 2024-05-27 ENCOUNTER — APPOINTMENT (OUTPATIENT)
Dept: CT IMAGING | Facility: HOSPITAL | Age: 84
End: 2024-05-27
Payer: MEDICARE

## 2024-05-27 ENCOUNTER — APPOINTMENT (OUTPATIENT)
Dept: GENERAL RADIOLOGY | Facility: HOSPITAL | Age: 84
End: 2024-05-27
Payer: MEDICARE

## 2024-05-27 ENCOUNTER — HOSPITAL ENCOUNTER (OUTPATIENT)
Facility: HOSPITAL | Age: 84
Setting detail: OBSERVATION
Discharge: HOME | End: 2024-05-28
Attending: EMERGENCY MEDICINE | Admitting: INTERNAL MEDICINE
Payer: MEDICARE

## 2024-05-27 DIAGNOSIS — G45.8 ACUTE ANTERIOR CIRCULATION TIA: Primary | ICD-10-CM

## 2024-05-27 DIAGNOSIS — R56.9 SEIZURE: ICD-10-CM

## 2024-05-27 DIAGNOSIS — R13.10 DYSPHAGIA, UNSPECIFIED TYPE: ICD-10-CM

## 2024-05-27 LAB
ALT SERPL W P-5'-P-CCNC: 21 U/L (ref 1–33)
APTT PPP: 30.5 SECONDS (ref 22–39)
AST SERPL-CCNC: 26 U/L (ref 1–32)
BASOPHILS # BLD AUTO: 0.04 10*3/MM3 (ref 0–0.2)
BASOPHILS NFR BLD AUTO: 0.6 % (ref 0–1.5)
BILIRUB UR QL STRIP: NEGATIVE
BUN BLDA-MCNC: 14 MG/DL (ref 8–26)
CA-I BLDA-SCNC: 1.23 MMOL/L (ref 1.2–1.32)
CHLORIDE BLDA-SCNC: 100 MMOL/L (ref 98–109)
CLARITY UR: CLEAR
CO2 BLDA-SCNC: 28 MMOL/L (ref 24–29)
COLOR UR: YELLOW
CREAT BLDA-MCNC: 1 MG/DL (ref 0.6–1.3)
DEPRECATED RDW RBC AUTO: 57.4 FL (ref 37–54)
EGFRCR SERPLBLD CKD-EPI 2021: 56.4 ML/MIN/1.73
EOSINOPHIL # BLD AUTO: 0.22 10*3/MM3 (ref 0–0.4)
EOSINOPHIL NFR BLD AUTO: 3.2 % (ref 0.3–6.2)
ERYTHROCYTE [DISTWIDTH] IN BLOOD BY AUTOMATED COUNT: 16.4 % (ref 12.3–15.4)
GLUCOSE BLDC GLUCOMTR-MCNC: 102 MG/DL (ref 70–130)
GLUCOSE BLDC GLUCOMTR-MCNC: 115 MG/DL (ref 70–130)
GLUCOSE BLDC GLUCOMTR-MCNC: 90 MG/DL (ref 70–130)
GLUCOSE UR STRIP-MCNC: NEGATIVE MG/DL
HCT VFR BLD AUTO: 36.2 % (ref 34–46.6)
HCT VFR BLDA CALC: 36 % (ref 38–51)
HGB BLD-MCNC: 11.6 G/DL (ref 12–15.9)
HGB BLDA-MCNC: 12.2 G/DL (ref 12–17)
HGB UR QL STRIP.AUTO: NEGATIVE
HOLD SPECIMEN: NORMAL
IMM GRANULOCYTES # BLD AUTO: 0.02 10*3/MM3 (ref 0–0.05)
IMM GRANULOCYTES NFR BLD AUTO: 0.3 % (ref 0–0.5)
INR PPP: 1.2 (ref 0.8–1.2)
KETONES UR QL STRIP: NEGATIVE
LEUKOCYTE ESTERASE UR QL STRIP.AUTO: NEGATIVE
LYMPHOCYTES # BLD AUTO: 2.01 10*3/MM3 (ref 0.7–3.1)
LYMPHOCYTES NFR BLD AUTO: 29.3 % (ref 19.6–45.3)
MCH RBC QN AUTO: 30.4 PG (ref 26.6–33)
MCHC RBC AUTO-ENTMCNC: 32 G/DL (ref 31.5–35.7)
MCV RBC AUTO: 94.8 FL (ref 79–97)
MONOCYTES # BLD AUTO: 0.58 10*3/MM3 (ref 0.1–0.9)
MONOCYTES NFR BLD AUTO: 8.5 % (ref 5–12)
NEUTROPHILS NFR BLD AUTO: 3.98 10*3/MM3 (ref 1.7–7)
NEUTROPHILS NFR BLD AUTO: 58.1 % (ref 42.7–76)
NITRITE UR QL STRIP: NEGATIVE
NRBC BLD AUTO-RTO: 0 /100 WBC (ref 0–0.2)
PH UR STRIP.AUTO: 7.5 [PH] (ref 5–8)
PLATELET # BLD AUTO: 231 10*3/MM3 (ref 140–450)
PMV BLD AUTO: 10.5 FL (ref 6–12)
POTASSIUM BLDA-SCNC: 4.1 MMOL/L (ref 3.5–4.9)
PROT UR QL STRIP: NEGATIVE
PROTHROMBIN TIME: 13.9 SECONDS (ref 12.8–15.2)
QT INTERVAL: 418 MS
QTC INTERVAL: 418 MS
RBC # BLD AUTO: 3.82 10*6/MM3 (ref 3.77–5.28)
SODIUM BLD-SCNC: 140 MMOL/L (ref 138–146)
SP GR UR STRIP: 1.04 (ref 1–1.03)
TROPONIN T SERPL HS-MCNC: 9 NG/L
UROBILINOGEN UR QL STRIP: ABNORMAL
WBC NRBC COR # BLD AUTO: 6.85 10*3/MM3 (ref 3.4–10.8)
WHOLE BLOOD HOLD COAG: NORMAL
WHOLE BLOOD HOLD SPECIMEN: NORMAL

## 2024-05-27 PROCEDURE — 99214 OFFICE O/P EST MOD 30 MIN: CPT

## 2024-05-27 PROCEDURE — 84450 TRANSFERASE (AST) (SGOT): CPT

## 2024-05-27 PROCEDURE — 70450 CT HEAD/BRAIN W/O DYE: CPT

## 2024-05-27 PROCEDURE — G0378 HOSPITAL OBSERVATION PER HR: HCPCS

## 2024-05-27 PROCEDURE — 82948 REAGENT STRIP/BLOOD GLUCOSE: CPT

## 2024-05-27 PROCEDURE — 85025 COMPLETE CBC W/AUTO DIFF WBC: CPT

## 2024-05-27 PROCEDURE — 81003 URINALYSIS AUTO W/O SCOPE: CPT

## 2024-05-27 PROCEDURE — 93005 ELECTROCARDIOGRAM TRACING: CPT

## 2024-05-27 PROCEDURE — 80047 BASIC METABLC PNL IONIZED CA: CPT

## 2024-05-27 PROCEDURE — 25010000002 LEVETRIRACETAM PER 10 MG

## 2024-05-27 PROCEDURE — 85014 HEMATOCRIT: CPT

## 2024-05-27 PROCEDURE — 25510000001 IOPAMIDOL PER 1 ML: Performed by: EMERGENCY MEDICINE

## 2024-05-27 PROCEDURE — 85610 PROTHROMBIN TIME: CPT

## 2024-05-27 PROCEDURE — 36415 COLL VENOUS BLD VENIPUNCTURE: CPT

## 2024-05-27 PROCEDURE — 93010 ELECTROCARDIOGRAM REPORT: CPT | Performed by: INTERNAL MEDICINE

## 2024-05-27 PROCEDURE — 0042T HC CT CEREBRAL PERFUSION W/WO CONTRAST: CPT

## 2024-05-27 PROCEDURE — 96374 THER/PROPH/DIAG INJ IV PUSH: CPT

## 2024-05-27 PROCEDURE — 70498 CT ANGIOGRAPHY NECK: CPT

## 2024-05-27 PROCEDURE — 99291 CRITICAL CARE FIRST HOUR: CPT

## 2024-05-27 PROCEDURE — 92610 EVALUATE SWALLOWING FUNCTION: CPT

## 2024-05-27 PROCEDURE — 85730 THROMBOPLASTIN TIME PARTIAL: CPT

## 2024-05-27 PROCEDURE — 99223 1ST HOSP IP/OBS HIGH 75: CPT | Performed by: INTERNAL MEDICINE

## 2024-05-27 PROCEDURE — 70496 CT ANGIOGRAPHY HEAD: CPT

## 2024-05-27 PROCEDURE — 96376 TX/PRO/DX INJ SAME DRUG ADON: CPT

## 2024-05-27 PROCEDURE — 84460 ALANINE AMINO (ALT) (SGPT): CPT

## 2024-05-27 PROCEDURE — 87086 URINE CULTURE/COLONY COUNT: CPT | Performed by: INTERNAL MEDICINE

## 2024-05-27 PROCEDURE — 84484 ASSAY OF TROPONIN QUANT: CPT

## 2024-05-27 PROCEDURE — 71045 X-RAY EXAM CHEST 1 VIEW: CPT

## 2024-05-27 PROCEDURE — 25010000002 LEVETRIRACETAM PER 10 MG: Performed by: INTERNAL MEDICINE

## 2024-05-27 RX ORDER — SODIUM CHLORIDE 0.9 % (FLUSH) 0.9 %
10 SYRINGE (ML) INJECTION EVERY 12 HOURS SCHEDULED
Status: DISCONTINUED | OUTPATIENT
Start: 2024-05-27 | End: 2024-05-28 | Stop reason: HOSPADM

## 2024-05-27 RX ORDER — SODIUM CHLORIDE 9 MG/ML
40 INJECTION, SOLUTION INTRAVENOUS AS NEEDED
Status: DISCONTINUED | OUTPATIENT
Start: 2024-05-27 | End: 2024-05-27 | Stop reason: SDUPTHER

## 2024-05-27 RX ORDER — SODIUM CHLORIDE 0.9 % (FLUSH) 0.9 %
10 SYRINGE (ML) INJECTION EVERY 12 HOURS SCHEDULED
Status: DISCONTINUED | OUTPATIENT
Start: 2024-05-27 | End: 2024-05-27 | Stop reason: SDUPTHER

## 2024-05-27 RX ORDER — LEVETIRACETAM 500 MG/5ML
500 INJECTION, SOLUTION, CONCENTRATE INTRAVENOUS EVERY 12 HOURS SCHEDULED
Status: DISCONTINUED | OUTPATIENT
Start: 2024-05-28 | End: 2024-05-28

## 2024-05-27 RX ORDER — ATORVASTATIN CALCIUM 40 MG/1
80 TABLET, FILM COATED ORAL NIGHTLY
Status: CANCELLED | OUTPATIENT
Start: 2024-05-27

## 2024-05-27 RX ORDER — LEVETIRACETAM 500 MG/5ML
1500 INJECTION, SOLUTION, CONCENTRATE INTRAVENOUS ONCE
Status: COMPLETED | OUTPATIENT
Start: 2024-05-27 | End: 2024-05-27

## 2024-05-27 RX ORDER — ESCITALOPRAM OXALATE 10 MG/1
10 TABLET ORAL DAILY
Status: DISCONTINUED | OUTPATIENT
Start: 2024-05-27 | End: 2024-05-28 | Stop reason: HOSPADM

## 2024-05-27 RX ORDER — ACETAMINOPHEN 325 MG/1
650 TABLET ORAL EVERY 6 HOURS PRN
Status: DISCONTINUED | OUTPATIENT
Start: 2024-05-27 | End: 2024-05-28 | Stop reason: HOSPADM

## 2024-05-27 RX ORDER — SODIUM CHLORIDE 0.9 % (FLUSH) 0.9 %
10 SYRINGE (ML) INJECTION AS NEEDED
Status: DISCONTINUED | OUTPATIENT
Start: 2024-05-27 | End: 2024-05-27 | Stop reason: SDUPTHER

## 2024-05-27 RX ORDER — SODIUM CHLORIDE 9 MG/ML
40 INJECTION, SOLUTION INTRAVENOUS AS NEEDED
Status: DISCONTINUED | OUTPATIENT
Start: 2024-05-27 | End: 2024-05-28 | Stop reason: HOSPADM

## 2024-05-27 RX ORDER — SODIUM CHLORIDE 0.9 % (FLUSH) 0.9 %
10 SYRINGE (ML) INJECTION AS NEEDED
Status: DISCONTINUED | OUTPATIENT
Start: 2024-05-27 | End: 2024-05-28 | Stop reason: HOSPADM

## 2024-05-27 RX ORDER — ASCORBIC ACID 500 MG
500 TABLET ORAL DAILY
Status: DISCONTINUED | OUTPATIENT
Start: 2024-05-27 | End: 2024-05-28 | Stop reason: HOSPADM

## 2024-05-27 RX ORDER — PROPAFENONE HYDROCHLORIDE 325 MG/1
325 CAPSULE, EXTENDED RELEASE ORAL EVERY 12 HOURS SCHEDULED
Status: DISCONTINUED | OUTPATIENT
Start: 2024-05-27 | End: 2024-05-28 | Stop reason: HOSPADM

## 2024-05-27 RX ORDER — BISACODYL 5 MG/1
5 TABLET, DELAYED RELEASE ORAL DAILY PRN
Status: DISCONTINUED | OUTPATIENT
Start: 2024-05-27 | End: 2024-05-28 | Stop reason: HOSPADM

## 2024-05-27 RX ORDER — METOPROLOL SUCCINATE 25 MG/1
12.5 TABLET, EXTENDED RELEASE ORAL DAILY
Status: DISCONTINUED | OUTPATIENT
Start: 2024-05-27 | End: 2024-05-28 | Stop reason: HOSPADM

## 2024-05-27 RX ORDER — SODIUM CHLORIDE 9 MG/ML
40 INJECTION, SOLUTION INTRAVENOUS AS NEEDED
Status: CANCELLED | OUTPATIENT
Start: 2024-05-27

## 2024-05-27 RX ORDER — BISACODYL 10 MG
10 SUPPOSITORY, RECTAL RECTAL DAILY PRN
Status: DISCONTINUED | OUTPATIENT
Start: 2024-05-27 | End: 2024-05-28 | Stop reason: HOSPADM

## 2024-05-27 RX ORDER — POLYETHYLENE GLYCOL 3350 17 G/17G
17 POWDER, FOR SOLUTION ORAL DAILY PRN
Status: DISCONTINUED | OUTPATIENT
Start: 2024-05-27 | End: 2024-05-28 | Stop reason: HOSPADM

## 2024-05-27 RX ORDER — PANTOPRAZOLE SODIUM 40 MG/1
40 TABLET, DELAYED RELEASE ORAL DAILY
Status: DISCONTINUED | OUTPATIENT
Start: 2024-05-27 | End: 2024-05-28 | Stop reason: HOSPADM

## 2024-05-27 RX ORDER — AMOXICILLIN 250 MG
2 CAPSULE ORAL 2 TIMES DAILY PRN
Status: DISCONTINUED | OUTPATIENT
Start: 2024-05-27 | End: 2024-05-28 | Stop reason: HOSPADM

## 2024-05-27 RX ORDER — LANOLIN ALCOHOL/MO/W.PET/CERES
800 CREAM (GRAM) TOPICAL DAILY
Status: DISCONTINUED | OUTPATIENT
Start: 2024-05-27 | End: 2024-05-28 | Stop reason: HOSPADM

## 2024-05-27 RX ORDER — SODIUM CHLORIDE 0.9 % (FLUSH) 0.9 %
10 SYRINGE (ML) INJECTION EVERY 12 HOURS SCHEDULED
Status: CANCELLED | OUTPATIENT
Start: 2024-05-27

## 2024-05-27 RX ORDER — SODIUM CHLORIDE 0.9 % (FLUSH) 0.9 %
10 SYRINGE (ML) INJECTION AS NEEDED
Status: CANCELLED | OUTPATIENT
Start: 2024-05-27

## 2024-05-27 RX ORDER — LEVETIRACETAM 500 MG/5ML
500 INJECTION, SOLUTION, CONCENTRATE INTRAVENOUS ONCE
Status: COMPLETED | OUTPATIENT
Start: 2024-05-27 | End: 2024-05-27

## 2024-05-27 RX ORDER — ATORVASTATIN CALCIUM 40 MG/1
80 TABLET, FILM COATED ORAL NIGHTLY
Status: DISCONTINUED | OUTPATIENT
Start: 2024-05-27 | End: 2024-05-28 | Stop reason: HOSPADM

## 2024-05-27 RX ORDER — LANOLIN ALCOHOL/MO/W.PET/CERES
1000 CREAM (GRAM) TOPICAL DAILY
Status: DISCONTINUED | OUTPATIENT
Start: 2024-05-27 | End: 2024-05-28 | Stop reason: HOSPADM

## 2024-05-27 RX ADMIN — Medication 10 ML: at 20:33

## 2024-05-27 RX ADMIN — APIXABAN 5 MG: 5 TABLET, FILM COATED ORAL at 20:32

## 2024-05-27 RX ADMIN — IOPAMIDOL 115 ML: 755 INJECTION, SOLUTION INTRAVENOUS at 10:32

## 2024-05-27 RX ADMIN — ATORVASTATIN CALCIUM 80 MG: 40 TABLET, FILM COATED ORAL at 20:32

## 2024-05-27 RX ADMIN — LEVETIRACETAM 1500 MG: 100 INJECTION, SOLUTION INTRAVENOUS at 10:52

## 2024-05-27 RX ADMIN — PANTOPRAZOLE SODIUM 40 MG: 40 TABLET, DELAYED RELEASE ORAL at 17:54

## 2024-05-27 RX ADMIN — CYANOCOBALAMIN TAB 1000 MCG 1000 MCG: 1000 TAB at 17:55

## 2024-05-27 RX ADMIN — ACETAMINOPHEN 650 MG: 325 TABLET ORAL at 17:54

## 2024-05-27 RX ADMIN — PROPAFENONE HYDROCHLORIDE 325 MG: 325 CAPSULE, EXTENDED RELEASE ORAL at 20:32

## 2024-05-27 RX ADMIN — METOPROLOL SUCCINATE 12.5 MG: 25 TABLET, EXTENDED RELEASE ORAL at 17:54

## 2024-05-27 RX ADMIN — ESCITALOPRAM OXALATE 10 MG: 10 TABLET ORAL at 17:54

## 2024-05-27 RX ADMIN — LEVETIRACETAM 500 MG: 100 INJECTION, SOLUTION INTRAVENOUS at 20:31

## 2024-05-27 RX ADMIN — FOLIC ACID TAB 400 MCG 800 MCG: 400 TAB at 17:54

## 2024-05-27 RX ADMIN — OXYCODONE HYDROCHLORIDE AND ACETAMINOPHEN 500 MG: 500 TABLET ORAL at 17:54

## 2024-05-27 NOTE — ED PROVIDER NOTES
"Subjective   History of Present Illness    Pt presents for altered mental status.  She is mildly altered and provided limited history.  She does not know where she is at present.  EMS reports that they were called for altered mental status.  Pt was reportedly LKW at 0940 and \"a few minutes later\" was seen to have R side facial weakness and slurred speech.  They brought her in as a stroke alert.  She reportedly has a history of seizure and has been out of seizure meds for about three days.  She has a history of stroke as well.  She also has a history of AF and takes Eliquis, the compliance with that is not clear at present.    History provided by:  Patient and EMS personnel  History limited by:  Mental status change      Review of Systems    Past Medical History:   Diagnosis Date    History of breast cancer     History of heart disease     Hypothyroidism     Multinodular goiter     Seizures     Sleep apnea     Stroke     Thyroid function test abnormal        Allergies   Allergen Reactions    Lidocaine Other (See Comments)     seizure    Novocain [Procaine] Other (See Comments)     seizures    Tnkase [Tenecteplase] Angioedema     Treated 8/26    Nsaids Other (See Comments)     Avoid NSAIDs d/t history of GIB       Past Surgical History:   Procedure Laterality Date    BLEPHAROPLASTY      BREAST RECONSTRUCTION      CATARACT EXTRACTION Bilateral     COLONOSCOPY N/A 8/27/2023    Procedure: COLONOSCOPY;  Surgeon: Brunner, Mark I, MD;  Location:  [facility];  Service: Gastroenterology;  Laterality: N/A;    ENDOSCOPY N/A 8/27/2023    Procedure: ESOPHAGOGASTRODUODENOSCOPY;  Surgeon: Brunner, Mark I, MD;  Location:  [facility];  Service: Gastroenterology;  Laterality: N/A;    HYSTERECTOMY      SIMPLE MASTECTOMY         Family History   Problem Relation Age of Onset    Hypothyroidism Sister     Stomach cancer Mother     Heart attack Father        Social History     Socioeconomic History    Marital status:  "   Tobacco Use    Smoking status: Former     Current packs/day: 0.00     Types: Cigarettes     Quit date: 1970     Years since quittin.4     Passive exposure: Past    Smokeless tobacco: Never   Vaping Use    Vaping status: Never Used   Substance and Sexual Activity    Alcohol use: Yes     Alcohol/week: 1.0 - 2.0 standard drink of alcohol     Types: 1 - 2 Glasses of wine per week     Comment: social    Drug use: Never    Sexual activity: Never           Objective   Physical Exam  Vitals and nursing note reviewed.   Constitutional:       General: She is not in acute distress.     Appearance: Normal appearance. She is not ill-appearing.   HENT:      Head: Normocephalic and atraumatic.      Mouth/Throat:      Mouth: Mucous membranes are moist.   Eyes:      General: No scleral icterus.        Right eye: No discharge.         Left eye: No discharge.      Conjunctiva/sclera: Conjunctivae normal.   Cardiovascular:      Rate and Rhythm: Normal rate. Rhythm irregular.      Heart sounds: No murmur heard.  Pulmonary:      Effort: Pulmonary effort is normal. No respiratory distress.      Breath sounds: Normal breath sounds. No wheezing.   Abdominal:      General: Bowel sounds are normal. There is no distension.      Palpations: Abdomen is soft.      Tenderness: There is no abdominal tenderness. There is no guarding or rebound.   Musculoskeletal:         General: No swelling. Normal range of motion.      Cervical back: Normal range of motion and neck supple.   Skin:     General: Skin is warm and dry.      Findings: No rash.   Neurological:      Mental Status: She is alert.      Cranial Nerves: Cranial nerve deficit present.      Comments: Mild right facial droop.  Dysarthria without aphasia.  Confused to place.  Follows commands.  Antigravity in arm and legs without apparent asymmetry.   Psychiatric:         Mood and Affect: Mood normal.         Behavior: Behavior normal.         Thought Content: Thought content normal.          Critical Care    Performed by: Julien Tracy MD  Authorized by: Julien Tracy MD    Critical care provider statement:     Critical care time (minutes):  35    Critical care time was exclusive of:  Separately billable procedures and treating other patients    Critical care was necessary to treat or prevent imminent or life-threatening deterioration of the following conditions:  CNS failure or compromise    Critical care was time spent personally by me on the following activities:  Obtaining history from patient or surrogate, discussions with consultants, evaluation of patient's response to treatment, examination of patient, development of treatment plan with patient or surrogate, ordering and performing treatments and interventions, ordering and review of laboratory studies, ordering and review of radiographic studies, re-evaluation of patient's condition and review of old charts    I assumed direction of critical care for this patient from another provider in my specialty: no      Care discussed with: admitting provider               ED Course    Seen on arrival, in CT scanner, with stroke team.  History from patient and from EMS.  CT head NAD.  TNK considered but contraindicated due to reported Eliquis use.    I reviewed prior records.  My prior note on 4/13/24 notes that she has a history of stroke with residual R face, arm and speech deficit.  I reviewed hospital discharge summary from 4/16/24 noting seizures, AF on Eliquis and history of stroke.  MRI was negative EEG done during stay was negative.  Her dose of Keppra was increased.  Discharge summary 9/4/23 reviewed, admission for stroke with TNK administration and subsequent angioedema reaction requiring treatment.  Abnormal EEG reported during that admission.    Improving on rechecks, more conversational. Family present on rechecks, discussed with them.  Off Keppra because insurance refused to cover and they have been unable to work out  alternative, they are understandably quite frustrated.    Labs benign.  EKG AF.    Discussed with stroke team.  We discussed inpatient vs outpatient care.  Family initially wanted to take her home and we actually arranged a week worth of meds from our pharmacy to facilitate but on further evaluations they are not comfortable and feel patient is not returning to baseline so we will admit.  Discussed with hospitalist.                      Total (NIH Stroke Scale): 5                  Medical Decision Making  Problems Addressed:  Acute anterior circulation TIA: complicated acute illness or injury with systemic symptoms  Seizure: complicated acute illness or injury with systemic symptoms    Amount and/or Complexity of Data Reviewed  Independent Historian: caregiver and EMS     Details: family  External Data Reviewed: notes.  Labs: ordered. Decision-making details documented in ED Course.  Radiology: ordered and independent interpretation performed. Decision-making details documented in ED Course.     Details: CT head read by me: no acute process  CXR read by me: no acute process  ECG/medicine tests: ordered and independent interpretation performed. Decision-making details documented in ED Course.     Details: EKG read by me: atrial fibrillation    Risk  Prescription drug management.  Decision regarding hospitalization.    Critical Care  Total time providing critical care: 35 minutes        Final diagnoses:   Acute anterior circulation TIA   Seizure       ED Disposition  ED Disposition       ED Disposition   Decision to Admit    Condition   --    Comment   Level of Care: Telemetry [5]   Diagnosis: Seizure [[id number]]   Admitting Physician: MARTHA WATTS [[id number]]   Attending Physician: MARTHA WATTS [[id number]]                 No follow-up provider specified.       Medication List      No changes were made to your prescriptions during this visit.            Julien Tracy MD  05/27/24 [id number]

## 2024-05-27 NOTE — CONSULTS
Stroke Consult Note    Patient Name: Laury Eugene      MRN: [id number]  Age: 84 y.o.     Sex: female     : 1940  Primary Care Physician: Leatha Melvin DO  Referring Physician: Dr. Tracy, [facility] ED  Handedness: Right  Race:   Time Stroke Team Called: 1004 (PTA via EMS)  time Patient Seen: 1019  Chief Complaint/Reason for Consultation: Speech difficulty and right facial droop    HPI  Last Known Normal Date/Time: 940 on 2024    This patient is an 84-year-old female with past medical history significant for prior left MCA territory stroke (residual speech and right facial droop deficits), seizures (on Keppra), atrial fibrillation (on Eliquis), NOVA on CPAP, hypothyroidism, and breast cancer s/p chemotherapy and radiation who presented to [facility] ED from home via EMS with concern for worsening speech and right facial droop.  Patient has reportedly been unable to access her daily Keppra for 3 days.  Per EMS report, patient was in her normal state of health until 940 when acute onset of worsening speech and right facial droop occurred.  No other deficits per EMS assessment.  On my exam, patient is NIH 3 for moderate right facial droop and moderate dysarthria.  Patient speech is slow but fluent and she answers all questions appropriately.  Exam is negative for visual deficits, gaze preference, unilateral weakness, and sensory deficits.  Of note, patient's speech improved following CT imaging.  At that point, patient states she noted eye twitching prior to the onset of her speech difficulty which is consistent with her prior seizures.  Code stroke CTh with no evidence of acute abnormality.  CTA H/N with no acute abnormality, chronically occluded left M2 seen on prior imaging.  CT perfusion with no evidence of core infarct or significant territorial ischemic tissue at risk.  Patient will be loaded with Keppra 1.5 g.  Patient will be admitted to general medicine for ongoing management and  further workup    Review of Systems   Constitutional:  Negative for chills and fatigue.   HENT:  Negative for trouble swallowing.    Eyes:  Negative for visual disturbance.   Respiratory:  Negative for shortness of breath.    Cardiovascular:  Negative for chest pain.   Gastrointestinal:  Negative for abdominal pain.   Musculoskeletal:  Negative for myalgias.   Neurological:  Positive for facial asymmetry and speech difficulty. Negative for tremors, weakness, light-headedness, numbness and headaches.   Psychiatric/Behavioral:  Negative for behavioral problems. The patient is not nervous/anxious.       Objective  Neurological Exam  Mental Status  Alert. Oriented to person, place, time and situation. Moderate dysarthria present. Language is fluent with no aphasia. Attention and concentration are normal.    Cranial Nerves  CN II: Visual fields full to confrontation.  CN III, IV, VI: Extraocular movements intact bilaterally. Pupils equal round and reactive to light bilaterally.  CN V:  Right: Facial sensation is normal.  Left: Facial sensation is normal on the left.  CN VII:  Right: There is central facial weakness.  Left: There is no facial weakness.  CN VIII: Hearing grossly intact bilaterally.  CN IX, X: Palate elevates symmetrically  CN XII: Tongue midline without atrophy or fasciculations.    Motor  Normal muscle bulk throughout. Normal muscle tone.  All extremities at least 4/5 strength with no gross unilateral deficits appreciated..    Sensory  Light touch is normal in upper and lower extremities.     Coordination  Right: Finger-to-nose normal. Rapid alternating movement normal.Left: Finger-to-nose normal. Rapid alternating movement normal.    Physical Exam  Constitutional:       General: She is not in acute distress.  HENT:      Head: Normocephalic and atraumatic.      Mouth/Throat:      Mouth: Mucous membranes are moist.   Eyes:      Extraocular Movements: Extraocular movements intact.      Pupils: Pupils are  equal, round, and reactive to light.   Cardiovascular:      Rate and Rhythm: Normal rate and regular rhythm.   Pulmonary:      Effort: Pulmonary effort is normal.   Musculoskeletal:         General: No swelling or deformity.   Skin:     General: Skin is warm and dry.   Neurological:      Mental Status: She is alert.      Cranial Nerves: Dysarthria present.   Psychiatric:         Mood and Affect: Mood normal.         Behavior: Behavior normal.     Temp:  [97.9 °F (36.6 °C)] 97.9 °F (36.6 °C)  Heart Rate:  [63-64] 64  Resp:  [14] 14  BP: (166-171)/(69-74) 166/69    Past Medical History:   Diagnosis Date    History of breast cancer     History of heart disease     Hypothyroidism     Multinodular goiter     Sleep apnea     Stroke     Thyroid function test abnormal      Past Surgical History:   Procedure Laterality Date    BLEPHAROPLASTY      BREAST RECONSTRUCTION      CATARACT EXTRACTION Bilateral     COLONOSCOPY N/A 2023    Procedure: COLONOSCOPY;  Surgeon: Brunner, Mark I, MD;  Location:  [facility];  Service: Gastroenterology;  Laterality: N/A;    ENDOSCOPY N/A 2023    Procedure: ESOPHAGOGASTRODUODENOSCOPY;  Surgeon: Brunner, Mark I, MD;  Location: [facility];  Service: Gastroenterology;  Laterality: N/A;    HYSTERECTOMY      SIMPLE MASTECTOMY       Family History   Problem Relation Age of Onset    Hypothyroidism Sister     Stomach cancer Mother     Heart attack Father      Social History     Socioeconomic History    Marital status:    Tobacco Use    Smoking status: Former     Current packs/day: 0.00     Types: Cigarettes     Quit date:      Years since quittin.4     Passive exposure: Past    Smokeless tobacco: Never   Vaping Use    Vaping status: Never Used   Substance and Sexual Activity    Alcohol use: Yes     Alcohol/week: 1.0 - 2.0 standard drink of alcohol     Types: 1 - 2 Glasses of wine per week     Comment: social    Drug use: Never    Sexual activity: Never     Allergies    Allergen Reactions    Lidocaine Other (See Comments)     seizure    Novocain [Procaine] Other (See Comments)     seizures    Tnkase [Tenecteplase] Angioedema     Treated 8/26    Nsaids Other (See Comments)     GIB     Prior to Admission medications    Medication Sig Start Date End Date Taking? Authorizing Provider   apixaban (ELIQUIS) 5 MG tablet tablet Take 1 tablet by mouth Every 12 (Twelve) Hours. Indications: Atrial Fibrillation 9/4/23   Yumiko Mclaughlin MD   ascorbic acid (VITAMIN C) 500 MG tablet Take 1 tablet by mouth Daily.    Angélica Stinson MD   atorvastatin (LIPITOR) 80 MG tablet Take 1 tablet by mouth Every Night. 8/31/23   Ailin Son APRN   escitalopram (LEXAPRO) 10 MG tablet Take 1 tablet by mouth Daily. 9/5/23   Yumiko Mclaughlin MD   folic acid (FOLVITE) 1 MG tablet Take 800 mcg by mouth Daily.    Angélica Stinson MD   levETIRAcetam XR (KEPPRA XR) 1000 MG tablet sustained-release 24 hour tablet Take 1 tablet by mouth Daily. Can take two of your 500 mg tablets until you run out, then start 1000 mg tablets 4/16/24   Ana Gracia MD   metoprolol succinate XL (TOPROL-XL) 25 MG 24 hr tablet Take 1 tablet by mouth Daily. Pt taking 12.5 mg daily 10/5/23   Angélica Stinson MD   pantoprazole (PROTONIX) 40 MG EC tablet Take 1 tablet by mouth Daily. Started at [facility]    Angélica Stinson MD   propafenone SR (RYTHMOL SR) 325 MG 12 hr capsule Take 1 capsule by mouth Every 12 (Twelve) Hours. 8/31/23   Ailin Son APRN   vitamin B-12 (CYANOCOBALAMIN) 250 MCG tablet Take 4 tablets by mouth Daily.    Angélica Stinson MD     Acute Stroke Data  Alteplase (tPA) Inclusion / Exclusion Criteria  Person Administering Scale: Burt Riley PA-C    Inclusion Criteria  [x]   18 years of age or greater   [x]   Onset of symptoms < 4.5 hours before beginning treatment (stroke onset = time patient was last seen well or without symptoms).   []   Diagnosis of acute ischemic stroke  causing measurable disabling deficit (Complete Hemianopia, Any Aphasia, Visual or Sensory Extinction, Any weakness limiting sustained effort against gravity)   []   Any remaining deficit considered potentially disabling in view of patient and practitioner   Exclusion criteria (Do not proceed with Alteplase if any are checked under exclusion criteria)  []   Onset unknown or GREATER than 4.5 hours   []   ICH on CT/MRI   []   CT demonstrates hypodensity representing acute or subacute infarct   []   Significant head trauma or prior stroke in the previous 3 months   []   Symptoms suggestive of subarachnoid hemorrhage   []   History of un-ruptured intracranial aneurysm GREATER than 10 mm   []   Recent intracranial or intraspinal surgery within the last 3 months   []   Arterial puncture at a non-compressible site in the previous 7 days   []   Active internal bleeding   []   Acute bleeding tendency   []   Platelet count LESS than 100,000 for known hematological diseases such as leukemia, thrombocytopenia or chronic cirrhosis   []   Current use of anticoagulant with INR GREATER than 1.7 or PT GREATER than 15 seconds, aPTT GREATER than 40 seconds   []   Heparin received within 48 hours, resulting in abnormally elevated aPTT GREATER than upper limit of normal   [x]   Current use of direct thrombin inhibitors or direct factor Xa inhibitors in the past 48 hours   []   Elevated blood pressure refractory to treatment (systolic GREATER than 185 mm/Hg or diastolic  GREATER than 110 mm/Hg   []   Suspected infective endocarditis and aortic arch dissection   []   Current use of therapeutic treatment dose of low-molecular-weight heparin (LMWH) within the previous 24 hours   []   Structural GI malignancy or bleed   Relative exclusion for all patients  []   Only minor non-disabling symptoms   []   Pregnancy   []   Seizure at onset with postictal residual neurological impairments   []   Major surgery or previous trauma within past 14 days    []   History of previous spontaneous ICH, intracranial neoplasm, or AV malformation   []   Postpartum (within previous 14 days)   []   Recent GI or urinary tract hemorrhage (within previous 21 days)   []   Recent acute MI (within previous 3 months)   []   History of un-ruptured intracranial aneurysm LESS than 10 mm   []   History of ruptured intracranial aneurysm   []   Blood glucose LESS than 50 mg/dL (2.7 mmol/L)   []   Dural puncture within the last 7 days   []   Known GREATER than 10 cerebral microbleeds   Additional exclusions for patients with symptoms onset between 3 and 4.5 hours.  []   Age > 80.   []   On any anticoagulants regardless of INR  >>> Warfarin (Coumadin), Heparin, Enoxaparin (Lovenox), fondaparinux (Arixtra), bivalirudin (Angiomax), Argatroban, dabigatran (Pradaxa), rivaroxaban (Xarelto), or apixaban (Eliquis)   []   Severe stroke (NIHSS > 25).   []   History of BOTH diabetes and previous ischemic stroke.   []   The risks and benefits have been discussed with the patient or family related to the administration of IV Alteplase for stroke symptoms.   []   I have discussed and reviewed the patient's case and imaging with the attending prior to IV Alteplase.   N/A Time Alteplase administered     MODIFIED SCARLETT SCALE (to be assessed for each patient having history of stroke) []Stroke history but not assessed  []0: No symptoms at all  []1: No significant disability despite symptoms  [x]2: Slight disability  []3: Moderate disability  []4: Moderately severe disability  []5: Severe disability  []6: Death      NIH Stroke Scale  Time: 1030  Person Administering Scale: Burt Riley PA-C    1a  Level of consciousness: 0=alert; keenly responsive   1b. LOC questions:  0=Performs both tasks correctly   1c. LOC commands: 0=Performs both tasks correctly   2.  Best Gaze: 0=normal   3.  Visual: 0=No visual loss   4. Facial Palsy: 2=Partial paralysis (total or near total paralysis of the lower face)   5a.   Motor left arm: 0=No drift, limb holds 90 (or 45) degrees for full 10 seconds   5b.  Motor right arm: 0=No drift, limb holds 90 (or 45) degrees for full 10 seconds   6a. motor left le=No drift, limb holds 90 (or 45) degrees for full 10 seconds   6b  Motor right le=No drift, limb holds 90 (or 45) degrees for full 10 seconds   7. Limb Ataxia: 0=Absent   8.  Sensory: 0=Normal; no sensory loss   9. Best Language:  0=No aphasia, normal   10. Dysarthria: 1=Mild to moderate, patient slurs at least some words and at worst, can be understood with some difficulty   11. Extinction and Inattention: 0=No abnormality    Total:    3     Hospital Meds  Scheduled-    Infusions-     PRNs-   sodium chloride    Results Reviewed  I have personally reviewed current lab, radiology, and data and agree with results.    CT Angiogram Head w AI Analysis of LVO    Result Date: 2024  1.No acute abnormality identified within the large arteries of the head or neck. 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 This was also noted on study from 2023. 3.No significant stenosis of the bilateral internal carotid arteries. 4.CT perfusion study demonstrates no territorial ischemia or core infarct. Electronically Signed: Geoff Marte MD  2024 11:04 AM EDT  Workstation ID: [id number]    CT Angiogram Neck    Result Date: 2024  1.No acute abnormality identified within the large arteries of the head or neck. 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 This was also noted on study from 2023. 3.No significant stenosis of the bilateral internal carotid arteries. 4.CT perfusion study demonstrates no territorial ischemia or core infarct. Electronically Signed: Geoff Marte MD  2024 11:04 AM EDT  Workstation ID: [id number]    CT CEREBRAL PERFUSION WITH & WITHOUT CONTRAST    Result Date: 2024  1.No acute abnormality identified  within the large arteries of the head or neck. 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 This was also noted on study from 8/26/2023. 3.No significant stenosis of the bilateral internal carotid arteries. 4.CT perfusion study demonstrates no territorial ischemia or core infarct. Electronically Signed: Geoff Marte MD  5/27/2024 11:04 AM EDT  Workstation ID: [id number]    CT Head Without Contrast Stroke Protocol    Result Date: 5/27/2024  Impression: No acute intracranial findings. Chronic and senescent changes as above, including chronic infarcts. Electronically Signed: Fred Black MD  5/27/2024 10:40 AM EDT  Workstation ID: [id number]      Significant Labs  Sodium: 140  Creatinine: 1.00  Glucose: 102  WBC: 6.85  Hgb/HCT: 11.6/36.2  Platelets: 231    Assessment and Plan  This patient is an 84-year-old female with risk factors significant for prior stroke (left MCA territory with residual speech deficit and right facial droop), seizures (on Keppra but without med for 3 days), AF (on Eliquis), NOVA on CPAP, hypothyroid, and breast cancer s/p chemotherapy and radiation who presented to [facility] ED from home via EMS with concern for worsening right facial droop and dysarthria.  Additionally, patient states she noted eye twitching prior to onset of speech difficulty which is consistent with her prior seizure activity.  Worsened speech difficulty started acutely with last known well at 0940.  Patient was not a candidate for IV thrombolytics due to compliance with home Eliquis.  Patient was not a candidate for emergent neurointervention due to no LVO on CT imaging.    Of note, initial plan discussed with family and ED provider was to admit patient to the hospital.  I was later notified by the ED provider that the patient's POA had arrived at bedside.  At that time the patient was back to her baseline neurological status.  POA, patient, and other family requested they be  discharged home in the ED provider was in agreement at that time.  I was also in agreement with the plan following that discussion.  Later on however, after further discussion amongst the family they decided they would like the patient to be admitted for additional workup as was the initial plan.  Patient will now be admitted to general medicine for further workup with plan from stroke neurology perspective as below.    Antiplatelet PTA: None  Anticoagulant PTA: Eliquis    Right facial droop with dysarthria, worsening from baseline  -Differentials include seizure, stroke recrudescence, low concern for acute ischemic event but cannot be excluded  -Code stroke CTh with no evidence of acute intracranial abnormality  -CTa H/N with no acute abnormality, chronically occluded left M2 seen on prior imaging.  -CT perfusion with no evidence of core infarct or significant territorial ischemic tissue at risk  -MRI brain without contrast pending  -UA without evidence of UTI  -Patient loaded with 1.5 g Keppra in the ED.  Will continue 500 mg twice daily  -Recommend continuing Eliquis at home dose 5 mg twice daily  -Holding additional antithrombotics at this time due to bleeding risk.  Chart lists prior GI bleed while on NSAIDs.  Consider adding aspirin if MRI positive for acute stroke  -Allow permissive hypertension, SBP goal<180 in setting of bleeding risk on Eliquis  -NPO pending bedside swallow eval  -TTE pending  -LDL pending, start atorvastatin 80 mg nightly  -A1c pending  -Serial neurochecks per policy, stat Cth for any acute neurological change  -PT/OT/SLP as appropriate  -Recommend general neurology to follow patient starting 5/28 unless MRI brain positive for acute stroke.    Disposition: Admit to general medicine      Case discussed with the patient, family at bedside, Dr. Tracy, and Dr. Villarreal.  Thank you for the consult. Stroke neurology will continue to follow.       Burt Riley PA-C  [facility] Stroke Neurology

## 2024-05-27 NOTE — Clinical Note
Level of Care: Telemetry [5]   Diagnosis: Seizure [205090]   Bed Request Comments: seizure vs stroke

## 2024-05-27 NOTE — ED NOTES
Laury Eugene    Nursing Report ED to Floor:  Mental status: A+Ox4  Ambulatory status: Stand by/1+ assist  Oxygen Therapy:  Room air  Cardiac Rhythm: A-fib  Admitted from: Ed/Home  Safety Concerns:  Fall risk/seizure precautions  Social Issues: Dysarthria - allow extended time for her to get her words out. She gets frustrated   ED Room #:  30    ED Nurse Phone Extension - [phone] or may call [phone].      HPI:   Chief Complaint   Patient presents with    Stroke       Past Medical History:  Past Medical History:   Diagnosis Date    History of breast cancer     History of heart disease     Hypothyroidism     Multinodular goiter     Seizures     Sleep apnea     Stroke     Thyroid function test abnormal         Past Surgical History:  Past Surgical History:   Procedure Laterality Date    BLEPHAROPLASTY      BREAST RECONSTRUCTION      CATARACT EXTRACTION Bilateral     COLONOSCOPY N/A 8/27/2023    Procedure: COLONOSCOPY;  Surgeon: Brunner, Mark I, MD;  Location:  [facility];  Service: Gastroenterology;  Laterality: N/A;    ENDOSCOPY N/A 8/27/2023    Procedure: ESOPHAGOGASTRODUODENOSCOPY;  Surgeon: Brunner, Mark I, MD;  Location:  [facility];  Service: Gastroenterology;  Laterality: N/A;    HYSTERECTOMY      SIMPLE MASTECTOMY          Admitting Doctor:   Diana Vail MD    Consulting Provider(s):  Consults       Date and Time Order Name Status Description    5/27/2024 10:21 AM Inpatient Neurology Consult Stroke Completed              Admitting Diagnosis:   The primary encounter diagnosis was Acute anterior circulation TIA. Diagnoses of Seizure and Dysphagia, unspecified type were also pertinent to this visit.    Most Recent Vitals:   Vitals:    05/27/24 1415 05/27/24 1430 05/27/24 1445 05/27/24 1521   BP: 143/75 150/68 151/93 158/85   Pulse: 76 77 74 81   Resp:       Temp:       TempSrc:       SpO2: 93% 91% 94% 93%   Weight:       Height:           Active LDAs/IV Access:   Lines, Drains & Airways       Active LDAs        Name Placement date Placement time Site Days    Peripheral IV 05/27/24 1022 Right Antecubital 05/27/24  1022  Antecubital  less than 1                    Labs (abnormal labs have a star):   Labs Reviewed   CBC WITH AUTO DIFFERENTIAL - Abnormal; Notable for the following components:       Result Value    Hemoglobin 11.6 (*)     RDW 16.4 (*)     RDW-SD 57.4 (*)     All other components within normal limits   URINALYSIS W/ MICROSCOPIC IF INDICATED (NO CULTURE) - Abnormal; Notable for the following components:    Specific Gravity, UA 1.041 (*)     All other components within normal limits    Narrative:     Urine microscopic not indicated.   POCT CHEM 8 - Abnormal; Notable for the following components:    Hematocrit 36 (*)     eGFR 56.4 (*)     All other components within normal limits   SINGLE HS TROPONIN T - Normal    Narrative:     High Sensitive Troponin T Reference Range:  <14.0 ng/L- Negative Female for AMI  <22.0 ng/L- Negative Male for AMI  >=14 - Abnormal Female indicating possible myocardial injury.  >=22 - Abnormal Male indicating possible myocardial injury.   Clinicians would have to utilize clinical acumen, EKG, Troponin, and serial changes to determine if it is an Acute Myocardial Infarction or myocardial injury due to an underlying chronic condition.        APTT - Normal    Narrative:     PTT = The equivalent PTT values for the therapeutic range of heparin levels at 0.3 to 0.5 U/ml are 60 to 70 seconds.   AST - Normal   ALT - Normal   POCT PROTIME - INR - Normal   URINE CULTURE   RAINBOW DRAW    Narrative:     The following orders were created for panel order [place] Draw.  Procedure                               Abnormality         Status                     ---------                               -----------         ------                     Green Top (Gel)[[id number]]                                  Final result               Lavender Top[[id number]]                                     Final  result               Gold Top - SST[[id number]]                                   Final result               Pagan Top[[id number]]                                         Final result               Light Blue Top[[id number]]                                   Final result                 Please view results for these tests on the individual orders.   CBC AND DIFFERENTIAL    Narrative:     The following orders were created for panel order CBC & Differential.  Procedure                               Abnormality         Status                     ---------                               -----------         ------                     CBC Auto Differential[[id number]]        Abnormal            Final result                 Please view results for these tests on the individual orders.   GREEN TOP   LAVENDER TOP   GOLD TOP - SST   GRAY TOP   LIGHT BLUE TOP       Meds Given in ED:   Medications   sodium chloride 0.9 % flush 10 mL (has no administration in time range)   levETIRAcetam (KEPPRA) injection 500 mg (has no administration in time range)   levETIRAcetam (KEPPRA) injection 500 mg (has no administration in time range)   iopamidol (ISOVUE-370) 76 % injection 115 mL (115 mL Intravenous Given 5/27/24 1032)   levETIRAcetam (KEPPRA) injection 1,500 mg (1,500 mg Intravenous Given 5/27/24 1052)           Last NIH score:  Interval: baseline  1a. Level of Consciousness: 0-->Alert, keenly responsive  1b. LOC Questions: 0-->Answers both questions correctly  1c. LOC Commands: 0-->Performs both tasks correctly  2. Best Gaze: 0-->Normal  3. Visual: 0-->No visual loss  4. Facial Palsy: 2-->Partial paralysis (total or near-total paralysis of lower face) (right sided droop - family says this is normal)  5a. Motor Arm, Left: 0-->No drift, limb holds 90 (or 45) degrees for full 10 secs  5b. Motor Arm, Right: 0-->No drift, limb holds 90 (or 45) degrees for full 10 secs  6a. Motor Leg, Left: 0-->No drift, leg holds 30 degree position for full 5  secs  6b. Motor Leg, Right: 0-->No drift, leg holds 30 degree position for full 5 secs  7. Limb Ataxia: 0-->Absent  8. Sensory: 1-->Mild-to-moderate sensory loss, patient feels pinprick is less sharp or is dull on the affected side, or there is a loss of superficial pain with pinprick, but patient is aware of being touched (sensory loss noted to right side face, arm, and leg)  9. Best Language: 1-->Mild-to-moderate aphasia, some obvious loss of fluency or facility of comprehension, without significant limitation on ideas expressed or form of expression. Reduction of speech and/or comprehension, however, makes conversation. . . (see row details)  10. Dysarthria: 1-->Mild-to-moderate dysarthria, patient slurs at least some words and, at worst, can be understood with some difficulty  11. Extinction and Inattention (formerly Neglect): 0-->No abnormality    Total (NIH Stroke Scale): 5     Dysphagia screening results:  Patient Factors Component (Dysphagia:Stroke or Rule-out)  Best Eye Response: 4-->(E4) spontaneous (05/27/24 1433)  Best Motor Response: 6-->(M6) obeys commands (05/27/24 1433)  Best Verbal Response: 3-->(V3) inappropriate words (05/27/24 1433)  [place] Coma Scale Score: 13 (05/27/24 1433)  Is there Facial Asymmetry/Weakness?: Yes (05/27/24 1115)  Is there Tongue Asymmetry/Weakness?: Yes (05/27/24 1115)  Is there Palatal Asymmetry/Weakness?: Yes (05/27/24 1115)  Patient Assessment Result: (!) Fail (05/27/24 1115)     Kd Coma Scale:  No data recorded     CIWA:        Restraint Type:            Isolation Status:  No active isolations

## 2024-05-27 NOTE — H&P
[facility] Medicine Services  HISTORY AND PHYSICAL    Patient Name: Laury Eugene  : 1940  MRN: [id number]  Primary Care Physician: Leatha Melvin DO  Date of admission: 2024      Subjective   Subjective     Chief Complaint: right eyelid shakes, worsening dysarthria      HPI:  Laury Eugene is a 84 y.o. 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  Patient reports right eyelid symptoms similar to prior seizures. Out of keppra x 3 days due to limited supply of keppra XR at that pharmacy. Last admission had a seizure in setting of UTI, keppra XR 1000 mg at night was increased dose prescribed at discharge. Fatigue related to this per family  Feels her speech is still slower than normal, POA reports a spectrum of this and may be within the spectrum. Reports bilateral posterior headache.  Did not take AM medications, eliquis compliant before then per patient and family.    Personal History     Past Medical History:   Diagnosis Date    History of breast cancer     History of heart disease     Hypothyroidism     Multinodular goiter     Seizures     Sleep apnea     Stroke     Thyroid function test abnormal            Past Surgical History:   Procedure Laterality Date    BLEPHAROPLASTY      BREAST RECONSTRUCTION      CATARACT EXTRACTION Bilateral     COLONOSCOPY N/A 2023    Procedure: COLONOSCOPY;  Surgeon: Brunner, Mark I, MD;  Location:  [facility];  Service: Gastroenterology;  Laterality: N/A;    ENDOSCOPY N/A 2023    Procedure: ESOPHAGOGASTRODUODENOSCOPY;  Surgeon: Brunner, Mark I, MD;  Location:  [facility];  Service: Gastroenterology;  Laterality: N/A;    HYSTERECTOMY      SIMPLE MASTECTOMY         Family History: family history includes Heart attack in her father; Hypothyroidism in her sister; Stomach cancer in her mother.     Social History:   reports that she quit smoking about 54 years ago. Her smoking use included cigarettes. She has been exposed to tobacco smoke. She has never used smokeless tobacco. She reports current alcohol use of about 1.0 - 2.0 standard drink of alcohol per week. She reports that she does not use drugs.  Social History     Social History Narrative    Not on file       Medications:  Available home medication information reviewed.  apixaban, ascorbic acid, atorvastatin, escitalopram, folic acid, levETIRAcetam ER, metoprolol succinate XL, pantoprazole, propafenone SR, and vitamin B-12    Allergies   Allergen Reactions    Lidocaine Other (See Comments)     seizure    Novocain [Procaine] Other (See Comments)     seizures    Tnkase [Tenecteplase] Angioedema     Treated 8/26    Nsaids Other (See Comments)     Avoid NSAIDs d/t history of GIB       Objective   Objective     Vital Signs:   Temp:  [97.9 °F (36.6 °C)] 97.9 °F (36.6 °C)  Heart Rate:  [58-81] 81  Resp:  [14] 14  BP: (105-171)/(68-93) 158/85  Total (NIH Stroke Scale): 5    Physical Exam   Constitutional: Awake, alert older female sitting up in bed  Eyes: PERRLA, sclerae anicteric, no conjunctival injection  HENT: NCAT, chronic right facial droop, mucous membranes moist  Neck: Supple, no thyromegaly, no lymphadenopathy, trachea midline  Respiratory: Clear to auscultation bilaterally, nonlabored respirations   Cardiovascular: RRR, no murmurs, rubs, or gallops, palpable pedal pulses bilaterally  Gastrointestinal: Positive bowel sounds, soft, nontender, nondistended  Musculoskeletal: No bilateral ankle edema, no clubbing or cyanosis to extremities  Psychiatric: Appropriate affect, cooperative  Neurologic: Oriented, speech slow but understandable, right facial droop, strength symmetric in all extremities  Skin: No rashes    Result Review:  I have personally reviewed the results from the time of this admission to 5/27/2024 15:34 EDT and agree with these findings:  [x]  Laboratory  list / accordion  []  Microbiology  [x]  Radiology  []  EKG/Telemetry   []  Cardiology/Vascular   []  Pathology  []  Old records  []  Other:  Most notable findings include:   CT head without bleed      LAB RESULTS:      Lab 05/27/24  1024   WBC 6.85   HEMOGLOBIN 11.6*   HEMOGLOBIN, POC 12.2   HEMATOCRIT 36.2   HEMATOCRIT POC 36*   PLATELETS 231   NEUTROS ABS 3.98   IMMATURE GRANS (ABS) 0.02   LYMPHS ABS 2.01   MONOS ABS 0.58   EOS ABS 0.22   MCV 94.8   PROTIME 13.9   INR 1.2   APTT 30.5         Lab 05/27/24  1024   CREATININE 1.00   EGFR 56.4*         Lab 05/27/24  1024   ALT (SGPT) 21   AST (SGOT) 26         Lab 05/27/24  1024   HSTROP T 9                 UA          4/14/2024    15:20 5/27/2024    11:29   Urinalysis   Squamous Epithelial Cells, UA None Seen     Specific [place], UA 1.015  1.041    Ketones, UA Negative  Negative    Blood, UA Small (1+)  Negative    Leukocytes, UA Large (3+)  Negative    Nitrite, UA Positive  Negative    RBC, UA 11-20     WBC, UA Too Numerous to Count     Bacteria, UA 4+         Microbiology Results (last 10 days)       ** No results found for the last 240 hours. **            XR Chest 1 View    Result Date: 5/27/2024  XR CHEST 1 VW Date of Exam: 5/27/2024 11:22 AM EDT Indication: Acute cerebrovascular accident Comparison: 8/30/2023. Findings: The heart is slightly enlarged. There is persistent hazy density in the left mid and lower lung zones. Given the chronicity, this is felt to be chronic and may be due to scarring. There is mild interstitial prominence. The lungs and pleural spaces are otherwise clear. There are surgical clips in the left axilla. There are chronic age-related changes involving the bony thorax and thoracic aorta.     Impression: Impression: 1. Borderline cardiomegaly. 2. Persistent hazy density in the left mid and lower lung zones. Given the chronicity, this is probably due to chronic scarring. 3. Stable mild interstitial prominence. Electronically Signed: Kirstopher  MD Dahiana  5/27/2024 11:41 AM EDT  Workstation ID: [id number]    CT Angiogram Head w AI Analysis of LVO    Result Date: 5/27/2024  CT ANGIOGRAM HEAD W AI ANALYSIS OF LVO, CT ANGIOGRAM NECK, CT CEREBRAL PERFUSION W WO CONTRAST Date of Exam: 5/27/2024 10:26 AM EDT Indication: Neuro Deficit, acute, Stroke suspected Neuro deficit, acute stroke suspected. Comparison: Head/neck CTA dated 8/26/2023 Technique: CTA of the head was performed after the uneventful intravenous administration of 115 mL Isovue-370. Reconstructed coronal and sagittal images were also obtained. In addition, a 3-D volume rendered image was created for interpretation. Automated exposure control and iterative reconstruction methods were used. FINDINGS: Vascular Findings: The right common carotid, internal carotid, middle cerebral, anterior cerebral, vertebral, and posterior cerebral arteries are patent without abrupt cut off or aneurysmal dilation. 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 The left common carotid, internal carotid, middle cerebral, anterior cerebral, vertebral, and posterior cerebral arteries are patent without abrupt cut off or aneurysmal dilation. Basilar artery appears patent and appears unremarkable. Non-vascular Findings: For description of nonvascular intracranial findings, please refer to the noncontrast head CT performed the same date. No acute abnormality is identified within the visualized soft tissue or bony structures of the neck. 11 mm right thyroid nodule appears similar since 8/26/2023. The visualized lung apices are clear. Partially visualized chronic moderate compression fracture of T4. This was also present on 8/26/2023. CT Perfusion: CBF (<30%) volume: 0 mL Tmax (>6.0s) volume: 0 mL Mismatch volume: 0 mL Mismatch ratio: None     Impression: 1.No acute abnormality identified within the large arteries of the head or neck.  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 This was also noted on study from 8/26/2023. 3.No significant stenosis of the bilateral internal carotid arteries. 4.CT perfusion study demonstrates no territorial ischemia or core infarct. Electronically Signed: Geoff Marte MD  5/27/2024 11:04 AM EDT  Workstation ID: [id number]    CT Angiogram Neck    Result Date: 5/27/2024  CT ANGIOGRAM HEAD W AI ANALYSIS OF LVO, CT ANGIOGRAM NECK, CT CEREBRAL PERFUSION W WO CONTRAST Date of Exam: 5/27/2024 10:26 AM EDT Indication: Neuro Deficit, acute, Stroke suspected Neuro deficit, acute stroke suspected. Comparison: Head/neck CTA dated 8/26/2023 Technique: CTA of the head was performed after the uneventful intravenous administration of 115 mL Isovue-370. Reconstructed coronal and sagittal images were also obtained. In addition, a 3-D volume rendered image was created for interpretation. Automated exposure control and iterative reconstruction methods were used. FINDINGS: Vascular Findings: The right common carotid, internal carotid, middle cerebral, anterior cerebral, vertebral, and posterior cerebral arteries are patent without abrupt cut off or aneurysmal dilation. 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 The left common carotid, internal carotid, middle cerebral, anterior cerebral, vertebral, and posterior cerebral arteries are patent without abrupt cut off or aneurysmal dilation. Basilar artery appears patent and appears unremarkable. Non-vascular Findings: For description of nonvascular intracranial findings, please refer to the noncontrast head CT performed the same date. No acute abnormality is identified within the visualized soft tissue or bony structures of the neck. 11 mm right thyroid nodule appears similar since 8/26/2023. The visualized lung apices are clear. Partially visualized  chronic moderate compression fracture of T4. This was also present on 8/26/2023. CT Perfusion: CBF (<30%) volume: 0 mL Tmax (>6.0s) volume: 0 mL Mismatch volume: 0 mL Mismatch ratio: None     Impression: 1.No acute abnormality identified within the large arteries of the head or neck. 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 This was also noted on study from 8/26/2023. 3.No significant stenosis of the bilateral internal carotid arteries. 4.CT perfusion study demonstrates no territorial ischemia or core infarct. Electronically Signed: Geoff Marte MD  5/27/2024 11:04 AM EDT  Workstation ID: [id number]    CT CEREBRAL PERFUSION WITH & WITHOUT CONTRAST    Result Date: 5/27/2024  CT ANGIOGRAM HEAD W AI ANALYSIS OF LVO, CT ANGIOGRAM NECK, CT CEREBRAL PERFUSION W WO CONTRAST Date of Exam: 5/27/2024 10:26 AM EDT Indication: Neuro Deficit, acute, Stroke suspected Neuro deficit, acute stroke suspected. Comparison: Head/neck CTA dated 8/26/2023 Technique: CTA of the head was performed after the uneventful intravenous administration of 115 mL Isovue-370. Reconstructed coronal and sagittal images were also obtained. In addition, a 3-D volume rendered image was created for interpretation. Automated exposure control and iterative reconstruction methods were used. FINDINGS: Vascular Findings: The right common carotid, internal carotid, middle cerebral, anterior cerebral, vertebral, and posterior cerebral arteries are patent without abrupt cut off or aneurysmal dilation. 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 The left common carotid, internal carotid, middle cerebral, anterior cerebral, vertebral, and posterior cerebral arteries are patent without abrupt cut off or aneurysmal dilation. Basilar artery appears patent and appears unremarkable. Non-vascular Findings: For description of  nonvascular intracranial findings, please refer to the noncontrast head CT performed the same date. No acute abnormality is identified within the visualized soft tissue or bony structures of the neck. 11 mm right thyroid nodule appears similar since 8/26/2023. The visualized lung apices are clear. Partially visualized chronic moderate compression fracture of T4. This was also present on 8/26/2023. CT Perfusion: CBF (<30%) volume: 0 mL Tmax (>6.0s) volume: 0 mL Mismatch volume: 0 mL Mismatch ratio: None     Impression: 1.No acute abnormality identified within the large arteries of the head or neck. 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 This was also noted on study from 8/26/2023. 3.No significant stenosis of the bilateral internal carotid arteries. 4.CT perfusion study demonstrates no territorial ischemia or core infarct. Electronically Signed: Geoff Marte MD  5/27/2024 11:04 AM EDT  Workstation ID: [id number]    CT Head Without Contrast Stroke Protocol    Result Date: 5/27/2024  CT HEAD WO CONTRAST STROKE PROTOCOL Date of Exam: 5/27/2024 10:24 AM EDT Indication: Neuro deficit, acute, stroke suspected Neuro Deficit, acute, Stroke suspected. Comparison: Brain MRI 4/13/2024, head CT 8/27/2023 Technique: Axial CT images were obtained of the head without contrast administration.  Reconstructed coronal images were also obtained. Automated exposure control and iterative construction methods were used. Scan Time: 10:22 a.m. 5/27/2024 Results discussed with stroke navigator by Dr. Fred Black via telephone at 10:36 a.m 5/27/2024. Findings: There is confluent hypodensity in the left frontal lobe and left insula compatible with chronic infarct, present on prior brain MRI. There is transcortical hypodensity at the posterior right insula and superior right temporal lobe compatible with chronic  infarct, present on prior brain MRI. No evidence of acute large territory  infarct. No acute intracranial hemorrhage. No extra-axial collections or mass effect. There is minimal ex vacuo dilatation of the left lateral ventricle adjacent to the area of chronic infarct; otherwise unremarkable appearance of the ventricles. Normal appearance of the orbits. There is mucosal thickening in the left sphenoid sinus. The mastoid air cells are clear. No acute or suspicious bony findings.     Impression: Impression: No acute intracranial findings. Chronic and senescent changes as above, including chronic infarcts. Electronically Signed: Fred Black MD  5/27/2024 10:40 AM EDT  Workstation ID: [id number]     Results for orders placed during the hospital encounter of 08/26/23    Adult Transthoracic Echo Complete W/ Cont if Necessary Per Protocol    Interpretation Summary    Left ventricular systolic function is normal. Left ventricular ejection fraction appears to be 51 - 55%.      Assessment & Plan   Assessment & Plan       Seizure    Laury Eugene is a 84 y.o. 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 CT head imaging without acute abnormality, suspect seizure in setting of missed keppra x 3 days    Suspected breakthrough sz in setting of seizure disorder  -missed keppra x 3 days 2/2 lack of availability of XR medication at pharmacy  -sz precautions  -IV keppra 500 mg BID for now per neurology, s/p load  -at discharge ensure keppra 1000 mg XR nightly able to be provided given difficulty    Possible TIA, h/o CVA w residual right facial droop + dysarthria  -already on eliquis so would not    -MRI pending, ECHO pending    A-fib on eliquis - home propafenone + metoprolol (takes 1/2 dose at home per POA)  B12 deficiency - home b12  [facility] - home cpap    Patient very hopeful to return home tomorrow, 5/28    DVT prophylaxis:  Mechanical and medical DVT prophylaxis orders are signed and  held.       CODE STATUS:  D/w patient with POA present - no CPR, ok for intubation if short-term  Code Status and Medical Interventions:   Ordered at: 05/27/24 1526     Level Of Support Discussed With:    Patient    Health Care Surrogate     Code Status (Patient has no pulse and is not breathing):    No CPR (Do Not Attempt to Resuscitate)     Medical Interventions (Patient has pulse or is breathing):    Full Support     Comments:    short term OK for intubation       Expected Discharge   Expected Discharge Date: 5/28/2024; Expected Discharge Time:      Diana Vail MD  05/27/24    Level 3 note: 2/3 of the below    1) Problem  Acute or chronic illnesses or injuries that may pose a threat to life or bodily function: acute breakthrough sz    2) Data    Test reviewed:  Tests independently interpreted, see above read: CT head personally reviewed and interpreted: no acute abnormality, significant left sided chronic changes from known prior CVA    Outside group discussion: d/w ED physician, Dr Tracy, for admission

## 2024-05-27 NOTE — PLAN OF CARE
Goal Outcome Evaluation:  Plan of Care Reviewed With: patient, family                  Anticipated Discharge Disposition (SLP): home with OP services          SLP Swallowing Diagnosis: mild, oral dysphagia, R/O pharyngeal dysphagia (05/27/24 5554)

## 2024-05-27 NOTE — PLAN OF CARE
Goal Outcome Evaluation:              Outcome Evaluation: Patient was admitted to the floor r/t worsening speech and right side facial droop. NIHSS 5. She noted to have right side facial droop and dysarthria. Staff allowed extra time when asking questions. She is able to move and reposition self while in the bed. Has a care giver at bedside, alarms on and in place with call light within reach.

## 2024-05-27 NOTE — THERAPY EVALUATION
Acute Care - Speech Language Pathology   Swallow Initial Evaluation  [place]  Clinical Swallow Evaluation     Patient Name: Laury Eugene  : 1940  MRN: [id number]  Today's Date: 2024               Admit Date: 2024    Visit Dx:     ICD-10-CM ICD-9-CM   1. Acute anterior circulation TIA  G45.8 435.9   2. Seizure  R56.9 780.39   3. Dysphagia, unspecified type  R13.10 787.20     Patient Active Problem List   Diagnosis    Multinodular goiter    Abnormal thyroid function test    Hypothyroidism (acquired)    Essential hypertension    Hyperlipemia    Paroxysmal atrial fibrillation    Dysphagia    Acute lower gastrointestinal bleeding    ABLA (acute blood loss anemia)    Moderate malnutrition    History of stroke    Breakthrough seizure    Seizure     Past Medical History:   Diagnosis Date    History of breast cancer     History of heart disease     Hypothyroidism     Multinodular goiter     Seizures     Sleep apnea     Stroke     Thyroid function test abnormal      Past Surgical History:   Procedure Laterality Date    BLEPHAROPLASTY      BREAST RECONSTRUCTION      CATARACT EXTRACTION Bilateral     COLONOSCOPY N/A 2023    Procedure: COLONOSCOPY;  Surgeon: Brunner, Mark I, MD;  Location:  [facility];  Service: Gastroenterology;  Laterality: N/A;    ENDOSCOPY N/A 2023    Procedure: ESOPHAGOGASTRODUODENOSCOPY;  Surgeon: Brunner, Mark I, MD;  Location:  [facility];  Service: Gastroenterology;  Laterality: N/A;    HYSTERECTOMY      SIMPLE MASTECTOMY         SLP Recommendation and Plan  SLP Swallowing Diagnosis: mild, oral dysphagia, R/O pharyngeal dysphagia (24 1445)  SLP Diet Recommendation: regular textures, thin liquids, other (see comments) (consider NPO if decline or concerns arise) (24 1445)  Recommended Precautions and Strategies: upright posture during/after eating, general aspiration precautions, small bites of food and sips of liquid (24 1445)  SLP Rec. for  Method of Medication Administration: meds whole, with thin liquids, with puree, as tolerated (05/27/24 1445)     Monitor for Signs of Aspiration: yes, notify SLP if any concerns (05/27/24 1445)  Recommended Diagnostics: reassess via clinical swallow evaluation, other (see comments) (consider FEES should further concerns arise) (05/27/24 1445)  Swallow Criteria for Skilled Therapeutic Interventions Met: demonstrates skilled criteria (05/27/24 1445)  Anticipated Discharge Disposition (SLP): home with OP services (05/27/24 1445)  Rehab Potential/Prognosis, Swallowing: good, to achieve stated therapy goals (05/27/24 1445)     Predicted Duration Therapy Intervention (Days): 1 week (05/27/24 1445)  Oral Care Recommendations: Oral Care BID/PRN, Toothbrush (05/27/24 1445)  Demonstrates Need for Referral to Another Service: speech therapy (will f/u for further cognitive-communication assessment, as appropriate) (05/27/24 1445)             Plan of Care Reviewed With: patient, family      SWALLOW EVALUATION (Last 72 Hours)       SLP Adult Swallow Evaluation       Row Name 05/27/24 1445                   Rehab Evaluation    Document Type evaluation  -AC        Subjective Information complains of;fatigue  -AC        Patient Observations alert;cooperative  -AC        Patient/Family/Caregiver Comments/Observations Niece present.  -AC        Patient Effort good  -AC           General Information    Patient Profile Reviewed yes  -AC        Pertinent History Of Current Problem Code stroke when developed worsened R facial droop/dysarthria. CT head negative for acute stroke. Sz vs stroke recrudescence vs TIA Ddx per chart. U/a to obtain Keppra x3 days. Hx L MCA CVA 8/'23 w/ residual R facial droop and speech difficulty. FEES was completed during admission in August. Revealed oropharyngeal dysphagia w/ silent aspiration. SLP recommended soft/ground diet, no mixed consistencies, nectar-thick liquids, and no straws @ that time. Per niece,  pt's swallowing improved during IRF stay and she was eventually upgraded to a regular diet and thin liquids w/ strategies needed for oral residue clearance only. Reported bout of suspected pna in November, but otherwise no swallowing concerns. Pt has continued OP SLP services for aphasia/dysarthria since stroke.  -AC        Current Method of Nutrition NPO  -AC        Precautions/Limitations, Vision WFL;for purposes of eval  -AC        Precautions/Limitations, Hearing WFL;for purposes of eval  -AC        Prior Level of Function-Communication motor speech impairment;expressive language impairment  -AC        Prior Level of Function-Swallowing no diet consistency restrictions  -AC        Plans/Goals Discussed with patient and family;agreed upon  -AC        Barriers to Rehab previous functional deficit;medically complex  -AC        Patient's Goals for Discharge declined thickened liquids  -AC        Family Goals for Discharge family did not state  -AC           Pain    Additional Documentation Pain Scale: FACES Pre/Post-Treatment (Group)  -AC           Pain Scale: FACES Pre/Post-Treatment    Pain: FACES Scale, Pretreatment 0-->no hurt  -AC        Posttreatment Pain Rating 0-->no hurt  -AC           Oral Motor Structure and Function    Oral Lesions or Structural Abnormalities and/or variants Small dark purple hematoma along L lateral tongue.  -AC        Dentition Assessment natural, present and adequate  -AC        Secretion Management WNL/WFL  -AC        Mucosal Quality dry  -AC           Oral Musculature and Cranial Nerve Assessment    Oral Motor General Assessment oral labial or buccal impairment;lingual impairment  -AC        Oral Labial or Buccal Impairment, Detail, Cranial Nerve VII (Facial): right labial droop;reduced ROM  -AC        Lingual Impairment, Detail. Cranial Nerves IX, XII (Glossopharyngeal and Hypoglossal) reduced lingual ROM;reduced strength  -AC           General Eating/Swallowing Observations     Respiratory Support Currently in Use room air  -AC        Eating/Swallowing Skills self-fed;appropriate self-feeding skills observed  -AC        Positioning During Eating upright 90 degree;upright in bed  -AC        Utensils Used spoon;cup;straw  -AC        Consistencies Trialed thin liquids;pureed;regular textures  -AC           Clinical Swallow Eval    Oral Prep Phase impaired  -AC        Oral Transit WFL  -AC        Oral Residue WFL  -AC        Pharyngeal Phase suspected pharyngeal impairment  -AC        Esophageal Phase suspected esophageal impairment  -AC           Oral Prep Concerns    Oral Prep Concerns increased prep time  -AC        Increased Prep Time regular consistencies  -AC        Oral Prep Concerns, Comment Increased, but adequate mastication and no significant oral residue noted.  -AC           Pharyngeal Phase Concerns    Pharyngeal Phase Concerns throat clear;cough  -AC        Cough other (see comments)  w/ initial ice chip only  -AC        Throat Clear thin;other (see comments)  x1 after 3 oz H2O test  -AC        Pharyngeal Phase Concerns, Comment Cough noted w/ initial ice chip--family feel that r/t allergies. No overt clinical s/sxs aspiration while triaing several single bites/drinks thin liquid via cup/straw, puree, or solid. Noted throat clear x1 following 3oz H2O test. Pt's family feel swallowing is @ baseline. Discussed silent aspiration/risks and options for further assessment and mgt. Pt refused thickened liquids, despite stating that she understood risks. Agreeable to use of strict aspiration precautions. May consider instrumental swallow study should further concerns arise.  -AC           Esophageal Phase Concerns    Esophageal Phase Concerns belching  -AC        Belching other (see comments)  end of eval  -AC           Swallowing Quality of Life Assessment    Education and counseling provided Signs of aspiration;Silent aspiration;Risks of aspiration;Safest diet options;Aspiration  precautions  -           SLP Evaluation Clinical Impression    SLP Swallowing Diagnosis mild;oral dysphagia;R/O pharyngeal dysphagia  -        Functional Impact risk of aspiration/pneumonia  -        Rehab Potential/Prognosis, Swallowing good, to achieve stated therapy goals  -        Swallow Criteria for Skilled Therapeutic Interventions Met demonstrates skilled criteria  -           Recommendations    Predicted Duration Therapy Intervention (Days) 1 week  -        SLP Diet Recommendation regular textures;thin liquids;other (see comments)  consider NPO if decline or concerns arise  -        Recommended Diagnostics reassess via clinical swallow evaluation;other (see comments)  consider FEES should further concerns arise  -        Recommended Precautions and Strategies upright posture during/after eating;general aspiration precautions;small bites of food and sips of liquid  -        Oral Care Recommendations Oral Care BID/PRN;Toothbrush  -        SLP Rec. for Method of Medication Administration meds whole;with thin liquids;with puree;as tolerated  -        Monitor for Signs of Aspiration yes;notify SLP if any concerns  -        Anticipated Discharge Disposition (SLP) home with OP services  -        Demonstrates Need for Referral to Another Service speech therapy  will f/u for further cognitive-communication assessment, as appropriate  -                  User Key  (r) = Recorded By, (t) = Taken By, (c) = Cosigned By      Initials Name Effective Dates     Lulu Herrera, MS [facility]SLP 02/03/23 -                     EDUCATION  The patient has been educated in the following areas:   Dysphagia (Swallowing Impairment).                Time Calculation:    Time Calculation- SLP       Row Name 05/27/24 1533             Time Calculation- SLP    SLP Start Time 1445  -      SLP Received On 05/27/24  -         Untimed Charges    06247-ER Eval Oral Pharyng Swallow Minutes 54  -         Total  Minutes    Untimed Charges Total Minutes 54  -AC       Total Minutes 54  -AC                User Key  (r) = Recorded By, (t) = Taken By, (c) = Cosigned By      Initials Name Provider Type    AC Lulu Herrera MS CCC-SLP Speech and Language Pathologist                    Therapy Charges for Today       Code Description Service Date Service Provider Modifiers Qty    [id number] HC ST EVAL ORAL PHARYNG SWALLOW 4 5/27/2024 Lulu Herrera MS CCC-SLP GN 1                 Lulu Herrera MS CCC-SLP  5/27/2024

## 2024-05-28 ENCOUNTER — APPOINTMENT (OUTPATIENT)
Dept: MRI IMAGING | Facility: HOSPITAL | Age: 84
End: 2024-05-28
Payer: MEDICARE

## 2024-05-28 ENCOUNTER — APPOINTMENT (OUTPATIENT)
Dept: CARDIOLOGY | Facility: HOSPITAL | Age: 84
End: 2024-05-28
Payer: MEDICARE

## 2024-05-28 VITALS
BODY MASS INDEX: 27.48 KG/M2 | SYSTOLIC BLOOD PRESSURE: 105 MMHG | HEIGHT: 60 IN | OXYGEN SATURATION: 97 % | HEART RATE: 56 BPM | WEIGHT: 140 LBS | RESPIRATION RATE: 20 BRPM | DIASTOLIC BLOOD PRESSURE: 78 MMHG | TEMPERATURE: 97.7 F

## 2024-05-28 LAB
CHOLEST SERPL-MCNC: 140 MG/DL (ref 0–200)
HBA1C MFR BLD: 5.5 % (ref 4.8–5.6)
HDLC SERPL-MCNC: 65 MG/DL (ref 40–60)
LDLC SERPL CALC-MCNC: 63 MG/DL (ref 0–100)
LDLC/HDLC SERPL: 0.97 {RATIO}
TRIGL SERPL-MCNC: 59 MG/DL (ref 0–150)
VLDLC SERPL-MCNC: 12 MG/DL (ref 5–40)

## 2024-05-28 PROCEDURE — 99239 HOSP IP/OBS DSCHRG MGMT >30: CPT | Performed by: INTERNAL MEDICINE

## 2024-05-28 PROCEDURE — 70551 MRI BRAIN STEM W/O DYE: CPT

## 2024-05-28 PROCEDURE — 92610 EVALUATE SWALLOWING FUNCTION: CPT

## 2024-05-28 PROCEDURE — 99214 OFFICE O/P EST MOD 30 MIN: CPT

## 2024-05-28 PROCEDURE — G0378 HOSPITAL OBSERVATION PER HR: HCPCS

## 2024-05-28 PROCEDURE — 97161 PT EVAL LOW COMPLEX 20 MIN: CPT

## 2024-05-28 PROCEDURE — 83036 HEMOGLOBIN GLYCOSYLATED A1C: CPT

## 2024-05-28 PROCEDURE — 96376 TX/PRO/DX INJ SAME DRUG ADON: CPT

## 2024-05-28 PROCEDURE — 80061 LIPID PANEL: CPT

## 2024-05-28 PROCEDURE — 97166 OT EVAL MOD COMPLEX 45 MIN: CPT

## 2024-05-28 PROCEDURE — 93306 TTE W/DOPPLER COMPLETE: CPT

## 2024-05-28 PROCEDURE — 25010000002 LEVETRIRACETAM PER 10 MG: Performed by: INTERNAL MEDICINE

## 2024-05-28 RX ORDER — LEVETIRACETAM 500 MG/1
500 TABLET, FILM COATED, EXTENDED RELEASE ORAL 2 TIMES DAILY
Qty: 180 TABLET | Refills: 0 | Status: SHIPPED | OUTPATIENT
Start: 2024-05-28

## 2024-05-28 RX ORDER — METOPROLOL SUCCINATE 25 MG/1
12.5 TABLET, EXTENDED RELEASE ORAL DAILY
Start: 2024-05-29

## 2024-05-28 RX ORDER — LEVETIRACETAM 500 MG/1
1000 TABLET, FILM COATED, EXTENDED RELEASE ORAL NIGHTLY
Status: DISCONTINUED | OUTPATIENT
Start: 2024-05-28 | End: 2024-05-28 | Stop reason: HOSPADM

## 2024-05-28 RX ADMIN — FOLIC ACID TAB 400 MCG 800 MCG: 400 TAB at 08:12

## 2024-05-28 RX ADMIN — ESCITALOPRAM OXALATE 10 MG: 10 TABLET ORAL at 08:12

## 2024-05-28 RX ADMIN — Medication 10 ML: at 08:14

## 2024-05-28 RX ADMIN — CYANOCOBALAMIN TAB 1000 MCG 1000 MCG: 1000 TAB at 08:13

## 2024-05-28 RX ADMIN — PANTOPRAZOLE SODIUM 40 MG: 40 TABLET, DELAYED RELEASE ORAL at 08:12

## 2024-05-28 RX ADMIN — APIXABAN 5 MG: 5 TABLET, FILM COATED ORAL at 08:12

## 2024-05-28 RX ADMIN — OXYCODONE HYDROCHLORIDE AND ACETAMINOPHEN 500 MG: 500 TABLET ORAL at 08:12

## 2024-05-28 RX ADMIN — LEVETIRACETAM 500 MG: 100 INJECTION, SOLUTION INTRAVENOUS at 08:12

## 2024-05-28 RX ADMIN — PROPAFENONE HYDROCHLORIDE 325 MG: 325 CAPSULE, EXTENDED RELEASE ORAL at 08:12

## 2024-05-28 NOTE — THERAPY RE-EVALUATION
Acute Care - Speech Language Pathology   Swallow Re-Evaluation  [place]  Clinical Swallow Re-Evaluation       Patient Name: Laury Eugene  : 1940  MRN: [id number]  Today's Date: 2024               Admit Date: 2024    Visit Dx:     ICD-10-CM ICD-9-CM   1. Acute anterior circulation TIA  G45.8 435.9   2. Seizure  R56.9 780.39   3. Dysphagia, unspecified type  R13.10 787.20     Patient Active Problem List   Diagnosis    Multinodular goiter    Abnormal thyroid function test    Hypothyroidism (acquired)    Essential hypertension    Hyperlipemia    Paroxysmal atrial fibrillation    Dysphagia    Acute lower gastrointestinal bleeding    ABLA (acute blood loss anemia)    Moderate malnutrition    History of stroke    Breakthrough seizure    Seizure     Past Medical History:   Diagnosis Date    History of breast cancer     History of heart disease     Hypothyroidism     Multinodular goiter     Seizures     Sleep apnea     Stroke     Thyroid function test abnormal      Past Surgical History:   Procedure Laterality Date    BLEPHAROPLASTY      BREAST RECONSTRUCTION      CATARACT EXTRACTION Bilateral     COLONOSCOPY N/A 2023    Procedure: COLONOSCOPY;  Surgeon: Brunner, Mark I, MD;  Location:  [facility];  Service: Gastroenterology;  Laterality: N/A;    ENDOSCOPY N/A 2023    Procedure: ESOPHAGOGASTRODUODENOSCOPY;  Surgeon: Brunner, Mark I, MD;  Location:  [facility];  Service: Gastroenterology;  Laterality: N/A;    HYSTERECTOMY      SIMPLE MASTECTOMY         SLP Recommendation and Plan  SLP Swallowing Diagnosis: mild, oral dysphagia, functional pharyngeal phase (24 0815)  SLP Diet Recommendation: regular textures, thin liquids (24 0815)  Recommended Precautions and Strategies: upright posture during/after eating, general aspiration precautions (24)  SLP Rec. for Method of Medication Administration: meds whole, with thin liquids (2415)     Monitor for  Signs of Aspiration: notify SLP if any concerns (05/28/24 0815)  Recommended Diagnostics: SLE/Cog/Motor Speech Evaluation (05/28/24 0815)  Swallow Criteria for Skilled Therapeutic Interventions Met: no problems identified which require skilled intervention (05/28/24 0815)        Therapy Frequency (Swallow): evaluation only (05/28/24 0815)     Oral Care Recommendations: Oral Care BID/PRN, Toothbrush (05/28/24 0815)        Daily Summary of Progress (SLP): progress toward functional goals as expected (05/28/24 0815)               Treatment Assessment (SLP): oral dysphagia (Baseline oral dysphagia. No overt pharyngeal signs with PO trials) (05/28/24 0815)  Treatment Assessment Comments (SLP): Follow up re: swallowing.  Baseline facial asymmetry with labial and buccal impairment.  Pt is able to compensate for weakness/impairment.  Drooling and anterior loss intermittently on the right- baseline.  No pharyngeal signs, however suspect intermittent difficulty due to impaired coordination. Pt has been on regular/thin diet since her discharge from inpatient rehab without difficulty.  Appears safe to continue with regular/thin at this time. Pt is not agreeable to modified diet right now. (05/28/24 0815)  Plan for Continued Treatment (SLP): other (see comments) (SLP to assess SLC later today.) (05/28/24 0815)         Outcome Evaluation: Swallowing reevaluation completed. Appears to be back to baseline re: swallowing.  SLP to follow up re: communication later today.      SWALLOW EVALUATION (Last 72 Hours)       SLP Adult Swallow Evaluation       Row Name 05/28/24 0815 05/27/24 9720                Rehab Evaluation    Document Type re-evaluation  -ML evaluation  -AC       Subjective Information no complaints  -ML complains of;fatigue  -AC       Patient Observations alert;cooperative  -ML alert;cooperative  -AC       Patient/Family/Caregiver Comments/Observations Caregiver present  -ML Niece present.  -AC       Patient Effort good   -ML good  -AC          General Information    Patient Profile Reviewed yes  -ML yes  -AC       Pertinent History Of Current Problem Seizure susptected.  See ST hx.  -ML Code stroke when developed worsened R facial droop/dysarthria. CT head negative for acute stroke. Sz vs stroke recrudescence vs TIA Ddx per chart. U/a to obtain Keppra x3 days. Hx L MCA CVA 8/'23 w/ residual R facial droop and speech difficulty. FEES was completed during admission in August. Revealed oropharyngeal dysphagia w/ silent aspiration. SLP recommended soft/ground diet, no mixed consistencies, nectar-thick liquids, and no straws @ that time. Per niece, pt's swallowing improved during IRF stay and she was eventually upgraded to a regular diet and thin liquids w/ strategies needed for oral residue clearance only. Reported bout of suspected pna in November, but otherwise no swallowing concerns. Pt has continued OP SLP services for aphasia/dysarthria since stroke.  -AC       Current Method of Nutrition regular textures;thin liquids  -ML NPO  -AC       Precautions/Limitations, Vision -- WFL;for purposes of eval  -AC       Precautions/Limitations, Hearing -- WFL;for purposes of eval  -AC       Prior Level of Function-Communication -- motor speech impairment;expressive language impairment  -AC       Prior Level of Function-Swallowing -- no diet consistency restrictions  -AC       Plans/Goals Discussed with -- patient and family;agreed upon  -AC       Barriers to Rehab -- previous functional deficit;medically complex  -AC       Patient's Goals for Discharge -- declined thickened liquids  -AC       Family Goals for Discharge -- family did not state  -AC          Pain    Additional Documentation Pain Scale: Numbers Pre/Post-Treatment (Group)  -ML Pain Scale: FACES Pre/Post-Treatment (Group)  -AC          Pain Scale: Numbers Pre/Post-Treatment    Pretreatment Pain Rating 0/10 - no pain  -ML --       Posttreatment Pain Rating 0/10 - no pain  -ML --           Pain Scale: FACES Pre/Post-Treatment    Pain: FACES Scale, Pretreatment -- 0-->no hurt  -AC       Posttreatment Pain Rating -- 0-->no hurt  -AC          Oral Motor Structure and Function    Oral Lesions or Structural Abnormalities and/or variants -- Small dark purple hematoma along L lateral tongue.  -AC       Dentition Assessment -- natural, present and adequate  -AC       Secretion Management -- WNL/WFL  -AC       Mucosal Quality -- dry  -AC          Oral Musculature and Cranial Nerve Assessment    Oral Motor General Assessment oral labial or buccal impairment;lingual impairment  -ML oral labial or buccal impairment;lingual impairment  -AC       Oral Labial or Buccal Impairment, Detail, Cranial Nerve VII (Facial): right labial droop;reduced ROM  -ML right labial droop;reduced ROM  -AC       Lingual Impairment, Detail. Cranial Nerves IX, XII (Glossopharyngeal and Hypoglossal) reduced lingual ROM;reduced strength  -ML reduced lingual ROM;reduced strength  -AC       Oral Motor, Comment Apraxia present impacting cough/throat clear  -ML --          General Eating/Swallowing Observations    Respiratory Support Currently in Use room air  -ML room air  -AC       Eating/Swallowing Skills self-fed;appropriate self-feeding skills observed  -ML self-fed;appropriate self-feeding skills observed  -AC       Positioning During Eating upright 90 degree;upright in bed  -ML upright 90 degree;upright in bed  -AC       Utensils Used -- spoon;cup;straw  -AC       Consistencies Trialed -- thin liquids;pureed;regular textures  -AC          Clinical Swallow Eval    Oral Prep Phase -- impaired  -AC       Oral Transit -- WFL  -AC       Oral Residue -- WFL  -AC       Pharyngeal Phase -- suspected pharyngeal impairment  -AC       Esophageal Phase -- suspected esophageal impairment  -AC          Oral Prep Concerns    Oral Prep Concerns -- increased prep time  -AC       Increased Prep Time -- regular consistencies  -AC       Oral Prep  Concerns, Comment -- Increased, but adequate mastication and no significant oral residue noted.  -AC          Pharyngeal Phase Concerns    Pharyngeal Phase Concerns -- throat clear;cough  -AC       Cough -- other (see comments)  w/ initial ice chip only  -AC       Throat Clear -- thin;other (see comments)  x1 after 3 oz H2O test  -AC       Pharyngeal Phase Concerns, Comment -- Cough noted w/ initial ice chip--family feel that r/t allergies. No overt clinical s/sxs aspiration while triaing several single bites/drinks thin liquid via cup/straw, puree, or solid. Noted throat clear x1 following 3oz H2O test. Pt's family feel swallowing is @ baseline. Discussed silent aspiration/risks and options for further assessment and mgt. Pt refused thickened liquids, despite stating that she understood risks. Agreeable to use of strict aspiration precautions. May consider instrumental swallow study should further concerns arise.  -AC          Esophageal Phase Concerns    Esophageal Phase Concerns -- belching  -AC       Belching -- other (see comments)  end of eval  -          Swallowing Quality of Life Assessment    Education and counseling provided -- Signs of aspiration;Silent aspiration;Risks of aspiration;Safest diet options;Aspiration precautions  -          SLP Evaluation Clinical Impression    SLP Swallowing Diagnosis mild;oral dysphagia;functional pharyngeal phase  -ML mild;oral dysphagia;R/O pharyngeal dysphagia  -       Functional Impact risk of aspiration/pneumonia  -ML risk of aspiration/pneumonia  -       Rehab Potential/Prognosis, Swallowing -- good, to achieve stated therapy goals  -       Swallow Criteria for Skilled Therapeutic Interventions Met no problems identified which require skilled intervention  -ML demonstrates skilled criteria  -          SLP Treatment Clinical Impressions    Treatment Assessment (SLP) oral dysphagia  Baseline oral dysphagia. No overt pharyngeal signs with PO trials  -ML --        Treatment Assessment Comments (SLP) Follow up re: swallowing.  Baseline facial asymmetry with labial and buccal impairment.  Pt is able to compensate for weakness/impairment.  Drooling and anterior loss intermittently on the right- baseline.  No pharyngeal signs, however suspect intermittent difficulty due to impaired coordination. Pt has been on regular/thin diet since her discharge from inpatient rehab without difficulty.  Appears safe to continue with regular/thin at this time. Pt is not agreeable to modified diet right now.  -ML --       Daily Summary of Progress (SLP) progress toward functional goals as expected  -ML --       Barriers to Overall Progress (SLP) Baseline deficits  -ML --       Plan for Continued Treatment (SLP) other (see comments)  SLP to assess SLC later today.  -ML --       Care Plan Review evaluation/treatment results reviewed;patient/other agree to care plan  -ML --       Care Plan Review, Other Participant(s) caregiver  -ML --          Recommendations    Therapy Frequency (Swallow) evaluation only  -ML --       Predicted Duration Therapy Intervention (Days) -- 1 week  -       SLP Diet Recommendation regular textures;thin liquids  -ML regular textures;thin liquids;other (see comments)  consider NPO if decline or concerns arise  -       Recommended Diagnostics SLE/Cog/Motor Speech Evaluation  -ML reassess via clinical swallow evaluation;other (see comments)  consider FEES should further concerns arise  -       Recommended Precautions and Strategies upright posture during/after eating;general aspiration precautions  -ML upright posture during/after eating;general aspiration precautions;small bites of food and sips of liquid  -       Oral Care Recommendations Oral Care BID/PRN;Toothbrush  -ML Oral Care BID/PRN;Toothbrush  -AC       SLP Rec. for Method of Medication Administration meds whole;with thin liquids  -ML meds whole;with thin liquids;with puree;as tolerated  -AC        Monitor for Signs of Aspiration notify SLP if any concerns  -ML yes;notify SLP if any concerns  -AC       Anticipated Discharge Disposition (SLP) -- home with OP services  -AC       Demonstrates Need for Referral to Another Service -- speech therapy  will f/u for further cognitive-communication assessment, as appropriate  -AC                 User Key  (r) = Recorded By, (t) = Taken By, (c) = Cosigned By      Initials Name Effective Dates    AC Lulu Herrera, MS MEHDI-SLP 02/03/23 -     Any Tenorio CCC-SLP 06/16/21 -                     EDUCATION  The patient has been educated in the following areas:   Dysphagia (Swallowing Impairment) Aspiration precautions/oral care .                Time Calculation:    Time Calculation- SLP       Row Name 05/28/24 1021             Time Calculation- SLP    SLP Start Time 0815  -ML      SLP Received On 05/28/24  -ML                User Key  (r) = Recorded By, (t) = Taken By, (c) = Cosigned By      Initials Name Provider Type    Any Tenorio, CCC-SLP Speech and Language Pathologist                    Therapy Charges for Today       Code Description Service Date Service Provider Modifiers Qty    [id number]  ST EVAL ORAL PHARYNG SWALLOW 3 5/28/2024 Any Burgos CCC-SLP GN 1                 JEREMI Petersen  5/28/2024

## 2024-05-28 NOTE — THERAPY DISCHARGE NOTE
Patient Name: Laury Eugene  : 1940    MRN: [id number]                              Today's Date: 2024       Admit Date: 2024    Visit Dx:     ICD-10-CM ICD-9-CM   1. Acute anterior circulation TIA  G45.8 435.9   2. Seizure  R56.9 780.39   3. Dysphagia, unspecified type  R13.10 787.20     Patient Active Problem List   Diagnosis    Multinodular goiter    Abnormal thyroid function test    Hypothyroidism (acquired)    Essential hypertension    Hyperlipemia    Paroxysmal atrial fibrillation    Dysphagia    Acute lower gastrointestinal bleeding    ABLA (acute blood loss anemia)    Moderate malnutrition    History of stroke    Breakthrough seizure    Seizure     Past Medical History:   Diagnosis Date    History of breast cancer     History of heart disease     Hypothyroidism     Multinodular goiter     Seizures     Sleep apnea     Stroke     Thyroid function test abnormal      Past Surgical History:   Procedure Laterality Date    BLEPHAROPLASTY      BREAST RECONSTRUCTION      CATARACT EXTRACTION Bilateral     COLONOSCOPY N/A 2023    Procedure: COLONOSCOPY;  Surgeon: Brunner, Mark I, MD;  Location:  "[facility];  Service: Gastroenterology;  Laterality: N/A;    ENDOSCOPY N/A 2023    Procedure: ESOPHAGOGASTRODUODENOSCOPY;  Surgeon: Brunner, Mark I, MD;  Location: [facility];  Service: Gastroenterology;  Laterality: N/A;    HYSTERECTOMY      SIMPLE MASTECTOMY        General Information       Row Name 24 1044          Physical Therapy Time and Intention    Document Type discharge evaluation/summary  -CD     Mode of Treatment physical therapy  -CD       Row Name 24 1044          General Information    Patient Profile Reviewed yes  -CD     Prior Level of Function independent:;gait;transfer;bed mobility;min assist:;ADL's;using stairs;dependent:;home management;driving  -CD     Existing Precautions/Restrictions fall;seizures  remote LCVA with residual R facial droop and  dysarthria  -CD     Barriers to Rehab medically complex;previous functional deficit  -CD       Row Name 05/28/24 1044          Living Environment    People in Home other (see comments)  PT HAS 24/7 CAREGIVER ASSIST/SUPERVISION.  -CD       Row Name 05/28/24 1044          Home Main Entrance    Number of Stairs, Main Entrance three  -CD     Stair Railings, Main Entrance railings safe and in good condition  -CD       Row Name 05/28/24 1044          Stairs Within Home, Primary    Number of Stairs, Within Home, Primary ten  -CD     Stair Railings, Within Home, Primary railings safe and in good condition  -CD       Row Name 05/28/24 1044          Cognition    Orientation Status (Cognition) oriented to;person;place;time  -CD       Row Name 05/28/24 1044          Safety Issues, Functional Mobility    Impairments Affecting Function (Mobility) coordination;other (see comments)  SLURRED SPEECH, IMPAIRED COORDINATION L LE.  -CD               User Key  (r) = Recorded By, (t) = Taken By, (c) = Cosigned By      Initials Name Provider Type    CD Amanda Cortez, PT Physical Therapist                   Mobility       Row Name 05/28/24 1049          Bed Mobility    Bed Mobility supine-sit  -CD     Supine-Sit [place] (Bed Mobility) independent  -CD     Assistive Device (Bed Mobility) bed rails;head of bed elevated  -CD     Comment, (Bed Mobility) DENIED DIZZINESS. INCREASED EFFORT BUT COMPLETED INDEPENDENTLY. MILD POSTERIOR LEAN UPON SITTING EOB BUT NO LOB.  -CD       Row Name 05/28/24 1049          Transfers    Comment, (Transfers) CUES FOR HAND PLACEMENT.  -CD       Row Name 05/28/24 1049          Sit-Stand Transfer    Sit-Stand [place] (Transfers) supervision  -CD     Assistive Device (Sit-Stand Transfers) walker, front-wheeled  -CD       Row Name 05/28/24 1049          Gait/Stairs (Locomotion)    [place] Level (Gait) supervision  -CD     Distance in Feet (Gait) 400  -CD     Deviations/Abnormal Patterns (Gait) gait  speed decreased;yaritza decreased  -CD     Comment, (Gait/Stairs) AMBULATED 200 FEET WITH R WX  FEET WITHOUT A.D. GAIT STEADY WITHOUT A.D. COMPLETED TOILETING AND ADL'S AT SINK WITH SUPERVISION.  -CD               User Key  (r) = Recorded By, (t) = Taken By, (c) = Cosigned By      Initials Name Provider Type     Amanda Cortez PT Physical Therapist                   Obj/Interventions       Row Name 05/28/24 1052          Range of Motion Comprehensive    General Range of Motion bilateral lower extremity ROM WFL  -CD       Row Name 05/28/24 1052          Strength Comprehensive (MMT)    General Manual Muscle Testing (MMT) Assessment no strength deficits identified  -CD     Comment, General Manual Muscle Testing (MMT) Assessment B LE GROSSLY 4+/5 AND SYMMETRICAL.  -CD       Row Name 05/28/24 1052          Motor Skills    Motor Skills coordination;functional endurance  -CD     Coordination gross motor deficit;left;lower extremity;minimal impairment  -CD     Functional Endurance O2 SATS STABLE ON RA.  -CD       Row Name 05/28/24 1052          Balance    Balance Assessment sitting static balance;sitting dynamic balance;sit to stand dynamic balance;standing static balance;standing dynamic balance  -CD     Static Sitting Balance independent  -CD     Dynamic Sitting Balance independent  -CD     Position, Sitting Balance unsupported  -CD     Sit to Stand Dynamic Balance supervision  -CD     Static Standing Balance supervision  -CD     Dynamic Standing Balance supervision  -CD     Position/Device Used, Standing Balance unsupported  -CD     Comment, Balance NO LOB WITH GAIT IN FROST OR DURING ADL'S IN BATHROOM.  -CD       Row Name 05/28/24 1052          Sensory Assessment (Somatosensory)    Sensory Assessment (Somatosensory) left LE  -CD     Left LE Sensory Assessment light touch awareness;impaired  -CD               User Key  (r) = Recorded By, (t) = Taken By, (c) = Cosigned By      Initials Name Provider Type    CD  Amanda Cortez, PT Physical Therapist                   Goals/Plan    No documentation.                  Clinical Impression       Row Name 05/28/24 1056          Pain    Pretreatment Pain Rating 0/10 - no pain  -CD     Posttreatment Pain Rating 0/10 - no pain  -CD       Row Name 05/28/24 1056          Plan of Care Review    Plan of Care Reviewed With patient;caregiver  -CD     Outcome Evaluation PT PRESENTS LIKELY CLOSE TO RECENT BASELINE FOLLOWING CVA 10/1023. PT AMBULATED 400 FEET AND COMPLETED ADL'S IN BATHROOM WITH SUPERVISION. PT IS A&O AND FOLLOWS ALL COMMANDS. VITALS STABLE. RECOMMEND HOME WITH CONTINUED CG ASSIST P.T. IS SIGNING OFF. PT IS SAFE TO BE UP AD [place] WITH CAREGIVER SUPERVISION. NSG NOTIFIED.  -CD       Row Name 05/28/24 1056          Therapy Assessment/Plan (PT)    Patient/Family Therapy Goals Statement (PT) TO GO HOME.  -CD     Criteria for Skilled Interventions Met (PT) no;no problems identified which require skilled intervention  -CD     Therapy Frequency (PT) evaluation only  -CD       Row Name 05/28/24 1056          Vital Signs    Pre Systolic BP Rehab 134  -CD     Pre Treatment Diastolic BP 53  -CD     Post Systolic BP Rehab 126  -CD     Post Treatment Diastolic BP 47  -CD     Pretreatment Heart Rate (beats/min) 57  -CD     Posttreatment Heart Rate (beats/min) 55  -CD     Pre SpO2 (%) 94  -CD     O2 Delivery Pre Treatment room air  -CD     O2 Delivery Intra Treatment room air  -CD     Post SpO2 (%) 94  -CD     O2 Delivery Post Treatment room air  -CD     Pre Patient Position Supine  -CD     Intra Patient Position Standing  -CD     Post Patient Position Sitting  -CD       Row Name 05/28/24 1056          Positioning and Restraints    Pre-Treatment Position in bed  -CD     Post Treatment Position chair  -CD     In Chair reclined;call light within reach;with family/caregiver;notified nsg  -CD               User Key  (r) = Recorded By, (t) = Taken By, (c) = Cosigned By      Initials Name Provider  Type    CD Amanda Cortez, PT Physical Therapist                   Outcome Measures       Row Name 05/28/24 1104 05/28/24 0800       How much help from another person do you currently need...    Turning from your back to your side while in flat bed without using bedrails? 4  -CD 4  -AS    Moving from lying on back to sitting on the side of a flat bed without bedrails? -- 4  -AS    Moving to and from a bed to a chair (including a wheelchair)? 4  -CD 4  -AS    Standing up from a chair using your arms (e.g., wheelchair, bedside chair)? 4  -CD 4  -AS    Climbing 3-5 steps with a railing? 3  -CD 3  -AS    To walk in hospital room? 4  -CD 4  -AS    AM-PAC 6 Clicks Score (PT) -- 23  -AS    Highest Level of Mobility Goal -- 7 --> Walk 25 feet or more  -AS      Row Name 05/28/24 1104 05/28/24 1050       Modified Bulloch Scale    Modified Bulloch Scale 1 - No significant disability despite symptoms.  Able to carry out all usual duties and activities.  BASELINE DEFICITS FROM CVA 10/2023  -CD 3 - Moderate disability.  Requiring some help, but able to walk without assistance.  -CS      Row Name 05/28/24 1050          Functional Assessment    Outcome Measure Options AM-PAC 6 Clicks Daily Activity (OT);Modified Bulloch  -CS               User Key  (r) = Recorded By, (t) = Taken By, (c) = Cosigned By      Initials Name Provider Type    CD Amanda Cortez, PT Physical Therapist    CS Sandip Loo, OT Occupational Therapist    AS Allison Mart, RN Registered Nurse                  Physical Therapy Education       Title: PT OT SLP Therapies (In Progress)       Topic: Physical Therapy (Done)       Point: Mobility training (Done)       Learning Progress Summary             Patient Acceptance, E, VU,NR by CD at 5/28/2024 1105    Comment: SEE FLOWSHEET - SAFETY WITH MOBILITY, D/C REC'S/                         Point: Home exercise program (Done)       Learning Progress Summary             Patient Acceptance, E, VU,NR by CD at 5/28/2024  1105    Comment: SEE FLOWSHEET - SAFETY WITH MOBILITY, D/C REC'S/                         Point: Body mechanics (Done)       Learning Progress Summary             Patient Acceptance, E, VU,NR by CD at 5/28/2024 1105    Comment: SEE FLOWSHEET - SAFETY WITH MOBILITY, D/C REC'S/                         Point: Precautions (Done)       Learning Progress Summary             Patient Acceptance, E, VU,NR by CD at 5/28/2024 1105    Comment: SEE FLOWSHEET - SAFETY WITH MOBILITY, D/C REC'S/                                         User Key       Initials Effective Dates Name Provider Type Discipline    CD 02/03/23 -  Amanda Cortez PT Physical Therapist PT                  PT Recommendation and Plan     Plan of Care Reviewed With: patient, caregiver  Outcome Evaluation: PT PRESENTS LIKELY CLOSE TO RECENT BASELINE FOLLOWING CVA 10/1023. PT AMBULATED 400 FEET AND COMPLETED ADL'S IN BATHROOM WITH SUPERVISION. PT IS A&O AND FOLLOWS ALL COMMANDS. VITALS STABLE. RECOMMEND HOME WITH CONTINUED CG ASSIST P.T. IS SIGNING OFF. PT IS SAFE TO BE UP AD [place] WITH CAREGIVER SUPERVISION. NSG NOTIFIED.     Time Calculation:   PT Evaluation Complexity  History, PT Evaluation Complexity: 3 or more personal factors and/or comorbidities  Examination of Body Systems (PT Eval Complexity): total of 4 or more elements  Clinical Presentation (PT Evaluation Complexity): stable  Clinical Decision Making (PT Evaluation Complexity): low complexity  Overall Complexity (PT Evaluation Complexity): low complexity     PT Charges       Row Name 05/28/24 1106             Time Calculation    Start Time 1010  -CD      PT Received On 05/28/24  -CD         Untimed Charges    PT Eval/Re-eval Minutes 64  -CD         Total Minutes    Untimed Charges Total Minutes 64  -CD       Total Minutes 64  -CD                User Key  (r) = Recorded By, (t) = Taken By, (c) = Cosigned By      Initials Name Provider Type    CD Amanda Cortez PT Physical Therapist                   Therapy Charges for Today       Code Description Service Date Service Provider Modifiers Qty    [id number]  PT EVAL LOW COMPLEXITY 4 5/28/2024 Amanda Cortez, PT GP 1            PT G-Codes  Outcome Measure Options: AM-PAC 6 Clicks Daily Activity (OT), Modified Thad  AM-PAC 6 Clicks Score (PT): 23  AM-PAC 6 Clicks Score (OT): 21  Modified [place] Scale: 1 - No significant disability despite symptoms.  Able to carry out all usual duties and activities. (BASELINE DEFICITS FROM CVA 10/2023)    PT Discharge Summary  Anticipated Discharge Disposition (PT): home with assist  Reason for Discharge: At baseline function  Discharge Destination: Home with assist    Amanda Cortez, PT  5/28/2024

## 2024-05-28 NOTE — PLAN OF CARE
Goal Outcome Evaluation:  Plan of Care Reviewed With: patient, caregiver           Outcome Evaluation: PT PRESENTS LIKELY CLOSE TO RECENT BASELINE FOLLOWING CVA 10/1023. PT AMBULATED 400 FEET AND COMPLETED ADL'S IN BATHROOM WITH SUPERVISION. PT IS A&O AND FOLLOWS ALL COMMANDS. VITALS STABLE. RECOMMEND HOME WITH CONTINUED CG ASSIST P.T. IS SIGNING OFF. PT IS SAFE TO BE UP AD [place] WITH CAREGIVER SUPERVISION. NSG NOTIFIED.      Anticipated Discharge Disposition (PT): home with assist

## 2024-05-28 NOTE — PLAN OF CARE
Goal Outcome Evaluation:  Plan of Care Reviewed With: patient, caregiver        Progress: no change  Outcome Evaluation: Pt presents at/near recent baseline demonstrating supervision/standby for all ADL completion and related mobility. OT signing off, please reconsult as needed. Rec return to home with 24/7 caregiver assist.      Anticipated Discharge Disposition (OT): home

## 2024-05-28 NOTE — DISCHARGE SUMMARY
[facility] Medicine Services  DISCHARGE SUMMARY    Patient Name: Laury Eugene  : 1940  MRN: [id number]    Date of Admission: 2024 10:19 AM  Date of Discharge:  2024  Primary Care Physician: Leatha Melvin,     Consults       Date and Time Order Name Status Description    2024  2:26 AM Inpatient Neurology Consult General Completed     2024 10:21 AM Inpatient Neurology Consult Stroke Completed             Hospital Course     Presenting Problem:     Active Hospital Problems    Diagnosis  POA    **Seizure [R56.9]  Yes      Resolved Hospital Problems   No resolved problems to display.          Hospital Course:  Laury Eugene is a 84 y.o. 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 CT head imaging without acute abnormality, suspect seizure in setting of missed keppra x 3 days     Suspected breakthrough sz in setting of seizure disorder  -missed keppra x 3 days 2/2 lack of availability of XR medication at pharmacy  -d/w neuro, ok to go home.  Will send 90 days keppra 1000 mg XR nightly to meds to bed to ensure patient has     Possible TIA, h/o CVA w residual right facial droop + dysarthria  -already on eliquis so would not    -MRI brain showed no acute, stable chronic infarcts left MCA territory and within right temporal lobe/insula.       A-fib on eliquis - home propafenone + metoprolol (takes 1/2 dose at home per POA)  B12 deficiency - home b12  [facility] - home cpap      Discharge Follow Up Recommendations for outpatient labs/diagnostics:  F/u with PCP in 1 week  F/u with neurology    Day of Discharge     HPI:   No further seizures.  Wants to go home.     Review of Systems  Gen- No fevers, chills  CV- No chest pain, palpitations  Resp- No cough, dyspnea  GI- No N/V/D, abd pain      Vital Signs:   Temp:  [97.4 °F (36.3 °C)-97.8 °F (36.6  °C)] 97.7 °F (36.5 °C)  Heart Rate:  [51-81] 56  Resp:  [14-20] 20  BP: (105-166)/(53-95) 105/78      Physical Exam:  Constitutional: No acute distress, awake, alert  HENT: NCAT, mucous membranes moist  Respiratory: Clear to auscultation bilaterally, respiratory effort normal   Cardiovascular: RRR, no murmurs, rubs, or gallops  Gastrointestinal: Positive bowel sounds, soft, nontender, nondistended  Musculoskeletal: No bilateral ankle edema  Psychiatric: Appropriate affect, cooperative  Neurologic: Oriented x 3, moves all extremities, dysarthria  Skin: No rashes      Pertinent  and/or Most Recent Results     LAB RESULTS:      Lab 05/27/24  1024   WBC 6.85   HEMOGLOBIN 11.6*   HEMOGLOBIN, POC 12.2   HEMATOCRIT 36.2   HEMATOCRIT POC 36*   PLATELETS 231   NEUTROS ABS 3.98   IMMATURE GRANS (ABS) 0.02   LYMPHS ABS 2.01   MONOS ABS 0.58   EOS ABS 0.22   MCV 94.8   PROTIME 13.9   APTT 30.5         Lab 05/28/24  0756 05/27/24  1024   CREATININE  --  1.00   EGFR  --  56.4*   HEMOGLOBIN A1C 5.50  --          Lab 05/27/24  1024   ALT (SGPT) 21   AST (SGOT) 26         Lab 05/27/24  1024   HSTROP T 9   PROTIME 13.9   INR 1.2         Lab 05/28/24  0756   CHOLESTEROL 140   LDL CHOL 63   HDL CHOL 65*   TRIGLYCERIDES 59             Brief Urine Lab Results  (Last result in the past 365 days)        Color   Clarity   Blood   Leuk Est   Nitrite   Protein   CREAT   Urine HCG        05/27/24 1129 Yellow   Clear   Negative   Negative   Negative   Negative                 Microbiology Results (last 10 days)       Procedure Component Value - Date/Time    Urine Culture - Urine, Urine, Clean Catch [[id number]]  (Normal) Collected: 05/27/24 1129    Lab Status: Preliminary result Specimen: Urine, Clean Catch Updated: 05/28/24 0918     Urine Culture No growth            MRI Brain Without Contrast    Result Date: 5/28/2024  MRI BRAIN WO CONTRAST Date of Exam: 5/28/2024 1:05 AM EDT Indication: Stroke, follow up  Comparison: CT  head/perfusion/angiography 5/27/2024. Brain MRI 4/13/2024. Technique:  Routine multiplanar/multisequence sequence images of the brain were obtained without contrast administration. Findings: No diffusion restriction to suggest an acute infarct. There is a stable chronic left MCA territory infarct. There is a stable right temporal insular cortical infarct. There is mild stable patchy periventricular white matter FLAIR hyperintense signal abnormality. 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 No new signal abnormalities within the brain parenchyma. Thinning of the orbital lenses would suggest prior cataract surgery. Moderate left maxillary sinus mucosal thickening. Clear mastoids. Major arterial flow voids appear intact. Calvarial and superficial soft tissue signal is within normal limits. There is mild right TMJ arthritis. Midline structures appear intact. No acute or chronic intracranial  hemorrhage.     Impression: 1. No acute intracranial abnormality. 2. Stable chronic infarcts in the left MCA territory and within the right temporal lobe/insula. 3. Mild chronic small vessel ischemic change. 4. Moderate left sphenoid sinus mucosal disease. Electronically Signed: Jim Guillen MD  5/28/2024 2:07 AM EDT  Workstation ID: [id number]    XR Chest 1 View    Result Date: 5/27/2024  XR CHEST 1 VW Date of Exam: 5/27/2024 11:22 AM EDT Indication: Acute cerebrovascular accident Comparison: 8/30/2023. Findings: The heart is slightly enlarged. There is persistent hazy density in the left mid and lower lung zones. Given the chronicity, this is felt to be chronic and may be due to scarring. There is mild interstitial prominence. The lungs and pleural spaces are otherwise clear. There are surgical clips in the left axilla. There are chronic age-related changes involving the bony thorax and thoracic aorta.     Impression: 1. Borderline cardiomegaly. 2. Persistent hazy density in the  left mid and lower lung zones. Given the chronicity, this is probably due to chronic scarring. 3. Stable mild interstitial prominence. Electronically Signed: Kristopher Talbot MD  5/27/2024 11:41 AM EDT  Workstation ID: [id number]    CT Angiogram Head w AI Analysis of LVO    Result Date: 5/27/2024  CT ANGIOGRAM HEAD W AI ANALYSIS OF LVO, CT ANGIOGRAM NECK, CT CEREBRAL PERFUSION W WO CONTRAST Date of Exam: 5/27/2024 10:26 AM EDT Indication: Neuro Deficit, acute, Stroke suspected Neuro deficit, acute stroke suspected. Comparison: Head/neck CTA dated 8/26/2023 Technique: CTA of the head was performed after the uneventful intravenous administration of 115 mL Isovue-370. Reconstructed coronal and sagittal images were also obtained. In addition, a 3-D volume rendered image was created for interpretation. Automated exposure control and iterative reconstruction methods were used. FINDINGS: Vascular Findings: The right common carotid, internal carotid, middle cerebral, anterior cerebral, vertebral, and posterior cerebral arteries are patent without abrupt cut off or aneurysmal dilation. 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 The left common carotid, internal carotid, middle cerebral, anterior cerebral, vertebral, and posterior cerebral arteries are patent without abrupt cut off or aneurysmal dilation. Basilar artery appears patent and appears unremarkable. Non-vascular Findings: For description of nonvascular intracranial findings, please refer to the noncontrast head CT performed the same date. No acute abnormality is identified within the visualized soft tissue or bony structures of the neck. 11 mm right thyroid nodule appears similar since 8/26/2023. The visualized lung apices are clear. Partially visualized chronic moderate compression fracture of T4. This was also present on 8/26/2023. CT Perfusion: CBF (<30%) volume: 0 mL Tmax  (>6.0s) volume: 0 mL Mismatch volume: 0 mL Mismatch ratio: None     1.No acute abnormality identified within the large arteries of the head or neck. 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 This was also noted on study from 8/26/2023. 3.No significant stenosis of the bilateral internal carotid arteries. 4.CT perfusion study demonstrates no territorial ischemia or core infarct. Electronically Signed: Geoff Marte MD  5/27/2024 11:04 AM EDT  Workstation ID: [id number]    CT Angiogram Neck    Result Date: 5/27/2024  CT ANGIOGRAM HEAD W AI ANALYSIS OF LVO, CT ANGIOGRAM NECK, CT CEREBRAL PERFUSION W WO CONTRAST Date of Exam: 5/27/2024 10:26 AM EDT Indication: Neuro Deficit, acute, Stroke suspected Neuro deficit, acute stroke suspected. Comparison: Head/neck CTA dated 8/26/2023 Technique: CTA of the head was performed after the uneventful intravenous administration of 115 mL Isovue-370. Reconstructed coronal and sagittal images were also obtained. In addition, a 3-D volume rendered image was created for interpretation. Automated exposure control and iterative reconstruction methods were used. FINDINGS: Vascular Findings: The right common carotid, internal carotid, middle cerebral, anterior cerebral, vertebral, and posterior cerebral arteries are patent without abrupt cut off or aneurysmal dilation. 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 The left common carotid, internal carotid, middle cerebral, anterior cerebral, vertebral, and posterior cerebral arteries are patent without abrupt cut off or aneurysmal dilation. Basilar artery appears patent and appears unremarkable. Non-vascular Findings: For description of nonvascular intracranial findings, please refer to the noncontrast head CT performed the same date. No acute abnormality is identified within the visualized soft tissue or  bony structures of the neck. 11 mm right thyroid nodule appears similar since 8/26/2023. The visualized lung apices are clear. Partially visualized chronic moderate compression fracture of T4. This was also present on 8/26/2023. CT Perfusion: CBF (<30%) volume: 0 mL Tmax (>6.0s) volume: 0 mL Mismatch volume: 0 mL Mismatch ratio: None     1.No acute abnormality identified within the large arteries of the head or neck. 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 This was also noted on study from 8/26/2023. 3.No significant stenosis of the bilateral internal carotid arteries. 4.CT perfusion study demonstrates no territorial ischemia or core infarct. Electronically Signed: Geoff Marte MD  5/27/2024 11:04 AM EDT  Workstation ID: [id number]    CT CEREBRAL PERFUSION WITH & WITHOUT CONTRAST    Result Date: 5/27/2024  CT ANGIOGRAM HEAD W AI ANALYSIS OF LVO, CT ANGIOGRAM NECK, CT CEREBRAL PERFUSION W WO CONTRAST Date of Exam: 5/27/2024 10:26 AM EDT Indication: Neuro Deficit, acute, Stroke suspected Neuro deficit, acute stroke suspected. Comparison: Head/neck CTA dated 8/26/2023 Technique: CTA of the head was performed after the uneventful intravenous administration of 115 mL Isovue-370. Reconstructed coronal and sagittal images were also obtained. In addition, a 3-D volume rendered image was created for interpretation. Automated exposure control and iterative reconstruction methods were used. FINDINGS: Vascular Findings: The right common carotid, internal carotid, middle cerebral, anterior cerebral, vertebral, and posterior cerebral arteries are patent without abrupt cut off or aneurysmal dilation. 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 The left common carotid, internal carotid, middle cerebral, anterior cerebral, vertebral, and posterior cerebral arteries are patent without abrupt  cut off or aneurysmal dilation. Basilar artery appears patent and appears unremarkable. Non-vascular Findings: For description of nonvascular intracranial findings, please refer to the noncontrast head CT performed the same date. No acute abnormality is identified within the visualized soft tissue or bony structures of the neck. 11 mm right thyroid nodule appears similar since 8/26/2023. The visualized lung apices are clear. Partially visualized chronic moderate compression fracture of T4. This was also present on 8/26/2023. CT Perfusion: CBF (<30%) volume: 0 mL Tmax (>6.0s) volume: 0 mL Mismatch volume: 0 mL Mismatch ratio: None     1.No acute abnormality identified within the large arteries of the head or neck. 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 This was also noted on study from 8/26/2023. 3.No significant stenosis of the bilateral internal carotid arteries. 4.CT perfusion study demonstrates no territorial ischemia or core infarct. Electronically Signed: Geoff Marte MD  5/27/2024 11:04 AM EDT  Workstation ID: [id number]    CT Head Without Contrast Stroke Protocol    Result Date: 5/27/2024  CT HEAD WO CONTRAST STROKE PROTOCOL Date of Exam: 5/27/2024 10:24 AM EDT Indication: Neuro deficit, acute, stroke suspected Neuro Deficit, acute, Stroke suspected. Comparison: Brain MRI 4/13/2024, head CT 8/27/2023 Technique: Axial CT images were obtained of the head without contrast administration.  Reconstructed coronal images were also obtained. Automated exposure control and iterative construction methods were used. Scan Time: 10:22 a.m. 5/27/2024 Results discussed with stroke navigator by Dr. Fred Black via telephone at 10:36 a.m 5/27/2024. Findings: There is confluent hypodensity in the left frontal lobe and left insula compatible with chronic infarct, present on prior brain MRI. There is transcortical hypodensity at the posterior right insula and superior right  temporal lobe compatible with chronic  infarct, present on prior brain MRI. No evidence of acute large territory infarct. No acute intracranial hemorrhage. No extra-axial collections or mass effect. There is minimal ex vacuo dilatation of the left lateral ventricle adjacent to the area of chronic infarct; otherwise unremarkable appearance of the ventricles. Normal appearance of the orbits. There is mucosal thickening in the left sphenoid sinus. The mastoid air cells are clear. No acute or suspicious bony findings.     Impression: No acute intracranial findings. Chronic and senescent changes as above, including chronic infarcts. Electronically Signed: Fred Black MD  5/27/2024 10:40 AM EDT  Workstation ID: [id number]             Results for orders placed during the hospital encounter of 08/26/23    Adult Transthoracic Echo Complete W/ Cont if Necessary Per Protocol    Interpretation Summary    Left ventricular systolic function is normal. Left ventricular ejection fraction appears to be 51 - 55%.      Plan for Follow-up of Pending Labs/Results:   Pending Labs       Order Current Status    Urine Culture - Urine, Urine, Clean Catch Preliminary result          Discharge Details        Discharge Medications        Changes to Medications        Instructions Start Date   levETIRAcetam  MG tablet  Commonly known as: Keppra XR  What changed:   medication strength  how much to take  when to take this  additional instructions   500 mg, Oral, 2 Times Daily      metoprolol succinate XL 25 MG 24 hr tablet  Commonly known as: TOPROL-XL  What changed:   how much to take  additional instructions   12.5 mg, Oral, Daily   Start Date: May 29, 2024            Continue These Medications        Instructions Start Date   apixaban 5 MG tablet tablet  Commonly known as: ELIQUIS   5 mg, Oral, Every 12 Hours Scheduled      ascorbic acid 500 MG tablet  Commonly known as: VITAMIN C   500 mg, Oral, Daily      atorvastatin 80 MG  tablet  Commonly known as: LIPITOR   80 mg, Oral, Nightly      escitalopram 10 MG tablet  Commonly known as: LEXAPRO   10 mg, Oral, Daily      folic acid 1 MG tablet  Commonly known as: FOLVITE   800 mcg, Oral, Daily      pantoprazole 40 MG EC tablet  Commonly known as: PROTONIX   40 mg, Oral, Daily, Started at [facility]       propafenone  MG 12 hr capsule  Commonly known as: RYTHMOL SR   325 mg, Oral, Every 12 Hours Scheduled      vitamin B-12 250 MCG tablet  Commonly known as: CYANOCOBALAMIN   1,000 mcg, Oral, Daily               Allergies   Allergen Reactions    Lidocaine Other (See Comments)     seizure    Novocain [Procaine] Other (See Comments)     seizures    Tnkase [Tenecteplase] Angioedema     Treated 8/26    Nsaids Other (See Comments)     Avoid NSAIDs d/t history of GIB         Discharge Disposition:  Home or Self Care    Diet:  Hospital:  Diet Order   Procedures    Diet: Cardiac; Healthy Heart (2-3 Na+); Texture: Regular (IDDSI 7); Fluid Consistency: Thin (IDDSI 0)            Activity:      Restrictions or Other Recommendations:         CODE STATUS:    Code Status and Medical Interventions:   Ordered at: 05/27/24 1526     Level Of Support Discussed With:    Patient    Health Care Surrogate     Code Status (Patient has no pulse and is not breathing):    No CPR (Do Not Attempt to Resuscitate)     Medical Interventions (Patient has pulse or is breathing):    Full Support     Comments:    short term OK for intubation       Future Appointments   Date Time Provider Department Center   6/19/2024  1:30 PM Lynnette Baker APRN [facility]   12/11/2024  2:30 PM Roger Hodgson MD [facility]       Additional Instructions for the Follow-ups that You Need to Schedule       Discharge Follow-up with PCP   As directed       Currently Documented PCP:    Leatha Melvin DO    PCP Phone Number:    [phone]     Follow Up Details: with PCP in 1 week        Discharge Follow-up with  Specified Provider: with neurology   As directed      To: with neurology   Follow Up Details: per their rec                      Arie Maldonado MD  05/28/24      Time Spent on Discharge:  I spent  38 minutes on this discharge activity which included: face-to-face encounter with the patient, reviewing the data in the system, coordination of the care with the nursing staff as well as consultants, documentation, and entering orders.

## 2024-05-28 NOTE — PLAN OF CARE
Goal Outcome Evaluation:              Outcome Evaluation: Swallowing reevaluation completed. Appears to be back to baseline re: swallowing.  SLP to follow up re: communication later today.                 SLP Swallowing Diagnosis: mild, oral dysphagia, functional pharyngeal phase (05/28/24 0815)  Treatment Assessment (SLP): oral dysphagia (Baseline oral dysphagia. No overt pharyngeal signs with PO trials) (05/28/24 0815)  Treatment Assessment Comments (SLP): Follow up re: swallowing.  Baseline facial asymmetry with labial and buccal impairment.  Pt is able to compensate for weakness/impairment.  Drooling and anterior loss intermittently on the right- baseline.  No pharyngeal signs, however suspect intermittent difficulty due to impaired coordination. Pt has been on regular/thin diet since her discharge from inpatient rehab without difficulty.  Appears safe to continue with regular/thin at this time. Pt is not agreeable to modified diet right now. (05/28/24 0815)  Plan for Continued Treatment (SLP): other (see comments) (SLP to assess SLC later today.) (05/28/24 0815)

## 2024-05-28 NOTE — THERAPY DISCHARGE NOTE
Acute Care - Occupational Therapy Discharge  [facility]    Patient Name: Laury Eugene  : 1940    MRN: [id number]                              Today's Date: 2024       Admit Date: 2024    Visit Dx:     ICD-10-CM ICD-9-CM   1. Acute anterior circulation TIA  G45.8 435.9   2. Seizure  R56.9 780.39   3. Dysphagia, unspecified type  R13.10 787.20     Patient Active Problem List   Diagnosis    Multinodular goiter    Abnormal thyroid function test    Hypothyroidism (acquired)    Essential hypertension    Hyperlipemia    Paroxysmal atrial fibrillation    Dysphagia    Acute lower gastrointestinal bleeding    ABLA (acute blood loss anemia)    Moderate malnutrition    History of stroke    Breakthrough seizure    Seizure     Past Medical History:   Diagnosis Date    History of breast cancer     History of heart disease     Hypothyroidism     Multinodular goiter     Seizures     Sleep apnea     Stroke     Thyroid function test abnormal      Past Surgical History:   Procedure Laterality Date    BLEPHAROPLASTY      BREAST RECONSTRUCTION      CATARACT EXTRACTION Bilateral     COLONOSCOPY N/A 2023    Procedure: COLONOSCOPY;  Surgeon: Brunner, Mark I, MD;  Location:  [facility];  Service: Gastroenterology;  Laterality: N/A;    ENDOSCOPY N/A 2023    Procedure: ESOPHAGOGASTRODUODENOSCOPY;  Surgeon: Brunner, Mark I, MD;  Location:  [facility];  Service: Gastroenterology;  Laterality: N/A;    HYSTERECTOMY      SIMPLE MASTECTOMY        General Information       Row Name 24 1027          OT Time and Intention    Document Type discharge evaluation/summary  -CS     Mode of Treatment occupational therapy  -CS       Row Name 24 1027          General Information    Patient Profile Reviewed yes  -CS     Prior Level of Function independent:;transfer;all household mobility;ADL's;min assist:;using stairs;community mobility;dependent:;home management;cleaning;driving  Pt lives with caregiver  support (all but sleeping hours), supervision and safety cues for ADLs and related mobility. No current AD use for mobility, walk-in shower and seating for bathing tasks.  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  -CS     Barriers to Rehab medically complex;previous functional deficit  -CS       Row Name 05/28/24 1027          Living Environment    People in Home alone;other (see comments)  caregiver support 7days/wk  -CS       Row Name 05/28/24 1027          Home Main Entrance    Number of Stairs, Main Entrance three  -CS     Stair Railings, Main Entrance railings safe and in good condition  -CS       Row Name 05/28/24 1027          Stairs Within Home, Primary    Number of Stairs, Within Home, Primary ten  -CS     Stair Railings, Within Home, Primary railings safe and in good condition  -CS       Row Name 05/28/24 1027          Cognition    Orientation Status (Cognition) oriented to;person;place  -       Row Name 05/28/24 1027          Safety Issues, Functional Mobility    Impairments Affecting Function (Mobility) balance;cognition;coordination;strength  -CS     Cognitive Impairments, Mobility Safety/Performance insight into deficits/self-awareness;safety precaution awareness;safety precaution follow-through  -CS               User Key  (r) = Recorded By, (t) = Taken By, (c) = Cosigned By      Initials Name Provider Type    CS Sandip Loo OT Occupational Therapist                   Mobility/ADL's       Row Name 05/28/24 1034          Bed Mobility    Bed Mobility supine-sit;scooting/bridging  -CS     Scooting/Bridging [place] (Bed Mobility) standby assist;verbal cues  -CS     Supine-Sit [place] (Bed Mobility) supervision  -CS     Assistive Device (Bed Mobility) bed rails;head of bed elevated  -CS     Comment, (Bed Mobility) appropriate sequencing intact, cues for scoot to EOB for improved balance, mild  posterior lean in sitting, improved with time  -[facility] Name 05/28/24 1034          Transfers    Transfers sit-stand transfer  -[facility] Name 05/28/24 1034          Bed-Chair Transfer    Bed-Chair [place] (Transfers) standby assist  -     Assistive Device (Bed-Chair Transfers) walker, front-wheeled  -       Row Name 05/28/24 1034          Functional Mobility    Functional Mobility- Comment defer to PT for specifics, AD initially progressing to SBA with no AD during in-room and hallway distances  -     Patient was able to Ambulate yes  -[facility] Name 05/28/24 1034          Activities of Daily Living    BADL Assessment/Intervention upper body dressing;lower body dressing  -[facility] Name 05/28/24 1034          Upper Body Dressing Assessment/Training    [place] Level (Upper Body Dressing) don;pajama/robe;standby assist;set up  -     Comment, (Upper Body Dressing) threading and targeting WFL  -[facility] Name 05/28/24 1034          Lower Body Dressing Assessment/Training    [place] Level (Lower Body Dressing) don;socks  -     Position (Lower Body Dressing) edge of bed sitting  -               User Key  (r) = Recorded By, (t) = Taken By, (c) = Cosigned By      Initials Name Provider Type    CS Sandip Loo, OT Occupational Therapist                   Obj/Interventions       [facility] Name 05/28/24 1039          Sensory Assessment (Somatosensory)    Sensory Assessment (Somatosensory) left-sided sensation intact;right UE  -CS     Right UE Sensory Assessment light touch awareness;intact;general sensation;impaired  -[facility] Name 05/28/24 1039          Vision Assessment/Intervention    Visual Impairment/Limitations WFL  -[facility] Name 05/28/24 1039          Range of Motion Comprehensive    General Range of Motion bilateral upper extremity ROM L  [facility] Name 05/28/24 1039          Strength Comprehensive (MMT)    General Manual Muscle Testing (MMT) Assessment upper  extremity strength deficits identified  -CS     Comment, General Manual Muscle Testing (MMT) Assessment BUE grossly 4/5, symemtrical  -CS       Row Name 05/28/24 1039          Motor Skills    Motor Skills coordination;functional endurance  -     Coordination bilateral;upper extremity;finger to nose;bimanual skills;WFL  -CS     Functional Endurance RA stable on RA  -CS       Row Name 05/28/24 1039          Balance    Balance Assessment sitting static balance;sitting dynamic balance;standing static balance;standing dynamic balance  -     Static Sitting Balance supervision  -CS     Dynamic Sitting Balance standby assist  -CS     Position, Sitting Balance unsupported;sitting edge of bed  -CS     Static Standing Balance supervision  -CS     Dynamic Standing Balance contact guard  -CS     Position/Device Used, Standing Balance unsupported  -CS     Balance Interventions sitting;standing;sit to stand;occupation based/functional task  -CS     Comment, Balance no LOB during ADLs  -               User Key  (r) = Recorded By, (t) = Taken By, (c) = Cosigned By      Initials Name Provider Type    CS Sandip Loo OT Occupational Therapist                   Goals/Plan    No documentation.                  Clinical Impression       Row Name 05/28/24 1041          Pain Assessment    Additional Documentation Pain Scale: FACES Pre/Post-Treatment (Group)  -       Row Name 05/28/24 1041          Pain Scale: FACES Pre/Post-Treatment    Pain: FACES Scale, Pretreatment 0-->no hurt  -     Posttreatment Pain Rating 0-->no hurt  -       Row Name 05/28/24 1041          Plan of Care Review    Plan of Care Reviewed With patient;caregiver  -     Progress no change  -     Outcome Evaluation Pt presents at/near recent baseline demonstrating supervision/standby for all ADL completion and related mobility. OT signing off, please reconsult as needed. Rec return to home with 24/7 caregiver assist.  -       Row Name 05/28/24 1041           Therapy Assessment/Plan (OT)    Criteria for Skilled Therapeutic Interventions Met (OT) no;does not meet criteria for skilled intervention  -CS     Therapy Frequency (OT) evaluation only  -CS       Row Name 05/28/24 1041          Therapy Plan Review/Discharge Plan (OT)    Anticipated Discharge Disposition (OT) home  -CS       Row Name 05/28/24 1041          Vital Signs    Pre Systolic BP Rehab 134  -CS     Pre Treatment Diastolic BP 53  -CS     Post Systolic BP Rehab 126  -CS     Post Treatment Diastolic BP 47  -CS     Posttreatment Heart Rate (beats/min) 55  -CS     O2 Delivery Pre Treatment room air  -CS     O2 Delivery Intra Treatment room air  -CS     Post SpO2 (%) 94  -CS     O2 Delivery Post Treatment room air  -CS     Pre Patient Position Supine  -CS     Intra Patient Position Standing  -CS     Post Patient Position Sitting  -CS       Row Name 05/28/24 1041          Positioning and Restraints    Pre-Treatment Position in bed  -CS     Post Treatment Position bathroom  -CS     Bathroom sitting;with PT  -CS               User Key  (r) = Recorded By, (t) = Taken By, (c) = Cosigned By      Initials Name Provider Type    CS Sandip Loo, OT Occupational Therapist                   Outcome Measures       Row Name 05/28/24 1050          How much help from another is currently needed...    Putting on and taking off regular lower body clothing? 3  -CS     Bathing (including washing, rinsing, and drying) 3  -CS     Toileting (which includes using toilet bed pan or urinal) 3  -CS     Putting on and taking off regular upper body clothing 4  -CS     Taking care of personal grooming (such as brushing teeth) 4  -CS     Eating meals 4  -CS     AM-PAC 6 Clicks Score (OT) 21  -CS       Row Name 05/28/24 0800          How much help from another person do you currently need...    Turning from your back to your side while in flat bed without using bedrails? 4  -AS     Moving from lying on back to sitting on the side  of a flat bed without bedrails? 4  -AS     Moving to and from a bed to a chair (including a wheelchair)? 4  -AS     Standing up from a chair using your arms (e.g., wheelchair, bedside chair)? 4  -AS     Climbing 3-5 steps with a railing? 3  -AS     To walk in hospital room? 4  -AS     AM-PAC 6 Clicks Score (PT) 23  -AS     Highest Level of Mobility Goal 7 --> Walk 25 feet or more  -AS       Row Name 05/28/24 1050          Modified Thad Scale    Modified Paris Scale 3 - Moderate disability.  Requiring some help, but able to walk without assistance.  -       Row Name 05/28/24 1050          Functional Assessment    Outcome Measure Options AM-PAC 6 Clicks Daily Activity (OT);Modified Thad  -CS               User Key  (r) = Recorded By, (t) = Taken By, (c) = Cosigned By      Initials Name Provider Type    Sandip Mayo OT Occupational Therapist    AS Allison Mart, RN Registered Nurse                  Occupational Therapy Education       Title: PT OT SLP Therapies (In Progress)       Topic: Occupational Therapy (Done)       Point: ADL training (Done)       Description:   Instruct learner(s) on proper safety adaptation and remediation techniques during self care or transfers.   Instruct in proper use of assistive devices.                  Learning Progress Summary             Patient Acceptance, E,D, VU,DU by  at 5/28/2024 1050                         Point: Home exercise program (Done)       Description:   Instruct learner(s) on appropriate technique for monitoring, assisting and/or progressing therapeutic exercises/activities.                  Learning Progress Summary             Patient Acceptance, E,D, VU,DU by  at 5/28/2024 1050                         Point: Precautions (Done)       Description:   Instruct learner(s) on prescribed precautions during self-care and functional transfers.                  Learning Progress Summary             Patient Acceptance, E,D, VU,DU by  at 5/28/2024 1050                          Point: Body mechanics (Done)       Description:   Instruct learner(s) on proper positioning and spine alignment during self-care, functional mobility activities and/or exercises.                  Learning Progress Summary             Patient Acceptance, E,D, KARTHIK,DU by  at 5/28/2024 1050                                         User Key       Initials Effective Dates Name Provider Type Discipline     06/16/21 -  Sandip Loo OT Occupational Therapist OT                  OT Recommendation and Plan  Therapy Frequency (OT): evaluation only  Plan of Care Review  Plan of Care Reviewed With: patient, caregiver  Progress: no change  Outcome Evaluation: Pt presents at/near recent baseline demonstrating supervision/standby for all ADL completion and related mobility. OT signing off, please reconsult as needed. Rec return to home with 24/7 caregiver assist.  Plan of Care Reviewed With: patient, caregiver  Outcome Evaluation: Pt presents at/near recent baseline demonstrating supervision/standby for all ADL completion and related mobility. OT signing off, please reconsult as needed. Rec return to home with 24/7 caregiver assist.     Time Calculation:   Evaluation Complexity (OT)  Review Occupational Profile/Medical/Therapy History Complexity: expanded/moderate complexity  Assessment, Occupational Performance/Identification of Deficit Complexity: 3-5 performance deficits  Clinical Decision Making Complexity (OT): detailed assessment/moderate complexity  Overall Complexity of Evaluation (OT): moderate complexity     Time Calculation- OT       Row Name 05/28/24 1051             Time Calculation- OT    OT Start Time 0955  -CS      OT Received On 05/28/24  -CS         Untimed Charges    OT Eval/Re-eval Minutes 50  -CS         Total Minutes    Untimed Charges Total Minutes 50  -CS       Total Minutes 50  -CS                User Key  (r) = Recorded By, (t) = Taken By, (c) = Cosigned By      Initials Name  Provider Type    CS Sandip Loo, JOSE RAUL Occupational Therapist                  Therapy Charges for Today       Code Description Service Date Service Provider Modifiers Qty    [id number] HC OT EVAL MOD COMPLEXITY 4 5/28/2024 Sandip Loo OT GO 1               OT Discharge Summary  Anticipated Discharge Disposition (OT): home  Reason for Discharge: Patient/Caregiver request, At baseline function  Discharge Destination: Home with assist    Sandip Loo OT  5/28/2024

## 2024-05-28 NOTE — CONSULTS
Diabetes Education    Patient Name:  Laury Eugene  YOB: 1940  MRN: [id number]  Admit Date:  5/27/2024      Chart reviewed per consult, A1C pending. Patient GCS charted 13. May not be appropriate for education today. Please call [phone] for immediate needs.    Electronically signed by:  Gloria Allred RN  05/28/24 09:04 EDT

## 2024-05-28 NOTE — PROGRESS NOTES
[facility] Neurology    Progress Note    Patient Name: Laury Eugene  : 1940  MRN: [id number]  Primary Care Physician:  Leatha Melvin DO  Date of admission: 2024    Subjective     Chief Complaint: Seizure    History of Present Illness   Patient seen resting comfortably in chair with caregiver bedside.  No seizure-like events.  No acute events overnight.  Patient denies complaint of headache.    Review of Systems   General: Negative for fever, nausea, or vomiting.   Neurological: Negative for headache, pain, or weakness.     Objective     Physical Exam  Vitals and nursing note reviewed.   Constitutional:       General: She is not in acute distress.     Appearance: She is not ill-appearing.   Eyes:      General: No visual field deficit.     Extraocular Movements: Extraocular movements intact.      Pupils: Pupils are equal, round, and reactive to light.      Comments: No nystagmus or deviated gaze noted   Cardiovascular:      Rate and Rhythm: Normal rate.   Pulmonary:      Effort: Pulmonary effort is normal.   Neurological:      Mental Status: She is alert and oriented to person, place, and time. Mental status is at baseline.      Cranial Nerves: Cranial nerve deficit, dysarthria and facial asymmetry present.      Sensory: No sensory deficit.      Motor: Weakness present. No seizure activity.      Deep Tendon Reflexes:      Reflex Scores:       Bicep reflexes are 1+ on the right side and 1+ on the left side.       Patellar reflexes are 1+ on the right side and 1+ on the left side.     Comments:     Cranial Nerves   CN II: Pupils are equal, round, and reactive to light. Normal visual acuity and visual fields.    CN III IV VI: Extraocular movements are full without nystagmus.  CN V: Normal facial sensation and strength of muscles of mastication.  CN VII: Facial movements are symmetric. No weakness.  CN VIII:  Auditory acuity is normal.  CN IX & X:  Symmetric palatal movement.  CN XI:  Sternocleidomastoid and trapezius are normal.  No weakness.  CN XII: The tongue is midline.  No atrophy or fasciculations.            Vitals:   Temp:  [97.4 °F (36.3 °C)-97.9 °F (36.6 °C)] 97.4 °F (36.3 °C)  Heart Rate:  [51-81] 51  Resp:  [14-20] 20  BP: (105-171)/(53-95) 134/53    Current Medications    Current Facility-Administered Medications:     acetaminophen (TYLENOL) tablet 650 mg, 650 mg, Oral, Q6H PRN, Diana Vail MD, 650 mg at 05/27/24 1754    apixaban (ELIQUIS) tablet 5 mg, 5 mg, Oral, Q12H, Diana Vail MD, 5 mg at 05/28/24 0812    ascorbic acid (VITAMIN C) tablet 500 mg, 500 mg, Oral, Daily, Diana Vail MD, 500 mg at 05/28/24 0812    atorvastatin (LIPITOR) tablet 80 mg, 80 mg, Oral, Nightly, Burt Riley PA-C, 80 mg at 05/27/24 2032    sennosides-docusate (PERICOLACE) 8.6-50 MG per tablet 2 tablet, 2 tablet, Oral, BID PRN **AND** polyethylene glycol (MIRALAX) packet 17 g, 17 g, Oral, Daily PRN **AND** bisacodyl (DULCOLAX) EC tablet 5 mg, 5 mg, Oral, Daily PRN **AND** bisacodyl (DULCOLAX) suppository 10 mg, 10 mg, Rectal, Daily PRN, Diana Vail MD    escitalopram (LEXAPRO) tablet 10 mg, 10 mg, Oral, Daily, Diana Vail MD, 10 mg at 05/28/24 0812    folic acid (FOLVITE) tablet 800 mcg, 800 mcg, Oral, Daily, Diana Vail MD, 800 mcg at 05/28/24 0812    levETIRAcetam (KEPPRA) injection 500 mg, 500 mg, Intravenous, Q12H, Diana Vail MD, 500 mg at 05/28/24 0812    metoprolol succinate XL (TOPROL-XL) 24 hr tablet 12.5 mg, 12.5 mg, Oral, Daily, Diana Vail MD, 12.5 mg at 05/27/24 1754    pantoprazole (PROTONIX) EC tablet 40 mg, 40 mg, Oral, Daily, Diana Vail MD, 40 mg at 05/28/24 0812    propafenone SR (RYTHMOL SR) 12 hr capsule 325 mg, 325 mg, Oral, Q12H, Diana Vail MD, 325 mg at 05/28/24 0812    sodium chloride 0.9 % flush 10 mL, 10 mL, Intravenous, Q12H, Diana Vail MD, 10 mL at 05/28/24 0814    sodium chloride 0.9 % flush 10 mL, 10 mL, Intravenous, PRN, Yared  "Diana BUTLER MD    sodium chloride 0.9 % infusion 40 mL, 40 mL, Intravenous, PRN, Diana Vail MD    vitamin B-12 (CYANOCOBALAMIN) tablet 1,000 mcg, 1,000 mcg, Oral, Daily, Diana Vail MD, 1,000 mcg at 05/28/24 0813    Laboratory Results:   Lab Results   Component Value Date    GLUCOSE 95 04/14/2024    CALCIUM 9.1 04/14/2024     04/14/2024    K 4.3 04/14/2024    CO2 30.0 (H) 04/14/2024     04/14/2024    BUN 14 04/14/2024    CREATININE 1.00 05/27/2024    EGFRIFNONA 54 (L) 12/29/2020    BCR 16.7 04/14/2024    ANIONGAP 7.0 04/14/2024     Lab Results   Component Value Date    WBC 6.85 05/27/2024    HGB 12.2 05/27/2024    HGB 11.6 (L) 05/27/2024    HCT 36 (L) 05/27/2024    HCT 36.2 05/27/2024    MCV 94.8 05/27/2024     05/27/2024     Lab Results   Component Value Date    CHOL 140 05/28/2024    CHOL 129 08/26/2023    CHOL 143 08/26/2023     Lab Results   Component Value Date    HDL 65 (H) 05/28/2024    HDL 41 08/26/2023    HDL 45 08/26/2023     Lab Results   Component Value Date    LDL 63 05/28/2024    LDL 73 08/26/2023    LDL 80 08/26/2023     Lab Results   Component Value Date    TRIG 59 05/28/2024    TRIG 74 08/26/2023    TRIG 98 08/26/2023     Lab Results   Component Value Date    HGBA1C 5.80 (H) 08/27/2023     Lab Results   Component Value Date    INR 1.2 05/27/2024    INR 1.0 08/26/2023    PROTIME 13.9 05/27/2024    PROTIME 12.2 (L) 08/26/2023     No results found for: \"FOLATE\"  No results found for: \"[id number]\"    MRI Brain Without Contrast    Result Date: 5/28/2024  MRI BRAIN WO CONTRAST Date of Exam: 5/28/2024 1:05 AM EDT Indication: Stroke, follow up  Comparison: CT head/perfusion/angiography 5/27/2024. Brain MRI 4/13/2024. Technique:  Routine multiplanar/multisequence sequence images of the brain were obtained without contrast administration. Findings: No diffusion restriction to suggest an acute infarct. There is a stable chronic left MCA territory infarct. There is a stable right temporal " insular cortical infarct. There is mild stable patchy periventricular white matter FLAIR hyperintense signal abnormality. 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 No new signal abnormalities within the brain parenchyma. Thinning of the orbital lenses would suggest prior cataract surgery. Moderate left maxillary sinus mucosal thickening. Clear mastoids. Major arterial flow voids appear intact. Calvarial and superficial soft tissue signal is within normal limits. There is mild right TMJ arthritis. Midline structures appear intact. No acute or chronic intracranial  hemorrhage.     Impression: Impression: 1. No acute intracranial abnormality. 2. Stable chronic infarcts in the left MCA territory and within the right temporal lobe/insula. 3. Mild chronic small vessel ischemic change. 4. Moderate left sphenoid sinus mucosal disease. Electronically Signed: Jim Guillen MD  5/28/2024 2:07 AM EDT  Workstation ID: [id number]        Assessment / Plan     Active Hospital Problems:    Seizure       Brief Patient Summary:  Laury Eugene is a 84 y.o. female who has past medical history of A-fib on Eliquis, CVA with residual dysarthria and right-sided facial droop and seizures presenting to [facility] ED with complaint of dysarthria and right eyelid shaking.  Patient has been out of her Keppra for 3 days due to pharmacy supply of Keppra XR.  Patient received loading dose of Keppra 1.5 g in ED. patient was originally seen by our stroke navigators with no acute findings.    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  Continue Keppra 1000 mg extended release nightly, per family report patient was well-controlled prior to missed doses.  Patient will likely need meds to bed supply for 90 days of medication.  Continue seizure precautions  Continue  neurochecks  Continue home Eliquis  Patient to work with PT/OT as tolerated  Educated patient's caregiver on when to return to the hospital if needed.  She verbalized understanding.  Patient is okay for discharge once deemed medically appropriate by hospitalist    I have discussed the above with the patient, bedside RN, Dr. Maldonado and Dr. Clements  Time spent with patient: 50 minutes in face-to-face evaluation and management of the patient.      Samantha Pisano, APRN

## 2024-05-28 NOTE — CONSULTS
Diabetes Education    Patient Name:  Laury Eugene  YOB: 1940  MRN: [id number]  Admit Date:  5/27/2024    Chart review for diabetes educator consult.     At the time of this review patient A1c is 5.5% , they have no noted history of diabetes and no home medications noted for treatment of diabetes. At this time we do not feel the patient would benefit from diabetes education. Thank you for this consult, should patient needs change please re consult us.    Electronically signed by:  Gloria Allred RN  05/28/24 13:52 EDT

## 2024-05-28 NOTE — NURSING NOTE
Patient is up ad aakash with caregiver, A&Ox4, on room air, sinus bradycardia.    Telemetry and IV discontinued by RN.  Tele box returned to [facility] by RN.

## 2024-05-29 LAB — BACTERIA SPEC AEROBE CULT: NO GROWTH

## 2024-06-19 ENCOUNTER — OFFICE VISIT (OUTPATIENT)
Dept: NEUROLOGY | Facility: CLINIC | Age: 84
End: 2024-06-19
Payer: MEDICARE

## 2024-06-19 VITALS
BODY MASS INDEX: 27.48 KG/M2 | OXYGEN SATURATION: 95 % | HEIGHT: 60 IN | HEART RATE: 60 BPM | TEMPERATURE: 98.2 F | WEIGHT: 140 LBS | DIASTOLIC BLOOD PRESSURE: 76 MMHG | SYSTOLIC BLOOD PRESSURE: 132 MMHG

## 2024-06-19 DIAGNOSIS — G40.919 BREAKTHROUGH SEIZURE: Primary | ICD-10-CM

## 2024-06-19 NOTE — PROGRESS NOTES
Follow Up Office Visit      Encounter Date: 2024   Patient Name: Laury Eugene  : 1940   MRN: [id number]   PCP: Leatha Melvin DO    Chief Complaint:    Chief Complaint   Patient presents with    Follow-up    Stroke       History of Present Illness:   Clinic visit 10/20/2023: Laury Eugene is a 83 y.o. female with a past medical history of  past medical history of breast cancer in remission, atrial fibrillation, hypertension, hypothyroidism, recent GI bleed 2023, multiple falls in past year (most recent 2023), chronic right temporal lobe infarct who presented to [facility] Emergency Department on 2023 with acute left facial droop, aphasia, and confusion.  CT head 2023 showed chronic right temporal infarct and no hemorrhage.  Decision made to proceed with TNK.  CT angiogram head and neck 2023 showed distal left M2 occlusion.  The patient was not taken for thrombectomy due to NIHSS 3 on admission.  MRI brain 2023 shows chronic right temporal infarct and acute left insular infarct.  TTE 2023 LVEF 51-55%, normal left atrium, no thrombus. The patient was cleared from the GI perspective to restart apixaban and this medication was restarted for atrial fibrillation prior to discharge on 2023.      The patient presents to clinic today with her niece Mindy who helps to provide some of the history.The patient has been working with Physical Therapy, Occupational Therapy, and Speech Therapy. She and her niece report persistent expressive aphasia, right hemiparesis, and right facial droop. She and her niece deny new neurologic symptoms since her hospital discharge. She reports one fall since she has been on apixaban. She denies seizure activity. The patient questions if she could be cleared to drive.    Clinic visit 2024: Patient presents to clinic today for follow-up.  Since her last visit she unfortunately has presented to the ER on 2  different occasions in April and May for seizure activity.  Her most recent admission in May was due to breakthrough seizure activity that occurred while patient off of her medication for approximately 3 days.  Patient patient due to being unable to get a refill.  She was discharged on an increased dose of Keppra.  She has had no new strokelike symptoms or seizure activity since leaving the hospital.  She continues to have dysarthria, right facial droop and mild right-sided weakness.  She does report some drowsiness associated with her increased dose of Keppra but this is tolerable.  Will have the patient establish with general neurology as it is clear she will need long-term management of her seizure disorder.  She continues to take her Eliquis without issue or side effect.    Subjective        I have reviewed and the following portions of the patient's history were updated as appropriate: past family history, past medical history, past social history, past surgical history and problem list.    Medications:     Current Outpatient Medications:     apixaban (ELIQUIS) 5 MG tablet tablet, Take 1 tablet by mouth Every 12 (Twelve) Hours. Indications: Atrial Fibrillation, Disp: 60 tablet, Rfl:     ascorbic acid (VITAMIN C) 500 MG tablet, Take 1 tablet by mouth Daily., Disp: , Rfl:     atorvastatin (LIPITOR) 80 MG tablet, Take 1 tablet by mouth Every Night., Disp: 90 tablet, Rfl: 0    escitalopram (LEXAPRO) 10 MG tablet, Take 1 tablet by mouth Daily., Disp: , Rfl:     folic acid (FOLVITE) 1 MG tablet, Take 800 mcg by mouth Daily., Disp: , Rfl:     levETIRAcetam XR (Keppra XR) 500 MG tablet, Take 1 tablet by mouth 2 (Two) Times a Day., Disp: 180 tablet, Rfl: 0    metoprolol succinate XL (TOPROL-XL) 25 MG 24 hr tablet, Take 0.5 tablets by mouth Daily., Disp: , Rfl:     pantoprazole (PROTONIX) 40 MG EC tablet, Take 1 tablet by mouth Daily. Started at [facility], Disp: , Rfl:     propafenone SR (RYTHMOL SR) 325 MG 12 hr  "capsule, Take 1 capsule by mouth Every 12 (Twelve) Hours., Disp: 120 capsule, Rfl: 0    vitamin B-12 (CYANOCOBALAMIN) 250 MCG tablet, Take 4 tablets by mouth Daily., Disp: , Rfl:     Allergies:   Allergies   Allergen Reactions    Lidocaine Other (See Comments)     seizure    Novocain [Procaine] Other (See Comments)     seizures    Tnkase [Tenecteplase] Angioedema     Treated 8/26    Nsaids Other (See Comments)     Avoid NSAIDs d/t history of GIB       Objective     Physical Exam:   Vital Signs:   Vitals:    06/19/24 1326   BP: 132/76   Pulse: 60   Temp: 98.2 °F (36.8 °C)   SpO2: 95%   Weight: 63.5 kg (140 lb)   Height: 152.4 cm (60\")     Body mass index is 27.34 kg/m².    Physical Exam  Vitals and nursing note reviewed.   Constitutional:       General: She is awake. She is not in acute distress.     Appearance: Normal appearance. She is normal weight. She is not ill-appearing.      Comments: 84-year-old  female   HENT:      Head: Normocephalic and atraumatic.      Nose: Nose normal.      Mouth/Throat:      Mouth: Mucous membranes are moist.   Eyes:      General: Lids are normal.      Extraocular Movements: Extraocular movements intact.      Pupils: Pupils are equal, round, and reactive to light.   Cardiovascular:      Rate and Rhythm: Normal rate and regular rhythm.      Pulses: Normal pulses.   Pulmonary:      Effort: Pulmonary effort is normal. No respiratory distress.   Skin:     General: Skin is warm and dry.   Neurological:      Mental Status: She is alert and oriented to person, place, and time.      Cranial Nerves: Cranial nerve deficit and dysarthria present.      Motor: Weakness present.   Psychiatric:         Mood and Affect: Mood normal.         Behavior: Behavior normal.       Neurological Exam  Mental Status  Awake and alert. Oriented to person, place, time and situation. Oriented to person, place, and time. Moderate dysarthria present. Language is fluent with no aphasia. Attention and " concentration are normal.    Cranial Nerves  CN II: Right visual acuity: Counts fingers. Left visual acuity: Counts fingers. Visual fields full to confrontation.  CN III, IV, VI: Extraocular movements intact bilaterally. Normal lids and orbits bilaterally. Pupils equal round and reactive to light bilaterally.  CN V: Facial sensation is normal.  CN VII:  Right: There is central facial weakness.  CN VIII: Hearing is normal to speech .    Motor  Normal muscle bulk throughout. No fasciculations present. Normal muscle tone. Strength is 5/5 in all four extremities except as noted.  RUE/RLE 4+/5 strength.    Sensory  Light touch is normal in upper and lower extremities.     Coordination  Right: Finger-to-nose normal.Left: Finger-to-nose normal.  No obvious dysmetria .    Gait  Casual gait is normal including stance, stride, and arm swing.     NIH Stroke Scale    1a  Level of consciousness: 0=alert; keenly responsive   1b. LOC questions:  0=Answers both questions correctly    1c. LOC commands: 0=Performs both tasks correctly   2.  Best Gaze: 0=normal   3. Visual: 0=No visual loss   4. Facial Palsy: 2=Partial paralysis (total or near total paralysis of the lower face)   5a. Motor left arm: 0=No drift, limb holds 90 (or 45) degrees for full 10 seconds   5b.  Motor right arm: 0=No drift, limb holds 90 (or 45) degrees for full 10 seconds   6a. Motor left le=No drift, limb holds 90 (or 45) degrees for full 10 seconds   6b  Motor right le=No drift, limb holds 90 (or 45) degrees for full 10 seconds   7. Limb Ataxia: 0=Absent   8.  Sensory: 0=Normal; no sensory loss   9. Best Language:  0=No aphasia, normal   10. Dysarthria: 1=Mild to moderate, patient slurs at least some words and at worst, can be understood with some difficulty   11. Extinction and Inattention: 0=No abnormality    Total:   3        Modified Wetzel Score: 2        0  No Symptoms    1 No significant disability. Able to carry out all usual activities,  despite some symptoms.    2 Slight disability. Able to look after own affairs without assistance, but unable to carry out all previous activities.    3 Moderate disability. Requires some help, but able to walk unassisted.    4 Moderately severe disability. Unable to attend to own bodily needs without assistance, and unable to walk unassisted.    5 Severe disability. Requires constant nursing care and attention, bedridden, incontinent.    6 Dead      PHQ-9 Depression Screening  Little interest or pleasure in doing things? 0-->not at all   Feeling down, depressed, or hopeless? 0-->not at all   Trouble falling or staying asleep, or sleeping too much?     Feeling tired or having little energy?     Poor appetite or overeating?     Feeling bad about yourself - or that you are a failure or have let yourself or your family down?     Trouble concentrating on things, such as reading the newspaper or watching television?     Moving or speaking so slowly that other people could have noticed? Or the opposite - being so fidgety or restless that you have been moving around a lot more than usual?     Thoughts that you would be better off dead, or of hurting yourself in some way?     PHQ-9 Total Score 0   If you checked off any problems, how difficult have these problems made it for you to do your work, take care of things at home, or get along with other people?         WENDI Fall Risk Clinician Key Questions   Have you fallen in the past year?: Yes  Do you feel unsteady with walking?: No  Are you worried about falling?: Yes      Assessment / Plan      Assessment/Plan:      # Acute left insular infarct  # Chronic right temporal infarct  -The etiology of the patient's cortical infarcts in multiple vascular territories is suggestive of atrial fibrillation. GI was consulted and did clear the patient to start anticoagulation if indicated. Risk benefit discussion with the patient and her nieces as inpatient and shared decision making  to restart apixaban for atrial fibrillation.  -Continue apixaban 5 mg BID   -Continue atorvastatin 80 mg daily (LDL 63)  -normal BP goals, <130/80. Today's BP was 144/92.   -discussed s/sxs of stroke/seizure to call 911 or return to the ED   -follow up in stroke clinic in August for 1 year follow up. If stable at that time will transition to annual appointments.     #Seizure disorder, with recent admissions for breakthrough seizures  -In review of general neurology's documentation (April Pisanoler APRN), it was recommended to continue Keppra 1000 mg extended release nightly   -patient had missed 3 days of Keppra prior to breakthrough seizure so this is likely the cause of the episode in May 2024.   -referral to general neurology for long term seizure management has been placed     Follow Up:   Return in about 2 months (around 8/19/2024).    DIANNE Grace  [facility] [place] [facility]

## 2024-07-17 RX ORDER — ATORVASTATIN CALCIUM 80 MG/1
80 TABLET, FILM COATED ORAL NIGHTLY
Qty: 90 TABLET | Refills: 0 | OUTPATIENT
Start: 2024-07-17

## 2024-08-21 ENCOUNTER — OFFICE VISIT (OUTPATIENT)
Dept: NEUROLOGY | Facility: CLINIC | Age: 84
End: 2024-08-21
Payer: MEDICARE

## 2024-08-21 VITALS
WEIGHT: 141 LBS | HEART RATE: 55 BPM | TEMPERATURE: 98.7 F | BODY MASS INDEX: 27.68 KG/M2 | SYSTOLIC BLOOD PRESSURE: 122 MMHG | OXYGEN SATURATION: 97 % | DIASTOLIC BLOOD PRESSURE: 64 MMHG | HEIGHT: 60 IN

## 2024-08-21 DIAGNOSIS — Z86.73 HISTORY OF STROKE: Primary | ICD-10-CM

## 2024-08-21 RX ORDER — LEVETIRACETAM 500 MG/1
500 TABLET, FILM COATED, EXTENDED RELEASE ORAL 2 TIMES DAILY
Qty: 180 TABLET | Refills: 1 | Status: SHIPPED | OUTPATIENT
Start: 2024-08-21

## 2024-08-21 NOTE — PROGRESS NOTES
Follow Up Office Visit      Encounter Date: 2024   Patient Name: Laury Eugene  : 1940   MRN: [id number]   PCP: Leatha Melvin DO    Chief Complaint:    Chief Complaint   Patient presents with    Follow-up    Stroke       History of Present Illness: Clinic visit 10/20/2023: Laury Eugene is a 83 y.o. female with a past medical history of  past medical history of breast cancer in remission, atrial fibrillation, hypertension, hypothyroidism, recent GI bleed 2023, multiple falls in past year (most recent 2023), chronic right temporal lobe infarct who presented to [facility] Emergency Department on 2023 with acute left facial droop, aphasia, and confusion.  CT head 2023 showed chronic right temporal infarct and no hemorrhage.  Decision made to proceed with TNK.  CT angiogram head and neck 2023 showed distal left M2 occlusion.  The patient was not taken for thrombectomy due to NIHSS 3 on admission.  MRI brain 2023 shows chronic right temporal infarct and acute left insular infarct.  TTE 2023 LVEF 51-55%, normal left atrium, no thrombus. The patient was cleared from the GI perspective to restart apixaban and this medication was restarted for atrial fibrillation prior to discharge on 2023.      The patient presents to clinic today with her niece Mindy who helps to provide some of the history.The patient has been working with Physical Therapy, Occupational Therapy, and Speech Therapy. She and her niece report persistent expressive aphasia, right hemiparesis, and right facial droop. She and her niece deny new neurologic symptoms since her hospital discharge. She reports one fall since she has been on apixaban. She denies seizure activity. The patient questions if she could be cleared to drive.     Clinic visit 2024: Patient presents to clinic today for follow-up.  Since her last visit she unfortunately has presented to the ER on 2  different occasions in April and May for seizure activity.  Her most recent admission in May was due to breakthrough seizure activity that occurred while patient off of her medication for approximately 3 days.  Patient patient due to being unable to get a refill.  She was discharged on an increased dose of Keppra.  She has had no new strokelike symptoms or seizure activity since leaving the hospital.  She continues to have dysarthria, right facial droop and mild right-sided weakness.  She does report some drowsiness associated with her increased dose of Keppra but this is tolerable.  Will have the patient establish with general neurology as it is clear she will need long-term management of her seizure disorder.  She continues to take her Eliquis without issue or side effect.    Clinic visit 8/21/2024: Patient presents to clinic with her sister. She reports she is having a good day. Per sister, although she continues to have dysarthria she is having a very good day in her communication. She continues to have facial droop. Patient denies new or worsening stroke garcia seizure symptoms. She continues to be maintained on Keppra 500mg XR BID. She will establish care with Angelica DEAN in general neurology clinic for long term seizure management in September.   Subjective        I have reviewed and the following portions of the patient's history were updated as appropriate: past family history, past medical history, past social history, past surgical history and problem list.    Medications:     Current Outpatient Medications:     apixaban (ELIQUIS) 5 MG tablet tablet, Take 1 tablet by mouth Every 12 (Twelve) Hours. Indications: Atrial Fibrillation, Disp: 60 tablet, Rfl:     ascorbic acid (VITAMIN C) 500 MG tablet, Take 1 tablet by mouth Daily., Disp: , Rfl:     atorvastatin (LIPITOR) 80 MG tablet, Take 1 tablet by mouth Every Night., Disp: 90 tablet, Rfl: 0    BIOTIN FORTE PO, Take  by mouth., Disp: , Rfl:      "escitalopram (LEXAPRO) 10 MG tablet, Take 1 tablet by mouth Daily., Disp: , Rfl:     folic acid (FOLVITE) 1 MG tablet, Take 800 mcg by mouth Daily., Disp: , Rfl:     levETIRAcetam XR (Keppra XR) 500 MG tablet, Take 1 tablet by mouth 2 (Two) Times a Day., Disp: 180 tablet, Rfl: 1    metoprolol succinate XL (TOPROL-XL) 25 MG 24 hr tablet, Take 0.5 tablets by mouth Daily., Disp: , Rfl:     Multiple Vitamins-Minerals (MULTIVITAMIN GUMMIES ADULTS PO), Take 1 tablet by mouth Daily., Disp: , Rfl:     pantoprazole (PROTONIX) 40 MG EC tablet, Take 1 tablet by mouth Daily. Started at [facility], Disp: , Rfl:     propafenone SR (RYTHMOL SR) 325 MG 12 hr capsule, Take 1 capsule by mouth Every 12 (Twelve) Hours., Disp: 120 capsule, Rfl: 0    vitamin B-12 (CYANOCOBALAMIN) 250 MCG tablet, Take 4 tablets by mouth Daily., Disp: , Rfl:     Allergies:   Allergies   Allergen Reactions    Lidocaine Other (See Comments)     seizure    Novocain [Procaine] Other (See Comments)     seizures    Tnkase [Tenecteplase] Angioedema     Treated 8/26    Nsaids Other (See Comments)     Avoid NSAIDs d/t history of GIB       Objective     Physical Exam:   Vital Signs:   Vitals:    08/21/24 1354   BP: 122/64   Pulse: 55   Temp: 98.7 °F (37.1 °C)   SpO2: 97%   Weight: 64 kg (141 lb)   Height: 152.4 cm (60\")     Body mass index is 27.54 kg/m².    Physical Exam  Vitals and nursing note reviewed.   Constitutional:       General: She is awake. She is not in acute distress.     Appearance: Normal appearance. She is normal weight. She is not ill-appearing.      Comments: 84 year old  female   HENT:      Head: Normocephalic and atraumatic.      Nose: Nose normal.      Mouth/Throat:      Mouth: Mucous membranes are moist.   Eyes:      General: Lids are normal.      Extraocular Movements: Extraocular movements intact.      Pupils: Pupils are equal, round, and reactive to light.   Cardiovascular:      Rate and Rhythm: Normal rate and regular rhythm.      " Pulses: Normal pulses.   Pulmonary:      Effort: Pulmonary effort is normal. No respiratory distress.   Skin:     General: Skin is warm and dry.   Neurological:      Mental Status: She is alert and oriented to person, place, and time.      Cranial Nerves: Cranial nerve deficit and dysarthria present.      Motor: Motor strength is normal.     Coordination: Coordination is intact.   Psychiatric:         Mood and Affect: Mood normal.         Behavior: Behavior normal.       Neurological Exam  Mental Status  Awake and alert. Oriented to person, place and time. Oriented to person, place, and time. Moderate dysarthria present. Language is fluent with no aphasia. Attention and concentration are normal. Fund of knowledge is appropriate for level of education.    Cranial Nerves  CN II: Right visual acuity: Counts fingers. Left visual acuity: Counts fingers. Visual fields full to confrontation.  CN III, IV, VI: Extraocular movements intact bilaterally. Normal lids and orbits bilaterally. Pupils equal round and reactive to light bilaterally.  CN V: Facial sensation is normal.  CN VII:  Right: There is central facial weakness.  CN VIII: Hearing is normal to speech .  CN XI: Shoulder shrug strength is normal.  CN XII: Tongue midline without atrophy or fasciculations.    Motor  Normal muscle bulk throughout. No fasciculations present. Normal muscle tone. Strength is 5/5 throughout all four extremities.  Subte weakness in right .    Coordination    Finger-to-nose, rapid alternating movements and heel-to-shin normal bilaterally without dysmetria.  No obvious dysmetria .    Gait  Casual gait is normal including stance, stride, and arm swing.       Modified Irion Score: 2        0  No Symptoms    1 No significant disability. Able to carry out all usual activities, despite some symptoms.    2 Slight disability. Able to look after own affairs without assistance, but unable to carry out all previous activities.    3 Moderate  disability. Requires some help, but able to walk unassisted.    4 Moderately severe disability. Unable to attend to own bodily needs without assistance, and unable to walk unassisted.    5 Severe disability. Requires constant nursing care and attention, bedridden, incontinent.    6 Dead      PHQ-9 Depression Screening  Little interest or pleasure in doing things? 0-->not at all   Feeling down, depressed, or hopeless? 0-->not at all   Trouble falling or staying asleep, or sleeping too much?     Feeling tired or having little energy?     Poor appetite or overeating?     Feeling bad about yourself - or that you are a failure or have let yourself or your family down?     Trouble concentrating on things, such as reading the newspaper or watching television?     Moving or speaking so slowly that other people could have noticed? Or the opposite - being so fidgety or restless that you have been moving around a lot more than usual?     Thoughts that you would be better off dead, or of hurting yourself in some way?     PHQ-9 Total Score 0   If you checked off any problems, how difficult have these problems made it for you to do your work, take care of things at home, or get along with other people?       WENDI Fall Risk Clinician Key Questions   Have you fallen in the past year?: Yes  Do you feel unsteady with walking?: Yes (sometimes)  Are you worried about falling?: Yes      Lab Results   Component Value Date    GLUCOSE 95 04/14/2024    BUN 14 04/14/2024    CREATININE 1.00 05/27/2024     04/14/2024    K 4.3 04/14/2024     04/14/2024    CALCIUM 9.1 04/14/2024    PROTEINTOT 6.9 04/13/2024    ALBUMIN 3.8 04/13/2024    ALT 21 05/27/2024    AST 26 05/27/2024    ALKPHOS 114 04/13/2024    BILITOT 0.2 04/13/2024    GLOB 3.1 04/13/2024    AGRATIO 1.2 04/13/2024    BCR 16.7 04/14/2024    ANIONGAP 7.0 04/14/2024    EGFR 56.4 (L) 05/27/2024      Lipid Panel          5/28/2024    07:56   Lipid Panel   Total Cholesterol 140     Triglycerides 59    HDL Cholesterol 65    VLDL Cholesterol 12    LDL Cholesterol  63    LDL/HDL Ratio 0.97       Most Recent A1C          5/28/2024    07:56   HGBA1C Most Recent   Hemoglobin A1C 5.50       Assessment / Plan      Assessment/Plan:   # Acute left insular infarct  # Chronic right temporal infarct  -The etiology of the patient's cortical infarcts in multiple vascular territories is suggestive of atrial fibrillation.   -Continue apixaban 5 mg BID   -Continue atorvastatin 80 mg daily (LDL 63)  -normal BP goals, <130/80. Today's BP was 122/64.   -Patient would like to be considered to be cleared for driving. She has been 90 days since her last seizure but will need to get clearance from ophthalmology and also pass the [employer] OT driving simulation. I have placed a referral for this.   -discussed s/sxs of stroke/seizure to call 911 or return to the ED   -follow up in stroke clinic in 1 year      #Seizure disorder, with recent admissions for breakthrough seizures  -In review of general neurology's documentation (April Aliya DEAN), it was recommended to continue Keppra XR  -patient's sister requested refill today which I have sent to her pharmacy   -patient had missed 3 days of Keppra prior to breakthrough seizure so this is likely the cause of the episode in May 2024.   -referral to general neurology for long term seizure management has been placed and she will be seen in September    Follow Up:   Return in about 1 year (around 8/21/2025).    DIANNE Grace  [facility] [place] [facility]

## 2024-09-16 ENCOUNTER — OFFICE VISIT (OUTPATIENT)
Dept: NEUROLOGY | Facility: CLINIC | Age: 84
End: 2024-09-16
Payer: MEDICARE

## 2024-09-16 VITALS
SYSTOLIC BLOOD PRESSURE: 112 MMHG | DIASTOLIC BLOOD PRESSURE: 68 MMHG | BODY MASS INDEX: 27.56 KG/M2 | WEIGHT: 140.4 LBS | OXYGEN SATURATION: 95 % | HEIGHT: 60 IN | HEART RATE: 62 BPM

## 2024-09-16 DIAGNOSIS — G40.919 BREAKTHROUGH SEIZURE: Primary | ICD-10-CM

## 2024-09-16 PROCEDURE — 3074F SYST BP LT 130 MM HG: CPT | Performed by: NURSE PRACTITIONER

## 2024-09-16 PROCEDURE — 99214 OFFICE O/P EST MOD 30 MIN: CPT | Performed by: NURSE PRACTITIONER

## 2024-09-16 PROCEDURE — 1160F RVW MEDS BY RX/DR IN RCRD: CPT | Performed by: NURSE PRACTITIONER

## 2024-09-16 PROCEDURE — 3078F DIAST BP <80 MM HG: CPT | Performed by: NURSE PRACTITIONER

## 2024-09-16 PROCEDURE — 1159F MED LIST DOCD IN RCRD: CPT | Performed by: NURSE PRACTITIONER

## 2024-09-16 RX ORDER — LEVETIRACETAM 500 MG/1
500 TABLET, FILM COATED, EXTENDED RELEASE ORAL 2 TIMES DAILY
Qty: 180 TABLET | Refills: 3 | Status: SHIPPED | OUTPATIENT
Start: 2024-09-16

## 2024-09-17 ENCOUNTER — TELEPHONE (OUTPATIENT)
Dept: NEUROLOGY | Facility: CLINIC | Age: 84
End: 2024-09-17
Payer: MEDICARE

## 2024-09-20 ENCOUNTER — PATIENT ROUNDING (BHMG ONLY) (OUTPATIENT)
Dept: NEUROLOGY | Facility: CLINIC | Age: 84
End: 2024-09-20
Payer: MEDICARE

## 2024-10-26 ENCOUNTER — APPOINTMENT (OUTPATIENT)
Dept: CT IMAGING | Facility: HOSPITAL | Age: 84
End: 2024-10-26
Payer: MEDICARE

## 2024-10-26 ENCOUNTER — HOSPITAL ENCOUNTER (EMERGENCY)
Facility: HOSPITAL | Age: 84
Discharge: HOME | End: 2024-10-26
Attending: EMERGENCY MEDICINE
Payer: MEDICARE

## 2024-10-26 ENCOUNTER — APPOINTMENT (OUTPATIENT)
Dept: GENERAL RADIOLOGY | Facility: HOSPITAL | Age: 84
End: 2024-10-26
Payer: MEDICARE

## 2024-10-26 VITALS
RESPIRATION RATE: 18 BRPM | HEART RATE: 58 BPM | TEMPERATURE: 97.4 F | BODY MASS INDEX: 27.48 KG/M2 | OXYGEN SATURATION: 95 % | SYSTOLIC BLOOD PRESSURE: 147 MMHG | DIASTOLIC BLOOD PRESSURE: 84 MMHG | WEIGHT: 140 LBS | HEIGHT: 60 IN

## 2024-10-26 DIAGNOSIS — G40.909 RECURRENT SEIZURES: Primary | ICD-10-CM

## 2024-10-26 DIAGNOSIS — Z86.73 HISTORY OF CVA (CEREBROVASCULAR ACCIDENT): ICD-10-CM

## 2024-10-26 DIAGNOSIS — R41.82 ALTERED MENTAL STATUS, UNSPECIFIED ALTERED MENTAL STATUS TYPE: ICD-10-CM

## 2024-10-26 LAB
ALT SERPL W P-5'-P-CCNC: 17 U/L (ref 1–33)
APTT PPP: 33.5 SECONDS (ref 22–39)
AST SERPL-CCNC: 27 U/L (ref 1–32)
BASOPHILS # BLD AUTO: 0.03 10*3/MM3 (ref 0–0.2)
BASOPHILS NFR BLD AUTO: 0.5 % (ref 0–1.5)
BILIRUB UR QL STRIP: NEGATIVE
BUN BLDA-MCNC: 16 MG/DL (ref 8–26)
CA-I BLDA-SCNC: 1.2 MMOL/L (ref 1.2–1.32)
CHLORIDE BLDA-SCNC: 99 MMOL/L (ref 98–109)
CK SERPL-CCNC: 35 U/L (ref 20–180)
CLARITY UR: CLEAR
CO2 BLDA-SCNC: 28 MMOL/L (ref 24–29)
COLOR UR: YELLOW
CREAT BLDA-MCNC: 1.1 MG/DL (ref 0.6–1.3)
D-LACTATE SERPL-SCNC: 1.7 MMOL/L (ref 0.5–2)
DEPRECATED RDW RBC AUTO: 46.1 FL (ref 37–54)
EGFRCR SERPLBLD CKD-EPI 2021: 50.3 ML/MIN/1.73
EOSINOPHIL # BLD AUTO: 0.16 10*3/MM3 (ref 0–0.4)
EOSINOPHIL NFR BLD AUTO: 2.7 % (ref 0.3–6.2)
ERYTHROCYTE [DISTWIDTH] IN BLOOD BY AUTOMATED COUNT: 12.9 % (ref 12.3–15.4)
GLUCOSE BLDC GLUCOMTR-MCNC: 100 MG/DL (ref 70–130)
GLUCOSE UR STRIP-MCNC: NEGATIVE MG/DL
HCT VFR BLD AUTO: 35.8 % (ref 34–46.6)
HCT VFR BLDA CALC: 35 % (ref 38–51)
HGB BLD-MCNC: 11.8 G/DL (ref 12–15.9)
HGB BLDA-MCNC: 11.9 G/DL (ref 12–17)
HGB UR QL STRIP.AUTO: NEGATIVE
HOLD SPECIMEN: NORMAL
IMM GRANULOCYTES # BLD AUTO: 0.01 10*3/MM3 (ref 0–0.05)
IMM GRANULOCYTES NFR BLD AUTO: 0.2 % (ref 0–0.5)
INR PPP: 1.5 (ref 0.8–1.2)
KETONES UR QL STRIP: NEGATIVE
LEUKOCYTE ESTERASE UR QL STRIP.AUTO: NEGATIVE
LYMPHOCYTES # BLD AUTO: 1.98 10*3/MM3 (ref 0.7–3.1)
LYMPHOCYTES NFR BLD AUTO: 33.2 % (ref 19.6–45.3)
MCH RBC QN AUTO: 32 PG (ref 26.6–33)
MCHC RBC AUTO-ENTMCNC: 33 G/DL (ref 31.5–35.7)
MCV RBC AUTO: 97 FL (ref 79–97)
MONOCYTES # BLD AUTO: 0.72 10*3/MM3 (ref 0.1–0.9)
MONOCYTES NFR BLD AUTO: 12.1 % (ref 5–12)
NEUTROPHILS NFR BLD AUTO: 3.06 10*3/MM3 (ref 1.7–7)
NEUTROPHILS NFR BLD AUTO: 51.3 % (ref 42.7–76)
NITRITE UR QL STRIP: NEGATIVE
NRBC BLD AUTO-RTO: 0 /100 WBC (ref 0–0.2)
PH UR STRIP.AUTO: 6 [PH] (ref 5–8)
PLATELET # BLD AUTO: 211 10*3/MM3 (ref 140–450)
PMV BLD AUTO: 10.5 FL (ref 6–12)
POTASSIUM BLDA-SCNC: 4.2 MMOL/L (ref 3.5–4.9)
PROT UR QL STRIP: NEGATIVE
PROTHROMBIN TIME: 17.9 SECONDS (ref 12.8–15.2)
RBC # BLD AUTO: 3.69 10*6/MM3 (ref 3.77–5.28)
SODIUM BLD-SCNC: 140 MMOL/L (ref 138–146)
SP GR UR STRIP: >=1.03 (ref 1–1.03)
TROPONIN T SERPL HS-MCNC: 8 NG/L
UROBILINOGEN UR QL STRIP: NORMAL
WBC NRBC COR # BLD AUTO: 5.96 10*3/MM3 (ref 3.4–10.8)
WHOLE BLOOD HOLD COAG: NORMAL
WHOLE BLOOD HOLD SPECIMEN: NORMAL

## 2024-10-26 PROCEDURE — 80047 BASIC METABLC PNL IONIZED CA: CPT | Performed by: EMERGENCY MEDICINE

## 2024-10-26 PROCEDURE — 84450 TRANSFERASE (AST) (SGOT): CPT | Performed by: EMERGENCY MEDICINE

## 2024-10-26 PROCEDURE — 82550 ASSAY OF CK (CPK): CPT

## 2024-10-26 PROCEDURE — 25510000001 IOPAMIDOL PER 1 ML: Performed by: EMERGENCY MEDICINE

## 2024-10-26 PROCEDURE — 85610 PROTHROMBIN TIME: CPT | Performed by: EMERGENCY MEDICINE

## 2024-10-26 PROCEDURE — 70496 CT ANGIOGRAPHY HEAD: CPT

## 2024-10-26 PROCEDURE — 81003 URINALYSIS AUTO W/O SCOPE: CPT

## 2024-10-26 PROCEDURE — 99283 EMERGENCY DEPT VISIT LOW MDM: CPT

## 2024-10-26 PROCEDURE — 71045 X-RAY EXAM CHEST 1 VIEW: CPT

## 2024-10-26 PROCEDURE — 70450 CT HEAD/BRAIN W/O DYE: CPT

## 2024-10-26 PROCEDURE — 85730 THROMBOPLASTIN TIME PARTIAL: CPT | Performed by: EMERGENCY MEDICINE

## 2024-10-26 PROCEDURE — 84460 ALANINE AMINO (ALT) (SGPT): CPT | Performed by: EMERGENCY MEDICINE

## 2024-10-26 PROCEDURE — 84484 ASSAY OF TROPONIN QUANT: CPT | Performed by: EMERGENCY MEDICINE

## 2024-10-26 PROCEDURE — 70498 CT ANGIOGRAPHY NECK: CPT

## 2024-10-26 PROCEDURE — 36415 COLL VENOUS BLD VENIPUNCTURE: CPT

## 2024-10-26 PROCEDURE — 99285 EMERGENCY DEPT VISIT HI MDM: CPT

## 2024-10-26 PROCEDURE — 83605 ASSAY OF LACTIC ACID: CPT

## 2024-10-26 PROCEDURE — 85014 HEMATOCRIT: CPT | Performed by: EMERGENCY MEDICINE

## 2024-10-26 PROCEDURE — 25010000002 LEVETRIRACETAM PER 10 MG

## 2024-10-26 PROCEDURE — 0042T HC CT CEREBRAL PERFUSION W/WO CONTRAST: CPT

## 2024-10-26 PROCEDURE — 85025 COMPLETE CBC W/AUTO DIFF WBC: CPT | Performed by: EMERGENCY MEDICINE

## 2024-10-26 PROCEDURE — 96374 THER/PROPH/DIAG INJ IV PUSH: CPT

## 2024-10-26 PROCEDURE — 93005 ELECTROCARDIOGRAM TRACING: CPT | Performed by: EMERGENCY MEDICINE

## 2024-10-26 PROCEDURE — 80177 DRUG SCRN QUAN LEVETIRACETAM: CPT

## 2024-10-26 RX ORDER — SODIUM CHLORIDE 0.9 % (FLUSH) 0.9 %
10 SYRINGE (ML) INJECTION AS NEEDED
Status: DISCONTINUED | OUTPATIENT
Start: 2024-10-26 | End: 2024-10-26 | Stop reason: HOSPADM

## 2024-10-26 RX ORDER — IOPAMIDOL 755 MG/ML
120 INJECTION, SOLUTION INTRAVASCULAR
Status: COMPLETED | OUTPATIENT
Start: 2024-10-26 | End: 2024-10-26

## 2024-10-26 RX ORDER — LEVETIRACETAM 750 MG/1
750 TABLET ORAL 2 TIMES DAILY
Qty: 60 TABLET | Refills: 2 | Status: SHIPPED | OUTPATIENT
Start: 2024-10-26 | End: 2025-01-24

## 2024-10-26 RX ORDER — LEVETIRACETAM 500 MG/5ML
1000 INJECTION, SOLUTION, CONCENTRATE INTRAVENOUS ONCE
Status: COMPLETED | OUTPATIENT
Start: 2024-10-26 | End: 2024-10-26

## 2024-10-26 RX ADMIN — IOPAMIDOL 120 ML: 755 INJECTION, SOLUTION INTRAVENOUS at 14:12

## 2024-10-26 RX ADMIN — LEVETIRACETAM 1000 MG: 100 INJECTION, SOLUTION INTRAVENOUS at 15:05

## 2024-10-26 NOTE — ED PROVIDER NOTES
[place]    EMERGENCY DEPARTMENT ENCOUNTER      Pt Name: Laury Eugene  MRN: [id number]  YOB: 1940  Date of evaluation: 10/26/2024  Provider: Manjinder Weathers MD    CHIEF COMPLAINT       Chief Complaint   Patient presents with    Stroke         HISTORY OF PRESENT ILLNESS   Laury Eugene is a 84 y.o. female who presents to the emergency department with complaint of 2 separate staring episodes associated with right-sided facial twitching that began just prior to arrival.  Each episode lasted for a couple of minutes.  Patient is currently back to baseline and has no complaints.  She has history of stroke with residual left-sided deficits complicated by seizures for which she is on Keppra.  Denies any headache or recent illness.      Nursing notes were reviewed.    REVIEW OF SYSTEMS     ROS:  A chief complaint appropriate review of systems was completed and is negative except as noted in the HPI.      PAST MEDICAL HISTORY     Past Medical History:   Diagnosis Date    History of breast cancer     History of heart disease     Hypothyroidism     Multinodular goiter     Seizures     Sleep apnea     Stroke     Thyroid function test abnormal          SURGICAL HISTORY       Past Surgical History:   Procedure Laterality Date    BLEPHAROPLASTY      BREAST RECONSTRUCTION      CATARACT EXTRACTION Bilateral     COLONOSCOPY N/A 8/27/2023    Procedure: COLONOSCOPY;  Surgeon: Brunner, Mark I, MD;  Location:  [facility];  Service: Gastroenterology;  Laterality: N/A;    ENDOSCOPY N/A 8/27/2023    Procedure: ESOPHAGOGASTRODUODENOSCOPY;  Surgeon: Brunner, Mark I, MD;  Location:  [facility];  Service: Gastroenterology;  Laterality: N/A;    HYSTERECTOMY      SIMPLE MASTECTOMY           CURRENT MEDICATIONS     No current facility-administered medications for this encounter.    Current Outpatient Medications:     apixaban (ELIQUIS) 5 MG tablet tablet, Take 1 tablet by mouth Every 12 (Twelve) Hours. Indications:  Atrial Fibrillation, Disp: 60 tablet, Rfl:     ascorbic acid (VITAMIN C) 500 MG tablet, Take 1 tablet by mouth Daily., Disp: , Rfl:     atorvastatin (LIPITOR) 80 MG tablet, Take 1 tablet by mouth Every Night., Disp: 90 tablet, Rfl: 0    BIOTIN FORTE PO, Take  by mouth., Disp: , Rfl:     escitalopram (LEXAPRO) 10 MG tablet, Take 1 tablet by mouth Daily., Disp: , Rfl:     folic acid (FOLVITE) 1 MG tablet, Take 800 mcg by mouth Daily., Disp: , Rfl:     levETIRAcetam (KEPPRA) 750 MG tablet, Take 1 tablet by mouth 2 (Two) Times a Day for 90 days., Disp: 60 tablet, Rfl: 2    metoprolol succinate XL (TOPROL-XL) 25 MG 24 hr tablet, Take 0.5 tablets by mouth Daily., Disp: , Rfl:     Multiple Vitamins-Minerals (MULTIVITAMIN GUMMIES ADULTS PO), Take 1 tablet by mouth Daily., Disp: , Rfl:     pantoprazole (PROTONIX) 40 MG EC tablet, Take 1 tablet by mouth Daily. Started at [facility], Disp: , Rfl:     propafenone SR (RYTHMOL SR) 325 MG 12 hr capsule, Take 1 capsule by mouth Every 12 (Twelve) Hours., Disp: 120 capsule, Rfl: 0    vitamin B-12 (CYANOCOBALAMIN) 250 MCG tablet, Take 4 tablets by mouth Daily., Disp: , Rfl:     ALLERGIES     Lidocaine, Novocain [procaine], Tnkase [tenecteplase], and Nsaids    FAMILY HISTORY       Family History   Problem Relation Age of Onset    Hypothyroidism Sister     Stomach cancer Mother     Heart attack Father           SOCIAL HISTORY       Social History     Socioeconomic History    Marital status:    Tobacco Use    Smoking status: Former     Current packs/day: 0.00     Types: Cigarettes     Quit date:      Years since quittin.8     Passive exposure: Past    Smokeless tobacco: Never   Vaping Use    Vaping status: Never Used   Substance and Sexual Activity    Alcohol use: Yes     Alcohol/week: 1.0 - 2.0 standard drink of alcohol     Types: 1 - 2 Glasses of wine per week     Comment: social    Drug use: Never    Sexual activity: Never         PHYSICAL EXAM    (up to 7 for level 4,  8 or more for level 5)     Vitals:    10/26/24 1500 10/26/24 1530 10/26/24 1600 10/26/24 1630   BP: 154/65 150/94 151/65 147/84   BP Location:       Patient Position:       Pulse: 58 56 57 58   Resp:       Temp:       TempSrc:       SpO2: 97% 97% 95% 95%   Weight:       Height:           General: Awake, alert, no acute distress.  HEENT: Conjunctivae normal.  Neck: Trachea midline.  Cardiac: Heart regular rate, rhythm, no murmurs, rubs, or gallops  Lungs: Lungs are clear to auscultation, there is no wheezing, rhonchi, or rales. There is no use of accessory muscles.  Chest wall: There is no tenderness to palpation over the chest wall or over ribs  Abdomen: Abdomen is soft, nontender, nondistended. There are no firm or pulsatile masses, no rebound rigidity or guarding.   Musculoskeletal: No deformity.  Neuro: Alert and oriented x 4.  Dermatology: Skin is warm and dry  Psych: Mentation is grossly normal, cognition is grossly normal. Affect is appropriate.        DIAGNOSTIC RESULTS     EKG: All EKGs are interpreted by the Emergency Department Physician who either signs or Co-signs this chart in the absence of a cardiologist.    ECG 12 Lead ED Triage Standing Order; Acute Stroke (Onset <24 hrs)   Preliminary Result   Test Reason : ED Triage Standing Order~   Blood Pressure :   */*   mmHG   Vent. Rate :  57 BPM     Atrial Rate :  57 BPM      P-R Int : 216 ms          QRS Dur :  94 ms       QT Int : 444 ms       P-R-T Axes :  41   3  46 degrees      QTc Int : 432 ms      Sinus bradycardia with 1st degree AV block with premature atrial complexes   Anterior infarct (cited on or before 14-APR-2024)   Abnormal ECG   When compared with ECG of 27-MAY-2024 11:03,   Sinus rhythm has replaced Atrial fibrillation      Referred By: JENNIFER           Confirmed By:             RADIOLOGY:   [x] Radiologist's Report Reviewed:  XR Chest 1 View   Final Result   Impression:      1. No acute cardiopulmonary disease.         Electronically  Signed: Yared Muñoz MD     10/26/2024 2:47 PM EDT     Workstation ID: [id number]      CT Angiogram Head w AI Analysis of LVO   Final Result   Impression:   No new proximal large vessel occlusion or new high-grade stenosis of the major arteries of the head and neck.         Electronically Signed: Los Fletcher     10/26/2024 2:49 PM EDT     Workstation ID: [id number]      CT Angiogram Neck   Final Result   Impression:   No new proximal large vessel occlusion or new high-grade stenosis of the major arteries of the head and neck.         Electronically Signed: Los Fletcher     10/26/2024 2:49 PM EDT     Workstation ID: [id number]      CT CEREBRAL PERFUSION WITH & WITHOUT CONTRAST   Final Result   Impression:   Negative perfusion scan for acute ischemia/infarct. Old left frontal and right temporal infarcts again noted.            Electronically Signed: Russel Boo MD     10/26/2024 2:31 PM EDT     Workstation ID: [id number]      CT Head Without Contrast Stroke Protocol   Final Result   Impression:   Stable head CT scan including old left MCA territory and right temporal lobe infarcts. Persistent left sphenoid sinus disease. No new intracranial disease is seen.            Electronically Signed: Russel Boo MD     10/26/2024 2:13 PM EDT     Workstation ID: [id number]          I ordered and independently reviewed the above noted radiographic studies.        LABS:    I have reviewed and interpreted all of the currently available lab results from this visit (if applicable):  Results for orders placed or performed during the hospital encounter of 10/26/24   POCT Protime/INR    Collection Time: 10/26/24  2:08 PM    Specimen: Blood   Result Value Ref Range    Protime 17.9 (H) 12.8 - 15.2 seconds    INR 1.5 (H) 0.8 - 1.2   Single High Sensitivity Troponin T    Collection Time: 10/26/24  2:09 PM    Specimen: Blood   Result Value Ref Range    HS Troponin T 8 <14 ng/L   aPTT    Collection Time: 10/26/24  2:09 PM    Specimen: Blood    Result Value Ref Range    PTT 33.5 22.0 - 39.0 seconds   AST    Collection Time: 10/26/24  2:09 PM    Specimen: Blood   Result Value Ref Range    AST (SGOT) 27 1 - 32 U/L   ALT    Collection Time: 10/26/24  2:09 PM    Specimen: Blood   Result Value Ref Range    ALT (SGPT) 17 1 - 33 U/L   CBC Auto Differential    Collection Time: 10/26/24  2:09 PM    Specimen: Blood   Result Value Ref Range    WBC 5.96 3.40 - 10.80 10*3/mm3    RBC 3.69 (L) 3.77 - 5.28 10*6/mm3    Hemoglobin 11.8 (L) 12.0 - 15.9 g/dL    Hematocrit 35.8 34.0 - 46.6 %    MCV 97.0 79.0 - 97.0 fL    MCH 32.0 26.6 - 33.0 pg    MCHC 33.0 31.5 - 35.7 g/dL    RDW 12.9 12.3 - 15.4 %    RDW-SD 46.1 37.0 - 54.0 fl    MPV 10.5 6.0 - 12.0 fL    Platelets 211 140 - 450 10*3/mm3    Neutrophil % 51.3 42.7 - 76.0 %    Lymphocyte % 33.2 19.6 - 45.3 %    Monocyte % 12.1 (H) 5.0 - 12.0 %    Eosinophil % 2.7 0.3 - 6.2 %    Basophil % 0.5 0.0 - 1.5 %    Immature Grans % 0.2 0.0 - 0.5 %    Neutrophils, Absolute 3.06 1.70 - 7.00 10*3/mm3    Lymphocytes, Absolute 1.98 0.70 - 3.10 10*3/mm3    Monocytes, Absolute 0.72 0.10 - 0.90 10*3/mm3    Eosinophils, Absolute 0.16 0.00 - 0.40 10*3/mm3    Basophils, Absolute 0.03 0.00 - 0.20 10*3/mm3    Immature Grans, Absolute 0.01 0.00 - 0.05 10*3/mm3    nRBC 0.0 0.0 - 0.2 /100 WBC   CK    Collection Time: 10/26/24  2:09 PM    Specimen: Blood   Result Value Ref Range    Creatine Kinase 35 20 - 180 U/L   Lactic Acid, Plasma    Collection Time: 10/26/24  2:09 PM    Specimen: Blood   Result Value Ref Range    Lactate 1.7 0.5 - 2.0 mmol/L   POC CHEM 8    Collection Time: 10/26/24  2:09 PM    Specimen: Blood   Result Value Ref Range    Glucose 100 70 - 130 mg/dL    BUN 16 8 - 26 mg/dL    Creatinine 1.10 0.60 - 1.30 mg/dL    Sodium 140 138 - 146 mmol/L    POC Potassium 4.2 3.5 - 4.9 mmol/L    Chloride 99 98 - 109 mmol/L    Total CO2 28 24 - 29 mmol/L    Hemoglobin 11.9 (L) 12.0 - 17.0 g/dL    Hematocrit 35 (L) 38 - 51 %    Ionized Calcium 1.20  1.20 - 1.32 mmol/L    eGFR 50.3 (L) >60.0 mL/min/1.73   Green Top (Gel)    Collection Time: 10/26/24  2:09 PM   Result Value Ref Range    Extra Tube Hold for add-ons.    Lavender Top    Collection Time: 10/26/24  2:09 PM   Result Value Ref Range    Extra Tube hold for add-on    Gold Top - SST    Collection Time: 10/26/24  2:09 PM   Result Value Ref Range    Extra Tube Hold for add-ons.    Gray Top    Collection Time: 10/26/24  2:09 PM   Result Value Ref Range    Extra Tube Hold for add-ons.    Light Blue Top    Collection Time: 10/26/24  2:09 PM   Result Value Ref Range    Extra Tube Hold for add-ons.    ECG 12 Lead ED Triage Standing Order; Acute Stroke (Onset <24 hrs)    Collection Time: 10/26/24  2:38 PM   Result Value Ref Range    QT Interval 444 ms    QTC Interval 432 ms   Urinalysis With Culture If Indicated - Urine, Clean Catch    Collection Time: 10/26/24  3:06 PM    Specimen: Urine, Clean Catch   Result Value Ref Range    Color, UA Yellow Yellow, Straw    Appearance, UA Clear Clear    pH, UA 6.0 5.0 - 8.0    Specific Gravity, UA >=1.030 1.001 - 1.030    Glucose, UA Negative Negative    Ketones, UA Negative Negative    Bilirubin, UA Negative Negative    Blood, UA Negative Negative    Protein, UA Negative Negative    Leuk Esterase, UA Negative Negative    Nitrite, UA Negative Negative    Urobilinogen, UA 0.2 E.U./dL 0.2 - 1.0 E.U./dL        If labs were ordered, I independently reviewed the results and considered them in treating the patient.      EMERGENCY DEPARTMENT COURSE and DIFFERENTIAL DIAGNOSIS/MDM:   Vitals:  AS OF 19:38 EDT    BP - 147/84  HR - 58  TEMP - 97.4 °F (36.3 °C) (Oral)  O2 SATS - 95%        Discussion below represents my analysis of pertinent findings related to patient's condition, differential diagnosis, treatment plan and final disposition.      Differential diagnosis:  The differential diagnosis associated with the patient's presentation includes: CVA, intracranial hemorrhage, cranial  mass, UTI, electrolyte derangement, pneumonia, medication noncompliance      Independent interpretations (ECG/rhythm strip/X-ray/US/CT scan): I independently interpreted the patient's head CT and cardiac monitor.  No evidence of intracranial hemorrhage and patient is in sinus rhythm.      Additional sources:  Discussed/obtained information from independent historians:   [] Spouse:   [] Parent:   [] Friend:   [x] EMS: Report was taken from EMS.  Vital signs stable during transport.   [] Other:  External (non-ED) record review:   [] Inpatient record:   [] Office record:   [] Outpatient record:   [] Prior Outpatient labs:   [] Prior Outpatient radiology:   [] Primary Care record:   [] Outside ED record:   [] Other:       Patient's care impacted by:   [] Diabetes   [] Hypertension   [] Coronary Artery Disease   [] Cancer   [x] Other: History of stroke, seizure    Care significantly affected by Social Determinants of Health (housing and economic circumstances, unemployment)    [] Yes     [x] No   If yes, Patient's care significantly limited by  Social Determinants of Health including:    [] Inadequate housing    [] Low income    [] Alcoholism and drug addiction in family    [] Problems related to primary support group    [] Unemployment    [] Problems related to employment    [] Other Social Determinants of Health:       Consideration of admission/observation vs discharge: Considered admission, however after discussion with neurology was felt the patient could be safely discharged and follow-up as an outpatient.      I considered prescription management with:    [] Pain medication:   [] Antiviral:   [] Antibiotic:   [x] Other: Patient started on Keppra 750 twice daily    ED Course:    ED Course as of 10/26/24 1938   Sat Oct 26, 2024   1637 Discussed case with Dr. Lopez.  Discussed history, presentation, workup.  Recommends increasing Keppra to 750 mg twice daily.  Can follow-up as an outpatient. [NS]   1710 On  reexamination, the patient is fully back to baseline.  Patient has been evaluated by myself and stroke team.  Based on review of record, patient has had similar previous presentation attributed to partial seizures.  CT imaging today is negative and laboratory evaluation is unremarkable.  Suspect breakthrough seizures.  Discussed with neurology as outlined above.  Will increase the patient's Keppra dosing to 750 mg twice daily. [NS]      ED Course User Index  [NS] Manjinder Weathers MD             I had a discussion with the patient/family regarding diagnosis, diagnostic results, treatment plan, and medications.  The patient/family indicated understanding of these instructions.  I spent adequate time at the bedside preceding discharge necessary to personally discuss the aftercare instructions, giving patient education, providing explanations of the results of our evaluations/findings, and my decision making to assure that the patient/family understand the plan of care.  Time was allotted to answer questions at that time and throughout the ED course.  Emphasis was placed on timely follow-up after discharge.  I also discussed the potential for the development of an acute emergent condition requiring further evaluation, admission, or even surgical intervention. I discussed that we found nothing during the visit today indicating the need for further workup, admission, or the presence of an unstable medical condition.  I encouraged the patient to return to the emergency department immediately for ANY concerns, worsening, new complaints, or if symptoms persist and unable to seek follow-up in a timely fashion.  The patient/family expressed understanding and agreement with this plan.  The patient will follow-up with their PCP in 1-2 days for reevaluation.           PROCEDURES:  Procedures    CRITICAL CARE TIME        FINAL IMPRESSION      1. Recurrent seizures    2. Altered mental status, unspecified altered mental status  type    3. History of CVA (cerebrovascular accident)          DISPOSITION/PLAN     ED Disposition       ED Disposition   Discharge    Condition   Stable    Comment   --                 Comment: Please note this report has been produced using speech recognition software.      Manjinder Weathers MD  Attending Emergency Physician             Manjinder Weathers MD  10/26/24 1944

## 2024-10-26 NOTE — CONSULTS
Stroke Consult Note    Patient Name: Laury Eugene   MRN: [id number]  Age: 84 y.o.  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  Event was witnessed at approximately 1310 by her niece in which she lives with.  Event quickly resolved with patient back to baseline where she does have a right facial droop, right facial decreased sensation, and mild slurred speech.  NIH 3 on initial exam.  Patient reports that she feels like she is back to her baseline level of function.  No reported dizziness, no recent falls, no weakness or numbness to extremities, no visual deficits, no ataxia observed.  CT head without evidence of hemorrhage or acute abnormality.  CT angiography head and neck without acute abnormalities.  Chronic left M2 which has been present on prior imaging is observed.  CT perfusion without evidence of core infarct.  Notably the patient was evaluated by neurology stroke on 2024, with a very similar episode which was thought to be breakthrough seizure after thorough evaluation.  I was able to speak to the patient's niece at bedside.  She states that she has been taking her Keppra 500 mg twice daily without missing any doses.  Patient has also been taking Eliquis 5 mg twice daily without any missed  doses.  She states that when the current transient event began she noticed some eye twitching, which is consistent to prior episodes of seizure.  Patient loaded with 1000 mg Keppra IV in ED. General Neurology consulted.  Patient not deemed a candidate for IV thrombolytics based on resolution of symptoms and suspected seizure activity    Last Known Normal Date/Time: 1310 EST     Review of Systems   Constitutional:  Negative for activity change, fatigue and fever.   HENT:  Negative for nosebleeds, trouble swallowing and voice change.    Eyes:  Negative for photophobia and visual disturbance.   Respiratory:  Negative for cough and shortness of breath.    Cardiovascular:  Negative for chest pain.   Gastrointestinal:  Negative for blood in stool, nausea and vomiting.   Genitourinary:  Positive for frequency.   Musculoskeletal: Negative.    Neurological:  Positive for seizures, facial asymmetry, speech difficulty and numbness. Negative for dizziness, tremors, syncope, weakness, light-headedness and headaches.   Hematological: Negative.    Psychiatric/Behavioral: Negative.        Past Medical History:   Diagnosis Date    History of breast cancer     History of heart disease     Hypothyroidism     Multinodular goiter     Seizures     Sleep apnea     Stroke     Thyroid function test abnormal      Past Surgical History:   Procedure Laterality Date    BLEPHAROPLASTY      BREAST RECONSTRUCTION      CATARACT EXTRACTION Bilateral     COLONOSCOPY N/A 8/27/2023    Procedure: COLONOSCOPY;  Surgeon: Brunner, Mark I, MD;  Location:  [facility];  Service: Gastroenterology;  Laterality: N/A;    ENDOSCOPY N/A 8/27/2023    Procedure: ESOPHAGOGASTRODUODENOSCOPY;  Surgeon: Brunner, Mark I, MD;  Location:  [facility];  Service: Gastroenterology;  Laterality: N/A;    HYSTERECTOMY      SIMPLE MASTECTOMY       Family History   Problem Relation Age of Onset    Hypothyroidism Sister     Stomach cancer Mother     Heart attack Father       Social History     Socioeconomic History    Marital status:    Tobacco Use    Smoking status: Former     Current packs/day: 0.00     Types: Cigarettes     Quit date: 1970     Years since quittin.8     Passive exposure: Past    Smokeless tobacco: Never   Vaping Use    Vaping status: Never Used   Substance and Sexual Activity    Alcohol use: Yes     Alcohol/week: 1.0 - 2.0 standard drink of alcohol     Types: 1 - 2 Glasses of wine per week     Comment: social    Drug use: Never    Sexual activity: Never     Allergies   Allergen Reactions    Lidocaine Other (See Comments)     seizure    Novocain [Procaine] Other (See Comments)     seizures    Tnkase [Tenecteplase] Angioedema     Treated     Nsaids Other (See Comments)     Avoid NSAIDs d/t history of GIB     Prior to Admission medications    Medication Sig Start Date End Date Taking? Authorizing Provider   apixaban (ELIQUIS) 5 MG tablet tablet Take 1 tablet by mouth Every 12 (Twelve) Hours. Indications: Atrial Fibrillation 23   Yumiko Mclaughlin MD   ascorbic acid (VITAMIN C) 500 MG tablet Take 1 tablet by mouth Daily.    ProviderAngélica MD   atorvastatin (LIPITOR) 80 MG tablet Take 1 tablet by mouth Every Night. 23   Ailin Son APRN   BIOTIN FORTE PO Take  by mouth.    ProviderAngélica MD   escitalopram (LEXAPRO) 10 MG tablet Take 1 tablet by mouth Daily. 23   Yumiko Mclaughlin MD   folic acid (FOLVITE) 1 MG tablet Take 800 mcg by mouth Daily.    ProviderAngélica MD   levETIRAcetam XR (Keppra XR) 500 MG tablet Take 1 tablet by mouth 2 (Two) Times a Day. 24   Angelica Wan APRN   metoprolol succinate XL (TOPROL-XL) 25 MG 24 hr tablet Take 0.5 tablets by mouth Daily. 24   Arie Maldonado MD   Multiple Vitamins-Minerals (MULTIVITAMIN GUMMIES ADULTS PO) Take 1 tablet by mouth Daily.    ProviderAngélica MD   pantoprazole (PROTONIX) 40 MG EC tablet Take 1 tablet by mouth Daily. Started at [facility]  Angélica Garza MD   propafenone SR (RYTHMOL SR) 325 MG 12 hr capsule Take 1 capsule by mouth Every 12 (Twelve) Hours. 8/31/23   Ailin Son APRN   vitamin B-12 (CYANOCOBALAMIN) 250 MCG tablet Take 4 tablets by mouth Daily.    Angélica Stinson MD         Heart Rate:  [48] 48  Neurological Exam  Mental Status  Alert. Oriented to person, place and time. Oriented to person, place, and time. Mild dysarthria present. Language is fluent with no aphasia.    Cranial Nerves  CN II: Visual fields full to confrontation.  CN III, IV, VI: Extraocular movements intact bilaterally. Normal lids and orbits bilaterally. Pupils equal round and reactive to light bilaterally.  CN V:  Right: Diminished sensation of the entire right side of the face.  Left: Facial sensation is normal on the left.  CN VII:  Right: There is central facial weakness.  Left: There is no facial weakness.    Motor  Normal muscle bulk throughout. No fasciculations present. Normal muscle tone.  At least 4/5 to all extremities.    Sensory  Light touch abnormality: Decreased sensation to touch to right face.     Coordination  Right: Finger-to-nose normal. Heel-to-shin normal.Left: Finger-to-nose normal. Heel-to-shin normal.    Gait  Casual gait is normal including stance, stride, and arm swing.      Physical Exam  Constitutional:       General: She is not in acute distress.     Appearance: She is not ill-appearing.   HENT:      Head: Normocephalic and atraumatic.      Mouth/Throat:      Pharynx: Oropharynx is clear.   Eyes:      General: Lids are normal.      Extraocular Movements: Extraocular movements intact.      Pupils: Pupils are equal, round, and reactive to light.   Cardiovascular:      Rate and Rhythm: Normal rate.   Pulmonary:      Effort: Pulmonary effort is normal. No respiratory distress.   Abdominal:      General: There is no distension.      Tenderness: There is no guarding.   Musculoskeletal:         General: No signs of  injury.      Right lower leg: No edema.      Left lower leg: No edema.   Skin:     General: Skin is warm.      Findings: No bruising or lesion.   Neurological:      Mental Status: She is alert and oriented to person, place, and time. Mental status is at baseline.      Cranial Nerves: Cranial nerve deficit and dysarthria present.      Sensory: Sensory deficit present.      Motor: No weakness.   Psychiatric:         Mood and Affect: Mood normal.       Acute Stroke Data    Thrombolytic Inclusion / Exclusion Criteria    Time: 14:18 EDT  Person Administering Scale: Gregg Rivas PA-C    Inclusion Criteria  [x]   18 years of age or greater   [x]   Onset of symptoms < 4.5 hours before beginning treatment (stroke onset = time patient was last seen well or without symptoms).   []   Diagnosis of acute ischemic stroke causing measurable disabling deficit (Complete Hemianopia, Any Aphasia, Visual or Sensory Extinction, Any weakness limiting sustained effort against gravity)   []   Any remaining deficit considered potentially disabling in view of patient and practitioner   Exclusion criteria (Do not proceed with Alteplase if any are checked under exclusion criteria)  []   Onset unknown or GREATER than 4.5 hours   []   ICH on CT/MRI   []   CT demonstrates hypodensity representing acute or subacute infarct   []   Significant head trauma or prior stroke in the previous 3 months   []   Symptoms suggestive of subarachnoid hemorrhage   []   History of un-ruptured intracranial aneurysm GREATER than 10 mm   []   Recent intracranial or intraspinal surgery within the last 3 months   []   Arterial puncture at a non-compressible site in the previous 7 days   []   Active internal bleeding   []   Acute bleeding tendency   []   Platelet count LESS than 100,000 for known hematological diseases such as leukemia, thrombocytopenia or chronic cirrhosis   []   Current use of anticoagulant with INR GREATER than 1.7 or PT GREATER than 15 seconds,  aPTT GREATER than 40 seconds   []   Heparin received within 48 hours, resulting in abnormally elevated aPTT GREATER than upper limit of normal   [x]   Current use of direct thrombin inhibitors or direct factor Xa inhibitors in the past 48 hours   []   Elevated blood pressure refractory to treatment (systolic GREATER than 185 mm/Hg or diastolic  GREATER than 110 mm/Hg   []   Suspected infective endocarditis and aortic arch dissection   []   Current use of therapeutic treatment dose of low-molecular-weight heparin (LMWH) within the previous 24 hours   []   Structural GI malignancy or bleed   Relative exclusion for all patients  [x]   Only minor non-disabling symptoms   []   Pregnancy   []   Seizure at onset with postictal residual neurological impairments   []   Major surgery or previous trauma within past 14 days   []   History of previous spontaneous ICH, intracranial neoplasm, or AV malformation   []   Postpartum (within previous 14 days)   []   Recent GI or urinary tract hemorrhage (within previous 21 days)   []   Recent acute MI (within previous 3 months)   []   History of un-ruptured intracranial aneurysm LESS than 10 mm   []   History of ruptured intracranial aneurysm   []   Blood glucose LESS than 50 mg/dL (2.7 mmol/L)   []   Dural puncture within the last 7 days   []   Known GREATER than 10 cerebral microbleeds   Additional exclusions for patients with symptoms onset between 3 and 4.5 hours.  [x]   Age > 80.   []   On any anticoagulants regardless of INR  >>> Warfarin (Coumadin), Heparin, Enoxaparin (Lovenox), fondaparinux (Arixtra), bivalirudin (Angiomax), Argatroban, dabigatran (Pradaxa), rivaroxaban (Xarelto), or apixaban (Eliquis)   []   Severe stroke (NIHSS > 25).   []   History of BOTH diabetes and previous ischemic stroke.   []   The risks and benefits have been discussed with the patient or family related to the administration of IV thrombolytic therapy for stroke symptoms.   []   I have discussed and  reviewed the patient's case and imaging with the attending prior to IV thrombolytic therapy.   N/A Time IV thrombolytic administered       Hospital Meds:  Scheduled-    Infusions-     PRNs-   sodium chloride    Functional Status Prior to Current Stroke/[place] Score:   MODIFIED SCARLETT SCALE (to be assessed for each patient having history of stroke) []Stroke history but not assessed  []0: No symptoms at all  []1: No significant disability despite symptoms  [x]2: Slight disability  []3: Moderate disability  []4: Moderately severe disability  []5: Severe disability  []6: Death        NIH Stroke Scale  Time: 14:18 EDT  Person Administering Scale: Gregg Rivas PA-C    1a  Level of consciousness: 0=alert; keenly responsive   1b. LOC questions:  0=Answers both questions correctly   1c. LOC commands: 0=Performs both tasks correctly   2.  Best Gaze: 0=normal   3.  Visual: 0=No visual loss   4. Facial Palsy: 1=Minor paralysis (flattened nasolabial fold, asymmetric on smiling)   5a.  Motor left arm: 0=No drift, limb holds 90 (or 45) degrees for full 10 seconds   5b.  Motor right arm: 0=No drift, limb holds 90 (or 45) degrees for full 10 seconds   6a. motor left le=No drift, limb holds 90 (or 45) degrees for full 10 seconds   6b  Motor right le=No drift, limb holds 90 (or 45) degrees for full 10 seconds   7. Limb Ataxia: 0=Absent   8.  Sensory: 1=Mild to moderate sensory loss; patient feels pinprick is less sharp or is dull on the affected side; there is a loss of superficial pain with pinprick but patient is aware She is being touched   9. Best Language:  0=No aphasia, normal   10. Dysarthria: 1=Mild to moderate, patient slurs at least some words and at worst, can be understood with some difficulty   11. Extinction and Inattention: 0=No abnormality    Total:   3     CBC w/diff          2024    05:18 2024    10:24 10/26/2024    14:09   CBC w/Diff   WBC 8.61  6.85  5.96    RBC 3.42  3.82  3.69    Hemoglobin  10.0  12.2     11.6  11.9     11.8    Hematocrit 31.7  36     36.2  35     35.8    MCV 92.7  94.8  97.0    MCH 29.2  30.4  32.0    MCHC 31.5  32.0  33.0    RDW 17.4  16.4  12.9    Platelets 235  231  211    Neutrophil Rel %  58.1  51.3    Immature Granulocyte Rel %  0.3  0.2    Lymphocyte Rel %  29.3  33.2    Monocyte Rel %  8.5  12.1    Eosinophil Rel %  3.2  2.7    Basophil Rel %  0.6  0.5       Lab Results   Component Value Date    GLUCOSE 95 04/14/2024    BUN 14 04/14/2024    CREATININE 1.10 10/26/2024     04/14/2024    K 4.3 04/14/2024     04/14/2024    CALCIUM 8.9 09/23/2024    PROTEINTOT 6.9 09/23/2024    ALBUMIN 3.2 (L) 09/23/2024    ALT 17 10/26/2024    AST 27 10/26/2024    ALKPHOS 112 09/23/2024    BILITOT 0.6 09/23/2024    GLOB 3.1 04/13/2024    AGRATIO 1.2 04/13/2024    BCR 16.7 04/14/2024    ANIONGAP 7.0 04/14/2024    EGFR 50.3 (L) 10/26/2024      CT Angiogram Head w AI Analysis of LVO    Result Date: 10/26/2024  Impression: No new proximal large vessel occlusion or new high-grade stenosis of the major arteries of the head and neck. Electronically Signed: Los Fletcher  10/26/2024 2:49 PM EDT  Workstation ID: [id number]    CT Angiogram Neck    Result Date: 10/26/2024  Impression: No new proximal large vessel occlusion or new high-grade stenosis of the major arteries of the head and neck. Electronically Signed: Los Fletcher  10/26/2024 2:49 PM EDT  Workstation ID: [id number]    XR Chest 1 View    Result Date: 10/26/2024  Impression: 1. No acute cardiopulmonary disease. Electronically Signed: Yared Muñoz MD  10/26/2024 2:47 PM EDT  Workstation ID: [id number]    CT CEREBRAL PERFUSION WITH & WITHOUT CONTRAST    Result Date: 10/26/2024  Impression: Negative perfusion scan for acute ischemia/infarct. Old left frontal and right temporal infarcts again noted. Electronically Signed: Russel Boo MD  10/26/2024 2:31 PM EDT  Workstation ID: [id number]    CT Head Without Contrast Stroke Protocol    Result  Date: 10/26/2024  Impression: Stable head CT scan including old left MCA territory and right temporal lobe infarcts. Persistent left sphenoid sinus disease. No new intracranial disease is seen. Electronically Signed: Russel Boo MD  10/26/2024 2:13 PM EDT  Workstation ID: [id number]      Results Reviewed:  I have personally reviewed current lab, radiology, and data and agree with results.    Results for orders placed during the hospital encounter of 08/26/23    Adult Transthoracic Echo Complete W/ Cont if Necessary Per Protocol    Interpretation Summary    Left ventricular systolic function is normal. Left ventricular ejection fraction appears to be 51 - 55%.     Assessment/Plan:  84-year-old female with past medical history significant for prior left MCA territory stroke (residual speech and right facial droop deficits), seizures (on Keppra), atrial fibrillation (on Eliquis), NOVA on CPAP, hypothyroidism, and breast cancer s/p chemotherapy and radiation who presented to [facility] ED from home via EMS with complaints of a short, less than 2-minute episode where she was staring into space with aphasia.   Event quickly resolved with patient back to baseline where she does have a right facial droop, right facial decreased sensation, and mild slurred speech.  NIH 3 on initial exam.  Patient reports that she feels like she is back to her baseline level of function.       CT head without evidence of hemorrhage or acute abnormality.  CT angiography head and neck without acute abnormalities.  Chronic left M2 which has been present on prior imaging is observed.  CT perfusion without evidence of core infarct.  Notably the patient was evaluated by neurology stroke on 5/27/2024, with a very similar episode which was thought to be breakthrough seizure after thorough evaluation.  When event began she noticed some eye twitching, which is consistent to prior episodes of seizure.  Patient loaded with 1000 mg Keppra IV in ED. General Neurology  consulted.  Patient not deemed a candidate for IV thrombolytics based on resolution of symptoms and suspected seizure activity    Antiplatelet PTA: None  Anticoagulant PTA: Eliquis 5 mg twice daily      # transient aphasia and staring spell   # Hx L MCA territory CVA with baseline dysarthria and facial droop  # Chronic L M2 MCA occlusion  - Suspected breakthrough seizure vs Stroke Recrudescence. Concern for AIS low at this time.   -CTh with no evidence of acute intracranial abnormality  -CTa H/N with no acute abnormality, chronically occluded left M2 seen on prior imaging.  -CT perfusion with no evidence of core infarct or ischemic tissue at risk  -Patient loaded with 1000 mg Keppra IV in ED  -Continue Eliquis 5 mg twice daily  -Continue atorvastatin 80 mg daily  -May normalize blood pressure goals at this time <130/80  -Urinalysis pending (patient reports increased frequency)  -CK and lactate stat pending  -Given similar history with breakthrough seizure activity and workup with negative MRI on prior similar event, further stroke workup not deemed necessary at this time with resolution of symptoms.   -Recommend no driving for 90 days post seizure. Will need to pass [employer] driving simulation   -General Neurology consulted from ED for advice on continued management of antiseizure medication  -Recommend outpatient general neurology follow-up with first available appointment. Patient currently follows DIANNE Aaron  -Follow up in stroke clinic as currently scheduled or on as needed basis.     Disposition: Anticipate discharge home from ED given current resolution of symptoms following general neurology consult.     Plan of care discussed with patient, patient's family, and Dr. Weathers.  Please call with any additional questions or concerns    Gregg Rivas PA-C  October 26, 2024  14:18 EDT

## 2024-10-27 LAB
QT INTERVAL: 444 MS
QTC INTERVAL: 432 MS

## 2024-10-29 LAB — LEVETIRACETAM SERPL-MCNC: 18.7 UG/ML (ref 10–40)

## 2024-11-04 ENCOUNTER — OFFICE VISIT (OUTPATIENT)
Dept: NEUROLOGY | Facility: CLINIC | Age: 84
End: 2024-11-04
Payer: MEDICARE

## 2024-11-04 VITALS
WEIGHT: 140 LBS | HEART RATE: 65 BPM | OXYGEN SATURATION: 95 % | SYSTOLIC BLOOD PRESSURE: 118 MMHG | HEIGHT: 60 IN | DIASTOLIC BLOOD PRESSURE: 62 MMHG | BODY MASS INDEX: 27.48 KG/M2

## 2024-11-04 DIAGNOSIS — G40.919 BREAKTHROUGH SEIZURE: Primary | ICD-10-CM

## 2024-11-04 PROCEDURE — 99214 OFFICE O/P EST MOD 30 MIN: CPT | Performed by: NURSE PRACTITIONER

## 2024-11-04 PROCEDURE — 3074F SYST BP LT 130 MM HG: CPT | Performed by: NURSE PRACTITIONER

## 2024-11-04 PROCEDURE — 1159F MED LIST DOCD IN RCRD: CPT | Performed by: NURSE PRACTITIONER

## 2024-11-04 PROCEDURE — 1160F RVW MEDS BY RX/DR IN RCRD: CPT | Performed by: NURSE PRACTITIONER

## 2024-11-04 PROCEDURE — 3078F DIAST BP <80 MM HG: CPT | Performed by: NURSE PRACTITIONER

## 2024-11-04 NOTE — PROGRESS NOTES
Neuro Office Visit      Encounter Date: 2024   Patient Name: Laury Eugene  : 1940   MRN: [id number]   PCP:  Leatha Melvin DO     Chief Complaint:    Chief Complaint   Patient presents with   • Seizures     F/U       History of Present Illness: Laury Eugene is a 84 y.o. female who is here today in Neurology.  History of Present Illness  The patient presents for evaluation of a seizure. She is accompanied by an adult female.    Seen in the emergency department at [facility] after 2 episodes of staring associated with right facial twitching.  Each episode lasted for couple of minutes and she was unable to speak during this timeframe.    The first episode prompted a call to 911, during which she had another episode. She moaned loudly, attempted to stand but was prevented from doing so. She then began to hold her head, and her right eye was observed to be twitching slightly. Despite being unable to speak, she was able to follow commands and remained coherent. The paramedics initially suspected a transient ischemic attack (TIA), but the stroke team at the ER ruled this out.    She was loaded with 1 gram of Keppra and her Keppra dose was increased to 750 mg twice daily.  Keppra level was within normal limits.    She has been consistent with her Keppra medication, taking 750 mg twice daily. She reports no unusual or stressful events prior to the seizure. She feels grumpy on Keppra, a side effect she has experienced with other seizure medications as well.       Subjective      Past Medical History:   Past Medical History:   Diagnosis Date   • History of breast cancer    • History of heart disease    • Hypothyroidism    • Multinodular goiter    • Seizures    • Sleep apnea    • Stroke    • Thyroid function test abnormal        Past Surgical History:   Past Surgical History:   Procedure Laterality Date   • BLEPHAROPLASTY     • BREAST RECONSTRUCTION     • CATARACT EXTRACTION Bilateral    •  COLONOSCOPY N/A 2023    Procedure: COLONOSCOPY;  Surgeon: Brunner, Mark I, MD;  Location:  [facility];  Service: Gastroenterology;  Laterality: N/A;   • ENDOSCOPY N/A 2023    Procedure: ESOPHAGOGASTRODUODENOSCOPY;  Surgeon: Brunner, Mark I, MD;  Location:  [facility];  Service: Gastroenterology;  Laterality: N/A;   • HYSTERECTOMY     • SIMPLE MASTECTOMY         Family History:   Family History   Problem Relation Age of Onset   • Hypothyroidism Sister    • Stomach cancer Mother    • Heart attack Father        Social History:   Social History     Socioeconomic History   • Marital status:    Tobacco Use   • Smoking status: Former     Current packs/day: 0.00     Types: Cigarettes     Quit date:      Years since quittin.8     Passive exposure: Past   • Smokeless tobacco: Never   Vaping Use   • Vaping status: Never Used   Substance and Sexual Activity   • Alcohol use: Yes     Alcohol/week: 1.0 - 2.0 standard drink of alcohol     Types: 1 - 2 Glasses of wine per week     Comment: social   • Drug use: Never   • Sexual activity: Never       Medications:     Current Outpatient Medications:   •  apixaban (ELIQUIS) 5 MG tablet tablet, Take 1 tablet by mouth Every 12 (Twelve) Hours. Indications: Atrial Fibrillation, Disp: 60 tablet, Rfl:   •  ascorbic acid (VITAMIN C) 500 MG tablet, Take 1 tablet by mouth Daily., Disp: , Rfl:   •  atorvastatin (LIPITOR) 80 MG tablet, Take 1 tablet by mouth Every Night., Disp: 90 tablet, Rfl: 0  •  BIOTIN FORTE PO, Take  by mouth., Disp: , Rfl:   •  escitalopram (LEXAPRO) 10 MG tablet, Take 1 tablet by mouth Daily., Disp: , Rfl:   •  folic acid (FOLVITE) 1 MG tablet, Take 800 mcg by mouth Daily., Disp: , Rfl:   •  levETIRAcetam (KEPPRA) 750 MG tablet, Take 1 tablet by mouth 2 (Two) Times a Day for 90 days., Disp: 60 tablet, Rfl: 2  •  metoprolol succinate XL (TOPROL-XL) 25 MG 24 hr tablet, Take 0.5 tablets by mouth Daily., Disp: , Rfl:   •  Multiple  Vitamins-Minerals (MULTIVITAMIN GUMMIES ADULTS PO), Take 1 tablet by mouth Daily., Disp: , Rfl:   •  pantoprazole (PROTONIX) 40 MG EC tablet, Take 1 tablet by mouth Daily. Started at [facility], Disp: , Rfl:   •  propafenone SR (RYTHMOL SR) 325 MG 12 hr capsule, Take 1 capsule by mouth Every 12 (Twelve) Hours., Disp: 120 capsule, Rfl: 0  •  vitamin B-12 (CYANOCOBALAMIN) 250 MCG tablet, Take 4 tablets by mouth Daily., Disp: , Rfl:     Allergies:   Allergies   Allergen Reactions   • Lidocaine Other (See Comments)     seizure   • Novocain [Procaine] Other (See Comments)     seizures   • Tnkase [Tenecteplase] Angioedema     Treated 8/26   • Nsaids Other (See Comments)     Avoid NSAIDs d/t history of GIB       PHQ-9 Total Score:     [facility] Fall Risk Assessment was completed, and patient is at HIGH risk for falls. Assessment completed on:11/4/2024    Objective     Physical Exam:     Neurological Exam  Mental Status  Awake, alert and oriented to person, place and time. Recent and remote memory are intact. Speech is normal. Language is fluent with no aphasia. Attention and concentration are normal. Fund of knowledge is appropriate for level of education.    Cranial Nerves  CN II: Visual acuity is normal.  CN III, IV, VI: Extraocular movements intact bilaterally. Pupils equal round and reactive to light bilaterally.  CN V: Facial sensation is normal.  CN VII:  Right: There is peripheral facial weakness.  Left: There is no facial weakness.  CN IX, X: Palate elevates symmetrically  CN XI: Shoulder shrug strength is normal.  CN XII: Tongue midline without atrophy or fasciculations.    Motor  Normal muscle bulk throughout. No fasciculations present. Normal muscle tone. Strength is 5/5 throughout all four extremities.    Sensory  Sensation is intact to light touch, pinprick, vibration and proprioception in all four extremities.    Reflexes                                            Right                      Left  Brachioradialis  "                   2+                         2+  Biceps                                 2+                         2+  Triceps                                2+                         2+  Finger flex                           2+                         2+  Hamstring                            2+                         2+  Patellar                                2+                         2+  Achilles                                2+                         2+    Coordination    Finger-to-nose, rapid alternating movements and heel-to-shin normal bilaterally without dysmetria.    Gait  Normal casual, toe, heel and tandem gait.        Vital Signs:   Vitals:    11/04/24 1128   BP: 118/62   Pulse: 65   SpO2: 95%   Weight: 63.5 kg (140 lb)   Height: 152.4 cm (60\")     Body mass index is 27.34 kg/m².     Results:   Results  Laboratory Studies  Keppra level was 18.     Imaging:   CT Angiogram Head w AI Analysis of LVO    Result Date: 10/26/2024  Impression: No new proximal large vessel occlusion or new high-grade stenosis of the major arteries of the head and neck. Electronically Signed: Los Fletcher  10/26/2024 2:49 PM EDT  Workstation ID: [id number]    CT Angiogram Neck    Result Date: 10/26/2024  Impression: No new proximal large vessel occlusion or new high-grade stenosis of the major arteries of the head and neck. Electronically Signed: Los Fletcher  10/26/2024 2:49 PM EDT  Workstation ID: [id number]    XR Chest 1 View    Result Date: 10/26/2024  Impression: 1. No acute cardiopulmonary disease. Electronically Signed: Yared Muñoz MD  10/26/2024 2:47 PM EDT  Workstation ID: [id number]    CT CEREBRAL PERFUSION WITH & WITHOUT CONTRAST    Result Date: 10/26/2024  Impression: Negative perfusion scan for acute ischemia/infarct. Old left frontal and right temporal infarcts again noted. Electronically Signed: Russel Boo MD  10/26/2024 2:31 PM EDT  Workstation ID: [id number]    CT Head Without Contrast Stroke " "Protocol    Result Date: 10/26/2024  Impression: Stable head CT scan including old left MCA territory and right temporal lobe infarcts. Persistent left sphenoid sinus disease. No new intracranial disease is seen. Electronically Signed: Russel Boo MD  10/26/2024 2:13 PM EDT  Workstation ID: [id number]    XR Chest 1 View    Result Date: 9/23/2024  No change. No acute disease. Images reviewed, interpreted, and dictated by Dr. Rex Martins. Transcribed by Marvel Rivera.    DXA bone density spine and hip    Result Date: 7/23/2024  1. Osteopenic BMD of the lumbar spine. Increase risk for fracture. 2. Osteoporotic BMD of the left radius 33%. High risk for fracture. 3. Osteoporotic BMD of the left femoral neck. High risk for fracture. Images reviewed, interpreted, and dictated by Dr. Vincenzo Kennedy. Transcribed by Maira Emerson PA-C.       Labs:   No results found for: \"CMP\", \"PROTEIN\", \"ANTIMOGAB\", \"VPUZVQ1ZEUZ\", \"JCVRESULT\", \"QUANTTBGOLD\", \"CBCDIF\", \"IGGALBSER\"     Assessment / Plan      Assessment/Plan:   Diagnoses and all orders for this visit:    1. Breakthrough seizure (Primary)  Comments:  Continue Keppra         Assessment & Plan  1. Seizure.  Her Keppra level is within the normal range at 18. The current dose of Keppra 750 mg twice a day will be maintained. She is advised to provide an update on her condition by the end of November 2024. If there is any further seizure activity, she should inform the office immediately. At that point, the Keppra dosage may be increased up to 2000 mg or Vimpat could be considered as an alternative treatment option.    Follow-up  Patient is scheduled for a follow-up visit in March 2025.    Patient Education:     Reviewed medications, potential side effects and signs and symptoms to report. Discussed risk versus benefits of treatment plan with patient and/or family-including medications, labs and radiology that may be ordered. Addressed questions and concerns during visit. Patient and/or family " verbalized understanding and agree with plan. Instructed to call the office with any questions and report to ER with any life-threatening symptoms.     Follow Up:   Return for Next scheduled follow up.    I spent 30 minutes caring for Laury on this date of service. This time includes time spent by me in the following activities: preparing for the visit, reviewing tests, performing a medically appropriate examination and/or evaluation, counseling and educating the patient/family/caregiver, documenting information in the medical record, and independently interpreting results and communicating that information with the patient/family/caregiver.        During this visit the following were done:  Labs Reviewed [x]    Labs Ordered []    Radiology Reports Reviewed [x]    Radiology Ordered []    PCP Records Reviewed []    Referring Provider Records Reviewed []    ER Records Reviewed [x]    Hospital Records Reviewed []    History Obtained From Family []    Radiology Images Reviewed [x]    Other Reviewed []    Records Requested []      Patient or patient representative verbalized consent for the use of Ambient Listening during the visit with  DIANNE Wilcox for chart documentation. 11/4/2024  14:10 EST    DIANNE Wilcox   [facility] NEUROLOGY  [address] [place] [address]  [facility] [id number] [phone]

## 2024-11-08 ENCOUNTER — PATIENT ROUNDING (BHMG ONLY) (OUTPATIENT)
Dept: NEUROLOGY | Facility: CLINIC | Age: 84
End: 2024-11-08
Payer: MEDICARE

## 2024-12-03 ENCOUNTER — TELEPHONE (OUTPATIENT)
Dept: NEUROLOGY | Facility: CLINIC | Age: 84
End: 2024-12-03

## 2024-12-03 NOTE — TELEPHONE ENCOUNTER
Provider: TRUDY RAMÍREZ APRN    Caller: Mindy Hall    Relationship to Patient: Emergency Contact    Phone Number: [phone]    Reason for Call: STATED PATIENT IS ALWAYS TIRED ANYMORE, NO ENERGY.  STATED SHE DOES NOT WANT TO UP THE PATIENTS SEIZURE MEDICATION.    When was the patient last seen:  11-4-24    PLEASE CALL & ADVISE

## 2024-12-04 DIAGNOSIS — Z86.73 HISTORY OF STROKE: Primary | ICD-10-CM

## 2024-12-04 DIAGNOSIS — R53.81 DEBILITY: ICD-10-CM

## 2024-12-04 NOTE — TELEPHONE ENCOUNTER
"Spoke with Mindy to let them know per Provider:    \"As long as she is not having seizure activity that's fine.\"    Mindy stated the Pt would like to go to an in house rehab for strength. Could we please send a referral to [facility].    Elana Umana fax# [phone] at [facility].    They will just need the Pt Latest H&P    Pt relative verbalized understanding.    "

## 2024-12-11 ENCOUNTER — OFFICE VISIT (OUTPATIENT)
Dept: ENDOCRINOLOGY | Facility: CLINIC | Age: 84
End: 2024-12-11
Payer: MEDICARE

## 2024-12-11 VITALS
HEIGHT: 60 IN | WEIGHT: 142 LBS | OXYGEN SATURATION: 98 % | DIASTOLIC BLOOD PRESSURE: 64 MMHG | HEART RATE: 58 BPM | SYSTOLIC BLOOD PRESSURE: 130 MMHG | RESPIRATION RATE: 20 BRPM | BODY MASS INDEX: 27.88 KG/M2

## 2024-12-11 DIAGNOSIS — E04.2 MULTINODULAR GOITER: Primary | ICD-10-CM

## 2024-12-11 DIAGNOSIS — R94.6 ABNORMAL THYROID FUNCTION TEST: ICD-10-CM

## 2024-12-11 PROCEDURE — 99213 OFFICE O/P EST LOW 20 MIN: CPT | Performed by: INTERNAL MEDICINE

## 2024-12-11 PROCEDURE — 84443 ASSAY THYROID STIM HORMONE: CPT | Performed by: INTERNAL MEDICINE

## 2024-12-11 PROCEDURE — 3078F DIAST BP <80 MM HG: CPT | Performed by: INTERNAL MEDICINE

## 2024-12-11 PROCEDURE — 1160F RVW MEDS BY RX/DR IN RCRD: CPT | Performed by: INTERNAL MEDICINE

## 2024-12-11 PROCEDURE — 1159F MED LIST DOCD IN RCRD: CPT | Performed by: INTERNAL MEDICINE

## 2024-12-11 PROCEDURE — 36415 COLL VENOUS BLD VENIPUNCTURE: CPT | Performed by: INTERNAL MEDICINE

## 2024-12-11 PROCEDURE — 3075F SYST BP GE 130 - 139MM HG: CPT | Performed by: INTERNAL MEDICINE

## 2024-12-11 PROCEDURE — 84439 ASSAY OF FREE THYROXINE: CPT | Performed by: INTERNAL MEDICINE

## 2024-12-11 NOTE — PROGRESS NOTES
"     Office Note      Date: 2024  Patient Name: Laury Eugene  MRN: [id number]  : 1940    Chief Complaint   Patient presents with    Goiter    Thyroid Problem       History of Present Illness:   Laury Eugene is a 84 y.o. female who presents for Goiter and Thyroid Problem    She isn't taking any thyroid meds. She denies any excess iodine intake. She denies any recent steroids. She hasn't noted any change in the size of her neck. She denies any compressive sxs, aside from occ trouble swallowing. She denies any sxs of hypo- or hyperthyroidism at this time, aside from fatigue.    Subjective      Review of Systems:   Review of Systems   Constitutional:  Positive for fatigue.   Cardiovascular: Negative.    Gastrointestinal: Negative.    Endocrine: Negative.        The following portions of the patient's history were reviewed and updated as appropriate: allergies, current medications, past family history, past medical history, past social history, past surgical history, and problem list.    Objective     Visit Vitals  /64   Pulse 58   Resp 20   Ht 152.4 cm (60\")   Wt 64.4 kg (142 lb)   SpO2 98%   BMI 27.73 kg/m²       Physical Exam:  Physical Exam  Constitutional:       Comments: Older woman with halting speech   Neck:      Thyroid: No thyroid mass, thyromegaly or thyroid tenderness.   Lymphadenopathy:      Cervical: No cervical adenopathy.   Neurological:      Mental Status: She is alert.         Labs:    TSH  No results found for: \"TSHBASE\"     Free T4  Free T4   Date Value Ref Range Status   2024 1.12 0.93 - 1.70 ng/dL Final       T3  T3, Total   Date Value Ref Range Status   2020 98.4 80.0 - 200.0 ng/dl Final         TPO  No results found for: \"THYROIDAB\"    TG AB  No results found for: \"THGAB\"    TG  No results found for: \"THYROGLB\"    CBC w/DIFF  Lab Results   Component Value Date    WBC 5.96 10/26/2024    RBC 3.69 (L) 10/26/2024    HGB 11.9 (L) 10/26/2024    HGB 11.8 (L) " 10/26/2024    HCT 35 (L) 10/26/2024    HCT 35.8 10/26/2024    MCV 97.0 10/26/2024    MCH 32.0 10/26/2024    MCHC 33.0 10/26/2024    RDW 12.9 10/26/2024    RDWSD 46.1 10/26/2024    MPV 10.5 10/26/2024     10/26/2024    NEUTRORELPCT 51.3 10/26/2024    LYMPHORELPCT 33.2 10/26/2024    MONORELPCT 12.1 (H) 10/26/2024    EOSRELPCT 2.7 10/26/2024    BASORELPCT 0.5 10/26/2024    AUTOIGPER 0.2 10/26/2024    NEUTROABS 3.06 10/26/2024    LYMPHSABS 1.98 10/26/2024    MONOSABS 0.72 10/26/2024    EOSABS 0.16 10/26/2024    BASOSABS 0.03 10/26/2024    AUTOIGNUM 0.01 10/26/2024    NRBC 0.0 10/26/2024           Assessment / Plan      Assessment & Plan:  Diagnoses and all orders for this visit:    1. Multinodular goiter (Primary)  Assessment & Plan:  Neck exam is stable.  Continue with periodic neck exams.      2. Abnormal thyroid function test  Assessment & Plan:  Check TFTs today.  Will send note about results.    Orders:  -     TSH; Future  -     T4, Free; Future      Current Outpatient Medications   Medication Instructions    apixaban (ELIQUIS) 5 mg, Oral, Every 12 Hours Scheduled    ascorbic acid (VITAMIN C) 500 mg, Daily    atorvastatin (LIPITOR) 80 mg, Oral, Nightly    escitalopram (LEXAPRO) 10 mg, Oral, Daily    folic acid (FOLVITE) 800 mcg, Daily    levETIRAcetam (KEPPRA) 750 mg, Oral, 2 Times Daily    metoprolol succinate XL (TOPROL-XL) 12.5 mg, Oral, Daily    Multiple Vitamins-Minerals (MULTIVITAMIN GUMMIES ADULTS PO) 1 tablet, Daily    pantoprazole (PROTONIX) 40 mg, Daily    propafenone SR (RYTHMOL SR) 325 mg, Oral, Every 12 Hours Scheduled    vitamin B-12 (CYANOCOBALAMIN) 1,000 mcg, Daily      Return in about 1 year (around 12/11/2025) for Recheck with TSH.    Electronically signed by: Roger Hodgson MD  12/11/2024

## 2024-12-12 LAB
T4 FREE SERPL-MCNC: 1.07 NG/DL (ref 0.92–1.68)
TSH SERPL DL<=0.05 MIU/L-ACNC: 0.51 UIU/ML (ref 0.27–4.2)

## 2025-01-20 RX ORDER — LEVETIRACETAM 750 MG/1
750 TABLET ORAL 2 TIMES DAILY
Qty: 60 TABLET | Refills: 2 | Status: SHIPPED | OUTPATIENT
Start: 2025-01-20 | End: 2025-04-20

## 2025-01-20 NOTE — TELEPHONE ENCOUNTER
Rx Refill Note  Requested Prescriptions     Pending Prescriptions Disp Refills    levETIRAcetam (KEPPRA) 750 MG tablet 60 tablet 2     Sig: Take 1 tablet by mouth 2 (Two) Times a Day for 90 days.      Last filled:   Last office visit with prescribing clinician: 11/4/2024      Next office visit with prescribing clinician: 3/17/2025     Altagracia Bess MA  01/20/25, 14:56 EST

## 2025-01-20 NOTE — TELEPHONE ENCOUNTER
Caller: Mindy Hall    Relationship: Emergency Contact    Best call back number:[phone]    Requested Prescriptions:   Requested Prescriptions     Pending Prescriptions Disp Refills    levETIRAcetam (KEPPRA) 750 MG tablet 60 tablet 2     Sig: Take 1 tablet by mouth 2 (Two) Times a Day for 90 days.        Pharmacy where request should be sent: [facility] [id number] [address] AT [facility] [phone] [facility] [phone]      Last office visit with prescribing clinician: 11/4/2024   Last telemedicine visit with prescribing clinician: Visit date not found   Next office visit with prescribing clinician: 3/17/2025       Does the patient have less than a 3 day supply:  [x] Yes  [] No    Would you like a call back once the refill request has been completed: [] Yes [x] No    If the office needs to give you a call back, can they leave a voicemail: [] Yes [x] No    Carolann Garcia Rep   01/20/25 14:50 EST

## 2025-01-24 ENCOUNTER — HOSPITAL ENCOUNTER (EMERGENCY)
Facility: HOSPITAL | Age: 85
Discharge: HOME | End: 2025-01-24
Attending: EMERGENCY MEDICINE
Payer: MEDICARE

## 2025-01-24 ENCOUNTER — APPOINTMENT (OUTPATIENT)
Dept: CT IMAGING | Facility: HOSPITAL | Age: 85
End: 2025-01-24
Payer: MEDICARE

## 2025-01-24 VITALS
OXYGEN SATURATION: 94 % | DIASTOLIC BLOOD PRESSURE: 80 MMHG | SYSTOLIC BLOOD PRESSURE: 140 MMHG | RESPIRATION RATE: 18 BRPM | BODY MASS INDEX: 27.88 KG/M2 | TEMPERATURE: 98.3 F | HEIGHT: 60 IN | HEART RATE: 65 BPM | WEIGHT: 142 LBS

## 2025-01-24 DIAGNOSIS — G40.919 BREAKTHROUGH SEIZURE: Primary | ICD-10-CM

## 2025-01-24 LAB
ALBUMIN SERPL-MCNC: 3.7 G/DL (ref 3.5–5.2)
ALBUMIN/GLOB SERPL: 1.5 G/DL
ALP SERPL-CCNC: 95 U/L (ref 39–117)
ALT SERPL W P-5'-P-CCNC: 24 U/L (ref 1–33)
ANION GAP SERPL CALCULATED.3IONS-SCNC: 6 MMOL/L (ref 5–15)
AST SERPL-CCNC: 27 U/L (ref 1–32)
BASOPHILS # BLD AUTO: 0.04 10*3/MM3 (ref 0–0.2)
BASOPHILS NFR BLD AUTO: 0.5 % (ref 0–1.5)
BILIRUB SERPL-MCNC: 0.3 MG/DL (ref 0–1.2)
BILIRUB UR QL STRIP: NEGATIVE
BUN SERPL-MCNC: 12 MG/DL (ref 8–23)
BUN/CREAT SERPL: 13.8 (ref 7–25)
CALCIUM SPEC-SCNC: 8.7 MG/DL (ref 8.6–10.5)
CHLORIDE SERPL-SCNC: 106 MMOL/L (ref 98–107)
CLARITY UR: CLEAR
CO2 SERPL-SCNC: 31 MMOL/L (ref 22–29)
COLOR UR: YELLOW
CREAT SERPL-MCNC: 0.87 MG/DL (ref 0.57–1)
DEPRECATED RDW RBC AUTO: 50.2 FL (ref 37–54)
EGFRCR SERPLBLD CKD-EPI 2021: 66.2 ML/MIN/1.73
EOSINOPHIL # BLD AUTO: 0.17 10*3/MM3 (ref 0–0.4)
EOSINOPHIL NFR BLD AUTO: 2.3 % (ref 0.3–6.2)
ERYTHROCYTE [DISTWIDTH] IN BLOOD BY AUTOMATED COUNT: 13.9 % (ref 12.3–15.4)
FLUAV RNA RESP QL NAA+PROBE: NOT DETECTED
FLUBV RNA RESP QL NAA+PROBE: NOT DETECTED
GEN 5 1HR TROPONIN T REFLEX: 9 NG/L
GLOBULIN UR ELPH-MCNC: 2.5 GM/DL
GLUCOSE SERPL-MCNC: 91 MG/DL (ref 65–99)
GLUCOSE UR STRIP-MCNC: NEGATIVE MG/DL
HCT VFR BLD AUTO: 34.5 % (ref 34–46.6)
HGB BLD-MCNC: 11.2 G/DL (ref 12–15.9)
HGB UR QL STRIP.AUTO: NEGATIVE
IMM GRANULOCYTES # BLD AUTO: 0.02 10*3/MM3 (ref 0–0.05)
IMM GRANULOCYTES NFR BLD AUTO: 0.3 % (ref 0–0.5)
KETONES UR QL STRIP: NEGATIVE
LEUKOCYTE ESTERASE UR QL STRIP.AUTO: NEGATIVE
LYMPHOCYTES # BLD AUTO: 1.86 10*3/MM3 (ref 0.7–3.1)
LYMPHOCYTES NFR BLD AUTO: 25.1 % (ref 19.6–45.3)
MCH RBC QN AUTO: 31.9 PG (ref 26.6–33)
MCHC RBC AUTO-ENTMCNC: 32.5 G/DL (ref 31.5–35.7)
MCV RBC AUTO: 98.3 FL (ref 79–97)
MONOCYTES # BLD AUTO: 0.58 10*3/MM3 (ref 0.1–0.9)
MONOCYTES NFR BLD AUTO: 7.8 % (ref 5–12)
NEUTROPHILS NFR BLD AUTO: 4.74 10*3/MM3 (ref 1.7–7)
NEUTROPHILS NFR BLD AUTO: 64 % (ref 42.7–76)
NITRITE UR QL STRIP: NEGATIVE
NRBC BLD AUTO-RTO: 0 /100 WBC (ref 0–0.2)
PH UR STRIP.AUTO: 6 [PH] (ref 5–8)
PLATELET # BLD AUTO: 246 10*3/MM3 (ref 140–450)
PMV BLD AUTO: 10.3 FL (ref 6–12)
POTASSIUM SERPL-SCNC: 4.3 MMOL/L (ref 3.5–5.2)
PROT SERPL-MCNC: 6.2 G/DL (ref 6–8.5)
PROT UR QL STRIP: NEGATIVE
QT INTERVAL: 446 MS
QTC INTERVAL: 448 MS
RBC # BLD AUTO: 3.51 10*6/MM3 (ref 3.77–5.28)
SARS-COV-2 RNA RESP QL NAA+PROBE: NOT DETECTED
SODIUM SERPL-SCNC: 143 MMOL/L (ref 136–145)
SP GR UR STRIP: 1.01 (ref 1–1.03)
TROPONIN T NUMERIC DELTA: 1 NG/L
TROPONIN T SERPL HS-MCNC: 8 NG/L
UROBILINOGEN UR QL STRIP: NORMAL
WBC NRBC COR # BLD AUTO: 7.41 10*3/MM3 (ref 3.4–10.8)

## 2025-01-24 PROCEDURE — 70450 CT HEAD/BRAIN W/O DYE: CPT

## 2025-01-24 PROCEDURE — 85025 COMPLETE CBC W/AUTO DIFF WBC: CPT | Performed by: EMERGENCY MEDICINE

## 2025-01-24 PROCEDURE — 84484 ASSAY OF TROPONIN QUANT: CPT | Performed by: EMERGENCY MEDICINE

## 2025-01-24 PROCEDURE — 81003 URINALYSIS AUTO W/O SCOPE: CPT | Performed by: EMERGENCY MEDICINE

## 2025-01-24 PROCEDURE — 87636 SARSCOV2 & INF A&B AMP PRB: CPT | Performed by: EMERGENCY MEDICINE

## 2025-01-24 PROCEDURE — 36415 COLL VENOUS BLD VENIPUNCTURE: CPT

## 2025-01-24 PROCEDURE — P9612 CATHETERIZE FOR URINE SPEC: HCPCS

## 2025-01-24 PROCEDURE — 99284 EMERGENCY DEPT VISIT MOD MDM: CPT

## 2025-01-24 PROCEDURE — 80053 COMPREHEN METABOLIC PANEL: CPT | Performed by: EMERGENCY MEDICINE

## 2025-01-24 PROCEDURE — 93005 ELECTROCARDIOGRAM TRACING: CPT | Performed by: EMERGENCY MEDICINE

## 2025-01-24 RX ORDER — LACOSAMIDE 50 MG/1
50 TABLET ORAL EVERY 12 HOURS SCHEDULED
Qty: 60 TABLET | Refills: 0 | Status: SHIPPED | OUTPATIENT
Start: 2025-01-24 | End: 2025-01-28 | Stop reason: SDUPTHER

## 2025-01-24 NOTE — DISCHARGE INSTRUCTIONS
Begin the second seizure medicine this evening and give it twice a day in addition to the Keppra.  Call her neurologist and arrange close follow-up.  Return if any concerns.

## 2025-01-24 NOTE — ED PROVIDER NOTES
Subjective   History of Present Illness  Mrs. Hammond presents by ambulance from home after having a seizure.  Caregiver reports twitching of her face and inability to speak lasting 5 minutes.  She reports diffuse headache.  She denies recent illness.  She has history of left MCA distribution stroke.  Has subsequent seizures.  Was seen here in October of last year with a seizure and Keppra was increased to 750 mg twice a day.  She is on Eliquis for history of atrial fibrillation.      Review of Systems    Past Medical History:   Diagnosis Date    History of breast cancer     History of heart disease     Hypothyroidism     Multinodular goiter     Seizures     Sleep apnea     Stroke     Thyroid function test abnormal        Allergies   Allergen Reactions    Lidocaine Other (See Comments)     seizure    Novocain [Procaine] Other (See Comments)     seizures    Tnkase [Tenecteplase] Angioedema     Treated     Nsaids Other (See Comments)     Avoid NSAIDs d/t history of GIB       Past Surgical History:   Procedure Laterality Date    BLEPHAROPLASTY      BREAST RECONSTRUCTION      CATARACT EXTRACTION Bilateral     COLONOSCOPY N/A 2023    Procedure: COLONOSCOPY;  Surgeon: Brunner, Mark I, MD;  [facility];  Service: Gastroenterology;  Laterality: N/A;    ENDOSCOPY N/A 2023    Procedure: ESOPHAGOGASTRODUODENOSCOPY;  Surgeon: Brunner, Mark I, MD;  Location: [facility];  Service: Gastroenterology;  Laterality: N/A;    HYSTERECTOMY      SIMPLE MASTECTOMY         Family History   Problem Relation Age of Onset    Hypothyroidism Sister     Stomach cancer Mother     Heart attack Father        Social History     Socioeconomic History    Marital status:    Tobacco Use    Smoking status: Former     Current packs/day: 0.00     Types: Cigarettes     Quit date:      Years since quittin.1     Passive exposure: Past    Smokeless tobacco: Never   Vaping Use    Vaping status: Never Used    Substance and Sexual Activity    Alcohol use: Yes     Alcohol/week: 1.0 - 2.0 standard drink of alcohol     Types: 1 - 2 Glasses of wine per week     Comment: social    Drug use: Never    Sexual activity: Never           Objective   Physical Exam  Vitals and nursing note reviewed.   Constitutional:       General: She is not in acute distress.     Appearance: Normal appearance.   HENT:      Head: Normocephalic and atraumatic.      Nose: Nose normal. No congestion or rhinorrhea.   Eyes:      General: No scleral icterus.     Conjunctiva/sclera: Conjunctivae normal.   Neck:      Comments: No JVD   Cardiovascular:      Rate and Rhythm: Normal rate and regular rhythm.      Heart sounds: No murmur heard.     No friction rub.   Pulmonary:      Effort: Pulmonary effort is normal.      Breath sounds: Normal breath sounds. No wheezing or rales.   Abdominal:      General: Bowel sounds are normal.      Palpations: Abdomen is soft.      Tenderness: There is no abdominal tenderness. There is no guarding or rebound.   Musculoskeletal:         General: No tenderness.      Cervical back: Normal range of motion and neck supple.      Right lower leg: No edema.      Left lower leg: No edema.   Skin:     General: Skin is warm and dry.      Coloration: Skin is not pale.      Findings: No erythema.   Neurological:      Mental Status: She is alert.      Motor: Weakness present.      Coordination: Coordination abnormal.      Comments: Dense right hemiparesis including her face, she is awake and alert and answering questions appropriate   Psychiatric:         Mood and Affect: Mood normal.         Behavior: Behavior normal.         Thought Content: Thought content normal.         Procedures           ED Course  ED Course as of 01/24/25 1420   Fri Jan 24, 2025   1307 CT head, urinalysis, labs all normal. [DT]   1307 Mr.s Eugene is awake and alert.  Family tells me she is not been sleeping well and has had some diarrhea.  Has not missed any  doses of Keppra.  Will discuss with neurology. [DT]   1334 Discussed with Dr. Clements.  He reviewed her records and recommends continuing Keppra and adding Vimpat 50 mg twice a day. [DT]   3743 Spoke with Mrs. Eugene's niece and caregiver about recommendations. [DT]      ED Course User Index  [DT] Cruz Orozco MD                                                       Medical Decision Making  Saw her on arrival.  Reviewed and interpreted prior records.  Ordered and interpreted multiple labs as well as CT scan of her head.  Had neurology consultation.  Obtained history from niece and caregiver.    Problems Addressed:  Breakthrough seizure: complicated acute illness or injury    Amount and/or Complexity of Data Reviewed  Independent Historian: EMS  External Data Reviewed: notes.  Labs: ordered. Decision-making details documented in ED Course.  Radiology: ordered. Decision-making details documented in ED Course.  ECG/medicine tests: ordered. Decision-making details documented in ED Course.    Risk  Prescription drug management.        Final diagnoses:   Breakthrough seizure       ED Disposition  ED Disposition       ED Disposition   Discharge    Condition   Stable    Comment   --               Leatha Melvin, DO  [address] [facility]  [address] [place] [address]  [phone]          Angelica Wan, APRN  [address] [facility]  [address]  [place] [address]  [phone]               Medication List        New Prescriptions      lacosamide 50 MG tablet tablet  Commonly known as: Vimpat  Take 1 tablet by mouth Every 12 (Twelve) Hours.               Where to Get Your Medications        These medications were sent to [facility] [id number] - [place], [place] - [address] AT [facility] - [phone]  - [phone]   [address] [facility] [address] [address]      Phone: [phone]   lacosamide 50 MG tablet tablet            Cruz Orozco MD  01/24/25 7210

## 2025-01-27 ENCOUNTER — TELEPHONE (OUTPATIENT)
Dept: NEUROLOGY | Facility: CLINIC | Age: 85
End: 2025-01-27

## 2025-01-27 NOTE — TELEPHONE ENCOUNTER
Caller: Mindy Hall    Relationship: Emergency Contact    Best call back number: [phone]    Which medication are you concerned about:   lacosamide (Vimpat) 50 MG tablet     Who prescribed you this medication:   ED PROVIDER LOOKED AT OCT OV OF TRUDY DAY AND PRESCRIBED THE RX FROM THAT OV NOTE.    When did you start taking this medication:   1-24-25 WHEN THE PT HAD A BREAK THROUGH SEIZURE. SHE WAS TAKEN TO ED.    What are your concerns: MINDY IS ASKING IF [facility] WILL TAKE OVER THIS RX FOR THE PT? DOES PT NEED TO BE SEEN SOONER THAN 3-17-25?

## 2025-01-28 DIAGNOSIS — G40.919 BREAKTHROUGH SEIZURE: ICD-10-CM

## 2025-01-28 RX ORDER — LACOSAMIDE 50 MG/1
50 TABLET ORAL EVERY 12 HOURS SCHEDULED
Qty: 60 TABLET | Refills: 3 | Status: SHIPPED | OUTPATIENT
Start: 2025-01-28

## 2025-01-28 NOTE — TELEPHONE ENCOUNTER
Relayed to Mindy Per Provider: Yes I will take over the prescription for Vimpat as well.     Pt will need a script sent over as they only given a 30 day supply on 1/24/25.    Pt relative verbalized understanding.

## 2025-03-17 ENCOUNTER — TELEPHONE (OUTPATIENT)
Dept: NEUROLOGY | Facility: CLINIC | Age: 85
End: 2025-03-17
Payer: COMMERCIAL

## 2025-03-21 ENCOUNTER — OFFICE VISIT (OUTPATIENT)
Dept: NEUROLOGY | Facility: CLINIC | Age: 85
End: 2025-03-21
Payer: MEDICARE

## 2025-03-21 VITALS
HEART RATE: 59 BPM | BODY MASS INDEX: 27.17 KG/M2 | DIASTOLIC BLOOD PRESSURE: 62 MMHG | SYSTOLIC BLOOD PRESSURE: 118 MMHG | WEIGHT: 138.4 LBS | OXYGEN SATURATION: 97 % | HEIGHT: 60 IN

## 2025-03-21 DIAGNOSIS — R56.9 SEIZURE: Primary | ICD-10-CM

## 2025-03-21 DIAGNOSIS — Z86.73 HISTORY OF STROKE: ICD-10-CM

## 2025-03-21 PROCEDURE — 1159F MED LIST DOCD IN RCRD: CPT | Performed by: NURSE PRACTITIONER

## 2025-03-21 PROCEDURE — 3078F DIAST BP <80 MM HG: CPT | Performed by: NURSE PRACTITIONER

## 2025-03-21 PROCEDURE — 99214 OFFICE O/P EST MOD 30 MIN: CPT | Performed by: NURSE PRACTITIONER

## 2025-03-21 PROCEDURE — 1160F RVW MEDS BY RX/DR IN RCRD: CPT | Performed by: NURSE PRACTITIONER

## 2025-03-21 PROCEDURE — 3074F SYST BP LT 130 MM HG: CPT | Performed by: NURSE PRACTITIONER

## 2025-03-21 RX ORDER — LEVETIRACETAM 1000 MG/1
1000 TABLET ORAL 2 TIMES DAILY
Qty: 60 TABLET | Refills: 6 | Status: SHIPPED | OUTPATIENT
Start: 2025-03-21

## 2025-03-21 RX ORDER — NEOMYCIN SULFATE, POLYMYXIN B SULFATE AND DEXAMETHASONE 3.5; 10000; 1 MG/ML; [USP'U]/ML; MG/ML
SUSPENSION/ DROPS OPHTHALMIC
COMMUNITY
Start: 2025-03-13

## 2025-03-21 NOTE — PROGRESS NOTES
Neuro Office Visit      Encounter Date: 2025   Patient Name: Laury Eugene  : 1940   MRN: [id number]   PCP:  Laetha Melvin DO     Chief Complaint:    Chief Complaint   Patient presents with    Seizures       History of Present Illness: Laury Eugene is a 85 y.o. female who is here today in Neurology for seizures.    Last visit with me on 24, continued Keppra.    Accompanied by her caregiver.    Seen in the ED at [facility] on 2025 with complaints of facial twitching and inability to speak which lasted approximately 5 minutes.  CT of the head was unchanged.  She was continued on Keppra and Vimpat was added twice daily.  History of Present Illness  Reports a significant decrease in stamina. The onset of these symptoms coincided with the initiation of Vimpat following a seizure in December.    Initially, the medication caused considerable distress, but some improvement was noted over time, transitioning from fluctuating good and bad weeks to good and bad hours.     Daily activities are severely impacted; she gets dressed but often needs to lie back down due to exhaustion.     Outpatient physical therapy and speech therapy are ongoing.     Ambulation is limited; she has not walked to the mailbox since a week after her last stroke but managed to walk in the yard twice last week. Compared to her activity levels in August and September, there is a marked decline, necessitating frequent rest.    Seizure activity includes several episodes, with one lasting long enough to be alarming. She can now recognize the onset of seizures.    Current medications include Vimpat 50 mg and Keppra 750 mg, both taken twice daily.      CT Head Without Contrast (2025 11:20)     Subjective      Past Medical History:   Past Medical History:   Diagnosis Date    History of breast cancer     History of heart disease     Hypothyroidism     Multinodular goiter     Seizures     Sleep apnea     Stroke      Thyroid function test abnormal        Past Surgical History:   Past Surgical History:   Procedure Laterality Date    BLEPHAROPLASTY      BREAST RECONSTRUCTION      CATARACT EXTRACTION Bilateral     COLONOSCOPY N/A 2023    Procedure: COLONOSCOPY;  Surgeon: Brunner, Mark I, MD;  Location:  [facility];  Service: Gastroenterology;  Laterality: N/A;    ENDOSCOPY N/A 2023    Procedure: ESOPHAGOGASTRODUODENOSCOPY;  Surgeon: Brunner, Mark I, MD;  Location:  [facility];  Service: Gastroenterology;  Laterality: N/A;    HYSTERECTOMY      SIMPLE MASTECTOMY         Family History:   Family History   Problem Relation Age of Onset    Hypothyroidism Sister     Stomach cancer Mother     Heart attack Father        Social History:   Social History     Socioeconomic History    Marital status:    Tobacco Use    Smoking status: Former     Current packs/day: 0.00     Types: Cigarettes     Quit date:      Years since quittin.2     Passive exposure: Past    Smokeless tobacco: Never   Vaping Use    Vaping status: Never Used   Substance and Sexual Activity    Alcohol use: Yes     Alcohol/week: 1.0 - 2.0 standard drink of alcohol     Types: 1 - 2 Glasses of wine per week     Comment: social    Drug use: Never    Sexual activity: Never       Medications:     Current Outpatient Medications:     apixaban (ELIQUIS) 5 MG tablet tablet, Take 1 tablet by mouth Every 12 (Twelve) Hours. Indications: Atrial Fibrillation, Disp: 60 tablet, Rfl:     ascorbic acid (VITAMIN C) 500 MG tablet, Take 1 tablet by mouth Daily., Disp: , Rfl:     atorvastatin (LIPITOR) 80 MG tablet, Take 1 tablet by mouth Every Night., Disp: 90 tablet, Rfl: 0    escitalopram (LEXAPRO) 10 MG tablet, Take 1 tablet by mouth Daily., Disp: , Rfl:     folic acid (FOLVITE) 1 MG tablet, Take 800 mcg by mouth Daily., Disp: , Rfl:     lacosamide (Vimpat) 50 MG tablet tablet, Take 1 tablet by mouth Every 12 (Twelve) Hours., Disp: 60 tablet, Rfl: 3     metoprolol succinate XL (TOPROL-XL) 25 MG 24 hr tablet, Take 0.5 tablets by mouth Daily., Disp: , Rfl:     Multiple Vitamins-Minerals (MULTIVITAMIN GUMMIES ADULTS PO), Take 1 tablet by mouth Daily., Disp: , Rfl:     neomycin-polymyxin-dexamethasone (MAXITROL) 3.5-53854-0.1 ophthalmic suspension, , Disp: , Rfl:     pantoprazole (PROTONIX) 40 MG EC tablet, Take 1 tablet by mouth Daily. Started at [facility], Disp: , Rfl:     propafenone SR (RYTHMOL SR) 325 MG 12 hr capsule, Take 1 capsule by mouth Every 12 (Twelve) Hours., Disp: 120 capsule, Rfl: 0    vitamin B-12 (CYANOCOBALAMIN) 250 MCG tablet, Take 4 tablets by mouth Daily., Disp: , Rfl:     levETIRAcetam (KEPPRA) 1000 MG tablet, Take 1 tablet by mouth 2 (Two) Times a Day., Disp: 60 tablet, Rfl: 6    Midazolam 5 MG/0.1ML solution, Administer 0.1 mL into the nostril(s) as directed by provider As Needed (Seizure)., Disp: 2 each, Rfl: 2    Allergies:   Allergies   Allergen Reactions    Lidocaine Other (See Comments)     seizure    Novocain [Procaine] Other (See Comments)     seizures    Tnkase [Tenecteplase] Angioedema     Treated 8/26    Nsaids Other (See Comments)     Avoid NSAIDs d/t history of GIB       PHQ-9 Total Score:     [facility] Fall Risk Assessment was completed, and patient is at HIGH risk for falls. Assessment completed on:3/21/2025    Objective     Physical Exam:     Neurological Exam  Mental Status  Awake, alert and oriented to person, place and time. Recent and remote memory are intact. Speech is normal. Language is fluent with no aphasia. Attention and concentration are normal. Fund of knowledge is appropriate for level of education.    Cranial Nerves  CN II: Visual acuity is normal.  CN III, IV, VI: Extraocular movements intact bilaterally. Pupils equal round and reactive to light bilaterally.  CN V: Facial sensation is normal.  CN VII:  Left: There is peripheral facial weakness.  CN IX, X: Palate elevates symmetrically  CN XI: Shoulder shrug strength  "is normal.  CN XII: Tongue midline without atrophy or fasciculations.    Motor  Normal muscle bulk throughout. No fasciculations present. Normal muscle tone. Strength is 5/5 in all four extremities except as noted.  RUE 4/5.    Sensory  Sensation is intact to light touch, pinprick, vibration and proprioception in all four extremities.    Reflexes                                            Right                      Left  Brachioradialis                    2+                         2+  Biceps                                 2+                         2+  Triceps                                2+                         2+  Finger flex                           2+                         2+  Hamstring                            2+                         2+  Patellar                                2+                         2+  Achilles                                2+                         2+    Coordination    Finger-to-nose, rapid alternating movements and heel-to-shin normal bilaterally without dysmetria.    Gait  Normal casual, toe, heel and tandem gait.        Vital Signs:   Vitals:    03/21/25 1422   BP: 118/62   Pulse: 59   SpO2: 97%   Weight: 62.8 kg (138 lb 6.4 oz)   Height: 152.4 cm (60\")     Body mass index is 27.03 kg/m².     Results:   Results  Radiology: CT scan, no significant changes     Imaging:   CT Head Without Contrast  Result Date: 1/24/2025  Impression: No acute intracranial findings. Unchanged chronic findings as above. Electronically Signed: Scott Vides MD  1/24/2025 11:31 AM EST  Workstation ID: [id number]    XR Shoulder 2+ View Right  Result Date: 11/26/2024  Fracture as above. Images reviewed, interpreted, and dictated by Daniel Jorge DO       Labs:   No results found for: \"CMP\", \"PROTEIN\", \"ANTIMOGAB\", \"AHWEZY8ADAG\", \"JCVRESULT\", \"QUANTTBGOLD\", \"CBCDIF\", \"IGGALBSER\"     Assessment / Plan      Assessment/Plan:   Diagnoses and all orders for this visit:    1. Seizure " (Primary)  Comments:  Increase Keppra  Wean Vimpat  Orders:  -     Midazolam 5 MG/0.1ML solution; Administer 0.1 mL into the nostril(s) as directed by provider As Needed (Seizure).  Dispense: 2 each; Refill: 2    2. History of stroke  Comments:  Continue atorvastatin and Eliquis    Other orders  -     levETIRAcetam (KEPPRA) 1000 MG tablet; Take 1 tablet by mouth 2 (Two) Times a Day.  Dispense: 60 tablet; Refill: 6         Assessment & Plan  1. Seizures.  Her current medication regimen includes Vimpat 50 mg and Keppra 750 mg twice daily. The side effects of Vimpat, including fatigue, sleepiness, and dizziness, were discussed. It was noted that these side effects are more pronounced in older individuals. The potential benefits and drawbacks of Depakote were also discussed. A prescription for Nayzilam nasal spray was provided for use during seizure episodes. The plan is to gradually discontinue Vimpat by reducing the dosage to once daily for a week before stopping completely. Concurrently, the dosage of Keppra will be increased. If this adjustment does not adequately control her seizures, alternative medications such as Depakote or Lamictal may be considered.    Follow-up  The patient will follow up in 3 months.    Patient Education:     Reviewed medications, potential side effects and signs and symptoms to report. Discussed risk versus benefits of treatment plan with patient and/or family-including medications, labs and radiology that may be ordered. Addressed questions and concerns during visit. Patient and/or family verbalized understanding and agree with plan. Instructed to call the office with any questions and report to ER with any life-threatening symptoms.     Follow Up:   Return in about 3 months (around 6/21/2025).    I spent 40 minutes caring for Laury on this date of service. This time includes time spent by me in the following activities: preparing for the visit, performing a medically appropriate  examination and/or evaluation, counseling and educating the patient/family/caregiver, and documenting information in the medical record.        During this visit the following were done:  Labs Reviewed [x]    Labs Ordered []    Radiology Reports Reviewed [x]    Radiology Ordered []    PCP Records Reviewed []    Referring Provider Records Reviewed []    ER Records Reviewed [x]    Hospital Records Reviewed []    History Obtained From Family []    Radiology Images Reviewed [x]    Other Reviewed []    Records Requested []      Patient or patient representative verbalized consent for the use of Ambient Listening during the visit with  DIANNE Wilcox for chart documentation. 3/21/2025  14:21 EDT    DIANNE Wilcox   [facility]  [address] [address] [phone]

## 2025-04-17 RX ORDER — LEVETIRACETAM 750 MG/1
750 TABLET ORAL 2 TIMES DAILY
Qty: 60 TABLET | Refills: 2 | OUTPATIENT
Start: 2025-04-17

## 2025-04-17 NOTE — TELEPHONE ENCOUNTER
Rx Refill Note  Requested Prescriptions     Pending Prescriptions Disp Refills    levETIRAcetam (KEPPRA) 750 MG tablet [Pharmacy Med Name: levETIRAcetam 750 MG TABLET] 60 tablet 2     Sig: TAKE 1 TABLET BY MOUTH 2 TIMES A DAY      Last filled: 3/21/25, 60 with 6 refills  Last office visit with prescribing clinician: 3/21/2025      Next office visit with prescribing clinician: Visit date not found     Altagracia Bess MA  04/17/25, 10:13 EDT

## 2025-06-23 ENCOUNTER — OFFICE VISIT (OUTPATIENT)
Dept: NEUROLOGY | Facility: CLINIC | Age: 85
End: 2025-06-23
Payer: MEDICARE

## 2025-06-23 VITALS
HEIGHT: 60 IN | WEIGHT: 131 LBS | DIASTOLIC BLOOD PRESSURE: 68 MMHG | SYSTOLIC BLOOD PRESSURE: 122 MMHG | BODY MASS INDEX: 25.72 KG/M2 | OXYGEN SATURATION: 99 % | HEART RATE: 65 BPM

## 2025-06-23 DIAGNOSIS — R56.9 SEIZURE: Primary | ICD-10-CM

## 2025-06-23 DIAGNOSIS — Z86.73 HISTORY OF STROKE: ICD-10-CM

## 2025-06-23 PROBLEM — K92.2 GASTROINTESTINAL HEMORRHAGE: Status: ACTIVE | Noted: 2023-08-24

## 2025-06-23 PROBLEM — M81.0 AGE-RELATED OSTEOPOROSIS WITHOUT CURRENT PATHOLOGICAL FRACTURE: Status: ACTIVE | Noted: 2024-07-24

## 2025-06-23 PROBLEM — M79.671 RIGHT FOOT PAIN: Status: ACTIVE | Noted: 2019-10-16

## 2025-06-23 PROBLEM — G47.33 OSA ON CPAP: Status: ACTIVE | Noted: 2024-07-10

## 2025-06-23 PROBLEM — M25.512 PAIN IN JOINT OF LEFT SHOULDER: Status: ACTIVE | Noted: 2024-11-27

## 2025-06-23 PROCEDURE — 1159F MED LIST DOCD IN RCRD: CPT | Performed by: NURSE PRACTITIONER

## 2025-06-23 PROCEDURE — 99214 OFFICE O/P EST MOD 30 MIN: CPT | Performed by: NURSE PRACTITIONER

## 2025-06-23 PROCEDURE — 3078F DIAST BP <80 MM HG: CPT | Performed by: NURSE PRACTITIONER

## 2025-06-23 PROCEDURE — 3074F SYST BP LT 130 MM HG: CPT | Performed by: NURSE PRACTITIONER

## 2025-06-23 PROCEDURE — 1160F RVW MEDS BY RX/DR IN RCRD: CPT | Performed by: NURSE PRACTITIONER

## 2025-06-23 RX ORDER — LEVETIRACETAM 1000 MG/1
1000 TABLET ORAL 2 TIMES DAILY
Qty: 180 TABLET | Refills: 3 | Status: SHIPPED | OUTPATIENT
Start: 2025-06-23

## 2025-06-23 NOTE — PROGRESS NOTES
Neuro Office Visit      Encounter Date: 2025   Patient Name: Laury Eugene  : 1940   MRN: [id number]   PCP:  Leatha Melvin DO     Chief Complaint:    Chief Complaint   Patient presents with    Seizures       History of Present Illness: Laury Eugene is a 85 y.o. female who is here today in Neurology for seizures.    Last visit with me on 3/21/2025, increased Keppra, weaned Vimpat.    Accompanied by her caregiver who assists with history.  History of Present Illness  Seizures have been well-controlled since the last visit, with no recent episodes reported.     Vimpat has been discontinued, and Keppra is currently being taken without any adverse effects.     Energy levels have improved compared to the previous year, allowing her to complete tasks such as showering without needing extended rest periods.     No falls have occurred, and she is seeking referrals for physical therapy and speech therapy at [facility].    Intermittent diarrhea persists despite normal laboratory results. She has a history of irritable bowel syndrome with constipation (IBS-C).    She is scheduled to have her cholesterol checked next month and continues to take atorvastatin. Additionally, she remains on Eliquis.          Subjective      Past Medical History:   Past Medical History:   Diagnosis Date    History of breast cancer     History of heart disease     Hypothyroidism     Multinodular goiter     Seizures     Sleep apnea     Stroke     Thyroid function test abnormal        Past Surgical History:   Past Surgical History:   Procedure Laterality Date    BLEPHAROPLASTY      BREAST RECONSTRUCTION      CATARACT EXTRACTION Bilateral     COLONOSCOPY N/A 2023    Procedure: COLONOSCOPY;  Surgeon: Brunner, Mark I, MD;  Location: [facility] END[facility];  Service: Gastroenterology;  Laterality: N/A;    ENDOSCOPY N/A 2023    Procedure: ESOPHAGOGASTRODUODENOSCOPY;  Surgeon: Brunner, Mark I, MD;  Location: [facility]  ENDOSCOPY;  Service: Gastroenterology;  Laterality: N/A;    HYSTERECTOMY      SIMPLE MASTECTOMY         Family History:   Family History   Problem Relation Age of Onset    Hypothyroidism Sister     Stomach cancer Mother     Heart attack Father        Social History:   Social History     Socioeconomic History    Marital status:    Tobacco Use    Smoking status: Former     Current packs/day: 0.00     Types: Cigarettes     Quit date:      Years since quittin.5     Passive exposure: Past    Smokeless tobacco: Never   Vaping Use    Vaping status: Never Used   Substance and Sexual Activity    Alcohol use: Yes     Alcohol/week: 1.0 - 2.0 standard drink of alcohol     Types: 1 - 2 Glasses of wine per week     Comment: social    Drug use: Never    Sexual activity: Never       Medications:     Current Outpatient Medications:     apixaban (ELIQUIS) 5 MG tablet tablet, Take 1 tablet by mouth Every 12 (Twelve) Hours. Indications: Atrial Fibrillation, Disp: 60 tablet, Rfl:     ascorbic acid (VITAMIN C) 500 MG tablet, Take 1 tablet by mouth Daily., Disp: , Rfl:     atorvastatin (LIPITOR) 80 MG tablet, Take 1 tablet by mouth Every Night., Disp: 90 tablet, Rfl: 0    escitalopram (LEXAPRO) 10 MG tablet, Take 1 tablet by mouth Daily., Disp: , Rfl:     folic acid (FOLVITE) 1 MG tablet, Take 800 mcg by mouth Daily., Disp: , Rfl:     levETIRAcetam (KEPPRA) 1000 MG tablet, Take 1 tablet by mouth 2 (Two) Times a Day., Disp: 180 tablet, Rfl: 3    metoprolol succinate XL (TOPROL-XL) 25 MG 24 hr tablet, Take 0.5 tablets by mouth Daily., Disp: , Rfl:     Midazolam 5 MG/0.1ML solution, Administer 0.1 mL into the nostril(s) as directed by provider As Needed (Seizure)., Disp: 2 each, Rfl: 2    Multiple Vitamins-Minerals (MULTIVITAMIN GUMMIES ADULTS PO), Take 1 tablet by mouth Daily., Disp: , Rfl:     neomycin-polymyxin-dexamethasone (MAXITROL) 3.5-33310-8.1 ophthalmic suspension, , Disp: , Rfl:     pantoprazole (PROTONIX) 40 MG EC  tablet, Take 1 tablet by mouth Daily. Started at [facility], Disp: , Rfl:     propafenone SR (RYTHMOL SR) 325 MG 12 hr capsule, Take 1 capsule by mouth Every 12 (Twelve) Hours., Disp: 120 capsule, Rfl: 0    vitamin B-12 (CYANOCOBALAMIN) 250 MCG tablet, Take 4 tablets by mouth Daily., Disp: , Rfl:     Allergies:   Allergies   Allergen Reactions    Lidocaine Other (See Comments)     seizure    Novocain [Procaine] Other (See Comments)     seizures    Tnkase [Tenecteplase] Angioedema     Treated 8/26    Nsaids Other (See Comments)     Avoid NSAIDs d/t history of GIB       PHQ-9 Total Score:     [facility] Fall Risk Assessment was completed, and patient is at MODERATE risk for falls. Assessment completed on:6/23/2025    Objective     Physical Exam:     Neurological Exam  Mental Status  Awake, alert and oriented to person, place and time. Recent and remote memory are intact. Speech is normal. Language is fluent with no aphasia. Attention and concentration are normal. Fund of knowledge is appropriate for level of education.    Cranial Nerves  CN II: Visual acuity is normal.  CN III, IV, VI: Extraocular movements intact bilaterally. Pupils equal round and reactive to light bilaterally.  CN V: Facial sensation is normal.  CN VII: Full and symmetric facial movement.  CN IX, X: Palate elevates symmetrically  CN XI: Shoulder shrug strength is normal.  CN XII: Tongue midline without atrophy or fasciculations.    Motor  Normal muscle bulk throughout. No fasciculations present. Normal muscle tone. Strength is 5/5 throughout all four extremities.    Sensory  Sensation is intact to light touch, pinprick, vibration and proprioception in all four extremities.    Reflexes                                            Right                      Left  Brachioradialis                    2+                         2+  Biceps                                 2+                         2+  Triceps                                2+                     "     2+  Finger flex                           2+                         2+  Hamstring                            2+                         2+  Patellar                                2+                         2+  Achilles                                2+                         2+    Coordination    Finger-to-nose, rapid alternating movements and heel-to-shin normal bilaterally without dysmetria.    Gait  Normal casual, toe, heel and tandem gait.        Vital Signs:   Vitals:    06/23/25 1342   BP: 122/68   Pulse: 65   SpO2: 99%   Weight: 59.4 kg (131 lb)   Height: 152.4 cm (60\")     Body mass index is 25.58 kg/m².     Results:   Results       Imaging:   No Images in the past 120 days found..     Labs:   No results found for: \"CMP\", \"PROTEIN\", \"ANTIMOGAB\", \"VHOLXY9DBSM\", \"JCVRESULT\", \"QUANTTBGOLD\", \"CBCDIF\", \"IGGALBSER\"     Assessment / Plan      Assessment/Plan:   Diagnoses and all orders for this visit:    1. Seizure (Primary)  Comments:  Continue Keppra    2. History of stroke  -     Ambulatory referral to Physical Therapy  -     Ambulatory referral to Occupational Therapy  -     Ambulatory referral to Speech Therapy    Other orders  -     levETIRAcetam (KEPPRA) 1000 MG tablet; Take 1 tablet by mouth 2 (Two) Times a Day.  Dispense: 180 tablet; Refill: 3         Assessment & Plan  1. Seizure disorder.  Her seizures are currently well-managed with Keppra, and she is tolerating the medication without any adverse effects. A referral for physical therapy and speech therapy at [place] will be initiated.    2. Diarrhea.  She experiences intermittent diarrhea, which may be related to her medications. Xifaxan was discussed as a potential treatment to rebalance gut yovani and manage symptoms.    3. Hyperlipidemia.  She is currently taking atorvastatin for cholesterol management. A cholesterol check is scheduled for next month.    4. Anticoagulation therapy.  She continues to take Eliquis.    Patient Education: "     Reviewed medications, potential side effects and signs and symptoms to report. Discussed risk versus benefits of treatment plan with patient and/or family-including medications, labs and radiology that may be ordered. Addressed questions and concerns during visit. Patient and/or family verbalized understanding and agree with plan. Instructed to call the office with any questions and report to ER with any life-threatening symptoms.     Follow Up:   Return in about 6 months (around 12/23/2025).    I spent 30 minutes caring for Laury on this date of service. This time includes time spent by me in the following activities: preparing for the visit, performing a medically appropriate examination and/or evaluation, counseling and educating the patient/family/caregiver, referring and communicating with other health care professionals, and documenting information in the medical record.        During this visit the following were done:  Labs Reviewed []    Labs Ordered []    Radiology Reports Reviewed []    Radiology Ordered []    PCP Records Reviewed []    Referring Provider Records Reviewed []    ER Records Reviewed []    Hospital Records Reviewed []    History Obtained From Family []    Radiology Images Reviewed []    Other Reviewed []    Records Requested []      Patient or patient representative verbalized consent for the use of Ambient Listening during the visit with  DIANNE Wilcox for chart documentation. 6/23/2025  13:49 EDT    DIANNE Wilcox   [facility] NE[facility]OLOG[facility]  [address] [facility] [address]  [facility] [address] [phone]

## 2025-07-16 ENCOUNTER — HOSPITAL ENCOUNTER (OUTPATIENT)
Dept: PHYSICAL THERAPY | Facility: HOSPITAL | Age: 85
Setting detail: THERAPIES SERIES
Discharge: HOME | End: 2025-07-16
Payer: MEDICARE

## 2025-07-16 DIAGNOSIS — Z86.73 HISTORY OF CVA (CEREBROVASCULAR ACCIDENT): Primary | ICD-10-CM

## 2025-07-16 DIAGNOSIS — R26.89 BALANCE DISORDER: ICD-10-CM

## 2025-07-16 PROCEDURE — 97162 PT EVAL MOD COMPLEX 30 MIN: CPT

## 2025-07-16 PROCEDURE — 97162 PT EVAL MOD COMPLEX 30 MIN: CPT | Performed by: PHYSICAL THERAPIST

## 2025-07-16 NOTE — THERAPY EVALUATION
Physical Therapy Initial Evaluation     [facility]          [address]     Patient: Laury Eugene   : 1940  MRN: [id number]   Diagnosis/ICD-10 Code:      ICD-10-CM ICD-9-CM   1. History of CVA (cerebrovascular accident)  Z86.73 V12.54   2. Balance disorder  R26.89 781.99      Referring practitioner: QUIRINO Wilcox*  Today's Date: 2025      Subjective:     Subjective Questionnaire:     DE LA CRUZ/56  FGA:   TU.66 sec  10 Meter: 14.09 sec    BP:   Seated: 132/65  Standin/69     Eval Complexity: Moderate    Subjective Evaluation    History of Present Illness  Mechanism of injury: Patient sustained a Lt MCA CVA on 2023. Improved significantly since then but continues to have difficulty with speech production, being steady on her feet during walking, using her phone with Rt hand to scroll and open tabs. Feels off balance when she is tired and has to rely on furniture to get around during those moments of fatigue. Gets dizzy and unsteady with transitions from sitting to standing, or bending forward to standing upright. PMH for IBS, constipation, breast CA, seizures (on Keppra), Afib. Owns a rolling walker but does not use any AD. Denies recent falls.      Patient Occupation: Retired Quality of life: fair    Pain  Current pain ratin  At best pain ratin    Social Support  Lives in: multiple-level home (4 story w/ basement; has stair lift but does not use it)  Lives with: niece and caregiver (24 hr care)    Hand dominance: right    Treatments  Previous treatment: physical therapy and speech therapy  Current treatment: medication  Patient Goals  Patient goal: be more steady when walking           Objective          Static Posture   General Observations  Symmetrical weight bearing.     Head  Forward.    Shoulders  Rounded.    Strength/Myotome Testing     Left Hip   Planes of Motion   Flexion: 4  Extension: 4  Abduction:  4-  Adduction: 3+    Right Hip   Planes of Motion   Flexion: 4-  Extension: 4-  Abduction: 3  Adduction: 2    Left Knee   Flexion: 4  Prone flexion: 4  Extension: 4    Right Knee   Flexion: 4  Prone flexion: 4  Extension: 4    Left Ankle/Foot   Dorsiflexion: 4-  Plantar flexion: 4    Right Ankle/Foot   Dorsiflexion: 4-  Plantar flexion: 4        PT Neuro         Assessment & Plan       Assessment  Impairments: abnormal gait, impaired balance, impaired physical strength, lacks appropriate home exercise program and weight-bearing intolerance   Other impairment: lightheadedness and dizziness with positional changes  Functional limitations: carrying objects, lifting, walking, pulling, pushing, moving in bed and stooping (Prolonged physical activity, quick transitions from sitting to standing  )  Assessment details: Patient is an 85 YOF who presents to clinic with LE weakness, static and walking balance impairments, difficulties with speech and fine motor secondary to chronic Lt MCA CVA that occurred on 2023. Patient has improved since original CVA but continues to require 24 hr care at home. Discussed potential origin of dizziness with position changes, which may be cardiac in nature. Checked BP in seated and standing positions without notable orthostatic changes. Patient will benefit from skilled PT services to address gait, balance, and strength to reduce fall risk with ADLs.  Barriers to therapy: dysarthria  Prognosis: fair    Goals  Plan Goals: ST. Patient will be IND with all initial HEP.  2. Patient will score at least 50/56 on DE LA CRUZ to reduce fall risk when getting out of bed.  3. Patient will score at least 18/30 on FGA to reduce fall risk when walking down the block in her neighborhood.  4. Patient will perform TUG within or less than 12 sec to reduce fall risk when walking from bedroom to bathroom.  5.  Patient will perform 10M within or less than 12 sec to improve gait efficiency and safety when  walking across a busy intersection.    LT. Patient will be IND with final HEP.  2. Patient will score at least 54/56 on DE LA CRUZ to reduce fall risk with all transfers at home.  3. Patient will score at least 23/30 on FGA to reduce fall risk when ambulating in the community.  4. Patient will perform TUG within or less 10 sec to reduce fall risk with all mobility at home.  5. Patient will perform 10M within or less than 10 sec to improve gait efficiency and safety when walking in a mall/grocery store.    Plan  Therapy options: will be seen for skilled therapy services  Planned therapy interventions: neuromuscular re-education, balance/weight-bearing training, postural training, functional ROM exercises, strengthening, stretching, gait training, home exercise program, therapeutic activities and manual therapy  Frequency: 1x week  Duration in visits: 8  Duration in weeks: 8  Treatment plan discussed with: patient and caregiver (aditya)  Plan details: Patient will be seen 1x/wk x 8wks with treatment to include strengthening, stretching, manual therapy, neuromuscular re-education, balance, gait and endurance training. CPT codes to include: 50345, 98385, 85839, 16653 and 49222          Therapy Charges for Today       Code Description Service Date Service Provider Modifiers Qty    16408488269 HC-PT EVAL MOD COMPLEXITY 5 2025 Hafsa Loo, PT  1          DATE TREATMENT INITIATED: 2025      Medicare Initial Certification Certification Period: 20258973eeck28/13/2025  I certify that the therapy services are furnished while this patient is under my care.  The services outlined above are required by this patient, and will be reviewed every 90 days.     PHYSICIAN: Angelica Wan APRN  NPI: [id number]                                         DATE:     Please sign and return via fax to [phone].   Thank you,   [facility].

## 2025-07-18 ENCOUNTER — HOSPITAL ENCOUNTER (OUTPATIENT)
Dept: OCCUPATIONAL THERAPY | Facility: HOSPITAL | Age: 85
Setting detail: THERAPIES SERIES
Discharge: HOME | End: 2025-07-18
Payer: MEDICARE

## 2025-07-18 ENCOUNTER — HOSPITAL ENCOUNTER (OUTPATIENT)
Dept: SPEECH THERAPY | Facility: HOSPITAL | Age: 85
Setting detail: THERAPIES SERIES
Discharge: HOME | End: 2025-07-18
Payer: MEDICARE

## 2025-07-18 DIAGNOSIS — R47.1 DYSARTHRIA: ICD-10-CM

## 2025-07-18 DIAGNOSIS — Z86.73 HISTORY OF CVA (CEREBROVASCULAR ACCIDENT): Primary | ICD-10-CM

## 2025-07-18 DIAGNOSIS — R47.01 APHASIA: ICD-10-CM

## 2025-07-18 DIAGNOSIS — R48.2 APRAXIA: Primary | ICD-10-CM

## 2025-07-18 PROCEDURE — 97166 OT EVAL MOD COMPLEX 45 MIN: CPT

## 2025-07-18 PROCEDURE — 97530 THERAPEUTIC ACTIVITIES: CPT

## 2025-07-18 PROCEDURE — 92523 SPEECH SOUND LANG COMPREHEN: CPT | Performed by: SPEECH-LANGUAGE PATHOLOGIST

## 2025-07-18 NOTE — THERAPY EVALUATION
"  Occupational Therapy Initial Evaluation     [facility]          [address]     Patient: Laury Eugene   : 1940  MRN: [id number]   Diagnosis/ICD-10 Code:      ICD-10-CM ICD-9-CM   1. History of CVA (cerebrovascular accident)  Z86.73 V12.54      Referring practitioner: QUIRINO Wilcox*  Today's Date: 2025      Subjective:     Subjective Evaluation    History of Present Illness  Mechanism of injury: Per PT note \" Patient sustained a Lt MCA CVA on 2023. Improved significantly since then but continues to have difficulty with speech production, being steady on her feet during walking, using her phone with Rt hand to scroll and open tabs. Feels off balance when she is tired and has to rely on furniture to get around during those moments of fatigue. Gets dizzy and unsteady with transitions from sitting to standing, or bending forward to standing upright. PMH for IBS, constipation, breast CA, seizures (on Keppra), Afib. Owns a rolling walker but does not use any AD. Denies recent falls.\"     Went to [facility] after initial stroke and f/u w/ OP therapy. Last OT 2024. R sided incoordination and weakness. No falls recently. Has  CG assist. Has caregiver assist for meal management. Able to complete all ADLs indep. Difficulty with opening containers.     Quality of life: good    Pain  No pain reported    Social Support  Patient lives at: split level home; bedroom on second level.  Lives with: niece.    Hand dominance: right    Treatments  Current treatment: physical therapy and speech therapy  Patient Goals  Patient goals for therapy: increased strength, independence with ADLs/IADLs, increased motion and improved balance             Objective          Active Range of Motion     Left Elbow   Normal active range of motion    Right Elbow   Normal active range of motion    Left Wrist   Normal active range of motion    Right Wrist   Normal active range of " motion    Passive Range of Motion   Left Shoulder   Normal passive range of motion    Right Shoulder   Normal passive range of motion    Strength/Myotome Testing     Left Shoulder     Planes of Motion   Flexion: 4   Extension: 4   Abduction: 4   Adduction: 4     Right Shoulder     Planes of Motion   Flexion: 4   Extension: 4   Abduction: 4   Adduction: 4     Left Elbow   Flexion: 4  Extension: 4-    Right Elbow   Flexion: 4  Extension: 4-    Left Wrist/Hand      (2nd hand position)     Trial 1: 18 lbs    Trial 2: 15 lbs    Trial 3: 18 lbs    Average: 17 lbs    Right Wrist/Hand      (2nd hand position)     Trial 1: 10 lbs    Trial 2: 10 lbs    Trial 3: 6 lbs    Average: 8.67 lbs    Additional Strength Details  RUE localization normal        FDT:  Left  Trial 1: 46.82 sec.   Penalties: 0  Right  Trial 1 1:49 sec.   Penalties: 7x5     SBA floor retrieval   CGA top shelf retrieval   Kirk to open tissue container       OT Neuro         Assessment & Plan       Assessment  Impairments: abnormal coordination, abnormal or restricted ROM, activity intolerance, impaired balance, impaired physical strength, lacks appropriate home exercise program and safety issue   Other impairment: apraxia, expressive aphasia  Functional limitations: carrying objects and reaching overhead (Micrographia, opening containers)  Assessment details: Pt is a 84 y/o f who presents to OP OT with impaired ability to complete ADLs and PMH of CVA. Pt demonstrates decreased independence, safety, and satisfaction with ADL/IADL tasks and engaging in desired occupations due to apraxia, motor planning, R hand FMC/GMC and R hand strength . Pt would benefit from skilled occupational therapy services to address established goals as specified.   Prognosis: good    Goals  Plan Goals: Client will complete overhead/under cabinet reaching and placing with good body mechanics 100% of the time by 8 weeks to improve ability to complete home management tasks.      Client will indep demo understanding of Iphone accessibility settings to improve ability to type messages without double typing 5/5 letters by 8 weeks.     Client will manage containers of various sizes w/ AM/AE PRN w/n 5 sec in order to improve efficiency and independence with container management by 8 weeks.      Client will complete dynamic standing while performing bilateral in-hand manipulation/translation activity for dual tasking w/no LOB or loss of grasp of items for carry over with ADL/IADL performance by 8 wks.    Client will indep demonstrate understanding of with hand/UE stretching HEP to promote increased function and improved comfort level during ADL/IADL tasks by 4 wks.     Client will indep demonstrate understanding of hand/UE strengthening HEP to increase independence with ADL/IADL performance tasks by 4 wks.    Client will indep demonstrate understanding of hand FMC HEP to increase independence with ADL/IADL performance tasks by 4 wks.     Client will improve R hand FDT score by 20 seconds in 8 weeks to demonstrate improved in hand manipulation and translation required for work related tasks.     Client will improve in-hand FM skills of R hand through translation, shifting and rotation of objects without dropping 9/10 trials by 8 wks.      Client will increase B  strength by 3 lbs by 8 wks to demonstrate improved strength and function for daily tasks.              Plan  Therapy options: will be seen for skilled therapy services  Planned modality interventions: thermotherapy (hydrocollator packs) and thermotherapy (paraffin bath)  Planned therapy interventions: ADL retraining, fine motor coordination training, home exercise program, motor coordination training, strengthening, therapeutic activities, neuromuscular re-education, stretching, functional ROM exercises, manual therapy and IADL retraining  Frequency: 1x week  Duration in visits: 8  Treatment plan discussed with: patient  Plan  details: Will establish OT POC and goals to reflect pt needs and promote increased satisfaction and QOL. Will implement appropriate AE/DME needs, home/activity/environmental modifications, flexibility/ROM/stretching, balance training, NMR techniques, ADL/IADL retraining, FM/GMC training, and other interventions as needed.  CPT codes to include: 26978, 05677, 82813, 22160 and 26832         Eval Complexity: moderate    OT SIGNATURE: DANNY Galeana, OTR/L  [place] License: [id number]  DATE TREATMENT INITIATED: 7/18/2025    Therapy Charges for Today       Code Description Service Date Service Provider Modifiers Qty    48627683890  OT THERAPEUTIC ACT EA 15 MIN 7/18/2025 Doretha Chou OT GO 1    [id number]  OT EVAL MOD COMPLEXITY 4 7/18/2025 Doretha Chou OT GO 1            Initial Certification Certification Period: 7/18/20255618jtiw88/15/2025  I certify that the therapy services are furnished while this patient is under my care.  The services outlined above are required by this patient, and will be reviewed every 90 days.     PHYSICIAN: Angelica Wan APRN  NPI: [id number]                                         DATE:     Please sign and return via fax to [phone].   Thank you,   [facility] Physical Therapy.

## 2025-07-18 NOTE — THERAPY EVALUATION
Outpatient Speech Language Pathology   Adult Speech Language Cognitive Initial Evaluation  [facility]     Patient Name: Laury Eugene  : 1940  MRN: [id number]  Today's Date: 2025        Visit Date: 2025   Patient Active Problem List   Diagnosis    Multinodular goiter    Abnormal thyroid function test    Hypothyroidism (acquired)    Essential hypertension    Hyperlipemia    Paroxysmal atrial fibrillation    Dysphagia    Acute lower gastrointestinal bleeding    ABLA (acute blood loss anemia)    Moderate malnutrition    History of stroke    Breakthrough seizure    Seizure    Age-related osteoporosis without current pathological fracture    Gastrointestinal hemorrhage    NOVA on CPAP    Pain in joint of left shoulder    Right foot pain        Past Medical History:   Diagnosis Date    History of breast cancer     History of heart disease     Hypothyroidism     Multinodular goiter     Seizures     Sleep apnea     Stroke     Thyroid function test abnormal         Past Surgical History:   Procedure Laterality Date    BLEPHAROPLASTY      BREAST RECONSTRUCTION      CATARACT EXTRACTION Bilateral     COLONOSCOPY N/A 2023    Procedure: COLONOSCOPY;  Surgeon: Brunner, Mark I, MD;  Location: [facility] ENDOSCOPY;  Service: Gastroenterology;  Laterality: N/A;    ENDOSCOPY N/A 2023    Procedure: ESOPHAGOGASTRODUODENOSCOPY;  Surgeon: Brunner, Mark I, MD;  Location: [facility] ENDOSCOPY;  Service: Gastroenterology;  Laterality: N/A;    HYSTERECTOMY      SIMPLE MASTECTOMY           Visit Dx:    ICD-10-CM ICD-9-CM   1. Apraxia  R48.2 784.69   2. Dysarthria  R47.1 784.51   3. Aphasia  R47.01 784.3            OP SLP Assessment/Plan - 25 1400          SLP Assessment    Functional Problems Speech Language- Adult/Cognition  -RB    Impact on Function: Adult Speech Language/Cognition Difficulty communicating wants, needs, and ideas;Difficulty communicating in a medical emergency;Restrictions in personal and  social life;Difficulty sequencing thoughts to express complex messages;Unable to understand written/spoken language  -RB    Clinical Impression: Speech Language-Adult/Congnition Moderate:;Receptive Aphasia;Expressive Aphasia;Apraxia- Verbal;Dysarthria- Mixed  -RB    Functional Problems Comment impacts communication, participation, QOL  -RB    Clinical Impression Comments would benefit from skilled ST  -RB    Please refer to paper survey for additional self-reported information Yes  -RB    Please refer to items scanned into chart for additional diagnostic informaiton and handouts as provided by clinician Yes  -RB    SLP Diagnosis aphasia, apraxia, dysarthria  -RB    Prognosis Good (comment)  -RB    Patient/caregiver participated in establishment of treatment plan and goals Yes  -RB    Patient would benefit from skilled therapy intervention Yes  -RB       SLP Plan    Frequency 1x/weekly  -RB    Duration 12 weeks  -RB    Planned CPT's? SLP INDIVIDUAL SPEECH THERAPY: 43410;SLP SPEECH & LANGUAGE EVAL: 83303  -RB    Expected Duration of Therapy Session (SLP Eval) 45  -RB    Plan Comments POC  -RB              User Key  (r) = Recorded By, (t) = Taken By, (c) = Cosigned By      Initials Name Provider Type    RB Caprice Gutierrez MA,CCC-SLP Speech and Language Pathologist                     SLP SLC Evaluation - 07/18/25 1400          Communication Assessment/Intervention    Document Type evaluation  -RB    Total Evaluation Minutes, SLP 45  -RB    Subjective Information no complaints  -RB    Patient Observations alert;cooperative;agree to therapy  -RB    Patient Effort good  -RB    Symptoms Noted During/After Treatment none  -RB       General Information    Patient Profile Reviewed yes  -RB    Pertinent History Of Current Problem PMH: hx of CVA,NOVA.  Patient sustained a L MCA CVA on 08/2023. Improved significantly since then but continues to have difficulty with speech production, word finding, writing and notes reading  comprehension deficits. Pt saw SLP services after CVA and improved. She reports speech worsens when tired, on the phone, or when talking for long periods of time. She lives with her niece and has caregivers. She reports loving to read, but that she has not been able to do so since CVA.  -RB    Precautions/Limitations, Vision WFL;for purposes of eval  -RB    Precautions/Limitations, Hearing hearing aid, bilaterally  -RB    Prior Level of Function-Communication WFL  -RB    Plans/Goals Discussed with patient;agreed upon  -RB    Barriers to Rehab previous functional deficit  -RB    Patient's Goals for Discharge functional communication  -RB    Standardized Assessment Used RBANS  -RB       Pain    Pretreatment Pain Rating 0/10 - no pain  -RB    Posttreatment Pain Rating 0/10 - no pain  -RB       Comprehension Assessment/Intervention    Comprehension Assessment/Intervention Auditory Comprehension;Reading Comprehension  -RB       Auditory Comprehension Assessment/Intervention    Auditory Comprehension (Communication) WFL  -RB    Answers Questions (Communication) WFL;yes/no;wh questions;personal;simple  -RB    Able to Follow Commands (Communication) WFL;1-step  -RB    Narrative Discourse WFL;conversational level  -RB       Reading Comprehension Assessment/Intervention    Scanning (Reading) WFL;phrases  -RB    Visual Matching Ability (Reading) WFL  -RB    Single Word Level WFL  -RB    Phrase Level WFL  -RB    Reading Comprehension, Comment plan to assess more in depth next session  -RB       Expression Assessment/Intervention    Expression Assessment/Intervention graphic expression;verbal expression  -RB       Verbal Expression Assessment/Intervention    Verbal Expression moderate impairment  -RB    Automatic Speech (Communication) WFL  -RB    Phrase Completion WFL  -RB    Responsive Naming WFL  -RB    Confrontational Naming WFL  -RB    Spontaneous/Functional Words moderate impairment  -RB    Sentence Formulation moderate  impairment;perseverations;semantic paraphasias;phonemic paraphasias;circumlocution;delayed responses  -RB       Graphic Expression Assessment/Intervention    Biographical Information WFL;name;  -RB       Oral Motor Structure and Function    Oral Motor Structure and Function WFL  -RB    Dentition Assessment natural, present and adequate  -RB    Mucosal Quality moist, healthy  -RB       Oral Musculature and Cranial Nerve Assessment    Oral Motor General Assessment generalized oral motor weakness;oral labial or buccal impairment  -RB    Oral Labial or Buccal Impairment, Detail, Cranial Nerve VII (Facial): right labial droop  -RB       Motor Speech Assessment/Intervention    Motor Speech Function moderate impairment;familiar listener;unfamiliar listener  -RB    Characteristics Consistent with Dysarthria slow rate;decreased intensity;decreased articulation;decreased breath support;slurred speech  -RB    Characteristics Consistent with Apraxia groping;inconsistent errors;omissions;substitutions  -RB    Initiation of Phonation (Communication) WFL  -RB    Automatic Speech (Communication) WFL;response to greeting  -RB    Conversational Speech (Communication) moderate impairment  -RB    Speech intelligibility 90%;in quiet environment;in connected speech;with unfamiliar listener  -RB       Standardized Tests    Cognitive/Memory Tests RBANS: Repeatable Battery for the Assessment of Neuropsychological Status  -RB       RBANS- Repeatable Battery for the Assessment of Neuropsychological Status    Language Index Score 89  -RB    Language Qualitative Description average  -RB              User Key  (r) = Recorded By, (t) = Taken By, (c) = Cosigned By      Initials Name Provider Type    Caprice Arnold MA,CCC-SLP Speech and Language Pathologist                        ACTIVITY  ACCURACY ASSISTANCE NEEDED   LTGs:     Pt will be able to express all wants/thoughts/needs across settings and situations at 95% no cues           Pt will  be able to complete basic writing tasks to facilitate communication in home environment at 95% no cues       Pt will be able to engage in speech at the conversational level with maximum articulatory accuracy so that speech is understood by familiar/unfamiliar listeners at 95% no cues           STG:    PT will utilize word finding strategies at 100% no cues               STG:   Pt will utilize compensatory strategies for speech intelligibility in conversation at 100% no cues                   STG:  Pt will improve written language skills by writing grammatically correct sentences at 90% no cues      STG:   Pt will summarize written and auditory information at 90% no cues        Pt will utilize appropriate speech sounds during connected speech in order to successfully make needs and wants known across all environments at 100% no cues          Certification: 7/18/25-10/17/25               OP SLP Education       Row Name 07/18/25 1400       Education    Barriers to Learning No barriers identified  -RB    Education Provided Patient participated in establishing goals and treatment plan;Patient demonstrated recommended strategies;Patient requires further education on strategies, risks  -RB    Assessed Learning needs;Learning motivation;Learning readiness;Learning preferences  -RB    Learning Motivation Strong  -RB    Learning Method Explanation;Demonstration;Teach back;Written materials  -RB    Teaching Response Verbalized understanding;Demonstrated understanding;Reinforcement needed  -RB    Education Comments HEP: VE tasks, motor speech tasks, dysarthria strategies, albina lew  -RB              User Key  (r) = Recorded By, (t) = Taken By, (c) = Cosigned By      Initials Name Effective Dates    Caprice Arnold MA,CCC-SLP 03/28/25 -                              Time Calculation:        Therapy Charges for Today       Code Description Service Date Service Provider Modifiers Qty    97235768885 HC ST EVAL SPEECH AND PROD W  LANG  4 7/18/2025 Caprice Gutierrez MA,CCC-SLP GN 1          [facility] Speech Language Pathology   [address]  [place] [address]  Caprice Gutierrez MA CCC-SLP [facility] license: [id number]        PHYSICIAN: Angelica Wan APRN    NPI: [id number]         I certify that the therapy services are furnished while this patient is under my care.  The services outlined above are required by this patient, and will be reviewed every 90 days.                PHYSICIAN:                                         DATE:      Please sign and return via fax to [phone].   Thank you,   [facility] SpeechTherapy.             Caprice Gutierrez MA,CCC-SLP  7/18/2025

## 2025-07-23 ENCOUNTER — HOSPITAL ENCOUNTER (OUTPATIENT)
Dept: PHYSICAL THERAPY | Facility: HOSPITAL | Age: 85
Setting detail: THERAPIES SERIES
Discharge: HOME | End: 2025-07-23
Payer: MEDICARE

## 2025-07-23 ENCOUNTER — HOSPITAL ENCOUNTER (OUTPATIENT)
Dept: OCCUPATIONAL THERAPY | Facility: HOSPITAL | Age: 85
Setting detail: THERAPIES SERIES
Discharge: HOME | End: 2025-07-23
Payer: MEDICARE

## 2025-07-23 DIAGNOSIS — R26.89 BALANCE DISORDER: ICD-10-CM

## 2025-07-23 DIAGNOSIS — Z86.73 HISTORY OF CVA (CEREBROVASCULAR ACCIDENT): Primary | ICD-10-CM

## 2025-07-23 PROCEDURE — 97112 NEUROMUSCULAR REEDUCATION: CPT

## 2025-07-23 PROCEDURE — 97530 THERAPEUTIC ACTIVITIES: CPT

## 2025-07-23 PROCEDURE — 97110 THERAPEUTIC EXERCISES: CPT

## 2025-07-23 PROCEDURE — 97116 GAIT TRAINING THERAPY: CPT

## 2025-07-23 NOTE — THERAPY TREATMENT NOTE
Occupational Therapy Daily Note      Patient: Laury Eugene   : 1940  MRN: [id number]   Diagnosis/ICD-10 Code:      ICD-10-CM ICD-9-CM   1. History of CVA (cerebrovascular accident)  Z86.73 V12.54      Referring practitioner: QUIRINO Wilcox*  Today's Date: 2025    Subjective:   Patient reports: Required assistance with setting bathmat. Has not cooked a cobbler in 2 years. Would like to do this again.   Pain: 0  Treatment has included: therapeutic exercise, neuromuscular re-education, and therapeutic activity    Objective   See Exercise, Manual, and Modality Logs for complete treatment.    PT Neuro          Assessment & Plan       Assessment  Assessment details: Pt provided information on  strengthening. Pt participated in  strengthening and floor retrieval.  Pt demonstrates a continued need for therapy services to address apraxia, motor planning, R hand FMC/GMC and R hand strength. Deficits are limiting her participation in ADL/IADL and leisure activities. Will continue POC to progress toward established goals.       Plan  Plan details: Will continue OT intervention 1x/week to address deficits in order to progress toward goals.             Therapy Charges for Today       Code Description Service Date Service Provider Modifiers Qty    [id number]  OT NEUROMUSC RE EDUCATION EA 15 MIN 2025 Doretha Chou OT GO 2    [id number]  OT THERAPEUTIC ACT EA 15 MIN 2025 Doretha Chou OT GO 1            Doretha Long, OTD, OTR/L  [place] License #: [id number]  Occupational Therapist

## 2025-07-23 NOTE — THERAPY TREATMENT NOTE
Physical Therapy Daily Note      Patient: Laury Eugene   : 1940  MRN: [id number]   Diagnosis/ICD-10 Code:      ICD-10-CM ICD-9-CM   1. History of CVA (cerebrovascular accident)  Z86.73 V12.54   2. Balance disorder  R26.89 781.99      Referring practitioner: QUIRINO Wilcox*  Today's Date: 2025    Subjective:   Patient reports: feeling okay today.  Pain: 0/10  Treatment has included: therapeutic exercise, neuromuscular re-education, and gait training    Objective   See Exercise, Manual, and Modality Logs for complete treatment.    PT Neuro      25   Total Minutes   38312 - Gait Training Minutes  10   62124 - PT Therapeutic Exercise Minutes 8   64293 -  PT Neuromuscular Reeducation Minutes 22   Exercise 1   Exercise Name 1 NuStep Lvl 6   Time 1 6 min   Exercise 2   Exercise Name 2 Modified tandem with gentle head nodding   Reps 2 15 ea side   Additional Comments mild dizziness and lightheadedness; HEP   Exercise 3   Exercise Name 3 Romberg with head nodding   Reps 3 10 total   Additional Comments mild dizziness and lightheadedness; HEP   Exercise 4   Exercise Name 4 Fwd/side stepping onto airex and return   Reps 4 10 ea direction   Additional Comments LOB 30-50% of the time requiring CGA x1 to restore upward posture   Exercise 5   Exercise Name 5 Ambulation in clinic with trekking poles   Reps 5 200 ft   Additional Comments mod VC for increased step length and reciprocal arm swing       Assessment & Plan       Assessment  Assessment details: Patient demonstrated apraxia, delayed command following and occasional bouts of confusion during specific activity instructions. Experienced mild motion sensitivity when looking down, which improved slightly with more repetitions during static balance with head nodding. Multiple instances of LOB during stepping activities requiring CGA x1 to correct. Had difficulty executing turns on uneven surface and took short, shuffling steps. Will continue  progressing gait activities, static and dynamic balance to reduce fall risk with all ADLs.     Plan  Plan details: Will continue progressing gait activities, static and dynamic balance as tolerated.        Therapy Charges for Today       Code Description Service Date Service Provider Modifiers Qty    [id number]  PT NEUROMUSC RE EDUCATION EA 15 MIN 7/23/2025 Andreina Pillai, PT Student GP 1    [id number]  PT THER PROC EA 15 MIN 7/23/2025 Andreina Pillai, PT Student GP 1    [id number]  GAIT TRAINING EA 15 MIN 7/23/2025 Andreina Pillai, PT Student GP 1

## 2025-07-25 ENCOUNTER — APPOINTMENT (OUTPATIENT)
Dept: SPEECH THERAPY | Facility: HOSPITAL | Age: 85
End: 2025-07-25
Payer: MEDICARE

## 2025-07-30 ENCOUNTER — APPOINTMENT (OUTPATIENT)
Dept: SPEECH THERAPY | Facility: HOSPITAL | Age: 85
End: 2025-07-30
Payer: MEDICARE

## 2025-07-30 ENCOUNTER — APPOINTMENT (OUTPATIENT)
Dept: OCCUPATIONAL THERAPY | Facility: HOSPITAL | Age: 85
End: 2025-07-30
Payer: MEDICARE

## 2025-08-08 ENCOUNTER — HOSPITAL ENCOUNTER (OUTPATIENT)
Facility: HOSPITAL | Age: 85
Setting detail: THERAPIES SERIES
Discharge: HOME | End: 2025-08-08
Payer: MEDICARE

## 2025-08-08 DIAGNOSIS — Z86.73 HISTORY OF CVA (CEREBROVASCULAR ACCIDENT): Primary | ICD-10-CM

## 2025-08-08 DIAGNOSIS — R47.01 APHASIA: ICD-10-CM

## 2025-08-08 DIAGNOSIS — R47.1 DYSARTHRIA: Primary | ICD-10-CM

## 2025-08-08 PROCEDURE — 92507 TX SP LANG VOICE COMM INDIV: CPT | Performed by: SPEECH-LANGUAGE PATHOLOGIST

## 2025-08-08 PROCEDURE — 97166 OT EVAL MOD COMPLEX 45 MIN: CPT

## 2025-08-08 PROCEDURE — 97112 NEUROMUSCULAR REEDUCATION: CPT

## 2025-08-08 PROCEDURE — 97530 THERAPEUTIC ACTIVITIES: CPT

## 2025-08-14 ENCOUNTER — HOSPITAL ENCOUNTER (OUTPATIENT)
Facility: HOSPITAL | Age: 85
Setting detail: THERAPIES SERIES
Discharge: HOME | End: 2025-08-14
Payer: MEDICARE

## 2025-08-14 DIAGNOSIS — Z86.73 HISTORY OF CVA (CEREBROVASCULAR ACCIDENT): Primary | ICD-10-CM

## 2025-08-14 PROCEDURE — 97530 THERAPEUTIC ACTIVITIES: CPT

## 2025-08-14 PROCEDURE — 97112 NEUROMUSCULAR REEDUCATION: CPT

## 2025-08-14 PROCEDURE — 97110 THERAPEUTIC EXERCISES: CPT

## 2025-08-20 ENCOUNTER — HOSPITAL ENCOUNTER (OUTPATIENT)
Facility: HOSPITAL | Age: 85
Setting detail: THERAPIES SERIES
Discharge: HOME | End: 2025-08-20
Payer: MEDICARE

## 2025-08-20 DIAGNOSIS — R26.89 BALANCE DISORDER: ICD-10-CM

## 2025-08-20 DIAGNOSIS — Z86.73 HISTORY OF CVA (CEREBROVASCULAR ACCIDENT): Primary | ICD-10-CM

## 2025-08-20 PROCEDURE — 97162 PT EVAL MOD COMPLEX 30 MIN: CPT | Performed by: PHYSICAL THERAPIST

## 2025-08-20 PROCEDURE — 97110 THERAPEUTIC EXERCISES: CPT | Performed by: PHYSICAL THERAPIST

## 2025-08-20 PROCEDURE — 97112 NEUROMUSCULAR REEDUCATION: CPT | Performed by: PHYSICAL THERAPIST

## 2025-08-21 ENCOUNTER — OFFICE VISIT (OUTPATIENT)
Dept: NEUROLOGY | Facility: CLINIC | Age: 85
End: 2025-08-21
Payer: MEDICARE

## 2025-08-21 VITALS
WEIGHT: 132 LBS | TEMPERATURE: 98 F | SYSTOLIC BLOOD PRESSURE: 132 MMHG | HEART RATE: 59 BPM | HEIGHT: 60 IN | DIASTOLIC BLOOD PRESSURE: 68 MMHG | BODY MASS INDEX: 25.91 KG/M2 | OXYGEN SATURATION: 97 %

## 2025-08-21 DIAGNOSIS — Z86.73 HISTORY OF STROKE: Primary | ICD-10-CM

## 2025-08-21 DIAGNOSIS — E78.5 HYPERLIPIDEMIA, UNSPECIFIED HYPERLIPIDEMIA TYPE: ICD-10-CM

## 2025-08-21 DIAGNOSIS — I10 ESSENTIAL HYPERTENSION: ICD-10-CM

## 2025-08-21 DIAGNOSIS — I48.0 PAROXYSMAL ATRIAL FIBRILLATION: ICD-10-CM

## 2025-08-21 RX ORDER — ESCITALOPRAM OXALATE 20 MG/1
20 TABLET ORAL DAILY
COMMUNITY
Start: 2025-07-15

## 2025-08-22 ENCOUNTER — HOSPITAL ENCOUNTER (OUTPATIENT)
Facility: HOSPITAL | Age: 85
Setting detail: THERAPIES SERIES
Discharge: HOME | End: 2025-08-22
Payer: MEDICARE

## 2025-08-22 DIAGNOSIS — R47.01 APHASIA: ICD-10-CM

## 2025-08-22 DIAGNOSIS — Z86.73 HISTORY OF CVA (CEREBROVASCULAR ACCIDENT): Primary | ICD-10-CM

## 2025-08-22 DIAGNOSIS — R48.2 APRAXIA: ICD-10-CM

## 2025-08-22 DIAGNOSIS — R47.1 DYSARTHRIA: Primary | ICD-10-CM

## 2025-08-22 PROCEDURE — 97112 NEUROMUSCULAR REEDUCATION: CPT

## 2025-08-22 PROCEDURE — 92507 TX SP LANG VOICE COMM INDIV: CPT | Performed by: SPEECH-LANGUAGE PATHOLOGIST

## 2025-08-22 PROCEDURE — 97110 THERAPEUTIC EXERCISES: CPT

## 2025-08-27 ENCOUNTER — HOSPITAL ENCOUNTER (OUTPATIENT)
Facility: HOSPITAL | Age: 85
Setting detail: THERAPIES SERIES
Discharge: HOME | End: 2025-08-27
Payer: MEDICARE

## 2025-08-27 DIAGNOSIS — R26.89 BALANCE DISORDER: ICD-10-CM

## 2025-08-27 DIAGNOSIS — Z86.73 HISTORY OF CVA (CEREBROVASCULAR ACCIDENT): Primary | ICD-10-CM

## 2025-08-27 DIAGNOSIS — R47.1 DYSARTHRIA: Primary | ICD-10-CM

## 2025-08-27 DIAGNOSIS — R48.2 APRAXIA: ICD-10-CM

## 2025-08-27 DIAGNOSIS — R47.01 APHASIA: ICD-10-CM

## 2025-08-27 PROCEDURE — 97112 NEUROMUSCULAR REEDUCATION: CPT | Performed by: PHYSICAL THERAPIST

## 2025-08-27 PROCEDURE — 97116 GAIT TRAINING THERAPY: CPT | Performed by: PHYSICAL THERAPIST

## 2025-08-27 PROCEDURE — 92507 TX SP LANG VOICE COMM INDIV: CPT | Performed by: SPEECH-LANGUAGE PATHOLOGIST

## 2025-08-27 PROCEDURE — 97110 THERAPEUTIC EXERCISES: CPT | Performed by: PHYSICAL THERAPIST

## 2025-08-29 ENCOUNTER — HOSPITAL ENCOUNTER (OUTPATIENT)
Facility: HOSPITAL | Age: 85
Setting detail: THERAPIES SERIES
Discharge: HOME | End: 2025-08-29
Payer: MEDICARE

## 2025-08-29 DIAGNOSIS — Z86.73 HISTORY OF CVA (CEREBROVASCULAR ACCIDENT): Primary | ICD-10-CM

## 2025-08-29 PROCEDURE — 97110 THERAPEUTIC EXERCISES: CPT

## 2025-08-29 PROCEDURE — 97112 NEUROMUSCULAR REEDUCATION: CPT

## 2025-08-29 PROCEDURE — 97530 THERAPEUTIC ACTIVITIES: CPT

## (undated) DEVICE — SINGLE-USE BIOPSY FORCEPS: Brand: RADIAL JAW 4

## (undated) DEVICE — ST LINER SAFECAP GRN RED CP STRL

## (undated) DEVICE — ADAPT CLN LUM OLYMP AIR/H20

## (undated) DEVICE — SOL IRR H2O BTL 1000ML STRL

## (undated) DEVICE — GRADUATE CONTN 1000ML

## (undated) DEVICE — SAFELINER SUCTION CANISTER 1000CC: Brand: DEROYAL

## (undated) DEVICE — FIRST STEP BEDSIDE ADD WATER KIT - RESEALABLE STAND-UP POUCH, ENDOSCOPIC CLEANING PAD - 1 POUCH: Brand: FIRST STEP BEDSIDE ADD WATER KIT - RESEALABLE STAND-UP POUCH, ENDOSCOPIC CLEANIN

## (undated) DEVICE — HYBRID CO2 TUBING/CAP SET FOR OLYMPUS® SCOPES & CO2 SOURCE: Brand: ERBE

## (undated) DEVICE — CONTN GRAD MEAS TRIANG 32OZ BLK

## (undated) DEVICE — INTRO ACCSR BLNT TP

## (undated) DEVICE — SOLIDIFIER LIQ PREMISORB 1500CC

## (undated) DEVICE — THE BITE BLOCK MAXI, LATEX FREE STRAP IS USED TO PROTECT THE ENDOSCOPE INSERTION TUBE FROM BEING BITTEN BY THE PATIENT.

## (undated) DEVICE — SYR LUERLOK 50ML

## (undated) DEVICE — KT ORCA ORCAPOD DISP STRL

## (undated) DEVICE — TUBING, SUCTION, 1/4" X 10', STRAIGHT: Brand: MEDLINE

## (undated) DEVICE — LUBE JELLY FOIL PACK 1.4 OZ: Brand: MEDLINE INDUSTRIES, INC.